# Patient Record
Sex: FEMALE | Race: WHITE | NOT HISPANIC OR LATINO | Employment: OTHER | URBAN - METROPOLITAN AREA
[De-identification: names, ages, dates, MRNs, and addresses within clinical notes are randomized per-mention and may not be internally consistent; named-entity substitution may affect disease eponyms.]

---

## 2017-01-07 ENCOUNTER — HOSPITAL ENCOUNTER (OUTPATIENT)
Dept: MRI IMAGING | Facility: HOSPITAL | Age: 72
Discharge: HOME | End: 2017-01-07
Payer: MEDICARE

## 2017-01-07 DIAGNOSIS — G50.8: ICD-10-CM

## 2017-01-07 PROCEDURE — A9585 GADOBUTROL INJECTION: HCPCS | Performed by: FAMILY MEDICINE

## 2017-01-07 PROCEDURE — 70553 MRI BRAIN STEM W/O & W/DYE: CPT

## 2017-01-07 RX ADMIN — GADOBUTROL 6 ML: 604.72 INJECTION INTRAVENOUS at 10:38

## 2017-02-15 ENCOUNTER — APPOINTMENT (EMERGENCY)
Dept: RADIOLOGY | Facility: HOSPITAL | Age: 72
End: 2017-02-15
Payer: MEDICARE

## 2017-02-15 ENCOUNTER — APPOINTMENT (EMERGENCY)
Dept: CT IMAGING | Facility: HOSPITAL | Age: 72
End: 2017-02-15
Payer: MEDICARE

## 2017-02-15 ENCOUNTER — HOSPITAL ENCOUNTER (EMERGENCY)
Facility: HOSPITAL | Age: 72
Discharge: HOME | End: 2017-02-15
Attending: EMERGENCY MEDICINE | Admitting: EMERGENCY MEDICINE
Payer: MEDICARE

## 2017-02-15 VITALS
TEMPERATURE: 97.5 F | RESPIRATION RATE: 18 BRPM | SYSTOLIC BLOOD PRESSURE: 131 MMHG | DIASTOLIC BLOOD PRESSURE: 57 MMHG | HEART RATE: 66 BPM | OXYGEN SATURATION: 96 %

## 2017-02-15 DIAGNOSIS — K59.00 CONSTIPATION: ICD-10-CM

## 2017-02-15 DIAGNOSIS — R10.84 GENERALIZED ABDOMINAL PAIN: Primary | ICD-10-CM

## 2017-02-15 LAB
ALBUMIN SERPL BCP-MCNC: 3.7 G/DL (ref 3.5–5)
ALP SERPL-CCNC: 66 U/L (ref 46–116)
ALT SERPL W P-5'-P-CCNC: 22 U/L (ref 12–78)
ANION GAP SERPL CALCULATED.3IONS-SCNC: 11 MMOL/L (ref 4–13)
AST SERPL W P-5'-P-CCNC: 16 U/L (ref 5–45)
ATRIAL RATE: 65 BPM
BASOPHILS # BLD AUTO: 0.01 THOUSANDS/ΜL (ref 0–0.1)
BASOPHILS NFR BLD AUTO: 0 % (ref 0–1)
BILIRUB SERPL-MCNC: 0.18 MG/DL (ref 0.2–1)
BUN SERPL-MCNC: 23 MG/DL (ref 5–25)
CALCIUM SERPL-MCNC: 9.1 MG/DL (ref 8.3–10.1)
CHLORIDE SERPL-SCNC: 103 MMOL/L (ref 100–108)
CO2 SERPL-SCNC: 27 MMOL/L (ref 21–32)
CREAT SERPL-MCNC: 1.1 MG/DL (ref 0.6–1.3)
EOSINOPHIL # BLD AUTO: 0.1 THOUSAND/ΜL (ref 0–0.61)
EOSINOPHIL NFR BLD AUTO: 1 % (ref 0–6)
ERYTHROCYTE [DISTWIDTH] IN BLOOD BY AUTOMATED COUNT: 14.4 % (ref 11.6–15.1)
GFR SERPL CREATININE-BSD FRML MDRD: 48.7 ML/MIN/1.73SQ M
GLUCOSE SERPL-MCNC: 110 MG/DL (ref 65–140)
HCT VFR BLD AUTO: 33.9 % (ref 34.8–46.1)
HGB BLD-MCNC: 11.7 G/DL (ref 11.5–15.4)
LIPASE SERPL-CCNC: 116 U/L (ref 73–393)
LYMPHOCYTES # BLD AUTO: 2.23 THOUSANDS/ΜL (ref 0.6–4.47)
LYMPHOCYTES NFR BLD AUTO: 26 % (ref 14–44)
MCH RBC QN AUTO: 36.8 PG (ref 26.8–34.3)
MCHC RBC AUTO-ENTMCNC: 34.5 G/DL (ref 31.4–37.4)
MCV RBC AUTO: 107 FL (ref 82–98)
MONOCYTES # BLD AUTO: 0.87 THOUSAND/ΜL (ref 0.17–1.22)
MONOCYTES NFR BLD AUTO: 10 % (ref 4–12)
NEUTROPHILS # BLD AUTO: 5.34 THOUSANDS/ΜL (ref 1.85–7.62)
NEUTS SEG NFR BLD AUTO: 63 % (ref 43–75)
NRBC BLD AUTO-RTO: 0 /100 WBCS
P AXIS: 47 DEGREES
PLATELET # BLD AUTO: 217 THOUSANDS/UL (ref 149–390)
PMV BLD AUTO: 9.7 FL (ref 8.9–12.7)
POTASSIUM SERPL-SCNC: 4 MMOL/L (ref 3.5–5.3)
PR INTERVAL: 154 MS
PROT SERPL-MCNC: 7.2 G/DL (ref 6.4–8.2)
QRS AXIS: 49 DEGREES
QRSD INTERVAL: 84 MS
QT INTERVAL: 402 MS
QTC INTERVAL: 418 MS
RBC # BLD AUTO: 3.18 MILLION/UL (ref 3.81–5.12)
SODIUM SERPL-SCNC: 141 MMOL/L (ref 136–145)
SPECIMEN SOURCE: NORMAL
T WAVE AXIS: 39 DEGREES
TROPONIN I BLD-MCNC: 0.01 NG/ML (ref 0–0.08)
VENTRICULAR RATE: 65 BPM
WBC # BLD AUTO: 8.55 THOUSAND/UL (ref 4.31–10.16)

## 2017-02-15 PROCEDURE — 99284 EMERGENCY DEPT VISIT MOD MDM: CPT

## 2017-02-15 PROCEDURE — 93005 ELECTROCARDIOGRAM TRACING: CPT | Performed by: EMERGENCY MEDICINE

## 2017-02-15 PROCEDURE — 83690 ASSAY OF LIPASE: CPT | Performed by: EMERGENCY MEDICINE

## 2017-02-15 PROCEDURE — 71275 CT ANGIOGRAPHY CHEST: CPT

## 2017-02-15 PROCEDURE — 85025 COMPLETE CBC W/AUTO DIFF WBC: CPT | Performed by: EMERGENCY MEDICINE

## 2017-02-15 PROCEDURE — 80053 COMPREHEN METABOLIC PANEL: CPT

## 2017-02-15 PROCEDURE — 36415 COLL VENOUS BLD VENIPUNCTURE: CPT

## 2017-02-15 PROCEDURE — 84484 ASSAY OF TROPONIN QUANT: CPT

## 2017-02-15 PROCEDURE — 71020 HB CHEST X-RAY 2VW FRONTAL&LATL: CPT

## 2017-02-15 PROCEDURE — 74174 CTA ABD&PLVS W/CONTRAST: CPT

## 2017-02-15 RX ADMIN — IODIXANOL 100 ML: 320 INJECTION, SOLUTION INTRAVASCULAR at 03:18

## 2017-03-21 ENCOUNTER — ALLSCRIPTS OFFICE VISIT (OUTPATIENT)
Dept: OTHER | Facility: OTHER | Age: 72
End: 2017-03-21

## 2017-03-21 ENCOUNTER — LAB REQUISITION (OUTPATIENT)
Dept: LAB | Facility: HOSPITAL | Age: 72
End: 2017-03-21
Payer: MEDICARE

## 2017-03-21 DIAGNOSIS — E78.2 MIXED HYPERLIPIDEMIA: ICD-10-CM

## 2017-03-21 LAB
ALBUMIN SERPL BCP-MCNC: 3.7 G/DL (ref 3.5–5)
ALP SERPL-CCNC: 59 U/L (ref 46–116)
ALT SERPL W P-5'-P-CCNC: 20 U/L (ref 12–78)
ANION GAP SERPL CALCULATED.3IONS-SCNC: 7 MMOL/L (ref 4–13)
AST SERPL W P-5'-P-CCNC: 11 U/L (ref 5–45)
BASOPHILS # BLD AUTO: 0.02 THOUSANDS/ΜL (ref 0–0.1)
BASOPHILS NFR BLD AUTO: 0 % (ref 0–1)
BILIRUB SERPL-MCNC: 0.34 MG/DL (ref 0.2–1)
BUN SERPL-MCNC: 22 MG/DL (ref 5–25)
CALCIUM SERPL-MCNC: 9.1 MG/DL (ref 8.3–10.1)
CHLORIDE SERPL-SCNC: 106 MMOL/L (ref 100–108)
CHOLEST SERPL-MCNC: 174 MG/DL (ref 50–200)
CO2 SERPL-SCNC: 29 MMOL/L (ref 21–32)
CREAT SERPL-MCNC: 0.95 MG/DL (ref 0.6–1.3)
EOSINOPHIL # BLD AUTO: 0.08 THOUSAND/ΜL (ref 0–0.61)
EOSINOPHIL NFR BLD AUTO: 1 % (ref 0–6)
ERYTHROCYTE [DISTWIDTH] IN BLOOD BY AUTOMATED COUNT: 14.2 % (ref 11.6–15.1)
GFR SERPL CREATININE-BSD FRML MDRD: 57.7 ML/MIN/1.73SQ M
GLUCOSE P FAST SERPL-MCNC: 86 MG/DL (ref 65–99)
HCT VFR BLD AUTO: 33 % (ref 34.8–46.1)
HDLC SERPL-MCNC: 99 MG/DL (ref 40–60)
HGB BLD-MCNC: 10.9 G/DL (ref 11.5–15.4)
LDLC SERPL CALC-MCNC: 62 MG/DL (ref 0–100)
LYMPHOCYTES # BLD AUTO: 1.7 THOUSANDS/ΜL (ref 0.6–4.47)
LYMPHOCYTES NFR BLD AUTO: 29 % (ref 14–44)
MCH RBC QN AUTO: 35.9 PG (ref 26.8–34.3)
MCHC RBC AUTO-ENTMCNC: 33 G/DL (ref 31.4–37.4)
MCV RBC AUTO: 109 FL (ref 82–98)
MONOCYTES # BLD AUTO: 0.67 THOUSAND/ΜL (ref 0.17–1.22)
MONOCYTES NFR BLD AUTO: 11 % (ref 4–12)
NEUTROPHILS # BLD AUTO: 3.43 THOUSANDS/ΜL (ref 1.85–7.62)
NEUTS SEG NFR BLD AUTO: 59 % (ref 43–75)
NRBC BLD AUTO-RTO: 0 /100 WBCS
PLATELET # BLD AUTO: 211 THOUSANDS/UL (ref 149–390)
PMV BLD AUTO: 10.2 FL (ref 8.9–12.7)
POTASSIUM SERPL-SCNC: 4.6 MMOL/L (ref 3.5–5.3)
PROT SERPL-MCNC: 6.7 G/DL (ref 6.4–8.2)
RBC # BLD AUTO: 3.04 MILLION/UL (ref 3.81–5.12)
SODIUM SERPL-SCNC: 142 MMOL/L (ref 136–145)
TRIGL SERPL-MCNC: 64 MG/DL
WBC # BLD AUTO: 5.93 THOUSAND/UL (ref 4.31–10.16)

## 2017-03-21 PROCEDURE — 80061 LIPID PANEL: CPT | Performed by: FAMILY MEDICINE

## 2017-03-21 PROCEDURE — 85025 COMPLETE CBC W/AUTO DIFF WBC: CPT | Performed by: FAMILY MEDICINE

## 2017-03-21 PROCEDURE — 80053 COMPREHEN METABOLIC PANEL: CPT | Performed by: FAMILY MEDICINE

## 2017-04-10 ENCOUNTER — ALLSCRIPTS OFFICE VISIT (OUTPATIENT)
Dept: OTHER | Facility: OTHER | Age: 72
End: 2017-04-10

## 2017-04-11 ENCOUNTER — GENERIC CONVERSION - ENCOUNTER (OUTPATIENT)
Dept: OTHER | Facility: OTHER | Age: 72
End: 2017-04-11

## 2017-05-22 ENCOUNTER — GENERIC CONVERSION - ENCOUNTER (OUTPATIENT)
Dept: OTHER | Facility: OTHER | Age: 72
End: 2017-05-22

## 2017-06-29 ENCOUNTER — ALLSCRIPTS OFFICE VISIT (OUTPATIENT)
Dept: OTHER | Facility: OTHER | Age: 72
End: 2017-06-29

## 2017-07-11 ENCOUNTER — ALLSCRIPTS OFFICE VISIT (OUTPATIENT)
Dept: OTHER | Facility: OTHER | Age: 72
End: 2017-07-11

## 2017-07-11 ENCOUNTER — LAB REQUISITION (OUTPATIENT)
Dept: LAB | Facility: HOSPITAL | Age: 72
End: 2017-07-11
Payer: MEDICARE

## 2017-07-11 DIAGNOSIS — Z78.0 ASYMPTOMATIC MENOPAUSAL STATE: ICD-10-CM

## 2017-07-11 DIAGNOSIS — D64.9 ANEMIA: ICD-10-CM

## 2017-07-11 DIAGNOSIS — I10 ESSENTIAL (PRIMARY) HYPERTENSION: ICD-10-CM

## 2017-07-11 DIAGNOSIS — I25.10 ATHEROSCLEROTIC HEART DISEASE OF NATIVE CORONARY ARTERY WITHOUT ANGINA PECTORIS: ICD-10-CM

## 2017-07-11 LAB
ALBUMIN SERPL BCP-MCNC: 3.8 G/DL (ref 3.5–5)
ALP SERPL-CCNC: 44 U/L (ref 46–116)
ALT SERPL W P-5'-P-CCNC: 18 U/L (ref 12–78)
ANION GAP SERPL CALCULATED.3IONS-SCNC: 7 MMOL/L (ref 4–13)
AST SERPL W P-5'-P-CCNC: 14 U/L (ref 5–45)
BASOPHILS # BLD AUTO: 0.02 THOUSANDS/ΜL (ref 0–0.1)
BASOPHILS NFR BLD AUTO: 0 % (ref 0–1)
BILIRUB SERPL-MCNC: 0.37 MG/DL (ref 0.2–1)
BUN SERPL-MCNC: 17 MG/DL (ref 5–25)
CALCIUM SERPL-MCNC: 9.5 MG/DL (ref 8.3–10.1)
CHLORIDE SERPL-SCNC: 105 MMOL/L (ref 100–108)
CHOLEST SERPL-MCNC: 172 MG/DL (ref 50–200)
CO2 SERPL-SCNC: 27 MMOL/L (ref 21–32)
CREAT SERPL-MCNC: 0.97 MG/DL (ref 0.6–1.3)
EOSINOPHIL # BLD AUTO: 0.06 THOUSAND/ΜL (ref 0–0.61)
EOSINOPHIL NFR BLD AUTO: 1 % (ref 0–6)
ERYTHROCYTE [DISTWIDTH] IN BLOOD BY AUTOMATED COUNT: 15 % (ref 11.6–15.1)
GFR SERPL CREATININE-BSD FRML MDRD: 56.3 ML/MIN/1.73SQ M
GLUCOSE P FAST SERPL-MCNC: 97 MG/DL (ref 65–99)
HCT VFR BLD AUTO: 35.9 % (ref 34.8–46.1)
HDLC SERPL-MCNC: 84 MG/DL (ref 40–60)
HGB BLD-MCNC: 11.9 G/DL (ref 11.5–15.4)
LDLC SERPL CALC-MCNC: 62 MG/DL (ref 0–100)
LYMPHOCYTES # BLD AUTO: 1.81 THOUSANDS/ΜL (ref 0.6–4.47)
LYMPHOCYTES NFR BLD AUTO: 22 % (ref 14–44)
MCH RBC QN AUTO: 36.5 PG (ref 26.8–34.3)
MCHC RBC AUTO-ENTMCNC: 33.1 G/DL (ref 31.4–37.4)
MCV RBC AUTO: 110 FL (ref 82–98)
MONOCYTES # BLD AUTO: 1.11 THOUSAND/ΜL (ref 0.17–1.22)
MONOCYTES NFR BLD AUTO: 14 % (ref 4–12)
NEUTROPHILS # BLD AUTO: 5 THOUSANDS/ΜL (ref 1.85–7.62)
NEUTS SEG NFR BLD AUTO: 63 % (ref 43–75)
NRBC BLD AUTO-RTO: 0 /100 WBCS
PLATELET # BLD AUTO: 247 THOUSANDS/UL (ref 149–390)
PMV BLD AUTO: 10.5 FL (ref 8.9–12.7)
POTASSIUM SERPL-SCNC: 4.7 MMOL/L (ref 3.5–5.3)
PROT SERPL-MCNC: 6.6 G/DL (ref 6.4–8.2)
RBC # BLD AUTO: 3.26 MILLION/UL (ref 3.81–5.12)
SODIUM SERPL-SCNC: 139 MMOL/L (ref 136–145)
TRIGL SERPL-MCNC: 128 MG/DL
TSH SERPL DL<=0.05 MIU/L-ACNC: 0.9 UIU/ML (ref 0.36–3.74)
WBC # BLD AUTO: 8.08 THOUSAND/UL (ref 4.31–10.16)

## 2017-07-11 PROCEDURE — 80061 LIPID PANEL: CPT | Performed by: FAMILY MEDICINE

## 2017-07-11 PROCEDURE — 80053 COMPREHEN METABOLIC PANEL: CPT | Performed by: FAMILY MEDICINE

## 2017-07-11 PROCEDURE — 85025 COMPLETE CBC W/AUTO DIFF WBC: CPT | Performed by: FAMILY MEDICINE

## 2017-07-11 PROCEDURE — 84443 ASSAY THYROID STIM HORMONE: CPT | Performed by: FAMILY MEDICINE

## 2017-07-19 ENCOUNTER — HOSPITAL ENCOUNTER (OUTPATIENT)
Dept: BONE DENSITY | Facility: MEDICAL CENTER | Age: 72
Discharge: HOME | End: 2017-07-19
Payer: MEDICARE

## 2017-07-19 DIAGNOSIS — Z78.0 ASYMPTOMATIC MENOPAUSAL STATE: ICD-10-CM

## 2017-07-19 PROCEDURE — 77080 DXA BONE DENSITY AXIAL: CPT

## 2017-07-25 ENCOUNTER — GENERIC CONVERSION - ENCOUNTER (OUTPATIENT)
Dept: OTHER | Facility: OTHER | Age: 72
End: 2017-07-25

## 2017-08-13 ENCOUNTER — ALLSCRIPTS OFFICE VISIT (OUTPATIENT)
Dept: OTHER | Facility: OTHER | Age: 72
End: 2017-08-13

## 2017-08-22 ENCOUNTER — APPOINTMENT (OUTPATIENT)
Dept: LAB | Facility: HOSPITAL | Age: 72
End: 2017-08-22
Payer: MEDICARE

## 2017-08-22 ENCOUNTER — ALLSCRIPTS OFFICE VISIT (OUTPATIENT)
Dept: OTHER | Facility: OTHER | Age: 72
End: 2017-08-22

## 2017-08-22 DIAGNOSIS — R23.3 SPONTANEOUS ECCHYMOSES: ICD-10-CM

## 2017-08-22 DIAGNOSIS — G50.8: ICD-10-CM

## 2017-08-22 LAB
BASOPHILS # BLD AUTO: 0.02 THOUSANDS/ΜL (ref 0–0.1)
BASOPHILS NFR BLD AUTO: 0 % (ref 0–1)
EOSINOPHIL # BLD AUTO: 0.1 THOUSAND/ΜL (ref 0–0.61)
EOSINOPHIL NFR BLD AUTO: 1 % (ref 0–6)
ERYTHROCYTE [DISTWIDTH] IN BLOOD BY AUTOMATED COUNT: 14.3 % (ref 11.6–15.1)
HCT VFR BLD AUTO: 33.1 % (ref 34.8–46.1)
HGB BLD-MCNC: 10.9 G/DL (ref 11.5–15.4)
LYMPHOCYTES # BLD AUTO: 1.45 THOUSANDS/ΜL (ref 0.6–4.47)
LYMPHOCYTES NFR BLD AUTO: 21 % (ref 14–44)
MCH RBC QN AUTO: 36.2 PG (ref 26.8–34.3)
MCHC RBC AUTO-ENTMCNC: 32.9 G/DL (ref 31.4–37.4)
MCV RBC AUTO: 110 FL (ref 82–98)
MONOCYTES # BLD AUTO: 0.89 THOUSAND/ΜL (ref 0.17–1.22)
MONOCYTES NFR BLD AUTO: 13 % (ref 4–12)
NEUTROPHILS # BLD AUTO: 4.46 THOUSANDS/ΜL (ref 1.85–7.62)
NEUTS SEG NFR BLD AUTO: 65 % (ref 43–75)
NRBC BLD AUTO-RTO: 0 /100 WBCS
PLATELET # BLD AUTO: 253 THOUSANDS/UL (ref 149–390)
PMV BLD AUTO: 9.8 FL (ref 8.9–12.7)
RBC # BLD AUTO: 3.01 MILLION/UL (ref 3.81–5.12)
WBC # BLD AUTO: 6.96 THOUSAND/UL (ref 4.31–10.16)

## 2017-08-22 PROCEDURE — 36415 COLL VENOUS BLD VENIPUNCTURE: CPT

## 2017-08-22 PROCEDURE — 85025 COMPLETE CBC W/AUTO DIFF WBC: CPT

## 2017-09-12 ENCOUNTER — ALLSCRIPTS OFFICE VISIT (OUTPATIENT)
Dept: OTHER | Facility: OTHER | Age: 72
End: 2017-09-12

## 2017-09-12 ENCOUNTER — TRANSCRIBE ORDERS (OUTPATIENT)
Dept: ADMINISTRATIVE | Facility: HOSPITAL | Age: 72
End: 2017-09-12

## 2017-09-12 DIAGNOSIS — G50.8: Primary | ICD-10-CM

## 2017-09-20 ENCOUNTER — LAB CONVERSION - ENCOUNTER (OUTPATIENT)
Dept: OTHER | Facility: OTHER | Age: 72
End: 2017-09-20

## 2017-09-20 LAB
A/G RATIO (HISTORICAL): 1.7 (CALC) (ref 1–2.5)
ALBUMIN SERPL BCP-MCNC: 3.9 G/DL (ref 3.6–5.1)
ALP SERPL-CCNC: 49 U/L (ref 33–130)
ALT SERPL W P-5'-P-CCNC: 13 U/L (ref 6–29)
AST SERPL W P-5'-P-CCNC: 16 U/L (ref 10–35)
BILIRUB SERPL-MCNC: 0.5 MG/DL (ref 0.2–1.2)
BUN SERPL-MCNC: 12 MG/DL (ref 7–25)
BUN/CREA RATIO (HISTORICAL): NORMAL (CALC) (ref 6–22)
CALCIUM SERPL-MCNC: 9.3 MG/DL (ref 8.6–10.4)
CARBAMAZEPINE LEVEL (HISTORICAL): <2 MG/L (ref 4–12)
CHLORIDE SERPL-SCNC: 104 MMOL/L (ref 98–110)
CO2 SERPL-SCNC: 29 MMOL/L (ref 20–31)
CREAT SERPL-MCNC: 0.84 MG/DL (ref 0.6–0.93)
EGFR AFRICAN AMERICAN (HISTORICAL): 80 ML/MIN/1.73M2
EGFR-AMERICAN CALC (HISTORICAL): 69 ML/MIN/1.73M2
GAMMA GLOBULIN (HISTORICAL): 2.3 G/DL (CALC) (ref 1.9–3.7)
GLUCOSE (HISTORICAL): 87 MG/DL (ref 65–99)
POTASSIUM SERPL-SCNC: 4.2 MMOL/L (ref 3.5–5.3)
SODIUM SERPL-SCNC: 139 MMOL/L (ref 135–146)
TOTAL PROTEIN (HISTORICAL): 6.2 G/DL (ref 6.1–8.1)

## 2017-09-27 ENCOUNTER — GENERIC CONVERSION - ENCOUNTER (OUTPATIENT)
Dept: OTHER | Facility: OTHER | Age: 72
End: 2017-09-27

## 2017-09-30 ENCOUNTER — HOSPITAL ENCOUNTER (OUTPATIENT)
Dept: MRI IMAGING | Facility: HOSPITAL | Age: 72
Discharge: HOME | End: 2017-09-30
Attending: PSYCHIATRY & NEUROLOGY
Payer: MEDICARE

## 2017-09-30 DIAGNOSIS — G50.8: ICD-10-CM

## 2017-09-30 PROCEDURE — A9585 GADOBUTROL INJECTION: HCPCS | Performed by: PSYCHIATRY & NEUROLOGY

## 2017-09-30 PROCEDURE — 70553 MRI BRAIN STEM W/O & W/DYE: CPT

## 2017-09-30 RX ADMIN — GADOBUTROL 6 ML: 604.72 INJECTION INTRAVENOUS at 08:41

## 2017-10-05 ENCOUNTER — GENERIC CONVERSION - ENCOUNTER (OUTPATIENT)
Dept: OTHER | Facility: OTHER | Age: 72
End: 2017-10-05

## 2017-10-26 NOTE — PROGRESS NOTES
Assessment  1  Transient cerebral ischemia (435 9) (G45 9)   2  Trigeminal neuritis (350 1) (G50 8)    Plan  Trigeminal neuritis    · CarBAMazepine  MG Oral Capsule Extended Release 12 Hour; 1 tab PO  daily   Rx By: Joy Lindsay; Dispense: 30 Days ; #:30 Capsule Extended Release 12 Hour; Refill: 3;For: Trigeminal neuritis; SERGIO = N; Print Rx   · (1) BUN; Status:Active; Requested for:12Sep2017;    Perform:[facility]; Due:12Sep2018; Ordered; For:Trigeminal neuritis; Ordered By:Ildefonso Moore;   · (1) COMPREHENSIVE METABOLIC PANEL; Status:Active; Requested for:12Sep2017;    Perform:[facility]; Due:12Sep2018; Ordered; For:Trigeminal neuritis; Ordered By:Ildefonso Moore;   · (1) TEGRETOL(CARBAMAZEPINE); Status:Active; Requested for:12Sep2017;    Perform:[facility]; Due:12Sep2018; Ordered; For:Trigeminal neuritis; Ordered By:Ildefonso Moore;   · * MRI BRAIN W WO CONTRAST; Status:Need Information - Financial Authorization; Requested for:12Sep2017;    Perform:[facility]; Order Comments:TOPETE and FIESTA needed with attn to the right trigeminal nerve; Due:12Sep2018; Last Updated By: Camilla Simon; 9/12/2017 9:41:00 AM;Ordered; For:Trigeminal neuritis;  Ordered By:Ildefonso Moore;    Discussion/Summary  Discussion Summary:   Pt here for trigeminal neuralgia follow upstill with constant right v2 and v3 sxsnotes sxs aggrevated by chewing and hot and cold foodsnotes sxs all on the right sidehad last mri head in jan 2017repeat mri head attn trigeminal areapt was previously on aggrenox after tia in 2011was initially on asa at that time and had a recurrent episode from our review in allrxsprior [facility], pt notified her pcp that the cost of the aggrenox was too muchduscussion and review of the data we could find, rec trial with plavixwas then placed on generic plavix as well as her asa by her pcpwas then noted to have increased ecchymosis in the lower ext and asa discontinuedecchymosis currently on the lower extto cont with plavix at this timedenies any new tia or cva like ssonia tried gabapentin for her TN in the past and no successtrial a low dose of tegretol to see if help hay with eatingto call in one week if no better and of course call if any issues with tolerating the medicationgive generic tegretol 100mg extended release once dailycan increase the tegretol level, will only do one daily and see how pt toleratesto call for any dizziness or gi related issues3 days after updated imaging  Counseling Documentation With Imm: The patient was counseled regarding diagnostic results,-- instructions for management,-- risk factor reductions,-- prognosis,-- patient and family education,-- risks and benefits of treatment options  total time of encounter was 25 minutes-- and-- greater than 50% minutes was spent counseling  Goals and Barriers: The patient has the current Goals: Pt to trial tegretolincrease if pt derrick med and still symptomaticfor any issues with new med  Patient's Capacity to Self-Care: Patient is able to Self-Care  Medication SE Review and Pt Understands Tx: Possible side effects of new medications were reviewed with the patient/guardian today  Patient Guardian understands agrees: The treatment plan was reviewed with the patient/guardian  The patient/guardian understands and agrees with the treatment plan      Chief Complaint  Chief Complaint Free Text Note Form: Patient presents today for a neurological follow up for trigeminal neuralgia  History of Present Illness  HPI: Patient is a 66year old female with PMH of RA on methotrexate for about 10 years, hypercholesterolemia, TIA in 2011 on Aggrenox, HTN, who presents today for neurological follow up  Patient initially seen on 5/20/16 for evaluation of right facial numbness  She was referred by Dr Fred Arriaga  Symptoms started acutely at the end of March 2016   Patient reports she was sick and treated with antibiotics for a sinus infection, then developed the numbness and had cold sores in her mouth  She was treated with valacyclovir and oral steroids  Symptoms predominantly numbness, no pain, facial weakness  No change in speech or swallowing  No vision changes, bowel or bladder changes, trips or falls  No vertigo  Patient had MRI of the brain 4/3/16 which showed 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  Possibilities include nerve sheath tumor vs neurosarcoid vs Lyme or Lymphoma, or demyelinating disease  Patient was seen by neurosurgery and Dr Adal Wells did not feel there was a discrete lesion, less likely tumor  Neurosurgery and neurorads considering LP  Dr Adal Wells planned to repeat MRI brain in July 2016 to see if enhancement has resolved, which would go against a neoplastic process  Prior labs with neg Lyme, ACE, Sjogren?s  Sed rate normal at 11  Of note, sed rate was elevated just before original MRI in March at 29  B12 383  Normal TSH  re rev with pt prior LP done on June 14th  WBCs 1 in the CSF, neg ACE, VZV, HSV, VDRL, Cryto, JCV, NMO  MS panel with elevated MBP at 2 2, normal IgG synthesis rate, 2 OCBs  She had repeat MRI of the brain on July 2nd 2016, ordered by Dr Adal Wells  This demonstrated persistent, but improving 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, with less involvement of the nerve root entry zone and improvement of previously seen enlargement of the nerve  This is unlikely to represent nerve sheath tumor and more likely to represent improving infectious/inflammatory process  Patient then saw Dr Adal Wells for follow up on July 8th 2016 and he told her again, not likely to be a nerve sheath tumor that would require surgery, and likely a viral process that caused the enhancement  He was pleased that this was resolving and did not feel any further neurosurgical follow up was needed at this time  here for follow up   last seen in april 2017  She continues with numbness of the right side of the face, mainly V2, V3 divisions, perioral  She denies any weakness, trouble with speech or swallowing  She does need to chew on the left side and her taste is not as great as in the past  pt feels sxs are exacerbated by hot and cold foods  pt notes sxs all on the right side  pt had last mri head in jan 2017  rec repeat mri head attn trigeminal area due to persistence of sxs  also pt was previously on aggrenox after [place] in 2011  pt was initially on asa at that time and had a recurrent episode from our review in allrxs  during appt re rev all prior tasks from july  over the summer, pt notified her pcp that the cost of the aggrenox was too much  pcp tasked with me at that time  after discission and review of the data we could find, rec trial with plavix if cheaper for pt than the aggrenox  was then placed on generic plavix as well as her asa by her pcp  pt was then noted to have increased ecchymosis in the lower ext and asa was recently discontinued  no ecchymosis currently on the lower ext on exam today  rev with pt to cont with plavix at this time  pt denies any new tia or cva like sxs  no falls or trips  no change in bowel or bladder  no change in vision  no loss of vision  no diplopia  pt has tried gabapentin for her TN in the past and no success  will trial a low dose of tegretol to see if help hay with eating  informed pt of side effects of the tegretol  pt to call in one week if no better and of course call if any issues with tolerating the medication  we will try generic tegretol 100mg extended release once daily  rev with pt that the diltiazem can increase the tegretol level and we will only do one daily and see how pt tolerates  pt to call for any dizziness or gi related issues  She had lost some weight due to the food was not tasting as good to her, but now eating better  Denies any new symptoms   I had tried her on low-dose gabapentin to see if that might help some of the more ?annoying? symptoms she was having such as some tingling, but it did not help her and she has stopped the medication  She continues to follow with rheumatology for RA and all has been stable  re rev last MRI of the brain 1/7/17 compared to 7/2/16 and 4/3/16 No acute infarction, intracranial hemorrhage or mass effect  Normal-appearing trigeminal nerves currently  Previously present tubular thickening and abnormal enhancement associated with the cisternal segment of the right 5th nerve on the 4/3/2016 MRI has resolved, indicative of prior trigeminal neuritis  will update imaging to make sure continued resolution  of note, pt is up 2 lbs since dec visit  Review of Systems  ros rev with pt at appt   Neurological ROS:   Constitutional: no fever, no chills, no recent weight gain, no recent weight loss, no complaints of feeling tired, no changes in appetite  HEENT:  no sinus problems, not feeling congested, no blurred vision, no dryness of the eyes, no eye pain, no hearing loss, no tinnitus, no mouth sores, no sore throat, no hoarseness, no dysphagia, no masses, no bleeding  Cardiovascular:  no chest pain or pressure, no palpitations present, the heart rate was not rapid or irregular, no swelling in the arms or legs, no poor circulation  Respiratory:  no unusual or persistant cough, no shortness of breath with or without exertion  Gastrointestinal:  no nausea, no vomiting, no diarrhea, no abdominal pain, no changes in bowel habits, no melena, no loss of bowel control  Genitourinary:  no incontinence, no feelings of urinary urgency, no increase in frequency, no urinary hesitancy, no dysuria, no hematuria  Musculoskeletal:  no arthralgias, no myalgias, no immobility or loss of function, no head/neck/back pain, no pain while walking  Integumentary  no masses, no rash, no skin lesions, no livedo reticularis     Psychiatric:  no anxiety, no depression, no mood swings, no psychiatric hospitalizations, no sleep problems  Endocrine  no unusual weight loss or gain, no excessive urination, no excessive thirst, no hair loss or gain, no hot or cold intolerance, no menstrual period change or irregularity, no loss of sexual ability or drive, no erection difficulty, no nipple discharge  Hematologic/Lymphatic:  no unusual bleeding, no tendency for easy bruising, no clotting skin or lumps  Neurological General:  no headache, no nausea or vomiting, no lightheadedness, no convulsions, no blackouts, no syncope, no trauma, no photopsia, no increased sleepiness, no trouble falling asleep, no snoring, no awakening at night  Neurological Mental Status:  no confusion, no mood swings, no alteration or loss of consciousness, no difficulty expressing/understanding speech, no memory problems  Neurological Cranial Nerves: facial numbness or weakness-- and-- taste or smell loss/changes  Neurological Motor findings include:  no tremor, no twitching, no cramping(pre/post exercise), no atrophy  Neurological Coordination:  no unsteadiness, no vertigo or dizziness, no clumsiness, no problems reaching for objects  Neurological Sensory:  no numbness, no pain, no tingling, does not fall when eyes closed or taking a shower  Neurological Gait:  no difficulty walking, not falling to one side, no sensation of being pushed, has not had falls  Active Problems  1  Anemia, unspecified type (285 9) (D64 9)   2  Arteriosclerotic heart disease (414 00) (I25 10)   3  Benign essential hypertension (401 1) (I10)   4  Ecchymoses, spontaneous (782 7) (R23 3)   5  Fatty liver (571 8) (K76 0)   6  Mixed hyperlipidemia (272 2) (E78 2)   7  MRI of brain abnormal (793 0) (R90 89)   8  Need for influenza vaccination (V04 81) (Z23)   9  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)   10  Post-menopause (V49 81) (Z78 0)   11  Rheumatoid arthritis (714 0) (M06 9)   12  Screening for colon cancer (V76 51) (Z12 11)   13   Seasonal allergies (477 9) (J30 2)   14  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)   15  Transient cerebral ischemia (435 9) (G45 9)   16  Trigeminal neuritis (350 1) (G50 8)    Past Medical History  1  History of Abnormal Liver Function Test (790 6)   2  History of Accelerated essential hypertension (401 0) (I10)   3  History of Acute sinusitis, recurrence not specified, unspecified location (461 9) (J01 90)   4  History of Bilateral impacted cerumen (380 4) (H61 23)   5  History of Chronic constipation (564 00) (K59 09)   6  History of Coated tongue (529 3) (K14 3)   7  History of Cough (786 2) (R05)   8  History of Cough (786 2) (R05)   9  History of Depression screening (V79 0) (Z13 89)   10  History of Dysfunction of right eustachian tube (381 81) (H69 81)   11  History of Encounter for mammogram to establish baseline mammogram (V76 12)    (Z12 31)   12  History of Encounter for screening for malignant neoplasm of breast (V76 10) (Z12 39)   13  History of Encounter for screening mammogram for breast cancer (V76 12) (Z12 31)   14  History of Encounter for screening mammogram for malignant neoplasm of breast    (V76 12) (Z12 31)   15  History of Encounter for wellness examination (V70 0) (Z00 00)   16  History of Facial numbness (782 0) (R20 0)   17  History of abdominal pain (V13 89) (Z87 898)   18  History of acute bacterial sinusitis (V12 69) (Z87 09)   19  History of acute pharyngitis (V12 69) (Z87 09)   20  History of acute sinusitis (V12 69) (Z87 09)   21  History of candidiasis of mouth (V12 09) (Z86 19)   22  History of constipation (V12 79) (Z87 19)   23  History of fever (V13 89) (Z87 898)   24  History of herpes zoster (V12 09) (Z86 19)   25  History of influenza (V12 09) (Z87 09)   26  History of psoriatic arthritis (V13 4) (Z87 2)   27  History of Medicare annual wellness visit, subsequent (V70 0) (Z00 00)   28  History of Need for pneumococcal vaccine (V03 82) (Z23)   29   History of Need for prophylactic vaccination and inoculation against influenza (V04 81)    (Z23)   30  History of Recent weight loss (783 21) (R63 4)   31  History of Screening for colon cancer (V76 51) (Z12 11)   32  History of Screening for colon cancer (V76 51) (Z12 11)   33  History of Screening for depression (V79 0) (Z13 89)   34  History of Screening for osteoporosis (V82 81) (Z13 820)   35  History of Screening for other and unspecified genitourinary condition (V81 6) (Z13 89)   36  History of Special screening for other neurological conditions (V80 09) (Z13 89)   37  History of Special screening for other neurological conditions (V80 09) (Z13 89)  Active Problems And Past Medical History Reviewed: The active problems and past medical history were reviewed and updated today  Surgical History  1  History of Appendectomy   2  History of Foot Surgery   3  History of Kidney Surgery  Surgical History Reviewed: The surgical history was reviewed and updated today  Family History  Father    1  No pertinent family history  Family History Reviewed: The family history was reviewed and updated today  Social History   · Denied: History of Drug use   · High school graduate   ·    · Never A Smoker   · No alcohol use   · Retired  Social History Reviewed: The social history was reviewed and updated today  Current Meds   1  Clopidogrel Bisulfate 75 MG Oral Tablet; take 1 tablet by mouth once daily; Therapy: 63VYP4139 to (Evaluate:26Nov2017); Last Rx:06Nfs7524 Ordered   2  DilTIAZem HCl ER Beads 240 MG Oral Capsule Extended Release 24 Hour; TAKE 1   CAPSULE DAILY IN THE MORNING; Therapy: 11VYC9131 to (Evaluate:44Fvc7070)  Requested for: 21Mar2017; Last   Rx:21Mar2017 Ordered   3  Fluticasone Propionate 50 MCG/ACT Nasal Suspension; USE 2 SPRAYS IN EACH   NOSTRIL ONCE DAILY; Therapy: 57OGY3423 to (Last Brewsterwisam Rapp)  Requested for: 39TTP0425; Status: ACTIVE   - Transmit to Pharmacy - Awaiting Verification Ordered   4   Folic Acid 1 MG Oral Tablet; TABS PO X 30; Therapy: 84TIQ3620 to (Last Rx:44Jpn2778)  Requested for: 97BZH7675 Ordered   5  Methotrexate 2 5 MG Oral Tablet; TAKE 6 TABLETS WEEKLY; Therapy: 37Ltd2640 to (Evaluate:42Jfp7654)  Requested for: 85Gpp6791 Recorded   6  PredniSONE 5 MG Oral Tablet; Take five tabs x 1 d, 4 tabs x1d, 3 tabs x 1d, 2 tabs   x 1 d , 1 tab x 1 d; Therapy: 53XWP3278 to (Last Rx:60Yma5791)  Requested for: 13IPM5668 Ordered   7  Simvastatin 40 MG Oral Tablet; TAKE 1 TABLET DAILY AS DIRECTED; Therapy: 88QKZ4995 to (Evaluate:90Dms0624)  Requested for: 07VJS8023; Last   Rx:61Uwy6333 Ordered   8  Valsartan 320 MG Oral Tablet; take 1 tablet once daily; Therapy: 82HKH5591 to (Evaluate:16Mar2018)  Requested for: 21Mar2017; Last   Rx:21Mar2017 Ordered   9  Xalatan 0 005 % Ophthalmic Solution; INSTILL 1 DROP IN BOTH EYES AT BEDTIME; Therapy: 44CLZ4606 to Recorded  Medication List Reviewed: The medication list was reviewed and updated today  Allergies  1  Amoxicillin CAPS   2  Penicillins    Vitals  Signs   Recorded: 12Sep2017 08:53AM   Heart Rate: 65, L Radial  Respiration: 16  Systolic: 418, LUE, Sitting  Diastolic: 62, LUE, Sitting  Height: 5 ft 4 in  Weight: 137 lb 8 oz  BMI Calculated: 23 6  BSA Calculated: 1 67    Physical Exam    Constitutional   General appearance: No acute distress, well appearing and well nourished  -- pos peripheral pulses lillie  Eyes   Ophthalmoscopic examination: Vision is grossly normal  Gross visual field testing by confrontation shows no abnormalities  EOMI in both eyes  Conjunctivae clear  Eyelids normal palpebral fissures equal  Orbits exhibit normal position  No discharge from the eyes  PERRL  -- disc flat  Musculoskeletal   Gait and station: Normal gait, stance and balance  Muscle strength: Normal strength throughout  Muscle tone: No atrophy, abnormal movements, flaccidity, cogwheeling or spasticity  Involuntary movements: None observed      Neurologic Orientation to person, place, and time: Normal     Recent and remote memory: Demonstrates normal memory  Attention span and concentration: Normal thought process and attention span  Language: Names objects, able to repeat phrases and speaks spontaneously  Fund of knowledge: Normal vocabulary with appropriate knowledge of current events and past history  2nd cranial nerve: Normal     3rd, 4th, and 6th cranial nerves: Normal     5th cranial nerve: Abnormal  -- decreased sensation maximum right v2 and v3 greater than v1    7th cranial nerve: Normal     8th cranial nerve: Normal     9th cranial nerve: Normal     11th cranial nerve: Normal     12th cranial nerve: Normal     Sensation: Normal     Reflexes: Normal     Coordination: Normal     Cortical function: Normal     Mood and affect: Normal        Results/Data  Diagnostic Studies Reviewed: I personally reviewed the films/images/results in the office today  My interpretation follows  Diagnostic Review see hpi        Future Appointments    Date/Time Provider Specialty Site   12/19/2017 08:00 AM Karmen Martini   03/12/2018 09:00 AM Shawna Dempsey, [facility] [place] [address]     Signatures   Electronically signed by : DARYL Chowdary ; Sep 12 2017 10:05AM EST                       (Author)

## 2017-10-31 DIAGNOSIS — Z12.31 ENCOUNTER FOR SCREENING MAMMOGRAM FOR MALIGNANT NEOPLASM OF BREAST: ICD-10-CM

## 2017-12-07 ENCOUNTER — ALLSCRIPTS OFFICE VISIT (OUTPATIENT)
Dept: OTHER | Facility: OTHER | Age: 72
End: 2017-12-07

## 2017-12-11 ENCOUNTER — HOSPITAL ENCOUNTER (OUTPATIENT)
Dept: MAMMOGRAPHY | Facility: MEDICAL CENTER | Age: 72
Discharge: HOME | End: 2017-12-11
Payer: MEDICARE

## 2017-12-11 DIAGNOSIS — Z12.31 ENCOUNTER FOR SCREENING MAMMOGRAM FOR MALIGNANT NEOPLASM OF BREAST: ICD-10-CM

## 2017-12-11 PROCEDURE — G0202 SCR MAMMO BI INCL CAD: HCPCS

## 2017-12-19 ENCOUNTER — ALLSCRIPTS OFFICE VISIT (OUTPATIENT)
Dept: OTHER | Facility: OTHER | Age: 72
End: 2017-12-19

## 2017-12-19 NOTE — PROGRESS NOTES
Assessment  1  Acute sinusitis, recurrence not specified, unspecified location (461 9) (J01 90)    Plan  Acute sinusitis, recurrence not specified, unspecified location    · Promethazine-Codeine 6 25-10 MG/5ML Oral Syrup; TAKE 1 TSP Every 6 hoursPRN    Discussion/Summary    Discussed OTC cold meds  Fever Care, ER instructions given  F/U 5-7 days if not resolved  Possible side effects of new medications were reviewed with the patient/guardian today  The treatment plan was reviewed with the patient/guardian  The patient/guardian understands and agrees with the treatment plan      Chief Complaint  Pt c/o cough, congestion, and sneezing x 2 weeks  History of Present Illness  HPI: Pt  presents with 1-2 week history of dry cough with intermittent productivity, PND, sneezing, scratchy throat, nasal congestion, chills  Denies fever, N/V/D  She has taken Coricedin HBP and Flonase  Denies hx of asthma/allergies  Does not smoke  Review of Systems   Constitutional: as noted in HPI   ENT: as noted in HPI  Cardiovascular: no complaints of slow or fast heart rate, no chest pain, no palpitations, no leg claudication or lower extremity edema  Respiratory: as noted in HPI  Gastrointestinal: no complaints of abdominal pain, no constipation, no nausea or diarrhea, no vomiting, no bloody stools  Genitourinary: no complaints of dysuria, no incontinence, no pelvic pain, no dysmenorrhea, no vaginal discharge or abnormal vaginal bleeding  Active Problems  1  Anemia, unspecified type (285 9) (D64 9)   2  Arteriosclerotic heart disease (414 00) (I25 10)   3  Benign essential hypertension (401 1) (I10)   4  Ecchymoses, spontaneous (782 7) (R23 3)   5  Encounter for screening mammogram for breast cancer (V76 12) (Z12 31)   6  Fatty liver (571 8) (K76 0)   7  Mixed hyperlipidemia (272 2) (E78 2)   8  MRI of brain abnormal (793 0) (R90 89)   9  Need for influenza vaccination (V04 81) (Z23)   10   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)   11  Post-menopause (V49 81) (Z78 0)   12  Rheumatoid arthritis (714 0) (M06 9)   13  Screening for colon cancer (V76 51) (Z12 11)   14  Seasonal allergies (477 9) (J30 2)   15  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)   16  Transient cerebral ischemia (435 9) (G45 9)   17  Trigeminal neuritis (350 1) (G50 8)    Past Medical History  1  History of Abnormal Liver Function Test (790 6)   2  History of Accelerated essential hypertension (401 0) (I10)   3  History of Acute sinusitis, recurrence not specified, unspecified location (461 9) (J01 90)   4  History of Bilateral impacted cerumen (380 4) (H61 23)   5  History of Chronic constipation (564 00) (K59 09)   6  History of Coated tongue (529 3) (K14 3)   7  History of Cough (786 2) (R05)   8  History of Cough (786 2) (R05)   9  History of Depression screening (V79 0) (Z13 89)   10  History of Dysfunction of right eustachian tube (381 81) (H69 81)   11  History of Encounter for mammogram to establish baseline mammogram (V76 12)  (Z12 31)   12  History of Encounter for screening for malignant neoplasm of breast (V76 10) (Z12 39)   13  History of Encounter for screening mammogram for breast cancer (V76 12) (Z12 31)   14  History of Encounter for screening mammogram for malignant neoplasm of breast  (V76 12) (Z12 31)   15  History of Encounter for wellness examination (V70 0) (Z00 00)   16  History of Facial numbness (782 0) (R20 0)   17  History of abdominal pain (V13 89) (Z87 898)   18  History of acute bacterial sinusitis (V12 69) (Z87 09)   19  History of acute pharyngitis (V12 69) (Z87 09)   20  History of acute sinusitis (V12 69) (Z87 09)   21  History of candidiasis of mouth (V12 09) (Z86 19)   22  History of constipation (V12 79) (Z87 19)   23  History of fever (V13 89) (Z87 898)   24  History of herpes zoster (V12 09) (Z86 19)   25  History of influenza (V12 09) (Z87 09)   26  History of psoriatic arthritis (V13 4) (Z87 2)   27   History of Medicare annual wellness visit, subsequent (V70 0) (Z00 00)   28  History of Need for pneumococcal vaccine (V03 82) (Z23)   29  History of Need for prophylactic vaccination and inoculation against influenza (V04 81)  (Z23)   30  History of Recent weight loss (783 21) (R63 4)   31  History of Screening for colon cancer (V76 51) (Z12 11)   32  History of Screening for colon cancer (V76 51) (Z12 11)   33  History of Screening for depression (V79 0) (Z13 89)   34  History of Screening for osteoporosis (V82 81) (Z13 820)   35  History of Screening for other and unspecified genitourinary condition (V81 6) (Z13 89)   36  History of Special screening for other neurological conditions (V80 09) (Z13 89)   37  History of Special screening for other neurological conditions (V80 09) (Z13 89)    Family History  Father    1  No pertinent family history    Social History   · Denied: History of Drug use   · High school graduate   ·    · Never A Smoker   · No alcohol use   · Retired  The social history was reviewed and is unchanged  Surgical History  1  History of Appendectomy   2  History of Foot Surgery   3  History of Kidney Surgery    Current Meds   1  Clopidogrel Bisulfate 75 MG Oral Tablet; Take 1 tablet daily; Therapy: 10MBN4499 to (Pattie Melgar)  Requested for: 33LMC2072; Last Rx:28Nov2017 Ordered   2  DilTIAZem HCl ER Beads 240 MG Oral Capsule Extended Release 24 Hour; TAKE 1 CAPSULE DAILY IN THE MORNING; Therapy: 75QNH1844 to (Evaluate:17Mar2018)  Requested for: 69Nwb3587; Last Rx:52Vhk2377 Ordered   3  Fluticasone Propionate 50 MCG/ACT Nasal Suspension; USE 2 SPRAYS IN EACH NOSTRIL ONCE DAILY; Therapy: 73QQP9115 to (Last Dewayne Schillings)  Requested for: 17BAU5554; Status: ACTIVE - Transmit to Pharmacy - Awaiting Verification Ordered   4  Folic Acid 1 MG Oral Tablet; TABS PO X 30; Therapy: 74BRM8400 to (Last Rx:53Fku6053)  Requested for: 49EEB3892 Ordered   5  Methotrexate 2 5 MG Oral Tablet; TAKE 6 TABLETS WEEKLY;  Therapy: 23QHY0025 to (Evaluate:82Zje6147)  Requested for: 14Apr2015 Recorded   6  Simvastatin 40 MG Oral Tablet; TAKE 1 TABLET DAILY AS DIRECTED; Therapy: 28ION2652 to (Evaluate:31Mar2018)  Requested for: 14SBF2421; Last Rx:03Oct2017 Ordered   7  Valsartan 320 MG Oral Tablet; take 1 tablet once daily; Therapy: 25POX6129 to (Evaluate:16Mar2018)  Requested for: 21Mar2017; Last Rx:21Mar2017 Ordered   8  Xalatan 0 005 % Ophthalmic Solution; INSTILL 1 DROP IN BOTH EYES AT BEDTIME; Therapy: 45KEW7183 to Recorded    The medication list was reviewed and updated today  Allergies  1  Amoxicillin CAPS   2  Penicillins    Vitals   Recorded: 76RUS5601 05:07PM   Temperature 97 8 F, Tympanic   Heart Rate 74   Respiration Quality Normal   Respiration 17   Systolic 510, LUE, Sitting   Diastolic 66, LUE, Sitting   Weight 134 lb 8 oz   BMI Calculated 23 09   BSA Calculated 1 65   O2 Saturation 99, RA   Pain Scale 0     Physical Exam   Constitutional  General appearance: No acute distress, well appearing and well nourished  Ears, Nose, Mouth, and Throat  External inspection of ears and nose: Normal    Otoscopic examination: Tympanic membranes translucent with normal light reflex  Canals patent without erythema  Nasal mucosa, septum, and turbinates: Abnormal  -- B/L boggy erythematous turbinates  Oropharynx: Abnormal  -- PND; no exudates  Pulmonary  Respiratory effort: No increased work of breathing or signs of respiratory distress  Auscultation of lungs: Clear to auscultation  Cardiovascular  Auscultation of heart: Normal rate and rhythm, normal S1 and S2, without murmurs  Lymphatic  Palpation of lymph nodes in neck: No lymphadenopathy  Psychiatric  Orientation to person, place, and time: Normal    Mood and affect: Normal    Additional Exam:  Vital signs were reviewed; neck supple          Future Appointments    Date/Time Provider Specialty Site   12/19/2017 08:00 AM Karmen Hutchison [facility] 09[facility] [address]     Signatures   Electronically signed by :  Umair Majano [facility]; Dec  8 2017  5:23PM EST                       (Author)    Electronically signed by : José Joshi DO; Dec 19 2017  4:17AM EST                       (Co-author)

## 2017-12-20 ENCOUNTER — LAB CONVERSION - ENCOUNTER (OUTPATIENT)
Dept: OTHER | Facility: OTHER | Age: 72
End: 2017-12-20

## 2017-12-20 LAB
A/G RATIO (HISTORICAL): 1.5 (CALC) (ref 1–2.5)
ALBUMIN SERPL BCP-MCNC: 4.1 G/DL (ref 3.6–5.1)
ALP SERPL-CCNC: 49 U/L (ref 33–130)
ALT SERPL W P-5'-P-CCNC: 10 U/L (ref 6–29)
AST SERPL W P-5'-P-CCNC: 17 U/L (ref 10–35)
BILIRUB SERPL-MCNC: 0.6 MG/DL (ref 0.2–1.2)
BUN SERPL-MCNC: 25 MG/DL (ref 7–25)
BUN/CREA RATIO (HISTORICAL): 23 (CALC) (ref 6–22)
CALCIUM SERPL-MCNC: 9.6 MG/DL (ref 8.6–10.4)
CHLORIDE SERPL-SCNC: 103 MMOL/L (ref 98–110)
CHOLEST SERPL-MCNC: 168 MG/DL
CHOLEST/HDLC SERPL: 2 (CALC)
CO2 SERPL-SCNC: 25 MMOL/L (ref 20–31)
CREAT SERPL-MCNC: 1.11 MG/DL (ref 0.6–0.93)
EGFR AFRICAN AMERICAN (HISTORICAL): 57 ML/MIN/1.73M2
EGFR-AMERICAN CALC (HISTORICAL): 49 ML/MIN/1.73M2
GAMMA GLOBULIN (HISTORICAL): 2.8 G/DL (CALC) (ref 1.9–3.7)
GLUCOSE (HISTORICAL): 102 MG/DL (ref 65–99)
HDLC SERPL-MCNC: 86 MG/DL
LDL CHOLESTEROL (HISTORICAL): 62 MG/DL (CALC)
NON-HDL-CHOL (CHOL-HDL) (HISTORICAL): 82 MG/DL (CALC)
POTASSIUM SERPL-SCNC: 4.4 MMOL/L (ref 3.5–5.3)
SODIUM SERPL-SCNC: 140 MMOL/L (ref 135–146)
TOTAL PROTEIN (HISTORICAL): 6.9 G/DL (ref 6.1–8.1)
TRIGL SERPL-MCNC: 118 MG/DL
TSH SERPL DL<=0.05 MIU/L-ACNC: 1.15 MIU/L (ref 0.4–4.5)

## 2017-12-20 NOTE — PROGRESS NOTES
Assessment  1  Medicare annual wellness visit, subsequent (V70 0) (Z00 00)   2  Screening for colon cancer (V76 51) (Z12 11)   3  Benign essential hypertension (401 1) (I10)   4  Mixed hyperlipidemia (272 2) (E78 2)   5  Tinea corporis (110 5) (B35 4)   6  Screening for neurological condition (V80 09) (Z13 89)   7  Screening for genitourinary condition (V81 6) (Z13 89)   8  Depression screening (V79 0) (Z13 89)    Plan  Benign essential hypertension, Fatty liver, Mixed hyperlipidemia    · Routine Venipuncture - POC; Status:Complete;   Done: 86MSE6404  Benign essential hypertension, Mixed hyperlipidemia    · (1) COMPREHENSIVE METABOLIC PANEL; Status:Active; Requested for:29Wpa6127;    · (1) LIPID PANEL FASTING W DIRECT LDL REFLEX; Status:Active; Requestedfor:14Fmt7488;    · (1) TSH WITH FT4 REFLEX; Status:Active; Requested for:54Iel8278;   Depression screening    · *VB-Depression Screening; Status:Complete - Retrospective By Protocol Authorization;  Done: 51VSB2272 10:10AM  Medicare annual wellness visit, subsequent, Screening for colon cancer    · (1) OCCULT BLOOD, FECAL IMMUNOCHEMICAL TEST; Status:Active; Requestedfor:04Bmf2525;   Screening for genitourinary condition    · *VB - Urinary Incontinence Screen (Dx Z13 89 Screen for UI); Status:Complete -Retrospective By Protocol Authorization;   Done: 27HIY7318 10:10AM  Screening for neurological condition    · *VB - Fall Risk Assessment  (Dx Z13 89 Screen for Neurologic Disorder);Status:Complete - Retrospective By Protocol Authorization;   Done: 25QMF5555 10:09AM  Tinea corporis    · Clotrimazole 1 % External Cream; APPLY SPARINGLY TO AFFECTED AREA(S)TWICE DAILY    Discussion/Summary    Patient is a 67-year-old female with hypertension  Her blood pressure is well controlled today  We are drawing blood work to check her cholesterol   She has a rash under her breasts which I believe is tinea and so I prescribed clotrimazole cream  Her rheumatologist has decreased her visits to once a year  She still follows with Dr Celine Chaudhry for her trigeminal neuritis and they had prescribe carbamazepine but patient was hesitant to take it  I will see her back in 4 months  Possible side effects of new medications were reviewed with the patient/guardian today  The treatment plan was reviewed with the patient/guardian  The patient/guardian understands and agrees with the treatment plan      Chief Complaint  Pt presents for a follow up to her BP  Patient is here today for follow up of chronic conditions described in HPI  History of Present Illness  The patient is here today for a follow-up visit  Her hyperlipidemia has been stable  the patient is adherent with her medication regimen  Her hypertension is primary, but-- stable  the patient is adherent with her medication regimen  She has no significant interval events  Symptoms: The patient denies any symptoms currently  Lifestyle and Disease Management:    Patient is here for follow up of hypertension  She recently finished Doxycycline and it upset her stomach  Still stuffy  She also has a rash under her breast       Review of Systems   Constitutional: No fever, no chills, feels well, no tiredness, no recent weight gain or weight loss  ENT: as noted in HPI  Cardiovascular: No complaints of slow heart rate, no fast heart rate, no chest pain, no palpitations, no leg claudication, no lower extremity edema  Respiratory: as noted in HPI  Integumentary: as noted in HPI  Neurological: Abnormal sensation across right cheek  Active Problems  1  Acute sinusitis, recurrence not specified, unspecified location (461 9) (J01 90)   2  Anemia, unspecified type (285 9) (D64 9)   3  Arteriosclerotic heart disease (414 00) (I25 10)   4  Benign essential hypertension (401 1) (I10)   5  Ecchymoses, spontaneous (782 7) (R23 3)   6  Encounter for screening mammogram for breast cancer (V76 12) (Z12 31)   7  Fatty liver (571 8) (K76 0)   8   Mixed hyperlipidemia (272 2) (E78 2)   9  MRI of brain abnormal (793 0) (R90 89)   10  Need for influenza vaccination (V04 81) (Z23)   11  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)   12  Post-menopause (V49 81) (Z78 0)   13  Rheumatoid arthritis (714 0) (M06 9)   14  Screening for colon cancer (V76 51) (Z12 11)   15  Seasonal allergies (477 9) (J30 2)   16  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)   17  Transient cerebral ischemia (435 9) (G45 9)   18  Trigeminal neuritis (350 1) (G50 8)    Past Medical History  1  History of Abnormal Liver Function Test (790 6)   2  History of Accelerated essential hypertension (401 0) (I10)   3  History of Acute sinusitis, recurrence not specified, unspecified location (461 9) (J01 90)   4  History of Bilateral impacted cerumen (380 4) (H61 23)   5  History of Chronic constipation (564 00) (K59 09)   6  History of Coated tongue (529 3) (K14 3)   7  History of Cough (786 2) (R05)   8  History of Cough (786 2) (R05)   9  History of Depression screening (V79 0) (Z13 89)   10  History of Dysfunction of right eustachian tube (381 81) (H69 81)   11  History of Encounter for mammogram to establish baseline mammogram (V76 12)  (Z12 31)   12  History of Encounter for screening for malignant neoplasm of breast (V76 10) (Z12 39)   13  History of Encounter for screening mammogram for breast cancer (V76 12) (Z12 31)   14  History of Encounter for screening mammogram for malignant neoplasm of breast  (V76 12) (Z12 31)   15  History of Encounter for wellness examination (V70 0) (Z00 00)   16  History of Facial numbness (782 0) (R20 0)   17  History of abdominal pain (V13 89) (Z87 898)   18  History of acute bacterial sinusitis (V12 69) (Z87 09)   19  History of acute pharyngitis (V12 69) (Z87 09)   20  History of acute sinusitis (V12 69) (Z87 09)   21  History of candidiasis of mouth (V12 09) (Z86 19)   22  History of constipation (V12 79) (Z87 19)   23  History of fever (V13 89) (Z87 898)   24   History of herpes zoster (V12 09) (Z86 19)   25  History of influenza (V12 09) (Z87 09)   26  History of psoriatic arthritis (V13 4) (Z87 2)   27  History of Need for pneumococcal vaccine (V03 82) (Z23)   28  History of Need for prophylactic vaccination and inoculation against influenza (V04 81)  (Z23)   29  History of Recent weight loss (783 21) (R63 4)   30  History of Screening for colon cancer (V76 51) (Z12 11)   31  History of Screening for colon cancer (V76 51) (Z12 11)   32  History of Screening for depression (V79 0) (Z13 89)   33  History of Screening for osteoporosis (V82 81) (Z13 820)   34  History of Screening for other and unspecified genitourinary condition (V81 6) (Z13 89)   35  History of Special screening for other neurological conditions (V80 09) (Z13 89)   36  History of Special screening for other neurological conditions (V80 09) (Z13 89)    The active problems and past medical history were reviewed and updated today  Surgical History  1  History of Appendectomy   2  History of Foot Surgery   3  History of Kidney Surgery    Family History  Father    1  No pertinent family history    Social History   · Denied: History of Drug use   · High school graduate   ·    · Never A Smoker   · No alcohol use   · Retired  The social history was reviewed and updated today  The social history was reviewed and is unchanged  Current Meds   1  Clopidogrel Bisulfate 75 MG Oral Tablet; Take 1 tablet daily; Therapy: 56NIK5455 to (Flora Medin)  Requested for: 40GSG9011; Last Rx:28Nov2017 Ordered   2  DilTIAZem HCl ER Beads 240 MG Oral Capsule Extended Release 24 Hour; TAKE 1 CAPSULE DAILY IN THE MORNING; Therapy: 12TBN7403 to (Evaluate:17Mar2018)  Requested for: 76Tpz2390; Last Rx:88Umf2429 Ordered   3  Fluticasone Propionate 50 MCG/ACT Nasal Suspension; USE 2 SPRAYS IN EACH NOSTRIL ONCE DAILY;  Therapy: 02LAT9065 to (Last Channing Lisa)  Requested for: 41CMJ8675; Status: [address] marjorie Andres Verification Ordered   4  Folic Acid 1 MG Oral Tablet; TABS PO X 30; Therapy: 95PFM6657 to (Last Rx:48Lkt1223)  Requested for: 04GET2131 Ordered   5  Methotrexate 2 5 MG Oral Tablet; TAKE 6 TABLETS WEEKLY; Therapy: 36Sga0712 to (Evaluate:52Ixv2022)  Requested for: 62Wxj5456 Recorded   6  Simvastatin 40 MG Oral Tablet; TAKE 1 TABLET DAILY AS DIRECTED; Therapy: 07DWZ3404 to (Evaluate:31Mar2018)  Requested for: 57OLK5112; Last Rx:03Oct2017 Ordered   7  Valsartan 320 MG Oral Tablet; take 1 tablet once daily; Therapy: 04CHQ6940 to (Evaluate:16Mar2018)  Requested for: 21Mar2017; Last Rx:21Mar2017 Ordered   8  Xalatan 0 005 % Ophthalmic Solution; INSTILL 1 DROP IN BOTH EYES AT BEDTIME; Therapy: 39WGZ3309 to Recorded    The medication list was reviewed and updated today  Allergies  1  Amoxicillin CAPS   2  Penicillins    Vitals  Vital Signs    Recorded: 11UVX7126 08:05AM   Temperature 97 6 F, Tympanic   Heart Rate 76   Respiration Quality Normal   Respiration 16   Systolic 565, LUE, Sitting   Diastolic 60, LUE, Sitting   Height 5 ft 4 5 in   Weight 131 lb 7 oz   BMI Calculated 22 21   BSA Calculated 1 65   O2 Saturation 99, RA   Pain Scale 0     Physical Exam   Constitutional  General appearance: No acute distress, well appearing and well nourished  Ears, Nose, Mouth, and Throat  External inspection of ears and nose: Normal    Otoscopic examination: Tympanic membranes translucent with normal light reflex  Canals patent without erythema  Oropharynx: Normal with no erythema, edema, exudate or lesions  Pulmonary  Respiratory effort: No increased work of breathing or signs of respiratory distress  Auscultation of lungs: Clear to auscultation  Cardiovascular  Auscultation of heart: Normal rate and rhythm, normal S1 and S2, without murmurs  Examination of extremities for edema and/or varicosities: Normal    Carotid pulses: Normal    Abdomen  Abdomen: Non-tender, no masses     Lymphatic  Palpation of lymph nodes in neck: No lymphadenopathy     Musculoskeletal  Gait and station: Normal    Psychiatric  Orientation to person, place, and time: Normal    Mood and affect: Normal          Results/Data  *VB - Urinary Incontinence Screen (Dx Z13 89 Screen for UI) 84GYE1908 10:10AM [place]     Test Name Result Flag Reference   Urinary Incontinence Assessment 76WVR6081       *VB-Depression Screening 64EFR2887 10:10AM [place] [facility]     Test Name Result Flag Reference   Depression Scale Result      Depression Screen - Negative For Symptoms     *VB - Fall Risk Assessment  (Dx Z13 89 Screen for Neurologic Disorder) 48XHR4599 10:09AM [place] [facility]     Test Name Result Flag Reference   Falls Risk      No falls in the past year       Future Appointments    Date/Time Provider Specialty Site   03/12/2018 09[facility]00 [facility] [address]     Signatures   Electronically signed by : Destini Ramos DO; Dec 19 2017 11:58PM EST                       (Author)

## 2018-01-09 NOTE — RESULT NOTES
Verified Results  (1) COMPREHENSIVE METABOLIC PANEL 11XJC2302 70:39TP Priscilla Gutierrezri   TW Order Number: [id number]      National Kidney Disease Education Program recommendations are as follows:  GFR calculation is accurate only with a steady state creatinine  Chronic Kidney disease less than 60 ml/min/1 73 sq  meters  Kidney failure less than 15 ml/min/1 73 sq  meters  Test Name Result Flag Reference   GLUCOSE,RANDM 94 mg/dL     If the patient is fasting, the ADA then defines impaired fasting glucose as > 100 mg/dL and diabetes as > or equal to 123 mg/dL  SODIUM 139 mmol/L  136-145   POTASSIUM 4 6 mmol/L  3 5-5 3   CHLORIDE 106 mmol/L  100-108   CARBON DIOXIDE 28 mmol/L  21-32   ANION GAP (CALC) 5 mmol/L  4-13   BLOOD UREA NITROGEN 15 mg/dL  5-25   CREATININE 0 92 mg/dL  0 60-1 30   Standardized to IDMS reference method   CALCIUM 8 8 mg/dL  8 3-10 1   BILI, TOTAL 0 47 mg/dL  0 20-1 00   ALK PHOSPHATAS 53 U/L     ALT (SGPT) 17 U/L  12-78   AST(SGOT) 11 U/L  5-45   ALBUMIN 3 7 g/dL  3 5-5 0   TOTAL PROTEIN 6 6 g/dL  6 4-8 2   eGFR Non-African American 60 0 ml/min/1 73sq m       (1) CBC/PLT/DIFF 78QAS8200 09:55AM Priscilla Boles     Test Name Result Flag Reference   WBC COUNT 6 56 Thousand/uL  4 31-10 16   RBC COUNT 3 19 Million/uL L 3 81-5 12   HEMOGLOBIN 11 3 g/dL L 11 5-15 4   HEMATOCRIT 34 4 % L 34 8-46  1    fL H 82-98   MCH 35 4 pg H 26 8-34 3   MCHC 32 8 g/dL  31 4-37 4   RDW 15 5 % H 11 6-15 1   MPV 10 3 fL  8 9-12 7   PLATELET COUNT 173 Thousands/uL  149-390   nRBC AUTOMATED 0 /100 WBCs     NEUTROPHILS RELATIVE PERCENT 62 %  43-75   LYMPHOCYTES RELATIVE PERCENT 21 %  14-44   MONOCYTES RELATIVE PERCENT 15 % H 4-12   EOSINOPHILS RELATIVE PERCENT 2 %  0-6   BASOPHILS RELATIVE PERCENT 0 %  0-1   NEUTROPHILS ABSOLUTE COUNT 3 99 Thousands/µL  1 85-7 62   LYMPHOCYTES ABSOLUTE COUNT 1 40 Thousands/µL  0 60-4 47   MONOCYTES ABSOLUTE COUNT 0 96 Thousand/µL  0 17-1 22   EOSINOPHILS ABSOLUTE COUNT 0 14 Thousand/µL  0 00-0 61   BASOPHILS ABSOLUTE COUNT 0 02 Thousands/µL  0 00-0 10     (1) LIPID PANEL, FASTING 81MFV2302 09:55AM Sonia Cid   Triglyceride:         Normal              <150 mg/dl       Borderline High    150-199 mg/dl       High               200-499 mg/dl       Very High          >499 mg/dl  Cholesterol:         Desirable        <200 mg/dl      Borderline High  200-239 mg/dl      High             >239 mg/dl  HDL Cholesterol:        High    >59 mg/dL      Low     <41 mg/dL     Test Name Result Flag Reference   CHOLESTEROL 149 mg/dL     HDL,DIRECT 75 mg/dL H 40-60   LDL CHOLESTEROL CALCULATED 51 mg/dL  0-100   TRIGLYCERIDES 114 mg/dL  <=150     (1) TSH WITH FT4 REFLEX 91TQS9926 09:55AM Sonia Cid   Patients undergoing fluorescein dye angiography may retain small amounts of fluorescein in the body for 48-72 hours post procedure  Samples containing fluorescein can produce falsely depressed TSH values  If the patient had this procedure,a specimen should be resubmitted post fluorescein clearance          The recommended reference ranges for TSH during pregnancy are as follows:  First trimester 0 1 to 2 5 uIU/mL  Second trimester  0 2 to 3 0 uIU/mL  Third trimester 0 3 to 3 0 uIU/m     Test Name Result Flag Reference   TSH 1 270 uIU/mL  0 358-3 740     (1) SED RATE 75XIC0284 09:55AM Sonia Cid     Test Name Result Flag Reference   SED RATE 34 mm/hour H 0-20

## 2018-01-09 NOTE — RESULT NOTES
Verified Results  (1) CSF CULTURE 11APF0646 02:21PM Gus Odor     Test Name Result Flag Reference   CLINICAL REPORT (Report)     Test:        CSF culture  Specimen Source:  Lumbar Puncture  Specimen Type:   Cerebrospinal Fluid  Specimen Date:   6/14/2016 2:21 PM  Result Date:    6/17/2016 9:15 AM  Result Status:   Final result  Resulting Lab:   [address] [address]            Tel: [phone]      CULTURE                                       ------------------                                   No growth     (1) CHRISTIANO VIRUS PCR, CSF [id number] 02:21PM Gus Odor     Test Name Result Flag Reference   JCV   Negative   No JCV DNA detected  This test was developed and its performance characteristics determined  by [employer]  It has not been cleared or approved by the Food and Drug  Administration  The FDA has determined that such clearance or  approval is not necessary  Performed at:  [address]  [id number]  : Roland Mendoza MD, Phone:  [phone]     (1) VZV (VARICELLA ZOSTER) DNA PCR, CSF 99VSK5567 02:21PM Gus Odor     Test Name Result Flag Reference   VZV Real Time PCR (Report)  Negative   No Varicella Zoster Virus DNA detected  This test was developed and its performance characteristics determined  by [facility]  It has not been cleared or approved by the Food and Drug  Administration  The FDA has determined that such clearance or  approval is not necessary     Performed at:  [address]  [id number]  : Roland Mendoza MD, Phone:  [phone]

## 2018-01-10 NOTE — RESULT NOTES
Verified Results  (1) CBC/PLT/DIFF 22Aug2017 08:29PM Lisandra Harmon Order Number: [id number]     Test Name Result Flag Reference   WBC COUNT 6 96 Thousand/uL  4 31-10 16   RBC COUNT 3 01 Million/uL L 3 81-5 12   HEMOGLOBIN 10 9 g/dL L 11 5-15 4   HEMATOCRIT 33 1 % L 34 8-46  1    fL H 82-98   MCH 36 2 pg H 26 8-34 3   MCHC 32 9 g/dL  31 4-37 4   RDW 14 3 %  11 6-15 1   MPV 9 8 fL  8 9-12 7   PLATELET COUNT 993 Thousands/uL  149-390   nRBC AUTOMATED 0 /100 WBCs     NEUTROPHILS RELATIVE PERCENT 65 %  43-75   LYMPHOCYTES RELATIVE PERCENT 21 %  14-44   MONOCYTES RELATIVE PERCENT 13 % H 4-12   EOSINOPHILS RELATIVE PERCENT 1 %  0-6   BASOPHILS RELATIVE PERCENT 0 %  0-1   NEUTROPHILS ABSOLUTE COUNT 4 46 Thousands/? ??L  1 85-7 62   LYMPHOCYTES ABSOLUTE COUNT 1 45 Thousands/? ??L  0 60-4 47   MONOCYTES ABSOLUTE COUNT 0 89 Thousand/? ??L  0 17-1 22   EOSINOPHILS ABSOLUTE COUNT 0 10 Thousand/? ??L  0 00-0 61   BASOPHILS ABSOLUTE COUNT 0 02 Thousands/? ??L  0 00-0 10       Plan  Ecchymoses, spontaneous    · (1) CBC/PLT/DIFF; Status:Complete;   Done: 22Aug2017 08:29PM   · Routine Venipuncture - POC; Status:Complete;   Done: 22Aug2017 03:06PM  Need for influenza vaccination    · Fluzone High-Dose 0 5 ML Intramuscular Suspension Prefilled Syringe  PMH: Accelerated essential hypertension    · DilTIAZem HCl ER Beads 240 MG Oral Capsule Extended Release 24 Hour;  TAKE 1 CAPSULE DAILY IN THE MORNING

## 2018-01-11 NOTE — RESULT NOTES
Message   please obtain full report     Verified Results  (1) LEUKEMIA PROFILE, CHRONIC [id number] 02:21PM Gemini Burns     Test Name Result Flag Reference   SCAN RESULT      SEE WRITTEN REPORT FROM [facility]

## 2018-01-12 NOTE — RESULT NOTES
Message   let pt know hb is 10 6, lower than march  any bleeding in stool?  pt needs to follow up with pcp   please send copy of labs to pcp today     Verified Results  (1) CBC/PLT/DIFF [id number] 07:15AM Boby Jonathan   REPORT COMMENT:  FOR REQ #S [id number]  FASTING:YES     Test Name Result Flag Reference   WHITE BLOOD CELL COUNT 7 1 Thousand/uL  3 8-10 8   RED BLOOD CELL COUNT 3 00 Million/uL L 3 80-5 10   HEMOGLOBIN 10 6 g/dL L 11 7-15 5   HEMATOCRIT 33 0 % L 35 0-45 0    1 fL H 80 0-100 0   MCH 35 4 pg H 27 0-33 0   MCHC 32 1 g/dL  32 0-36 0   RDW 17 0 % H 11 0-15 0   PLATELET COUNT 467 Thousand/uL  140-400   MPV 8 1 fL  7 5-11 5   ABSOLUTE NEUTROPHILS 4793 cells/uL  0604-6372   ABSOLUTE LYMPHOCYTES 1342 cells/uL  850-3900   ABSOLUTE MONOCYTES 746 cells/uL  200-950   ABSOLUTE EOSINOPHILS 199 cells/uL     ABSOLUTE BASOPHILS 21 cells/uL  0-200   NEUTROPHILS 67 5 %     LYMPHOCYTES 18 9 %     MONOCYTES 10 5 %     EOSINOPHILS 2 8 %     BASOPHILS 0 3 %

## 2018-01-13 VITALS
RESPIRATION RATE: 15 BRPM | OXYGEN SATURATION: 98 % | HEIGHT: 64 IN | HEART RATE: 55 BPM | BODY MASS INDEX: 22.88 KG/M2 | SYSTOLIC BLOOD PRESSURE: 122 MMHG | TEMPERATURE: 97.1 F | WEIGHT: 134 LBS | DIASTOLIC BLOOD PRESSURE: 86 MMHG

## 2018-01-13 VITALS
SYSTOLIC BLOOD PRESSURE: 110 MMHG | BODY MASS INDEX: 23.1 KG/M2 | DIASTOLIC BLOOD PRESSURE: 70 MMHG | TEMPERATURE: 96.6 F | OXYGEN SATURATION: 99 % | HEART RATE: 46 BPM | WEIGHT: 135.31 LBS | RESPIRATION RATE: 16 BRPM | HEIGHT: 64 IN

## 2018-01-13 VITALS
HEART RATE: 74 BPM | OXYGEN SATURATION: 97 % | BODY MASS INDEX: 22.29 KG/M2 | DIASTOLIC BLOOD PRESSURE: 68 MMHG | TEMPERATURE: 95.7 F | SYSTOLIC BLOOD PRESSURE: 110 MMHG | HEIGHT: 64 IN | RESPIRATION RATE: 16 BRPM | WEIGHT: 130.56 LBS

## 2018-01-13 VITALS
HEIGHT: 64 IN | WEIGHT: 134.31 LBS | BODY MASS INDEX: 22.93 KG/M2 | OXYGEN SATURATION: 97 % | DIASTOLIC BLOOD PRESSURE: 70 MMHG | SYSTOLIC BLOOD PRESSURE: 140 MMHG | TEMPERATURE: 98.1 F | RESPIRATION RATE: 16 BRPM

## 2018-01-14 VITALS
RESPIRATION RATE: 16 BRPM | HEART RATE: 65 BPM | SYSTOLIC BLOOD PRESSURE: 137 MMHG | WEIGHT: 137.5 LBS | BODY MASS INDEX: 23.47 KG/M2 | HEIGHT: 64 IN | DIASTOLIC BLOOD PRESSURE: 62 MMHG

## 2018-01-14 VITALS
DIASTOLIC BLOOD PRESSURE: 50 MMHG | BODY MASS INDEX: 22.76 KG/M2 | SYSTOLIC BLOOD PRESSURE: 118 MMHG | OXYGEN SATURATION: 99 % | HEIGHT: 64 IN | HEART RATE: 68 BPM | WEIGHT: 133.31 LBS | TEMPERATURE: 98 F | RESPIRATION RATE: 16 BRPM

## 2018-01-15 NOTE — RESULT NOTES
Message   LET PT KNOW CBC WITH SOME INCREASED ANEMIA WITH HB AT 10 6  NEEDS TO FOLLOW UP [facility] PCP FOR ETIOLOGY    SEND COPY OF LABS TO PCP OFFICE     Verified Results  (1) CBC/PLT/DIFF [id number] 07:15AM Gemini Burns     Test Name Result Flag Reference   WHITE BLOOD CELL COUNT 7 1 Thousand/uL  3 8-10 8   RED BLOOD CELL COUNT 3 00 Million/uL L 3 80-5 10   HEMOGLOBIN 10 6 g/dL L 11 7-15 5   HEMATOCRIT 33 0 % L 35 0-45 0    1 fL H 80 0-100 0   MCH 35 4 pg H 27 0-33 0   MCHC 32 1 g/dL  32 0-36 0   RDW 17 0 % H 11 0-15 0   PLATELET COUNT 308 Thousand/uL  140-400   MPV 8 1 fL  7 5-11 5   ABSOLUTE NEUTROPHILS 4793 cells/uL  5028-3039   ABSOLUTE LYMPHOCYTES 1342 cells/uL  850-3900   ABSOLUTE MONOCYTES 746 cells/uL  200-950   ABSOLUTE EOSINOPHILS 199 cells/uL     ABSOLUTE BASOPHILS 21 cells/uL  0-200   NEUTROPHILS 67 5 %     LYMPHOCYTES 18 9 %     MONOCYTES 10 5 %     EOSINOPHILS 2 8 %     BASOPHILS 0 3 %

## 2018-01-15 NOTE — RESULT NOTES
Message   please obtain     Verified Results  (1) SPECIAL TEST [id number] 02:21PM Sarikaareli Cohengenaro     Test Name Result Flag Reference   TEST RESULT      SEE WRITTEN REPORT FROM [facility]

## 2018-01-17 NOTE — RESULT NOTES
Verified Results  (1) OCCULT BLOOD, FECAL IMMUNOCHEMICAL TEST [id number] 02:08PM Ju Valentine    Order Number: [id number]     Test Name Result Flag Reference   OCCULT BLD, FECAL IMMUNOLOGICAL Negative  Negative   Performed by Fecal Immunochemical Test

## 2018-01-17 NOTE — RESULT NOTES
Verified Results  * DXA BONE DENSITY SPINE HIP AND PELVIS [id number] 08:35AM Rajan Medrano Order Number: [id number]    - Patient Instructions: To schedule this appointment, please contact Central Scheduling at [phone]  Test Name Result Flag Reference   DXA BONE DENSITY SPINE HIP AND PELVIS (Report)     CENTRAL DXA SCAN     CLINICAL HISTORY:  66year old post-menopausal  female risk factors include estrogen deficiency  Rheumatoid arthritis  Bone losing medication  TECHNIQUE: Bone densitometry was performed using a [facility]'s W bone densitometer  Regions of interest appear properly placed  There are no obvious fractures or other confounding variables which could limit the study  Degenerative or    osteoarthritic changes may be noted on the spine image suggesting a lateral thoracic lumbar spine x-ray for further evaluation  COMPARISON: Follow-up     RESULTS:    LUMBAR SPINE: L1-L4:   BMD 1 177 gm/cm2   T-score above normal, +1 2 and unchanged from 2014  Z-score +3 5     LEFT TOTAL HIP:   BMD 0 996 gm/cm2   T-score normal, 0 4   Z-score +2 1     LEFT FEMORAL NECK:   BMD 0 967 gm/cm2   T-score above normal, +1 1 and unchanged from 2014  Z-score +3 1     The forearm BMD is 0 596 and the T score is below normal, -1 6 and 3% less than 2014  The Z score is 0 8  IMPRESSION:   1  Based on the [facility] classification, this study identifies a diagnosis of osteopenia, only at the forearm area and the patient is considered at low risk for fracture  2  A daily intake of calcium of at least 1200 mg and vitamin D, 800-1000 IU, as well as weight bearing and muscle strengthening exercise, fall prevention and avoidance of tobacco and excessive alcohol intake as basic preventive measures are recommended  3  Repeat DXA scan on the same equipment in 18-24 months as clinically indicated         The 10 year risk of hip fracture is 0 3%, with the 10 year risk of major osteoporotic fracture being 7 0%, as calculated by the [facility] fracture risk assessment tool (FRAX)  The current NOF guidelines recommend treating patients with FRAX 10 year risk score   of >3% for hip fracture and >20% for major osteoporotic fracture  WHO CLASSIFICATION:   Normal (a T-score of -1 0 or higher)   Low bone mineral density (a T-score of less than -1 0 but higher than -2 5)   Osteoporosis (a T-score of -2 5 or less)   Severe osteoporosis (a T-score of -2 5 or less with a fragility fracture)      Thank you for allowing us the opportunity to participate in your patient care  The expanded DEXA report will no longer be arriving in your mail  If you desire to view the full report please contact [address] [place] [address] or access the PACS system  Workstation performed: [id number]     Signed by:   Serenity Prater MD   7/19/17       Plan  PMH: History of transient cerebral ischemia    · Aggrenox  MG Oral Capsule Extended Release 12 Hour  (Aspirin-Dipyridamole ER);  Take 1 capsule twice daily

## 2018-01-18 NOTE — RESULT NOTES
Verified Results  * MAMMO SCREENING BILATERAL W CAD [id number] 08:03AM Melissa Lara   TW Order Number: [id number]    - Patient Instructions: To schedule this appointment, please contact Central Scheduling at [phone]  Do not wear any perfume, powder, lotion or deodorant on breast or underarm area  Please bring your doctors order, referral (if needed) and insurance information with you on the day of the test  Failure to bring this information may result in this test being rescheduled  Arrive 15 minutes prior to your appointment time to register  On the day of your test, please bring any prior mammogram or breast studies with you that were not performed at a [facility]  Failure to bring prior exams may result in your test needing to be rescheduled   Order Number: [id number]    - Patient Instructions: To schedule this appointment, please contact Central Scheduling at [phone]  Do not wear any perfume, powder, lotion or deodorant on breast or underarm area  Please bring your doctors order, referral (if needed) and insurance information with you on the day of the test  Failure to bring this information may result in this test being rescheduled  Arrive 15 minutes prior to your appointment time to register  On the day of your test, please bring any prior mammogram or breast studies with you that were not performed at a [facility]  Failure to bring prior exams may result in your test needing to be rescheduled  Test Name Result Flag Reference   MAMMO SCREENING BILATERAL W CAD (Report)     Patient History:   Patient is postmenopausal and is nulliparous  No known family history of cancer  Took estrogen for 10 years  Took progesterone for 10 years  Patient has never smoked  Patient's BMI is 23 4  Reason for exam: screening (asymptomatic)       Mammo Screening Bilateral W CAD: November 12, 2016 - Check In #:    [de-identified]   Bilateral CC and MLO view(s) were taken  Technologist: KOREY Forbes (R)(M)   Prior study comparison: October 20, 2015, bilateral digital    screening mammogram performed at [address]  October 14, 2014, bilateral digital screening    mammogram performed at [address]  October 2, 2013, bilateral digital screening mammogram    performed at [address]  September 18, 2012, bilateral digital screening mammogram    performed at [address]  July 5, 2011, bilateral digital screening mammogram performed at [address]  There are scattered fibroglandular densities  No dominant soft tissue mass, architectural distortion or    suspicious calcifications are noted in either breast  The skin    and nipple contours are within normal limits  No mammographic evidence of malignancy  No significant changes when compared with prior studies  ASSESSMENT: BiRad:1 - Negative     Recommendation:   Routine screening mammogram of both breasts in 1 year  A    reminder letter will be scheduled  Analyzed by CAD     8-10% of cancers will be missed on mammography  Management of a    palpable abnormality must be based on clinical grounds  Patients   will be notified of their results via letter from our facility  Accredited by [employer] and FDA       Transcription Location: [facility] C[facility]all[facility] 98: [id number]     Risk Value(s):   Tyrer-Cuzick 10 Year: 5 107%, Tyrer-Cuzick Lifetime: 6 243%,    Myriad Table: 1 5%, GM 5 Year: 2 2%, NCI Lifetime: 5 3%   Signed by:   Godwin Arenas MD   11/14/16

## 2018-01-18 NOTE — RESULT NOTES
Verified Results  * MRI BRAIN W WO CONTRAST 03Apr2016 08:05AM Haile Aayush Order Number: [id number]   Performing Comments: Numbness on right side of face over a few weeks not improving  Biting cheek when eating  Sometimes feeling increased sensation or tightness over cheek, upper lipand around eye    - Patient Instructions: To schedule this appointment,    please contact Central Scheduling at [phone]  Test Name Result Flag Reference   MRI BRAIN W WO CONTRAST (Report)     MRI BRAIN WITH AND WITHOUT CONTRAST     INDICATION: Numbness in the right side of the face for the past few weeks  COMPARISON: Multiple prior examinations including most recent MRI performed January 4, 2011     TECHNIQUE:   Sagittal T1, axial T2, axial FLAIR, axial T1, axial Gradient, axial diffusion  Sagittal, axial and coronal T1 postcontrast  Axial BRAVO post contrast     6 mL of Gadavist was injected intravenously without immediate consequence  IMAGE QUALITY:  Diagnostic  FINDINGS:     BRAIN PARENCHYMA: There is 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  There is no other cranial nerve enhancement  No significant mass effect  Few scattered foci of T2/FLAIR signal abnormality without associated mass effect are stable from January 2011 consistent with minimal microangiopathic change  Otherwise no acute intracranial abnormality  No intracranial hemorrhage  No intracranial    ischemia  No other pathologic postcontrast enhancement  VENTRICLES: Normal      SELLA AND PITUITARY GLAND: Normal      ORBITS: Normal      PARANASAL SINUSES: Normal      VASCULATURE: Evaluation of the major intracranial vasculature demonstrates appropriate flow voids  CALVARIUM AND SKULL BASE: Normal      EXTRACRANIAL SOFT TISSUES: Normal        IMPRESSION:     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   Further evaluation with laboratory evaluation and lumbar puncture is recommended to exclude less likely diagnostic possibilities    including neurosarcoid, lymphoma, or demyelinating process such as Lyme disease  If negative, continued interval contrast-enhanced MR follow-up is recommended  ##sigslh##sigslh       Workstation performed: [id number]     Signed by:   Kunal Figueroa MD   4/4/16       Plan  MRI of brain abnormal    · *1 - [facility] Physician Referral  Consult - Abormal MRI - enhancement of  right trigeminal nerve  Right sided facial numbness    Status: Active  Requested for:  05Apr2016  Care Summary provided  : Yes

## 2018-01-18 NOTE — RESULT NOTES
Verified Results  (1) FUNGUS - YEAST CULTURE [id number] 11:24AM Marcelo ALCANTARA Order Number: [id number]     Test Name Result Flag Reference   CLINICAL REPORT (Report)     Test:        Fungal culture  Specimen Type: Other  Specimen Date:   4/6/2016 11:24 AM  Result Date:    4/13/2016 1:03 PM  Result Status:   Final result  Resulting Lab:   [place] [address]            Tel: [phone]                 CULTURE                                       ------------------                                   4+ Growth of Candida albicans     *** This organism has been edited   The previous result was Yeast on 4/11/2016      at 1238 EDT  ***       Plan  Oral thrush    · Clotrimazole 10 MG Mouth/Throat Aryan; ALLOW 1 TO DISSOLVE SLOWLY IN  MOUTH 5 TIMES DAILY AS DIRECTED

## 2018-01-22 VITALS
RESPIRATION RATE: 17 BRPM | BODY MASS INDEX: 23.09 KG/M2 | OXYGEN SATURATION: 99 % | WEIGHT: 134.5 LBS | DIASTOLIC BLOOD PRESSURE: 66 MMHG | HEART RATE: 74 BPM | TEMPERATURE: 97.8 F | SYSTOLIC BLOOD PRESSURE: 128 MMHG

## 2018-01-23 VITALS
OXYGEN SATURATION: 99 % | DIASTOLIC BLOOD PRESSURE: 60 MMHG | HEART RATE: 76 BPM | BODY MASS INDEX: 21.9 KG/M2 | HEIGHT: 65 IN | RESPIRATION RATE: 16 BRPM | SYSTOLIC BLOOD PRESSURE: 128 MMHG | WEIGHT: 131.44 LBS | TEMPERATURE: 97.6 F

## 2018-01-23 NOTE — RESULT NOTES
Verified Results  * MAMMO SCREENING BILATERAL W CAD [id number] 07:45AM Juan Fiore Order Number: [id number]    - Patient Instructions: To schedule this appointment, please contact Central Scheduling at [phone]  Do not wear any perfume, powder, lotion or deodorant on breast or underarm area  Please bring your doctors order, referral (if needed) and insurance information with you on the day of the test  Failure to bring this information may result in this test being rescheduled  Arrive 15 minutes prior to your appointment time to register  On the day of your test, please bring any prior mammogram or breast studies with you that were not performed at a [facility]  Failure to bring prior exams may result in your test needing to be rescheduled  Test Name Result Flag Reference   MAMMO SCREENING BILATERAL W CAD (Report)     Patient History:   Patient is postmenopausal and is nulliparous  No known family history of cancer  Took estrogen for 10 years  Took progesterone for 10 years  Patient has never smoked  Patient's BMI is 23 4  Reason for exam: screening, asymptomatic  Mammo Screening Bilateral W CAD: December 11, 2017 - Check In #:    [de-identified]   Bilateral CC and MLO view(s) were taken  Technologist: KOREY Keller (R)(M)   Prior study comparison: November 12, 2016, mammo screening    bilateral W CAD performed at [address]  October 20, 2015, bilateral digital screening mammogram    performed at [address]  October 14, 2014, bilateral digital screening mammogram performed at [address]  There are scattered fibroglandular densities  The parenchymal pattern appears stable  No dominant soft tissue    mass or suspicious calcifications are noted  The skin and nipple   contours are within normal limits  No mammographic evidence of malignancy   No    significant changes when compared with prior studies  ACR BI-RADSï¾® Assessments: BiRad:1 - Negative     Recommendation:   Routine screening mammogram in 1 year  Analyzed by CAD     The patient is scheduled in a reminder system for screening    mammography  8-10% of cancers will be missed on mammography  Management of a    palpable abnormality must be based on clinical grounds  Patients   will be notified of their results via letter from our facility  Accredited by [employer] and FDA       Transcription Location: [facility] 98: [id number]     Risk Value(s):   Tyrer-Cuzick 10 Year: 5 200%, Tyrer-Cuzick Lifetime: 5 800%,    Myriad Table: 1 5%, GM 5 Year: 2 2%, NCI Lifetime: 5 0%   Signed by:   Madhavi Aldrich MD   12/11/17       Plan  Acute sinusitis, recurrence not specified, unspecified location    · Promethazine-Codeine 6 25-10 MG/5ML Oral Syrup; TAKE 1 TSP Every 6 hours  PRN

## 2018-01-23 NOTE — PROGRESS NOTES
Assessment    1  Medicare annual wellness visit, subsequent (V70 0) (Z00 00)   2  Screening for colon cancer (V76 51) (Z12 11)   3  Benign essential hypertension (401 1) (I10)   4  Mixed hyperlipidemia (272 2) (E78 2)   5  Tinea corporis (110 5) (B35 4)   6  Screening for neurological condition (V80 09) (Z13 89)   7  Screening for genitourinary condition (V81 6) (Z13 89)   8  Depression screening (V79 0) (Z13 89)    Plan  Benign essential hypertension, Fatty liver, Mixed hyperlipidemia    · Routine Venipuncture - POC; Status:Complete;   Done: 76FJA1507  Benign essential hypertension, Mixed hyperlipidemia    · (1) COMPREHENSIVE METABOLIC PANEL; Status:Active; Requested for:40Vyz9435;    · (1) LIPID PANEL FASTING W DIRECT LDL REFLEX; Status:Active; Requested  for:62Zza7818;    · (1) TSH WITH FT4 REFLEX; Status:Active; Requested for:12Ugn9662;   Depression screening    · *VB-Depression Screening; Status:Complete - Retrospective By Protocol Authorization;    Done: 52VRY8330 10:10AM  Medicare annual wellness visit, subsequent, Screening for colon cancer    · (1) OCCULT BLOOD, FECAL IMMUNOCHEMICAL TEST; Status:Active; Requested  for:10Pja2425;   Screening for genitourinary condition    · *VB - Urinary Incontinence Screen (Dx Z13 89 Screen for UI); Status:Complete -  Retrospective By Protocol Authorization;   Done: 38ZVV0230 10:10AM  Screening for neurological condition    · *VB - Fall Risk Assessment  (Dx Z13 89 Screen for Neurologic Disorder);  Status:Complete - Retrospective By Protocol Authorization;   Done: 76DCC3772 10:09AM  Tinea corporis    · Clotrimazole 1 % External Cream; APPLY SPARINGLY TO AFFECTED AREA(S)  TWICE DAILY    Discussion/Summary    Patient is 38-year-old female here for Medicare wellness visit  She is up-to-date on immunizations she is having fasting blood work today  She is given a fit test to take home with her  She recently had her mammogram  She had a DEXA scan earlier this year   She has a living will  She was born prior to [address] [facility], so I did not do a hepatitis C test    Impression: Subsequent Annual Wellness Visit, with preventive exam as well as age and risk appropriate counseling completed  Cardiovascular screening and counseling: the risks and benefits of screening were discussed and due for a lipid panel  Diabetes screening and counseling: the risks and benefits of screening were discussed and due for blood glucose  Colorectal cancer screening and counseling: the risks and benefits of screening were discussed and due for fecal occult blood testing  Breast cancer screening and counseling: the risks and benefits of screening were discussed and screening is current  Cervical cancer screening and counseling: screening not indicated  Osteoporosis screening and counseling: the risks and benefits of screening were discussed and screening is current  Glaucoma screening and counseling: screening is current  HIV screening and counseling: screening not indicated  Immunizations: influenza vaccine is up to date this year, the lifetime pneumococcal vaccine has been completed and hepatitis B vaccination series is not indicated at this time due to the patient's low risk of nandini the disease  Advance Directive Planning: complete and up to date  Patient Discussion: plan discussed with the patient, follow-up visit needed in one year  Chief Complaint  Pt presents for her subsequent AWV  History of Present Illness  Welcome to Medicare and Wellness Visits: The patient is being seen for the subsequent annual wellness visit  Medicare Screening and Risk Factors   Hospitalizations: no previous hospitalizations and she has been hospitalized 0 times  Medicare Screening Tests Risk Questions   Abdominal aortic aneurysm risk assessment: none indicated  Osteoporosis risk assessment: , female gender and over 48years of age  HIV risk assessment: none indicated     Drug and Alcohol Use: The patient has never smoked cigarettes and has never used smokeless tobacco  The patient reports never drinking alcohol  Alcohol concern:   The patient has no concerns about alcohol abuse  She has never used illicit drugs  Diet and Physical Activity: Current diet includes well balanced meals, 2 servings of fruit per day, 2 servings of vegetables per day, 1 servings of meat per day, 1 servings of whole grains per day, 1 servings of dairy products per day and 1 cups of coffee per day  She exercises infrequently  Exercise: walking 15 minutes per day  Mood Disorder and Cognitive Impairment Screening: PHQ-9 Depression Scale   Over the past 2 weeks, how often have you been bothered by the following problems? 1 ) Little interest or pleasure in doing things? Not at all    2 ) Feeling down, depressed or hopeless? Not at all    3 ) Trouble falling asleep or sleeping too much? Not at all    4 ) Feeling tired or having little energy? Not at all    5 ) Poor appetite or overeating? Not at all    6 ) Feeling bad about yourself, or that you are a failure, or have let yourself or your family down? Not at all    7 ) Trouble concentrating on things, such as reading a newspaper or watching television? Not at all    8 ) Moving or speaking so slowly that other people could have noticed, or the opposite, moving or speaking faster than usual? Not at all  TOTAL SCORE: 0    How difficult have these problems made it for you to do your work, take care of things at home, or get along with people? Not at all  Depression screening score was 0     negative for symptoms  She denies feeling down, depressed, or hopeless over the past two weeks  She denies feeling little interest or pleasure in doing things over the past two weeks     Cognitive impairment screening: denies difficulty learning/retaining new information, denies difficulty handling complex tasks, denies difficulty with reasoning, denies difficulty with spatial ability and orientation, denies difficulty with language and denies difficulty with behavior  Functional Ability/Level of Safety: Hearing is normal bilaterally, normal in the right ear and normal in the left ear  She denies hearing difficulties  She does not use a hearing aid  The patient is currently able to do activities of daily living without limitations, able to do instrumental activities of daily living without limitations, able to participate in social activities without limitations and able to drive without limitations  Activities of daily living details: does not need help using the phone, no transportation help needed, does not need help shopping, no meal preparation help needed, does not need help doing housework, does not need help doing laundry, does not need help managing medications and does not need help managing money  Fall risk factors: The patient fell 0 times in the past 12 months  Injury History: polypharmacy, no alcohol use, no mobility impairment, no antidepressant use, no deconditioning, no postural hypotension, no sedative use, no visual impairment, no urinary incontinence, antihypertensive use, no cognitive impairment, up and go test was normal and no previous fall  Home safety risk factors:  no unfamiliar surroundings, no loose rugs, no poor household lighting, no uneven floors, no household clutter, grab bars in the bathroom and handrails on the stairs  Advance Directives: Advance directives: living will, durable power of  for health care directives and advance directives  end of life decisions were reviewed with the patient and I agree with the patient's decisions  Co-Managers and Medical Equipment/Suppliers: See Patient Care Team   Reviewed Updated [address] [place] [address] [address]:   Last Medicare Wellness Visit Information was reviewed, patient interviewed and updates made to the record today        Patient Care Team    Care Team Member Role Specialty Office Number   [address] Rheumatology [phone]   Kaiden Bhakta MD PhD Specialist Neurosurgery [phone], [address] Referring 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)   12  Post-menopause (V49 81) (Z78 0)   13  Rheumatoid arthritis (714 0) (M06 9)   14  Screening for colon cancer (V76 51) (Z12 11)   15  Seasonal allergies (477 9) (J30 2)   16  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)   17  Transient cerebral ischemia (435 9) (G45 9)   18   Trigeminal neuritis (350 1) (G50 8)    Past Medical History    · History of Abnormal Liver Function Test (790 6)   · History of Accelerated essential hypertension (401 0) (I10)   · History of Acute sinusitis, recurrence not specified, unspecified location (461 9) (J01 90)   · History of Bilateral impacted cerumen (380 4) (H61 23)   · History of Chronic constipation (564 00) (K59 09)   · History of Coated tongue (529 3) (K14 3)   · History of Cough (786 2) (R05)   · History of Cough (786 2) (R05)   · History of Depression screening (V79 0) (Z13 89)   · History of Dysfunction of right eustachian tube (381 81) (H69 81)   · History of Encounter for mammogram to establish baseline mammogram (V76 12)  (Z12 31)   · History of Encounter for screening for malignant neoplasm of breast (V76 10) (Z12 39)   · History of Encounter for screening mammogram for breast cancer (V76 12) (Z12 31)   · History of Encounter for screening mammogram for malignant neoplasm of breast  (V76 12) (Z12 31)   · History of Encounter for wellness examination (V70 0) (Z00 00)   · History of Facial numbness (782 0) (R20 0)   · History of abdominal pain (V13 89) (K01 014)   · History of acute bacterial sinusitis (V12 69) (Z87 09)   · History of acute pharyngitis (V12 69) (Z87 09)   · History of acute sinusitis (V12 69) (Z87 09)   · History of candidiasis of mouth (V12 09) (Z86 19)   · History of constipation (V12 79) (Z87 19)   · History of fever (V13 89) (L50 856)   · History of herpes zoster (V12 09) (Z86 19)   · History of influenza (V12 09) (Z87 09)   · History of psoriatic arthritis (V13 4) (Z87 2)   · History of Need for pneumococcal vaccine (V03 82) (Z23)   · History of Need for prophylactic vaccination and inoculation against influenza (V04 81)  (Z23)   · History of Recent weight loss (783 21) (R63 4)   · History of Screening for colon cancer (V76 51) (Z12 11)   · History of Screening for colon cancer (V76 51) (Z12 11)   · History of Screening for depression (V79 0) (Z13 89)   · History of Screening for osteoporosis (V82 81) (Z13 820)   · History of Screening for other and unspecified genitourinary condition (V81 6) (Z13 89)   · History of Special screening for other neurological conditions (V80 09) (Z13 89)   · History of Special screening for other neurological conditions (V80 09) (Z13 89)    Surgical History    · History of Appendectomy   · History of Foot Surgery   · History of Kidney Surgery    Family History  Father    · No pertinent family history    Social History    · Denied: History of Drug use   · High school graduate   ·    · Never A Smoker   · No alcohol use   · Retired    Current Meds   1  Clopidogrel Bisulfate 75 MG Oral Tablet; Take 1 tablet daily; Therapy: 54EXR9086 to (Marzena Brenner)  Requested for: 10KBY8480; Last   Rx:28Nov2017 Ordered   2   DilTIAZem HCl ER Beads 240 MG Oral Capsule Extended Release 24 Hour; TAKE 1   CAPSULE DAILY IN THE MORNING; Therapy: 88CQA3723 to (Evaluate:17Mar2018)  Requested for: 07Gxh0770; Last   Rx:79Rfg5400 Ordered   3  Fluticasone Propionate 50 MCG/ACT Nasal Suspension; USE 2 SPRAYS IN EACH   NOSTRIL ONCE DAILY; Therapy: 44IRY3006 to (Last [place])  Requested for: 67GUZ7750; Status:   ACTIVE - Transmit to Pharmacy - Awaiting Verification Ordered   4  Folic Acid 1 MG Oral Tablet; TABS PO X 30; Therapy: 05QIX6344 to (Last Rx:99Weq7591)  Requested for: 62OSE8200 Ordered   5  Methotrexate 2 5 MG Oral Tablet; TAKE 6 TABLETS WEEKLY; Therapy: 65Acw8136 to (Evaluate:65Ksm5825)  Requested for: 03Whh3443 Recorded   6  Simvastatin 40 MG Oral Tablet; TAKE 1 TABLET DAILY AS DIRECTED; Therapy: 97NXT0838 to (Evaluate:31Mar2018)  Requested for: 11VTL0708; Last   Rx:01Yjn2339 Ordered   7  Valsartan 320 MG Oral Tablet; take 1 tablet once daily; Therapy: 66DMK4724 to (Evaluate:16Mar2018)  Requested for: 21Mar2017; Last   Rx:21Mar2017 Ordered   8  Xalatan 0 005 % Ophthalmic Solution; INSTILL 1 DROP IN BOTH EYES AT BEDTIME; Therapy: 80Akc9699 to Recorded    Allergies    1  Amoxicillin CAPS   2  Penicillins    Immunizations   ** Printed in Appendix #1 below       Vitals  Signs    Temperature: 97 6 F, Tympanic  Heart Rate: 76  Respiration Quality: Normal  Respiration: 16  Systolic: 246, LUE, Sitting  Diastolic: 60, LUE, Sitting  Height: 5 ft 4 5 in  Weight: 131 lb 7 oz  BMI Calculated: 22 21  BSA Calculated: 1 65  O2 Saturation: 99, RA  Pain Scale: 0    Results/Data  *VB - Urinary Incontinence Screen (Dx Z13 89 Screen for UI) 49PJZ2137 10:10AM [place]     Test Name Result Flag Reference   Urinary Incontinence Assessment 89PAN4626       *VB-Depression Screening 26UZZ7458 10:10AM [facility]     Test Name Result Flag Reference   Depression Scale Result      Depression Screen - Negative For Symptoms     *VB - Fall Risk Assessment  (Dx Z13 89 Screen for Neurologic Disorder) 72IOC1938 10:09AM [facility]     Test Name Result Flag Reference   Falls Risk      No falls in the past year       Future Appointments    Date/Time Provider Specialty Site   [facility] 09[facility] [address]     Signatures   Electronically signed by : Darin Long DO; Dec 19 2017 11:55PM EST                       (Author)    Appendix #1     Patient: Carina Dill ; : 1945; MRN: [id number]      1 2 3 4 5    Influenza  12-Oct-2013 09-Sep-2014 15-Oct-2015 01-Oct-2016 22-Aug-2017    PCV  2015        PPSV  2008        Zoster  2009

## 2018-01-27 DIAGNOSIS — Z12.11 SCREENING FOR COLON CANCER: Primary | ICD-10-CM

## 2018-01-30 ENCOUNTER — APPOINTMENT (OUTPATIENT)
Dept: LAB | Facility: HOSPITAL | Age: 73
End: 2018-01-30
Payer: MEDICARE

## 2018-01-30 ENCOUNTER — TELEPHONE (OUTPATIENT)
Dept: FAMILY MEDICINE CLINIC | Facility: CLINIC | Age: 73
End: 2018-01-30

## 2018-01-30 LAB — HEMOCCULT STL QL IA: NEGATIVE

## 2018-01-30 PROCEDURE — G0328 FECAL BLOOD SCRN IMMUNOASSAY: HCPCS

## 2018-01-31 NOTE — TELEPHONE ENCOUNTER
I spoke to patient  She can hold her Plavix for 2 or 3 days prior to her having her tooth removed  We also discussed her blood work  Her cholesterol was very good  But she was questioning if her stool test was back yet  When I checked under in counters, it looked like the stool test still needed to be collected  I know that patient dropped off on Saturday or Sunday because Lonne Angers was asking me about it  Was it sent out  ?
Pt called into the office and stated that she will be having dental surgery on Monday and is currently taking Clopidogrel 75mg and stated should she stop taking this med  Please advise  Thank you!
Yes it was sent, I was here when ben bagged it up 
patient

## 2018-03-02 ENCOUNTER — HOSPITAL ENCOUNTER (INPATIENT)
Facility: HOSPITAL | Age: 73
LOS: 2 days | Discharge: HOME | DRG: 989 | End: 2018-03-04
Attending: SURGERY | Admitting: SURGERY
Payer: MEDICARE

## 2018-03-02 ENCOUNTER — APPOINTMENT (EMERGENCY)
Dept: CT IMAGING | Facility: HOSPITAL | Age: 73
DRG: 989 | End: 2018-03-02
Payer: MEDICARE

## 2018-03-02 ENCOUNTER — APPOINTMENT (INPATIENT)
Dept: RADIOLOGY | Facility: HOSPITAL | Age: 73
DRG: 989 | End: 2018-03-02
Payer: MEDICARE

## 2018-03-02 ENCOUNTER — APPOINTMENT (EMERGENCY)
Dept: RADIOLOGY | Facility: HOSPITAL | Age: 73
DRG: 989 | End: 2018-03-02
Payer: MEDICARE

## 2018-03-02 ENCOUNTER — HOSPITAL ENCOUNTER (EMERGENCY)
Facility: HOSPITAL | Age: 73
DRG: 989 | End: 2018-03-02
Attending: EMERGENCY MEDICINE | Admitting: EMERGENCY MEDICINE
Payer: MEDICARE

## 2018-03-02 VITALS
WEIGHT: 131 LBS | SYSTOLIC BLOOD PRESSURE: 145 MMHG | OXYGEN SATURATION: 100 % | DIASTOLIC BLOOD PRESSURE: 68 MMHG | TEMPERATURE: 98 F | BODY MASS INDEX: 22.14 KG/M2 | HEART RATE: 59 BPM | RESPIRATION RATE: 18 BRPM

## 2018-03-02 DIAGNOSIS — S62.309A FRACTURE OF METACARPAL BONE OF RIGHT HAND: ICD-10-CM

## 2018-03-02 DIAGNOSIS — S01.511A COMPLICATED LACERATION OF LIP, INITIAL ENCOUNTER: ICD-10-CM

## 2018-03-02 DIAGNOSIS — S09.90XA CLOSED HEAD INJURY, INITIAL ENCOUNTER: ICD-10-CM

## 2018-03-02 DIAGNOSIS — S12.000A C1 CERVICAL FRACTURE (HCC): Primary | ICD-10-CM

## 2018-03-02 DIAGNOSIS — S02.5XXA TOOTH FRACTURE: ICD-10-CM

## 2018-03-02 LAB
ABO GROUP BLD: NORMAL
ALBUMIN SERPL BCP-MCNC: 3.9 G/DL (ref 3.5–5)
ALP SERPL-CCNC: 65 U/L (ref 46–116)
ALT SERPL W P-5'-P-CCNC: 27 U/L (ref 12–78)
ANION GAP SERPL CALCULATED.3IONS-SCNC: 10 MMOL/L (ref 4–13)
AST SERPL W P-5'-P-CCNC: 19 U/L (ref 5–45)
BASOPHILS # BLD AUTO: 0.02 THOUSANDS/ΜL (ref 0–0.1)
BASOPHILS NFR BLD AUTO: 0 % (ref 0–1)
BILIRUB SERPL-MCNC: 0.52 MG/DL (ref 0.2–1)
BLD GP AB SCN SERPL QL: NEGATIVE
BUN SERPL-MCNC: 27 MG/DL (ref 5–25)
CALCIUM SERPL-MCNC: 9.3 MG/DL (ref 8.3–10.1)
CHLORIDE SERPL-SCNC: 98 MMOL/L (ref 100–108)
CO2 SERPL-SCNC: 28 MMOL/L (ref 21–32)
CREAT SERPL-MCNC: 1.2 MG/DL (ref 0.6–1.3)
EOSINOPHIL # BLD AUTO: 0.01 THOUSAND/ΜL (ref 0–0.61)
EOSINOPHIL NFR BLD AUTO: 0 % (ref 0–6)
ERYTHROCYTE [DISTWIDTH] IN BLOOD BY AUTOMATED COUNT: 14.6 % (ref 11.6–15.1)
GFR SERPL CREATININE-BSD FRML MDRD: 45 ML/MIN/1.73SQ M
GLUCOSE SERPL-MCNC: 108 MG/DL (ref 65–140)
HCT VFR BLD AUTO: 36.5 % (ref 34.8–46.1)
HGB BLD-MCNC: 12.4 G/DL (ref 11.5–15.4)
LYMPHOCYTES # BLD AUTO: 1.63 THOUSANDS/ΜL (ref 0.6–4.47)
LYMPHOCYTES NFR BLD AUTO: 11 % (ref 14–44)
MCH RBC QN AUTO: 34.1 PG (ref 26.8–34.3)
MCHC RBC AUTO-ENTMCNC: 34 G/DL (ref 31.4–37.4)
MCV RBC AUTO: 100 FL (ref 82–98)
MONOCYTES # BLD AUTO: 1.25 THOUSAND/ΜL (ref 0.17–1.22)
MONOCYTES NFR BLD AUTO: 8 % (ref 4–12)
NEUTROPHILS # BLD AUTO: 11.96 THOUSANDS/ΜL (ref 1.85–7.62)
NEUTS SEG NFR BLD AUTO: 81 % (ref 43–75)
NRBC BLD AUTO-RTO: 0 /100 WBCS
PLATELET # BLD AUTO: 195 THOUSANDS/UL (ref 149–390)
PMV BLD AUTO: 10 FL (ref 8.9–12.7)
POTASSIUM SERPL-SCNC: 4.3 MMOL/L (ref 3.5–5.3)
PROT SERPL-MCNC: 7.3 G/DL (ref 6.4–8.2)
RBC # BLD AUTO: 3.64 MILLION/UL (ref 3.81–5.12)
RH BLD: POSITIVE
SODIUM SERPL-SCNC: 136 MMOL/L (ref 136–145)
SPECIMEN EXPIRATION DATE: NORMAL
WBC # BLD AUTO: 14.87 THOUSAND/UL (ref 4.31–10.16)

## 2018-03-02 PROCEDURE — 72125 CT NECK SPINE W/O DYE: CPT

## 2018-03-02 PROCEDURE — 70450 CT HEAD/BRAIN W/O DYE: CPT

## 2018-03-02 PROCEDURE — 99285 EMERGENCY DEPT VISIT HI MDM: CPT

## 2018-03-02 PROCEDURE — 96360 HYDRATION IV INFUSION INIT: CPT

## 2018-03-02 PROCEDURE — 70486 CT MAXILLOFACIAL W/O DYE: CPT

## 2018-03-02 PROCEDURE — 13152 CMPLX RPR E/N/E/L 2.6-7.5 CM: CPT | Performed by: SURGERY

## 2018-03-02 PROCEDURE — 99222 1ST HOSP IP/OBS MODERATE 55: CPT | Performed by: SURGERY

## 2018-03-02 PROCEDURE — 73130 X-RAY EXAM OF HAND: CPT

## 2018-03-02 PROCEDURE — 90471 IMMUNIZATION ADMIN: CPT

## 2018-03-02 PROCEDURE — 90715 TDAP VACCINE 7 YRS/> IM: CPT | Performed by: EMERGENCY MEDICINE

## 2018-03-02 PROCEDURE — 36415 COLL VENOUS BLD VENIPUNCTURE: CPT | Performed by: EMERGENCY MEDICINE

## 2018-03-02 PROCEDURE — 26600 TREAT METACARPAL FRACTURE: CPT | Performed by: SURGERY

## 2018-03-02 PROCEDURE — 86900 BLOOD TYPING SEROLOGIC ABO: CPT | Performed by: EMERGENCY MEDICINE

## 2018-03-02 PROCEDURE — 85025 COMPLETE CBC W/AUTO DIFF WBC: CPT | Performed by: EMERGENCY MEDICINE

## 2018-03-02 PROCEDURE — 86850 RBC ANTIBODY SCREEN: CPT | Performed by: EMERGENCY MEDICINE

## 2018-03-02 PROCEDURE — 2W3EX1Z IMMOBILIZATION OF RIGHT HAND USING SPLINT: ICD-10-PCS | Performed by: SURGERY

## 2018-03-02 PROCEDURE — 0CQ00ZZ REPAIR UPPER LIP, OPEN APPROACH: ICD-10-PCS | Performed by: SURGERY

## 2018-03-02 PROCEDURE — 86901 BLOOD TYPING SEROLOGIC RH(D): CPT | Performed by: EMERGENCY MEDICINE

## 2018-03-02 PROCEDURE — 96374 THER/PROPH/DIAG INJ IV PUSH: CPT

## 2018-03-02 PROCEDURE — 73560 X-RAY EXAM OF KNEE 1 OR 2: CPT

## 2018-03-02 PROCEDURE — 80053 COMPREHEN METABOLIC PANEL: CPT | Performed by: EMERGENCY MEDICINE

## 2018-03-02 RX ORDER — BACITRACIN, NEOMYCIN, POLYMYXIN B 400; 3.5; 5 [USP'U]/G; MG/G; [USP'U]/G
1 OINTMENT TOPICAL ONCE
Status: DISCONTINUED | OUTPATIENT
Start: 2018-03-02 | End: 2018-03-02

## 2018-03-02 RX ORDER — ACETAMINOPHEN 325 MG/1
650 TABLET ORAL ONCE
Status: COMPLETED | OUTPATIENT
Start: 2018-03-02 | End: 2018-03-02

## 2018-03-02 RX ORDER — ONDANSETRON 2 MG/ML
4 INJECTION INTRAMUSCULAR; INTRAVENOUS ONCE
Status: COMPLETED | OUTPATIENT
Start: 2018-03-02 | End: 2018-03-02

## 2018-03-02 RX ORDER — FOLIC ACID 1 MG/1
1 TABLET ORAL
COMMUNITY
End: 2018-03-12 | Stop reason: SDUPTHER

## 2018-03-02 RX ORDER — ONDANSETRON 2 MG/ML
INJECTION INTRAMUSCULAR; INTRAVENOUS
Status: COMPLETED
Start: 2018-03-02 | End: 2018-03-02

## 2018-03-02 RX ORDER — GINSENG 100 MG
1 CAPSULE ORAL 3 TIMES DAILY
Status: DISCONTINUED | OUTPATIENT
Start: 2018-03-02 | End: 2018-03-04 | Stop reason: HOSPADM

## 2018-03-02 RX ORDER — SODIUM CHLORIDE 9 MG/ML
75 INJECTION, SOLUTION INTRAVENOUS CONTINUOUS
Status: DISCONTINUED | OUTPATIENT
Start: 2018-03-03 | End: 2018-03-03

## 2018-03-02 RX ORDER — CLOPIDOGREL BISULFATE 75 MG/1
75 TABLET ORAL DAILY
COMMUNITY
End: 2018-03-12 | Stop reason: SDUPTHER

## 2018-03-02 RX ORDER — ACETAMINOPHEN 325 MG/1
650 TABLET ORAL EVERY 6 HOURS PRN
Status: DISCONTINUED | OUTPATIENT
Start: 2018-03-02 | End: 2018-03-04 | Stop reason: HOSPADM

## 2018-03-02 RX ORDER — GABAPENTIN 100 MG/1
100 CAPSULE ORAL 3 TIMES DAILY
Status: DISCONTINUED | OUTPATIENT
Start: 2018-03-03 | End: 2018-03-04 | Stop reason: HOSPADM

## 2018-03-02 RX ORDER — LIDOCAINE HYDROCHLORIDE AND EPINEPHRINE 10; 10 MG/ML; UG/ML
10 INJECTION, SOLUTION INFILTRATION; PERINEURAL ONCE
Status: COMPLETED | OUTPATIENT
Start: 2018-03-02 | End: 2018-03-02

## 2018-03-02 RX ORDER — ACETAMINOPHEN 325 MG/1
975 TABLET ORAL ONCE
Status: DISCONTINUED | OUTPATIENT
Start: 2018-03-02 | End: 2018-03-02

## 2018-03-02 RX ADMIN — ACETAMINOPHEN 650 MG: 325 TABLET, FILM COATED ORAL at 23:49

## 2018-03-02 RX ADMIN — LIDOCAINE HYDROCHLORIDE,EPINEPHRINE BITARTRATE 10 ML: 10; .01 INJECTION, SOLUTION INFILTRATION; PERINEURAL at 15:46

## 2018-03-02 RX ADMIN — BACITRACIN ZINC 1 LARGE APPLICATION: 500 OINTMENT TOPICAL at 21:30

## 2018-03-02 RX ADMIN — SODIUM CHLORIDE 75 ML/HR: 0.9 INJECTION, SOLUTION INTRAVENOUS at 23:51

## 2018-03-02 RX ADMIN — ACETAMINOPHEN 650 MG: 325 TABLET, FILM COATED ORAL at 15:46

## 2018-03-02 RX ADMIN — SODIUM CHLORIDE 500 ML: 0.9 INJECTION, SOLUTION INTRAVENOUS at 17:32

## 2018-03-02 RX ADMIN — TETANUS TOXOID, REDUCED DIPHTHERIA TOXOID AND ACELLULAR PERTUSSIS VACCINE, ADSORBED 0.5 ML: 5; 2.5; 8; 8; 2.5 SUSPENSION INTRAMUSCULAR at 15:38

## 2018-03-02 RX ADMIN — ONDANSETRON 4 MG: 2 INJECTION INTRAMUSCULAR; INTRAVENOUS at 21:24

## 2018-03-02 NOTE — ED NOTES
Wound cleansed and irrigated with NSS and temporary dressing applied        Pretty Kelley RN  03/02/18 [id number]

## 2018-03-02 NOTE — ED NOTES
Pt  lingering in doorway, approached nurses station requesting additional ice pack d/t pt bleeding  Pt provided sterile gauze with saline and instructed to apply pressure to upper lip  Pt also provided ice pack for R hand  Pt and  informed that a doctor was signed up to see pt and would be at bedside for evaluation as soon as she is able, acknowledged understanding        Aysha Saravia RN  03/02/18 2037
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
## 2018-03-02 NOTE — EMTALA/ACUTE CARE TRANSFER
[facility]  [address] [address]  Dept: [phone]      EMTALA TRANSFER CONSENT    NAME Sera Patrick                                         1945                              MRN [id number]    I have been informed of my rights regarding examination, treatment, and transfer   by Dr Cortney Luevaon: Specialized equipment and/or services available at the receiving facility (Include comment)________________________    Risks: Potential for delay in receiving treatment      Consent for Transfer:  I acknowledge that my medical condition has been evaluated and explained to me by the emergency department physician or other qualified medical person and/or my attending physician, who has recommended that I be transferred to the service of  Accepting Physician:  (Dr Checo Mills) at [address] Name, [address] :  ([facility])  The above potential benefits of such transfer, the potential risks associated with such transfer, and the probable risks of not being transferred have been explained to me, and I fully understand them  The doctor has explained that, in my case, the benefits of transfer outweigh the risks  I agree to be transferred  I authorize the performance of emergency medical procedures and treatments upon me in both transit and upon arrival at the receiving facility  Additionally, I authorize the release of any and all medical records to the receiving facility and request they be transported with me, if possible  I understand that the safest mode of transportation during a medical emergency is an ambulance and that the Hospital advocates the use of this mode of transport  Risks of traveling to the receiving facility by car, including absence of medical control, life sustaining equipment, such as oxygen, and medical personnel has been explained to me and I fully understand them      [facility]  [  ] I consent to the stated transfer and to be transported by ambulance/helicopter  [  ]  I consent to the stated transfer, but refuse transportation by ambulance and accept full responsibility for my transportation by car  I understand the risks of non-ambulance transfers and I exonerate the Hospital and its staff from any deterioration in my condition that results from this refusal     X___________________________________________    DATE  18  TIME________  Signature of patient or legally responsible individual signing on patient behalf           RELATIONSHIP TO PATIENT_________________________          Provider Certification    NAME Roberto Jain                                         1945                              MRN [id number]    A medical screening exam was performed on the above named patient  Based on the examination:    Condition Necessitating Transfer The primary encounter diagnosis was C1 cervical fracture ([facility] [address] )  Diagnoses of Closed nondisplaced fracture of fourth metatarsal bone of right foot, initial encounter, Complicated laceration of lip, initial encounter, Closed head injury, initial encounter, and Tooth fracture were also pertinent to this visit      Patient Condition: The patient has been stabilized such that within reasonable medical probability, no material deterioration of the patient condition or the condition of the unborn child(violet) is likely to result from the transfer    Reason for Transfer: Level of Care needed not available at this facility    Transfer Requirements: Facility  ([address] [place] [address])   · Space available and qualified personnel available for treatment as acknowledged by    · Agreed to accept transfer and to provide appropriate medical treatment as acknowledged by        (Dr Zuleima Weeks)  · Appropriate medical records of the examination and treatment of the patient are provided at the time of transfer   [address] _______  · Transfer will be performed by qualified personnel from    and appropriate transfer equipment as required, including the use of necessary and appropriate life support measures  Provider Certification: I have examined the patient and explained the following risks and benefits of being transferred/refusing transfer to the patient/family:  General risk, such as traffic hazards, adverse weather conditions, rough terrain or turbulence, possible failure of equipment (including vehicle or aircraft), or consequences of actions of persons outside the control of the transport personnel      Based on these reasonable risks and benefits to the patient and/or the unborn child(violet), and based upon the information available at the time of the patients examination, I certify that the medical benefits reasonably to be expected from the provision of appropriate medical treatments at another medical facility outweigh the increasing risks, if any, to the individuals medical condition, and in the case of labor to the unborn child, from effecting the transfer      X____________________________________________ DATE 03/02/18        TIME_______      ORIGINAL - SEND TO MEDICAL RECORDS   COPY - SEND WITH PATIENT DURING TRANSFER

## 2018-03-03 ENCOUNTER — APPOINTMENT (INPATIENT)
Dept: RADIOLOGY | Facility: HOSPITAL | Age: 73
DRG: 989 | End: 2018-03-03
Payer: MEDICARE

## 2018-03-03 PROBLEM — S12.000A C1 CERVICAL FRACTURE (HCC): Status: ACTIVE | Noted: 2018-03-03

## 2018-03-03 PROBLEM — S01.511A LACERATION OF LIP: Status: ACTIVE | Noted: 2018-03-03

## 2018-03-03 PROBLEM — S62.308A CLOSED FRACTURE OF 4TH METACARPAL: Status: ACTIVE | Noted: 2018-03-03

## 2018-03-03 LAB
ANION GAP SERPL CALCULATED.3IONS-SCNC: 8 MMOL/L (ref 4–13)
BASOPHILS # BLD AUTO: 0.01 THOUSANDS/ΜL (ref 0–0.1)
BASOPHILS NFR BLD AUTO: 0 % (ref 0–1)
BUN SERPL-MCNC: 19 MG/DL (ref 5–25)
CALCIUM SERPL-MCNC: 7.7 MG/DL (ref 8.3–10.1)
CHLORIDE SERPL-SCNC: 108 MMOL/L (ref 100–108)
CO2 SERPL-SCNC: 27 MMOL/L (ref 21–32)
CREAT SERPL-MCNC: 0.95 MG/DL (ref 0.6–1.3)
EOSINOPHIL # BLD AUTO: 0.02 THOUSAND/ΜL (ref 0–0.61)
EOSINOPHIL NFR BLD AUTO: 0 % (ref 0–6)
ERYTHROCYTE [DISTWIDTH] IN BLOOD BY AUTOMATED COUNT: 14.5 % (ref 11.6–15.1)
GFR SERPL CREATININE-BSD FRML MDRD: 59 ML/MIN/1.73SQ M
GLUCOSE SERPL-MCNC: 93 MG/DL (ref 65–140)
HCT VFR BLD AUTO: 33.1 % (ref 34.8–46.1)
HGB BLD-MCNC: 11.1 G/DL (ref 11.5–15.4)
LYMPHOCYTES # BLD AUTO: 1.53 THOUSANDS/ΜL (ref 0.6–4.47)
LYMPHOCYTES NFR BLD AUTO: 13 % (ref 14–44)
MCH RBC QN AUTO: 33.6 PG (ref 26.8–34.3)
MCHC RBC AUTO-ENTMCNC: 33.5 G/DL (ref 31.4–37.4)
MCV RBC AUTO: 100 FL (ref 82–98)
MONOCYTES # BLD AUTO: 1.15 THOUSAND/ΜL (ref 0.17–1.22)
MONOCYTES NFR BLD AUTO: 10 % (ref 4–12)
NEUTROPHILS # BLD AUTO: 8.7 THOUSANDS/ΜL (ref 1.85–7.62)
NEUTS SEG NFR BLD AUTO: 77 % (ref 43–75)
NRBC BLD AUTO-RTO: 0 /100 WBCS
PLATELET # BLD AUTO: 194 THOUSANDS/UL (ref 149–390)
PMV BLD AUTO: 9.7 FL (ref 8.9–12.7)
POTASSIUM SERPL-SCNC: 4.3 MMOL/L (ref 3.5–5.3)
RBC # BLD AUTO: 3.3 MILLION/UL (ref 3.81–5.12)
SODIUM SERPL-SCNC: 143 MMOL/L (ref 136–145)
WBC # BLD AUTO: 11.45 THOUSAND/UL (ref 4.31–10.16)

## 2018-03-03 PROCEDURE — 99285 EMERGENCY DEPT VISIT HI MDM: CPT

## 2018-03-03 PROCEDURE — 99232 SBSQ HOSP IP/OBS MODERATE 35: CPT | Performed by: SURGERY

## 2018-03-03 PROCEDURE — 85025 COMPLETE CBC W/AUTO DIFF WBC: CPT | Performed by: EMERGENCY MEDICINE

## 2018-03-03 PROCEDURE — 72040 X-RAY EXAM NECK SPINE 2-3 VW: CPT

## 2018-03-03 PROCEDURE — 99222 1ST HOSP IP/OBS MODERATE 55: CPT | Performed by: NEUROLOGICAL SURGERY

## 2018-03-03 PROCEDURE — 80048 BASIC METABOLIC PNL TOTAL CA: CPT | Performed by: EMERGENCY MEDICINE

## 2018-03-03 RX ORDER — VALSARTAN 160 MG/1
320 TABLET ORAL EVERY EVENING
Status: DISCONTINUED | OUTPATIENT
Start: 2018-03-03 | End: 2018-03-04 | Stop reason: HOSPADM

## 2018-03-03 RX ORDER — SENNOSIDES 8.6 MG
1 TABLET ORAL
Status: DISCONTINUED | OUTPATIENT
Start: 2018-03-03 | End: 2018-03-04 | Stop reason: HOSPADM

## 2018-03-03 RX ORDER — HEPARIN SODIUM 5000 [USP'U]/ML
5000 INJECTION, SOLUTION INTRAVENOUS; SUBCUTANEOUS EVERY 8 HOURS SCHEDULED
Status: DISCONTINUED | OUTPATIENT
Start: 2018-03-03 | End: 2018-03-03 | Stop reason: SDUPTHER

## 2018-03-03 RX ORDER — HEPARIN SODIUM 5000 [USP'U]/ML
5000 INJECTION, SOLUTION INTRAVENOUS; SUBCUTANEOUS EVERY 8 HOURS SCHEDULED
Status: DISCONTINUED | OUTPATIENT
Start: 2018-03-03 | End: 2018-03-04 | Stop reason: HOSPADM

## 2018-03-03 RX ORDER — FOLIC ACID 1 MG/1
1 TABLET ORAL
Status: DISCONTINUED | OUTPATIENT
Start: 2018-03-03 | End: 2018-03-04 | Stop reason: HOSPADM

## 2018-03-03 RX ORDER — DILTIAZEM HYDROCHLORIDE 120 MG/1
120 CAPSULE, EXTENDED RELEASE ORAL EVERY 12 HOURS SCHEDULED
Status: DISCONTINUED | OUTPATIENT
Start: 2018-03-03 | End: 2018-03-04 | Stop reason: HOSPADM

## 2018-03-03 RX ORDER — PRAVASTATIN SODIUM 40 MG
40 TABLET ORAL
Status: DISCONTINUED | OUTPATIENT
Start: 2018-03-03 | End: 2018-03-04 | Stop reason: HOSPADM

## 2018-03-03 RX ORDER — LATANOPROST 50 UG/ML
1 SOLUTION/ DROPS OPHTHALMIC
Status: DISCONTINUED | OUTPATIENT
Start: 2018-03-03 | End: 2018-03-04 | Stop reason: HOSPADM

## 2018-03-03 RX ORDER — FOLIC ACID 1 MG/1
2 TABLET ORAL EVERY MORNING
Status: DISCONTINUED | OUTPATIENT
Start: 2018-03-03 | End: 2018-03-04 | Stop reason: HOSPADM

## 2018-03-03 RX ORDER — SODIUM CHLORIDE 9 MG/ML
75 INJECTION, SOLUTION INTRAVENOUS CONTINUOUS
Status: DISCONTINUED | OUTPATIENT
Start: 2018-03-03 | End: 2018-03-03

## 2018-03-03 RX ORDER — CLOPIDOGREL BISULFATE 75 MG/1
75 TABLET ORAL DAILY
Status: DISCONTINUED | OUTPATIENT
Start: 2018-03-03 | End: 2018-03-04 | Stop reason: HOSPADM

## 2018-03-03 RX ORDER — ONDANSETRON 2 MG/ML
4 INJECTION INTRAMUSCULAR; INTRAVENOUS EVERY 6 HOURS PRN
Status: DISCONTINUED | OUTPATIENT
Start: 2018-03-03 | End: 2018-03-04 | Stop reason: HOSPADM

## 2018-03-03 RX ADMIN — GABAPENTIN 100 MG: 100 CAPSULE ORAL at 15:28

## 2018-03-03 RX ADMIN — BACITRACIN ZINC 1 LARGE APPLICATION: 500 OINTMENT TOPICAL at 15:28

## 2018-03-03 RX ADMIN — PRAVASTATIN SODIUM 40 MG: 40 TABLET ORAL at 15:28

## 2018-03-03 RX ADMIN — BACITRACIN ZINC 1 LARGE APPLICATION: 500 OINTMENT TOPICAL at 21:56

## 2018-03-03 RX ADMIN — FOLIC ACID 2 MG: 1 TABLET ORAL at 08:28

## 2018-03-03 RX ADMIN — ACETAMINOPHEN 650 MG: 325 TABLET, FILM COATED ORAL at 06:21

## 2018-03-03 RX ADMIN — DILTIAZEM HYDROCHLORIDE 120 MG: 120 CAPSULE, EXTENDED RELEASE ORAL at 08:27

## 2018-03-03 RX ADMIN — FOLIC ACID 1 MG: 1 TABLET ORAL at 21:48

## 2018-03-03 RX ADMIN — GABAPENTIN 100 MG: 100 CAPSULE ORAL at 21:48

## 2018-03-03 RX ADMIN — GABAPENTIN 100 MG: 100 CAPSULE ORAL at 00:13

## 2018-03-03 RX ADMIN — CLOPIDOGREL BISULFATE 75 MG: 75 TABLET ORAL at 08:27

## 2018-03-03 RX ADMIN — DILTIAZEM HYDROCHLORIDE 120 MG: 120 CAPSULE, EXTENDED RELEASE ORAL at 21:52

## 2018-03-03 RX ADMIN — GABAPENTIN 100 MG: 100 CAPSULE ORAL at 08:28

## 2018-03-03 NOTE — PLAN OF CARE
Problem: Potential for Falls  Goal: Patient will remain free of falls  INTERVENTIONS:  - Assess patient frequently for physical needs  -  Identify cognitive and physical deficits and behaviors that affect risk of falls    -  [place] fall precautions as indicated by assessment   - Educate patient/family on patient safety including physical limitations  - Instruct patient to call for assistance with activity based on assessment  - Modify environment to reduce risk of injury  - Consider OT/PT consult to assist with strengthening/mobility   Outcome: Progressing      Problem: Prexisting or High Potential for Compromised Skin Integrity  Goal: Skin integrity is maintained or improved  INTERVENTIONS:  - Identify patients at risk for skin breakdown  - Assess and monitor skin integrity  - Assess and monitor nutrition and hydration status  - Monitor labs (i e  albumin)  - Assess for incontinence   - Turn and reposition patient  - Assist with mobility/ambulation  - Relieve pressure over bony prominences  - Avoid friction and shearing  - Provide appropriate hygiene as needed including keeping skin clean and dry  - Evaluate need for skin moisturizer/barrier cream  - Collaborate with interdisciplinary team (i e  Nutrition, Rehabilitation, etc )   - Patient/family teaching   Outcome: Progressing      Problem: PAIN - ADULT  Goal: Verbalizes/displays adequate comfort level or baseline comfort level  Interventions:  - Encourage patient to monitor pain and request assistance  - Assess pain using appropriate pain scale  - Administer analgesics based on type and severity of pain and evaluate response  - Implement non-pharmacological measures as appropriate and evaluate response  - Consider cultural and social influences on pain and pain management  - Notify physician/advanced practitioner if interventions unsuccessful or patient reports new pain   Outcome: Progressing      Problem: INFECTION - ADULT  Goal: Absence or prevention of progression during hospitalization  INTERVENTIONS:  - Assess and monitor for signs and symptoms of infection  - Monitor lab/diagnostic results  - Monitor all insertion sites, i e  indwelling lines, tubes, and drains  - Monitor endotracheal (as able) and nasal secretions for changes in amount and color  - [place] appropriate cooling/warming therapies per order  - Administer medications as ordered  - Instruct and encourage patient and family to use good hand hygiene technique  - Identify and instruct in appropriate isolation precautions for identified infection/condition   Outcome: Progressing      Problem: SAFETY ADULT  Goal: Maintain or return to baseline ADL function  INTERVENTIONS:  -  Assess patient's ability to carry out ADLs; assess patient's baseline for ADL function and identify physical deficits which impact ability to perform ADLs (bathing, care of mouth/teeth, toileting, grooming, dressing, etc )  - Assess/evaluate cause of self-care deficits   - Assess range of motion  - Assess patient's mobility; develop plan if impaired  - Assess patient's need for assistive devices and provide as appropriate  - Encourage maximum independence but intervene and supervise when necessary  ¯ Involve family in performance of ADLs  ¯ Assess for home care needs following discharge   ¯ Request OT consult to assist with ADL evaluation and planning for discharge  ¯ Provide patient education as appropriate   Outcome: Progressing    Goal: Maintain or return mobility status to optimal level  INTERVENTIONS:  - Assess patient's baseline mobility status (ambulation, transfers, stairs, etc )    - Identify cognitive and physical deficits and behaviors that affect mobility  - Identify mobility aids required to assist with transfers and/or ambulation (gait belt, sit-to-stand, lift, walker, cane, etc )  - [place] fall precautions as indicated by assessment  - Record patient progress and toleration of activity level on Mobility SBAR; progress patient to next Phase/Stage  - Instruct patient to call for assistance with activity based on assessment  - Request Rehabilitation consult to assist with strengthening/weightbearing, etc    Outcome: Progressing      Problem: DISCHARGE PLANNING  Goal: Discharge to home or other facility with appropriate resources  INTERVENTIONS:  - Identify barriers to discharge w/patient and caregiver  - Arrange for needed discharge resources and transportation as appropriate  - Identify discharge learning needs (meds, wound care, etc )  - Arrange for interpretive services to assist at discharge as needed  - Refer to Case Management Department for coordinating discharge planning if the patient needs post-hospital services based on physician/advanced practitioner order or complex needs related to functional status, cognitive ability, or social support system   Outcome: Progressing      Problem: Knowledge Deficit  Goal: Patient/family/caregiver demonstrates understanding of disease process, treatment plan, medications, and discharge instructions  Complete learning assessment and assess knowledge base    Interventions:  - Provide teaching at level of understanding  - Provide teaching via preferred learning methods   Outcome: Progressing      Problem: NEUROSENSORY - ADULT  Goal: Achieves maximal functionality and self care  INTERVENTIONS  - Monitor swallowing and airway patency with patient fatigue and changes in neurological status  - Encourage and assist patient to increase activity and self care with guidance from rehab services  - Encourage visually impaired, hearing impaired and aphasic patients to use assistive/communication devices   Outcome: Progressing      Problem: CARDIOVASCULAR - ADULT  Goal: Maintains optimal cardiac output and hemodynamic stability  INTERVENTIONS:  - Monitor I/O, vital signs and rhythm  - Monitor for S/S and trends of decreased cardiac output i e  bleeding, hypotension  - Administer and titrate ordered vasoactive medications to optimize hemodynamic stability  - Assess quality of pulses, skin color and temperature  - Assess for signs of decreased coronary artery perfusion - ex   Angina  - Instruct patient to report change in severity of symptoms   Outcome: Progressing    Goal: Absence of cardiac dysrhythmias or at baseline rhythm  INTERVENTIONS:  - Continuous cardiac monitoring, monitor vital signs, obtain 12 lead EKG if indicated  - Administer antiarrhythmic and heart rate control medications as ordered  - Monitor electrolytes and administer replacement therapy as ordered   Outcome: Progressing      Problem: SKIN/TISSUE INTEGRITY - ADULT  Goal: Skin integrity remains intact  INTERVENTIONS  - Identify patients at risk for skin breakdown  - Assess and monitor skin integrity  - Assess and monitor nutrition and hydration status  - Monitor labs (i e  albumin)  - Assess for incontinence   - Turn and reposition patient  - Assist with mobility/ambulation  - Relieve pressure over bony prominences  - Avoid friction and shearing  - Provide appropriate hygiene as needed including keeping skin clean and dry  - Evaluate need for skin moisturizer/barrier cream  - Collaborate with interdisciplinary team (i e  Nutrition, Rehabilitation, etc )   - Patient/family teaching   Outcome: Progressing    Goal: Incision(s), wounds(s) or drain site(s) healing without S/S of infection  INTERVENTIONS  - Assess and document risk factors for skin impairment   - Assess and document dressing, incision, wound bed, drain sites and surrounding tissue  - Initiate Nutrition services consult and/or wound management as needed   Outcome: Progressing    Goal: Oral mucous membranes remain intact  INTERVENTIONS  - Assess oral mucosa and hygiene practices  - Implement preventative oral hygiene regimen  - Implement oral medicated treatments as ordered  - Initiate Nutrition services referral as needed   Outcome: Progressing      Problem: HEMATOLOGIC - ADULT  Goal: Maintains hematologic stability  INTERVENTIONS  - Assess for signs and symptoms of bleeding or hemorrhage  - Monitor labs  - Administer supportive blood products/factors as ordered and appropriate   Outcome: Progressing      Problem: MUSCULOSKELETAL - ADULT  Goal: Maintain or return mobility to safest level of function  INTERVENTIONS:  - Assess patient's ability to carry out ADLs; assess patient's baseline for ADL function and identify physical deficits which impact ability to perform ADLs (bathing, care of mouth/teeth, toileting, grooming, dressing, etc )  - Assess/evaluate cause of self-care deficits   - Assess range of motion  - Assess patient's mobility; develop plan if impaired  - Assess patient's need for assistive devices and provide as appropriate  - Encourage maximum independence but intervene and supervise when necessary  - Involve family in performance of ADLs  - Assess for home care needs following discharge   - Request OT consult to assist with ADL evaluation and planning for discharge  - Provide patient education as appropriate   Outcome: Progressing    Goal: Maintain proper alignment of affected body part  INTERVENTIONS:  - Support, maintain and protect limb and body alignment  - Provide pt/fam with appropriate education   Outcome: Progressing

## 2018-03-03 NOTE — TRAUMA DOCUMENTATION
Family requesting pain meds and update on pt's status  Trauma resident paged [phone] at this time with no response

## 2018-03-03 NOTE — PROGRESS NOTES
Progress Note - Critical Care   Dennise Found 74 y o  female MRN: [id number]  Unit/Bed#: ICU 03 Encounter: [id number]    Assessment: 74year-old female with nondisplaced C1 fracture, R 4th metacarpal fx, and lip laceration 2/2 mechanical fall on 3/2  Plan:   Neuro:               - Neurosurgery saw this a m : "1  Continue to monitor neurological examination  2  Fit with [place] collar  Once collar is in placed, can undergo upright plain films of the cervical spine, including odontoid views  3  Neurosurgery will review results of plain film and likely follow up on an outpatient basis "    -No analgesia needed overnight                   CV: HD stable, no acute issues                  Pulm: On RA with no acute issues, maintain O2 sat > 92%                   GI:    - No indication for stress ulcer ppx   - Start diet                  : Cr 0 95 <- 1 2 (baseline ~0 9)                 F/E/N: MF NS 75 mL/hr, electrolytes unremarkable                 ID: WBCs 11/45 <- 14 87, afebrile  Continue to monitor vitals                   Heme: DVT ppx SQ heparin, SCDs  Hgb stable                   Endo: BS normal range                   Msk/Skin:               - Nonoperative management R 4th metatarsal fx with splint in place  Plastics to f/u as outpatient  - Lip laceration sutured  Bacitracin ointment  Plastic surgery to f/u as outpatient  - Pressure ulcer ppx, frequent offloading                 Disposition: To floor    Chief Complaint: multi-trauma 2/2 fall    HPI/24hr events: No acute events since admission       Physical Exam:     Vitals:    18 0400 18 0500 18 0600 18 0615   BP: (!) 116/41 (!) 119/47 (!) 98/45 (!) 101/44   BP Location:       Pulse: 60 58 (!) 54    Resp: (!) 26 15 15    Temp: 98 4 °F (36 9 °C)      TempSrc: Oral      SpO2: 99% 98% 98%    Weight:       Height:                 Temperature:   Temp (24hrs), Av 1 °F (36 7 °C), Min:97 8 °F (36 6 °C), Max:98 4 °F (36 9 °C)    Current: Temperature: 98 4 °F (36 9 °C)    Weights:   IBW: 57 kg    Body mass index is 21 68 kg/m²  Weight (last 2 days)     Date/Time   Weight    03/03/18 0120  59 1 (130 29)    03/02/18 1859  59 4 (131)            Constitutional: She appears well-developed and well-nourished  She is cooperative  She does not appear ill  No distress  Cervical collar in place  HENT:   Head: Normocephalic  Head is with laceration  Right Ear: External ear normal    Left Ear: External ear normal    Nose: Nose normal  No nasal septal hematoma  Mouth/Throat: Uvula is midline and oropharynx is clear and moist        Eyes: EOM are normal  Pupils are equal, round, and reactive to light  Right conjunctiva is not injected  Left conjunctiva is not injected  Neck: Phonation normal    Cardiovascular: Regular rhythm, normal heart sounds and normal pulses  Bradycardia present  Pulmonary/Chest: Effort normal and breath sounds normal  She exhibits no tenderness  Abdominal: Soft  Normal appearance and bowel sounds are normal  There is no tenderness  Musculoskeletal:        Left knee: She exhibits ecchymosis  Splint applied right hand/wrist  Fingers warm, well-perfused, with normal light-touch sensation  Neurological: She is alert  She has normal strength  No cranial nerve deficit or sensory deficit     Skin:           Non-Invasive/Invasive Ventilation Settings:  Respiratory    Lab Data (Last 4 hours)    None         O2/Vent Data (Last 4 hours)    None              No results found for: PHART, OYD6CEN, PO2ART, FIO4JAJ, J9NOIDML, BEART, SOURCE  SpO2: SpO2: 98 %    Intake and Outputs:  I/O       03/01 0701 - 03/02 0700 03/02 0701 - 03/03 0700 03/03 0701 - 03/04 0700    I V  (mL/kg)  461 3 (7 8)     Total Intake(mL/kg)  461 3 (7 8)     Net   +461 3             Unmeasured Urine Occurrence  1 x         Nutrition:        Diet Orders            Start     Ordered    03/03/18 0130  Diet NPO  Diet effective now Question Answer Comment   Diet Type NPO    RD to adjust diet per protocol? Yes        03/03/18 0129        Labs:     Results from last 7 days  Lab Units 03/03/18  0448 03/02/18  1731   WBC Thousand/uL 11 45* 14 87*   HEMOGLOBIN g/dL 11 1* 12 4   HEMATOCRIT % 33 1* 36 5   PLATELETS Thousands/uL 194 195   NEUTROS PCT % 77* 81*   MONOS PCT % 10 8        Results from last 7 days  Lab Units 03/03/18  0448 03/02/18  1731   SODIUM mmol/L 143 136   POTASSIUM mmol/L 4 3 4 3   CHLORIDE mmol/L 108 98*   CO2 mmol/L 27 28   BUN mg/dL 19 27*   CREATININE mg/dL 0 95 1 20   CALCIUM mg/dL 7 7* 9 3   TOTAL PROTEIN g/dL  --  7 3   BILIRUBIN TOTAL mg/dL  --  0 52   ALK PHOS U/L  --  65   ALT U/L  --  27   AST U/L  --  19   GLUCOSE RANDOM mg/dL 93 108                      No results found for: TROPONINI    Imaging:   XR knee 1 or 2 vw left   Final Result by Johnny Frost MD (03/03 0150)      No acute fracture or dislocation  Workstation performed: [id number]          I have personally reviewed pertinent reports  Allergies:    Allergies   Allergen Reactions    Amoxicillin     Penicillins        Medications:   Scheduled Meds:    Current Facility-Administered Medications:  acetaminophen 650 mg Oral Q6H PRN Ирина Izaguirre MD   bacitracin 1 large application Topical TID Radha Major MD   clopidogrel 75 mg Oral Daily Ирина Izaguirre MD   diltiazem 120 mg Oral Q12H Connie Medina MD   folic acid 1 mg Oral HS Ирина Izaguirre MD   folic acid 2 mg Oral QAM Ирина Izaguirre MD   gabapentin 100 mg Oral TID Ирина Izaguirre MD   heparin (porcine) 5,000 Units Subcutaneous Q8H [facility] Ирина Izaguirre MD   ondansetron 4 mg Intravenous Q6H PRN Ирина Izaguirre MD   pravastatin 40 mg Oral Daily With Rina Mcmullen MD   senna 1 tablet Oral HS PRN Ирина Izaguirre MD   sodium chloride 75 mL/hr Intravenous Continuous Ирина Izaguirre MD   valsartan 320 mg Oral QPM Ирина Izaguirre MD     Continuous Infusions:    sodium chloride 75 mL/hr     PRN Meds:    acetaminophen 650 mg Q6H PRN   ondansetron 4 mg Q6H PRN   senna 1 tablet HS PRN       VTE Pharmacologic Prophylaxis: Heparin  VTE Mechanical Prophylaxis: sequential compression device    Invasive lines and devices: Invasive Devices     Peripheral Intravenous Line            Peripheral IV 03/02/18 Right Antecubital less than 1 day                  Code Status: Level 1 - Full Code     Portions of the record may have been created with voice recognition software  Occasional wrong word or "sound a like" substitutions may have occurred due to the inherent limitations of voice recognition software  Read the chart carefully and recognize, using context, where substitutions have occurred       Daniel Myers MD
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
## 2018-03-03 NOTE — ED NOTES
Pt and family member aware that we're awaiting trauma to come see pt and put in orders        Alexa Muller  03/02/18 4705

## 2018-03-03 NOTE — ED NOTES
Pt's  at nursing station and states "when the doctor was doing the stitches in her face the other doctor came in to check her neck and he said that he would come back and he still hasn't come back and I was just wondering if they were going to come check her neck at some point since the other hospital said it was pretty bad "      Argelia Jones  03/02/18 [id number]
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
## 2018-03-03 NOTE — PLAN OF CARE
CARDIOVASCULAR - ADULT     Maintains optimal cardiac output and hemodynamic stability Progressing     Absence of cardiac dysrhythmias or at baseline rhythm Progressing        DISCHARGE PLANNING     Discharge to home or other facility with appropriate resources Progressing        HEMATOLOGIC - ADULT     Maintains hematologic stability Progressing        INFECTION - ADULT     Absence or prevention of progression during hospitalization Progressing        Knowledge Deficit     Patient/family/caregiver demonstrates understanding of disease process, treatment plan, medications, and discharge instructions Progressing        MUSCULOSKELETAL - ADULT     Maintain or return mobility to safest level of function Progressing     Maintain proper alignment of affected body part Progressing        NEUROSENSORY - ADULT     Achieves maximal functionality and self care Progressing        PAIN - ADULT     Verbalizes/displays adequate comfort level or baseline comfort level Progressing        Potential for Falls     Patient will remain free of falls Progressing        Prexisting or High Potential for Compromised Skin Integrity     Skin integrity is maintained or improved Progressing        SAFETY ADULT     Maintain or return to baseline ADL function Progressing     Maintain or return mobility status to optimal level Progressing        SKIN/TISSUE INTEGRITY - ADULT     Skin integrity remains intact Progressing     Incision(s), wounds(s) or drain site(s) healing without S/S of infection Progressing     Oral mucous membranes remain intact Progressing
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
## 2018-03-03 NOTE — DISCHARGE INSTRUCTIONS
Neurosurgical discharge instructions  VISTA collar at all times except for showering change to joana collar  No heavy lifting  No strenuous activities  NO DRIVING  **Please notify MD immediately if you have increased neck or arm pain  New numbness and/or weakness in your arm  Difficulty swallowing or breathing especially while lying down  Numbness or weakness in arms or legs  **     Lip Laceration Instructions  -Apply bacitracin ointment to the upper lip 3 x daily and as needed  Keep area moist with ointment as much as possible    -Avoid washing or getting it wet for 3 days    -No brushing teeth  May rinse with mouthwash twice daily    -Soft foods and liquids only     -f/u in 1 week for nylon suture removal      -Hand Surgery Discharge Instructions  -Keep splint clean dry and intact until follow-up appointment     -Hold right hand above level of heart at all times to decrease swelling and therefore pain     -Follow-up in 2 weeks for splint removal/replacement    -Call my office at [phone] if you experience increased pain, swelling, redness, drainage or fever greater than 100 5°F

## 2018-03-03 NOTE — CONSULTS
Consultation - Plastic Hand Surgery   Dennise Found 74 y o  female MRN: [id number]  Unit/Bed#: ED 21 Encounter: [id number]      Assessment/Plan      Assessment:  Right 4th metacarpal fracture  Plan:  -Ulnar gutter splint for 4 weeks  -fracture nondisplaced   -keep splint clean, dry and intact until followup appointment    History of Present Illness   Physician Requesting Consult: No att  providers found  Reason for Consult / Principal Problem: right 4th metacarpal fracture  Hx and PE limited by:   HPI: Dennise Found is a 74y o  year old female who presents with a nondisplaced oblique right 4th metacarpal fracture after falling from standing onto her face and also sustaining a complex upper lip laceration and C-spine fracture  Consults    Review of Systems   Constitutional: Negative  HENT: Negative  Eyes: Negative  Respiratory: Negative  Cardiovascular: Negative  Gastrointestinal: Negative  Endocrine: Negative  Genitourinary: Negative  Musculoskeletal:        As noted in HPI   Skin:        As noted in HPI   Allergic/Immunologic: Negative  Neurological: Negative  Hematological: Negative  Psychiatric/Behavioral: Negative          Historical Information   Past Medical History:   Diagnosis Date    Anemia     Hyperlipidemia     Hypertension     [facility] [address] [facility]     Stroke [facility] [address] [facility]     Trigeminal neuritis      Past Surgical History:   Procedure Laterality Date    APPENDECTOMY      FOOT SURGERY      KIDNEY SURGERY       Social History   History   Alcohol Use No     History   Drug Use No     History   Smoking Status    Never Smoker   Smokeless Tobacco    Never Used     Family History: non-contributory    Meds/Allergies   all current active meds have been reviewed    Allergies   Allergen Reactions    Amoxicillin     Penicillins        Objective   No intake or output data in the 24 hours ending 03/02/18 2765    Invasive Devices     Peripheral Intravenous Line Peripheral IV 03/02/18 Right Antecubital less than 1 day                Physical Exam   Constitutional: She is oriented to person, place, and time  She appears well-developed and well-nourished  HENT:   Head: Normocephalic and atraumatic  Eyes: Conjunctivae are normal  Pupils are equal, round, and reactive to light  Neck: Normal range of motion  Cardiovascular: Normal rate and regular rhythm  Pulmonary/Chest: Effort normal and breath sounds normal    Abdominal: Soft  Bowel sounds are normal    Musculoskeletal: Normal range of motion  Tenderness over right 4th metacarpal with swelling and bruising  Range of motion limited by pain  Sensation intact through thumb, index, middle, ring and small fingers  Neurological: She is alert and oriented to person, place, and time  Skin: Skin is warm and dry  Psychiatric: She has a normal mood and affect  Her behavior is normal        Lab Results:   Lab Results   Component Value Date    WBC 14 87 (H) 03/02/2018    HGB 12 4 03/02/2018    HCT 36 5 03/02/2018     (H) 03/02/2018     03/02/2018      No results found for: TISSUECULT, WOUNDCULT  Imaging Studies:  Reviewed  EKG, Pathology, and Other Studies:   Lab Results   Component Value Date/Time    [facility]  06/14/2016 02:21 PM     A  Lumbar Puncture, CSF:  Negative for malignancy  Few monocytes  Red blood cells  Satisfactory for evaluation  Interpretation performed at [facility] [employer]       Counseling / Coordination of Care  Total floor / unit time spent today thirty minutes  Greater than 50% of total time was spent with the patient and / or family counseling and / or coordination of care   A description of the counseling / coordination of care:  Splint care and follow-up
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
## 2018-03-03 NOTE — ORTHOTIC NOTE
Orthotic Note            Date: 3/3/2018      Patient Name: Shayy Escobar            Reason for Consult:  Patient Active Problem List   Diagnosis    C1 cervical fracture (HCC)    Closed fracture of 4th metacarpal    Laceration of lip         (20 minutes)    Ortho Tech adjusted/molded/fit/and donned ASPEN [place] to pt while pt was sitting up in bed  Chin plate set to a level 3 for optimal fit/comfort  Instructions and adjustments were reviewed during this time x3  Educated pt on changing/washing replacement padding and use of Lorena Collar x3  Will continue to follow up  [facility]/and business card left at bedside  RN aware  Recommendations:  Please call ext  [phone] with questions regarding bracing instruction and or adjustment(s)      Joseline CLAIRE RS

## 2018-03-04 ENCOUNTER — APPOINTMENT (INPATIENT)
Dept: RADIOLOGY | Facility: HOSPITAL | Age: 73
DRG: 989 | End: 2018-03-04
Payer: MEDICARE

## 2018-03-04 VITALS
SYSTOLIC BLOOD PRESSURE: 108 MMHG | BODY MASS INDEX: 21.83 KG/M2 | OXYGEN SATURATION: 95 % | DIASTOLIC BLOOD PRESSURE: 53 MMHG | RESPIRATION RATE: 16 BRPM | HEIGHT: 65 IN | WEIGHT: 131 LBS | TEMPERATURE: 98.2 F | HEART RATE: 74 BPM

## 2018-03-04 LAB
ERYTHROCYTE [DISTWIDTH] IN BLOOD BY AUTOMATED COUNT: 14.8 % (ref 11.6–15.1)
HCT VFR BLD AUTO: 32.6 % (ref 34.8–46.1)
HGB BLD-MCNC: 11.1 G/DL (ref 11.5–15.4)
MCH RBC QN AUTO: 34.3 PG (ref 26.8–34.3)
MCHC RBC AUTO-ENTMCNC: 34 G/DL (ref 31.4–37.4)
MCV RBC AUTO: 101 FL (ref 82–98)
PLATELET # BLD AUTO: 190 THOUSANDS/UL (ref 149–390)
PMV BLD AUTO: 10 FL (ref 8.9–12.7)
RBC # BLD AUTO: 3.24 MILLION/UL (ref 3.81–5.12)
WBC # BLD AUTO: 9.02 THOUSAND/UL (ref 4.31–10.16)

## 2018-03-04 PROCEDURE — G8988 SELF CARE GOAL STATUS: HCPCS

## 2018-03-04 PROCEDURE — 99232 SBSQ HOSP IP/OBS MODERATE 35: CPT | Performed by: PHYSICIAN ASSISTANT

## 2018-03-04 PROCEDURE — G8987 SELF CARE CURRENT STATUS: HCPCS

## 2018-03-04 PROCEDURE — 99238 HOSP IP/OBS DSCHRG MGMT 30/<: CPT | Performed by: SURGERY

## 2018-03-04 PROCEDURE — 97166 OT EVAL MOD COMPLEX 45 MIN: CPT

## 2018-03-04 PROCEDURE — 70498 CT ANGIOGRAPHY NECK: CPT

## 2018-03-04 PROCEDURE — 97163 PT EVAL HIGH COMPLEX 45 MIN: CPT

## 2018-03-04 PROCEDURE — G8989 SELF CARE D/C STATUS: HCPCS

## 2018-03-04 PROCEDURE — G8978 MOBILITY CURRENT STATUS: HCPCS

## 2018-03-04 PROCEDURE — G8979 MOBILITY GOAL STATUS: HCPCS

## 2018-03-04 PROCEDURE — 70496 CT ANGIOGRAPHY HEAD: CPT

## 2018-03-04 PROCEDURE — 85027 COMPLETE CBC AUTOMATED: CPT | Performed by: NURSE PRACTITIONER

## 2018-03-04 RX ORDER — ACETAMINOPHEN 325 MG/1
TABLET ORAL
Qty: 30 TABLET | Refills: 0 | Status: SHIPPED | OUTPATIENT
Start: 2018-03-04 | End: 2018-09-17 | Stop reason: ALTCHOICE

## 2018-03-04 RX ORDER — GABAPENTIN 100 MG/1
100 CAPSULE ORAL 3 TIMES DAILY
Qty: 30 CAPSULE | Refills: 0 | Status: SHIPPED | OUTPATIENT
Start: 2018-03-04 | End: 2018-03-12 | Stop reason: ALTCHOICE

## 2018-03-04 RX ADMIN — DILTIAZEM HYDROCHLORIDE 120 MG: 120 CAPSULE, EXTENDED RELEASE ORAL at 08:34

## 2018-03-04 RX ADMIN — BACITRACIN ZINC 1 LARGE APPLICATION: 500 OINTMENT TOPICAL at 08:34

## 2018-03-04 RX ADMIN — IOHEXOL 85 ML: 350 INJECTION, SOLUTION INTRAVENOUS at 11:21

## 2018-03-04 RX ADMIN — FOLIC ACID 2 MG: 1 TABLET ORAL at 08:33

## 2018-03-04 RX ADMIN — CLOPIDOGREL BISULFATE 75 MG: 75 TABLET ORAL at 08:33

## 2018-03-04 RX ADMIN — LATANOPROST 1 DROP: 50 SOLUTION OPHTHALMIC at 01:47

## 2018-03-04 RX ADMIN — GABAPENTIN 100 MG: 100 CAPSULE ORAL at 08:33
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
## 2018-03-04 NOTE — OCCUPATIONAL THERAPY NOTE
[address]  Evaluation     Patient Name: Kamilah Donahue  LQPVO'[id number] Date: 3/4/2018  Problem List  Patient Active Problem List   Diagnosis    [facility]1 [facility] fracture ([facility] [address] )    Closed fracture of 4th metacarpal    Laceration of lip     Past Medical History  Past Medical History:   Diagnosis Date    Anemia     Hyperlipidemia     Hypertension     Rheumatoid arthritis ([facility] [address] )     Stroke ([facility] )     Trigeminal neuritis      Past Surgical History  Past Surgical History:   Procedure Laterality Date    APPENDECTOMY      FOOT SURGERY      KIDNEY SURGERY             03/04/18 1103   Note Type   Note type Eval only   Restrictions/Precautions   Weight Bearing Precautions Per Order Yes   RUE Weight Bearing Per Order NWB  (VERBALLY NWB PER TRAUMA RESIDENT)   LUE Weight Bearing Per Order WBAT   Braces or Orthoses C/S Collar  ([facility])   Other Precautions Fall Risk;Spinal precautions   Pain Assessment   Pain Assessment No/denies pain   Pain Score No Pain   Hospital Pain Intervention(s) Repositioned   Response to Interventions TOLERATED   Home Living   Type of [address] Multi-level;Bed/bath upstairs;Stairs to enter with rails  (1 BISI)   Bathroom Shower/Tub Walk-in shower   Bathroom Toilet Standard   Bathroom Equipment Built-in shower seat   [facility]   Prior Function   Level of [place] Independent with ADLs and functional mobility   Lives With Spouse   Receives Help From Family   ADL Assistance Independent   IADLs Independent   Falls in the last 6 months 1 to 4  (NO ADD'L H/O FALLS)   Vocational Retired   Lifestyle   Autonomy PT REPORTS BASELINE INDEPENDENCE IN ADLS/IADLS/DRIVING  NO DME USE AT BASELINE AND NO RECENT H/O FALLS  Reciprocal Relationships PT RESIDES WITH SPOUSE WHO CAN ASSIST AT TIME OF D/C  Service to Others PT IS RETIRED  PREVIOUSLY WORKED AS A   Intrinsic Gratification PT ENJOYS DOING PUZZLES     Psychosocial Psychosocial (WDL) WDL   Subjective   Subjective "MY  WILL BE ABLE TO HELP ME"   ADL   Where Assessed Chair   Eating Assistance 5  Supervision/Setup   Grooming Assistance 5  Supervision/Setup   UB Bathing Assistance 4  Minimal Assistance   LB Bathing Assistance 4  Minimal Assistance   UB Dressing Assistance 5  Supervision/Setup   LB Dressing Assistance 5  [address]  5  Supervision/Setup   Bed Mobility   Supine to Sit Unable to assess   Sit to Supine Unable to assess   Additional Comments PT OOB IN CHAIR UPON ARRIVAL  PT LEFT IN ROOM CHAIR POST EVAL W/ CHAIR ALARM ACTIVATED  Transfers   Sit to Stand 5  Supervision   Additional items Assist x 1   Stand to Sit 5  Supervision   Additional items Assist x 1   Functional Mobility   Functional Mobility 5  Supervision   Additional Comments PT AMBULATING W/IN ROOM W/ SBA FROM THERAPIST  SPOKE TO PHYSICAL THERAPIST WHO REPORTS PLAN FOR SPC AT D/C  Balance   Static Sitting Good   Dynamic Sitting Fair +   Static Standing Fair   Dynamic Standing Fair   Ambulatory Fair -   Activity Tolerance   Activity Tolerance Patient tolerated treatment well   Medical Staff Made Aware F/U W/ TRAUMA PA-C COR[place]   Nurse Made Aware OKAY TO SEE PRE HEMAL Mata   RUE Assessment   RUE Assessment X  (RUE NWB IN SPLINT)   LUE Assessment   LUE Assessment WFL   Hand Function   Gross Motor Coordination Functional   Fine Motor Coordination Impaired  (MILD RUE DEFICITS 2* SPLINT)   Vision-Basic Assessment   Current Vision Wears glasses all the time   Cognition   Overall Cognitive Status WFL   Arousal/Participation Alert   Attention Within functional limits   Orientation Level Oriented X4   Memory Within functional limits   Following Commands Follows all commands and directions without difficulty   Comments PT ENGAGES IN APPROPRIATE CONVERSATION W/ THERAPIST THIS AM    Assessment   Prognosis Good   Assessment PT IS A 75 YO F ADMIT S/P FALL   (-) LOC REPORTED DESPITE FACIAL IMPACT  PT W/ C/O NECK, WRIST AND KNEE PAIN  PT CURRENTLY UNDERGOING W/U FOR NONDISPLACED C1 FX, R PROXIMAL 4TH METACARPAL FX, COMPLEX LIP LAC AND TOOTH FX  PT IS FIT WITH [facility] AND CLEARED FOR FUNCTIONAL ACTIVITY  PT IS ADD'L NWB RUE IN SPLINT (VERBAL ORDER PER TRAUMA RESIDENT)  PT W/ PMH SIGNIFICANT FOR ANEMIA, HTN, HLD, RA, CVA, TRIGENMINAL NEURITIS, APPY, FOOT SX AND KIDNEY SX  PT IS FROM HOME W/ SPOUSE AND W/ BASELINE INDEPENDENCE IN ADLS/IADLS/DRIVING  NO DME USE AT BASELINE AND NO ADD'L H/O FALLS  CURRENTLY PT REQUIRING MIN A BATHING, SBA DRESSING, SBA FUNCTIONAL TRANSFERS/SHORT DISTANCE MOBILITY  PT AND SPOUSE EDUCATED RE: NWB STATUS OF RUE AS WELL AS SPINAL PRECAUTIONS, COMPENSATORY STRATEGIES FOR ADL COMPLETION AND BRACE MANAGEMENT (SHOWER COLLAR)  PT DOES REMAIN LIMITED 2* IMPAIRED BALANCE, ACTIVITY TOLERANCE, NWB RUE, SPINAL PRECAUTIONS AND ADL IMPAIRMENTS  PT REPORTS NO CONCERNS RE: D/C HOME WITH FAMILY SUPPORT  NO ADDITIONAL INPT OT NEEDS IDENTIFIED AT THIS TIME  D/C OT  Goals   Patient Goals PT WOULD LIKE TO GO HOME      Plan   OT Frequency Eval only   Recommendation   OT Discharge Recommendation Home with family support   OT - OK to Discharge Yes   Barthel Index   Feeding 5   Bathing 0   Grooming Score 5   Dressing Score 5   Bladder Score 10   Bowels Score 10   Toilet Use Score 10   Transfers (Bed/Chair) Score 10   Mobility (Level Surface) Score 0   Stairs Score 5   Barthel Index Score 60   Modified Kinza Scale   Modified Kinza Scale 4     DOCUMENTATION COMPLETED BY ORIN PETERSON MS, OTR/L

## 2018-03-04 NOTE — PROGRESS NOTES
Progress Note - Adalid Rosa 1945, 74 y o  female MRN: [id number]    Unit/Bed#: [facility] 924-01 Encounter: [id number]    Primary Care Provider: Milly Perkins DO   Date and time admitted to hospital: 3/2/2018  6:49 PM        Laceration of lip   Assessment & Plan    PPD # 2 s/p sutures placed by plastics  Continue local wound care  Per plastics, d/c sutures 1 week (3/9)  Analgesia PRN        Closed fracture of 4th metacarpal   Assessment & Plan    S/p placement of ulnar gutter splint  Per hand (plastics), continue splint 4 weeks, follow up outpatient  Analgesia PRN        * C1 cervical fracture [facility])   Assessment & Plan    Per neurosurgery, non-op  Continue Vista collar (likely 2-3 months)  Analgesia PRN  Outpatient follow up  Subjective/Objective   Chief Complaint: No complaints  Subjective: Wants to get up and walk  States  able to help at home after discharge  Objective: No acute events overnight  Patient doing well from trauma standpoint  Pending PT/OT prior to discharge  Meds/Allergies   Prescriptions Prior to Admission   Medication Sig Dispense Refill Last Dose    clopidogrel (PLAVIX) 75 mg tablet Take 75 mg by mouth daily   3/2/2018 at Unknown time    diltiazem (DILACOR XR) 240 MG 24 hr capsule Take 240 mg by mouth daily  3/2/2018 at Unknown time    folic acid (FOLVITE) 1 mg tablet Take 2 mg by mouth every morning     3/2/2018 at Unknown time    folic acid (FOLVITE) 1 mg tablet Take 1 mg by mouth daily at bedtime   3/1/2018 at Unknown time    latanoprost (XALATAN) 0 005 % ophthalmic solution Administer 1 drop to both eyes daily at bedtime  3/1/2018 at Unknown time    methotrexate 2 5 mg tablet Take 12 5 mg by mouth once a week     3/2/2018 at Unknown time    Multiple Vitamin (MULTIVITAMIN) tablet Take 1 tablet by mouth daily  3/2/2018 at Unknown time    simvastatin (ZOCOR) 40 mg tablet Take 40 mg by mouth daily at bedtime     3/1/2018 at Unknown time    valsartan (DIOVAN) 320 MG tablet Take 320 mg by mouth every evening  3/1/2018 at Unknown time    VITAMIN D, CHOLECALCIFEROL, PO Take 1 capsule by mouth daily  Past Week at Unknown time       Vitals: Blood pressure 118/71, pulse 70, temperature 98 2 °F (36 8 °C), temperature source Oral, resp  rate 18, height 5' 5" (1 651 m), weight 59 4 kg (131 lb), SpO2 98 %  Body mass index is 21 8 kg/m²  SpO2: SpO2: 98 %    ABG: No results found for: PHART, NRH2JSA, PO2ART, EFU1UPT, I7ZQUNHR, BEART, SOURCE      Intake/Output Summary (Last 24 hours) at 03/04/18 5175  Last data filed at 03/04/18 0500   Gross per 24 hour   Intake             1470 ml   Output              580 ml   Net              890 ml       Invasive Devices     Peripheral Intravenous Line            Peripheral IV 03/02/18 Right Antecubital 1 day                Nutrition/GI Proph/Bowel Reg: regular diet    Physical Exam:   GENERAL APPEARANCE: comfortable in bed  HEENT: Vista collar in place, upper lip healing well  CV: RRR  LUNGS: CTAB  ABD: Soft, nontender  EXT: RUE in splint, NVI  NEURO: AAOx3  SKIN: warm, dry     Lab Results:   Results: I have personally reviewed pertinent reports   , BMP/CMP: No results found for: NA, K, CL, CO2, ANIONGAP, BUN, CREATININE, GLUCOSE, CALCIUM, AST, ALT, ALKPHOS, PROT, ALBUMIN, BILITOT, EGFR and CBC:   Lab Results   Component Value Date    WBC 9 02 03/04/2018    HGB 11 1 (L) 03/04/2018    HCT 32 6 (L) 03/04/2018     (H) 03/04/2018     03/04/2018    MCH 34 3 03/04/2018    MCHC 34 0 03/04/2018    RDW 14 8 03/04/2018    MPV 10 0 03/04/2018     Imaging/EKG Studies: Results: I have personally reviewed pertinent reports  and I have personally reviewed pertinent films in PACS  Other Studies: Xr Spine Cervical 2 Or 3 Vw Injury    Result Date: 3/4/2018  Impression: Known C1 fracture poorly visualized  Alignment is anatomic   Workstation performed: [id number]     Xr Hand 3+ Views Right    Result Date: 3/2/2018  Impression: Oblique fracture proximal aspect of the 4th metacarpal  Dislocation interphalangeal joint of the thumb  This may be chronic  Correlate for point tenderness  Workstation performed: [id number]     Xr Knee 1 Or 2 Vw Left    Result Date: 3/3/2018  Impression: No acute fracture or dislocation  Workstation performed: [id number]     Ct Head Without Contrast    Result Date: 3/2/2018  Impression: 1  Nondisplaced, oblique fracture of the anterior ring of C1 acentrically on the right  2   No acute intracranial hemorrhage  I personally discussed this study with KILO Wesley on 3/2/2018 at 4:32 PM  Workstation performed: [id number]     Ct Facial Bones Without Contrast    Result Date: 3/2/2018  Impression: 1  Nondisplaced anterior ring fracture of C1 to the right of midline noted  2   No facial bone fracture  Workstation performed: [id number]     Ct Cervical Spine Without Contrast    Result Date: 3/2/2018  Impression: Nondisplaced C1 fracture anteriorly on the right    I personally discussed this study with KILO Wesley on 3/2/2018 at 4:27 PM   Workstation performed: [id number]      VTE Prophylaxis: SQH

## 2018-03-04 NOTE — PLAN OF CARE
Problem: PHYSICAL THERAPY ADULT  Goal: Performs mobility at highest level of function for planned discharge setting  See evaluation for individualized goals  Treatment/Interventions: Functional transfer training, LE strengthening/ROM, Elevations, Therapeutic exercise, Endurance training, Cognitive reorientation, Patient/family training, Equipment eval/education, Bed mobility, Gait training, Compensatory technique education, Spoke to case management, Spoke to nursing, Continued evaluation  Equipment Recommended: Cane (issued by PT)       See flowsheet documentation for full assessment, interventions and recommendations    Prognosis: Good  Problem List: Decreased endurance, Decreased strength, Decreased range of motion, Impaired balance, Decreased mobility, Decreased coordination, Decreased safety awareness, Decreased skin integrity  Assessment: Pt is a 74 y o  female admitted to [facility] on 3/2/2018 w/ C1 cervical fracture ([address] ); currently non-op in vista collar w Cspine precautions; a;so sustained RUE 4th MCP fx in ulnar gutter splint WBAT per TR  Pt exhibits significant impairments with weakness, decreased ROM, impaired balance, decreased endurance, impaired coordination, gait deviations, decreased activity tolerance, decreased functional mobility tolerance, decreased safety awareness, impaired judgement, fall risk, orthopedic restrictions and decreased skin integrity; these impact independence with mobility, ADLs, and IADLs; requires min assist w hand support amb 70 ft - gait unsteady warrants use of cane; objective measures on the Barthel Index also reveal limitations;  therapy prognosis is impacted by relevant co morbidities as noted in evaluation; clinical presentation is currently unstable/unpredictable - pt is on cont pulse oximetry, presents w abnormal labs, vitals, complex hx and phys impairments as noted- a regression from baseline; PTA, pt was Independent with mobility lives in [facility] w stair glide access, 1 BISI, spouse available to assist skilled PT is indicated to to optimize functional independence and discharge planning; pending functional progress, PT recommendation at discharge is OP PT and home with family support cane; OP PT warranted 2* gait and bal dysfunction         Recommendation: Outpatient PT, Home with family support          See flowsheet documentation for full assessment

## 2018-03-04 NOTE — ASSESSMENT & PLAN NOTE
PPD # 2 s/p sutures placed by plastics  Continue local wound care  Per plastics, d/c sutures 1 week (3/9)  Analgesia PRN

## 2018-03-04 NOTE — ASSESSMENT & PLAN NOTE
Per neurosurgery, non-op  Continue Vista collar (likely 2-3 months)  Analgesia PRN  Outpatient follow up
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
## 2018-03-04 NOTE — PLAN OF CARE
CARDIOVASCULAR - ADULT     Maintains optimal cardiac output and hemodynamic stability Adequate for Discharge     Absence of cardiac dysrhythmias or at baseline rhythm Adequate for Discharge        DISCHARGE PLANNING     Discharge to home or other facility with appropriate resources Adequate for Discharge        HEMATOLOGIC - ADULT     Maintains hematologic stability Adequate for Discharge        INFECTION - ADULT     Absence or prevention of progression during hospitalization Adequate for Discharge        Knowledge Deficit     Patient/family/caregiver demonstrates understanding of disease process, treatment plan, medications, and discharge instructions Adequate for Discharge        MUSCULOSKELETAL - ADULT     Maintain or return mobility to safest level of function Adequate for Discharge     Maintain proper alignment of affected body part Adequate for Discharge        NEUROSENSORY - ADULT     Achieves maximal functionality and self care Adequate for Discharge        PAIN - ADULT     Verbalizes/displays adequate comfort level or baseline comfort level Adequate for Discharge        Potential for Falls     Patient will remain free of falls Adequate for Discharge        Prexisting or High Potential for Compromised Skin Integrity     Skin integrity is maintained or improved Adequate for Discharge        SAFETY ADULT     Maintain or return to baseline ADL function Adequate for Discharge     Maintain or return mobility status to optimal level Adequate for Discharge        SKIN/TISSUE INTEGRITY - ADULT     Skin integrity remains intact Adequate for Discharge     Incision(s), wounds(s) or drain site(s) healing without S/S of infection Adequate for Discharge     Oral mucous membranes remain intact Adequate for Discharge

## 2018-03-04 NOTE — ASSESSMENT & PLAN NOTE
S/p placement of ulnar gutter splint  Per hand (plastics), continue splint 4 weeks, follow up outpatient  Analgesia PRN

## 2018-03-04 NOTE — PHYSICAL THERAPY NOTE
Physical Therapy Evaluation    Patient's Name:Jany Boyle    Today's Date:03/04/18    Patient Active Problem List   Diagnosis    C1 cervical fracture ([facility] )    Closed fracture of 4th metacarpal    Laceration of lip       Past Medical History:   Diagnosis Date    Anemia     Hyperlipidemia     Hypertension     [facility]     Stroke ([facility] )     Trigeminal neuritis        Past Surgical History:   Procedure Laterality Date    APPENDECTOMY      FOOT SURGERY      KIDNEY SURGERY          03/04/18 1040   Pain Assessment   Pain Assessment 0-10   Pain Score No Pain   Hospital Pain Intervention(s) Ambulation/increased activity   Response to Interventions unchanged   Home Living   Type of Home House   Home Layout Multi-level  (stair glide access 1 BISI)   Prior Function   Level of [place] Independent with ADLs and functional mobility   Lives With Spouse   Receives Help From Family   Restrictions/Precautions   Weight Bearing Precautions Per Order Yes   RUE Weight Bearing Per Order WBAT   LUE Weight Bearing Per Order WBAT   Braces or Orthoses C/S Collar   Other Precautions Fall Risk;Spinal precautions   General   Family/Caregiver Present Yes   Cognition   Arousal/Participation Alert   Orientation Level Oriented X4   Following Commands Follows all commands and directions without difficulty   RUE Assessment   RUE Assessment (forearm in ulnar gutter splint)   LUE Assessment   LUE Assessment WFL   RLE Assessment   RLE Assessment X   Strength RLE   RLE Overall Strength 4-/5   LLE Assessment   LLE Assessment X   Strength LLE   LLE Overall Strength 4-/5   Coordination   Movements are Fluid and Coordinated 0   Coordination and Movement Description LE instability   Bed Mobility   Supine to Sit 4  Minimal assistance   Additional items Increased time required;Verbal cues;LE management  (hand support to pull on)   Transfers   Sit to Stand 4  Minimal assistance   Additional items Increased time required;Verbal cues   Stand to Sit 4  Minimal assistance   Additional items Increased time required;Verbal cues   Stand pivot 4  Minimal assistance   Additional items Increased time required;Verbal cues   Ambulation/Elevation   Gait pattern Improper Weight shift; Inconsistent jannie; Short stride; Excessively slow   Gait Assistance 4  Minimal assist   Additional items Verbal cues; Tactile cues   Assistive Device None  (needs AD)   Distance 70 ft   Balance   Dynamic Sitting Fair +  (forward reach)   Static Standing Fair   Dynamic Standing Poor +   Endurance Deficit   Endurance Deficit Yes   Endurance Deficit Description LE instability   Activity Tolerance   Activity Tolerance (LE instability)   Medical Staff Made Aware TR   Nurse Made Aware yes   Assessment   Prognosis Good   Problem List Decreased endurance;Decreased strength;Decreased range of motion; Impaired balance;Decreased mobility; Decreased coordination;Decreased safety awareness;Decreased skin integrity   Assessment Pt is a 74 y o  female admitted to [facility] on 3/2/2018 w/ C1 cervical fracture ([address] ); currently non-op in [place] collar w Cspine precautions; a;so sustained RUE 4th MCP fx in ulnar gutter splint WBAT per TR  Pt exhibits significant impairments with weakness, decreased ROM, impaired balance, decreased endurance, impaired coordination, gait deviations, decreased activity tolerance, decreased functional mobility tolerance, decreased safety awareness, impaired judgement, fall risk, orthopedic restrictions and decreased skin integrity; these impact independence with mobility, ADLs, and IADLs; requires min assist w hand support amb 70 ft - gait unsteady warrants use of cane; objective measures on the Barthel Index also reveal limitations;  therapy prognosis is impacted by relevant co morbidities as noted in evaluation; clinical presentation is currently unstable/unpredictable - pt is on cont pulse oximetry, presents w abnormal labs, vitals, complex hx and phys impairments as noted- a regression from baseline; PTA, pt was Independent with mobility lives in multilevel w stair glide access, 1 BISI, spouse available to assist skilled PT is indicated to to optimize functional independence and discharge planning; pending functional progress, PT recommendation at discharge is OP PT and home with family support cane; OP PT warranted 2* gait and bal dysfunction    Goals   Patient Goals go home   STG Expiration Date 03/18/18   Short Term Goal #1 1  Modified Independent with Bed Mobility Rolling Right and Left     2  Modified Independent with Bed Mobility Supine-Sit     3  Modified Independent with Transfer Bed-Chair     4  Increase Dynamic Sitting Balance at least 1 Grade for improved stability with functional reach activities     5  Increase Dynamic Standing Balance at least 1 Grade for improved ease with Activities of Daily Living     6  Increase Lower Extremity Strength at least 1 Grade for improved ease mobility tasks     7  Modified Independent with  Ambulation 150 feet using cane to facilitate home and community mobility 8  Modified Independent with Ascending/Descending 1 step to facilitate home and community accessibility  Plan   Treatment/Interventions Functional transfer training;LE strengthening/ROM; Elevations; Therapeutic exercise; Endurance training;Cognitive reorientation;Patient/family training;Equipment eval/education; Bed mobility;Gait training; Compensatory technique education;Spoke to case management;Spoke to nursing;Continued evaluation   PT Frequency (6x/wk)   Recommendation   Recommendation Outpatient PT; Home with family support   Equipment Recommended Cane  (issued by PT)   Barthel Index   Feeding 10   Bathing 0   Grooming Score 0   Dressing Score 5   Bladder Score 10   Bowels Score 10   Toilet Use Score 5   Transfers (Bed/Chair) Score 10   Mobility (Level Surface) Score 0   Stairs Score 5   Barthel Index Score 55
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
## 2018-03-04 NOTE — SOCIAL WORK
Cm attempted to met with patient, but patient was at CAT scan  Patient's  at bedside, Alayna Pino  He reported that patient is generally alert and oriented at their 2 story home   reports that there is 1 step to enter the home and a stair glide to 2nd floor bedroom   reports that patient can generally climb the steps herself, but the stair glide is for comfort   denied patient having any inpatient PT/OT, inpatient MH, substance abuse treatment or [facility] services  Patient's reported pharmacy of choice is [facility] [address]  Patient's PCP is through [facility]  Patient is anticipated for d/c this afternoon  CM reviewed d/c planning process including the following: identifying help at home, patient preference for d/c planning needs, Discharge Lounge, [facility] Meds to Bed program, availability of treatment team to discuss questions or concerns patient and/or family may have regarding understanding medications and recognizing signs and symptoms once discharged  CM also encouraged patient to follow up with all recommended appointments after discharge  Patient advised of importance for patient and family to participate in managing patients medical well being

## 2018-03-05 ENCOUNTER — DOCUMENTATION (OUTPATIENT)
Dept: NEUROSURGERY | Facility: CLINIC | Age: 73
End: 2018-03-05

## 2018-03-09 ENCOUNTER — OFFICE VISIT (OUTPATIENT)
Dept: PLASTIC SURGERY | Facility: CLINIC | Age: 73
End: 2018-03-09
Payer: MEDICARE

## 2018-03-09 VITALS — HEIGHT: 65 IN | WEIGHT: 131 LBS | BODY MASS INDEX: 21.83 KG/M2

## 2018-03-09 DIAGNOSIS — S01.511D LIP LACERATION, SUBSEQUENT ENCOUNTER: Primary | ICD-10-CM

## 2018-03-09 PROCEDURE — 99214 OFFICE O/P EST MOD 30 MIN: CPT | Performed by: PHYSICIAN ASSISTANT

## 2018-03-09 NOTE — PROGRESS NOTES
74 y o  female who is a right-hand-dominant status post fall         Initial imaging showed a nondisplaced oblique C1 fracture  as well as a right ring finger nondisplaced oblique fracture of the metacarpal  Lip lacerations were closed bedside by Dr Romero Haywood of Plastic surgery  She presents today for followup  She has an approximately 1 5 cm defect in her upper lip,  which crosses the vermillion border  There is no sign of infection  The sutures were removed  The wound was dressed with Dermagran and gauze  She will return in one week

## 2018-03-12 ENCOUNTER — OFFICE VISIT (OUTPATIENT)
Dept: NEUROLOGY | Facility: CLINIC | Age: 73
End: 2018-03-12
Payer: MEDICARE

## 2018-03-12 VITALS
WEIGHT: 131 LBS | SYSTOLIC BLOOD PRESSURE: 125 MMHG | DIASTOLIC BLOOD PRESSURE: 58 MMHG | HEART RATE: 64 BPM | HEIGHT: 65 IN | BODY MASS INDEX: 21.83 KG/M2

## 2018-03-12 DIAGNOSIS — S12.091D OTHER CLOSED NONDISPLACED FRACTURE OF FIRST CERVICAL VERTEBRA WITH ROUTINE HEALING, SUBSEQUENT ENCOUNTER: ICD-10-CM

## 2018-03-12 DIAGNOSIS — Z86.73 HISTORY OF TIA (TRANSIENT ISCHEMIC ATTACK): ICD-10-CM

## 2018-03-12 DIAGNOSIS — G50.0 TRIGEMINAL NEURALGIA: Primary | ICD-10-CM

## 2018-03-12 PROBLEM — M06.9 RHEUMATOID ARTHRITIS (HCC): Status: ACTIVE | Noted: 2018-03-12

## 2018-03-12 PROCEDURE — 99214 OFFICE O/P EST MOD 30 MIN: CPT | Performed by: PHYSICIAN ASSISTANT

## 2018-03-12 RX ORDER — CARBAMAZEPINE 100 MG/1
1 CAPSULE, EXTENDED RELEASE ORAL DAILY
COMMUNITY
Start: 2017-09-12 | End: 2018-03-12 | Stop reason: SDDI

## 2018-03-12 RX ORDER — VALSARTAN 320 MG/1
1 TABLET ORAL DAILY
COMMUNITY
Start: 2012-01-31 | End: 2018-04-19 | Stop reason: SDUPTHER

## 2018-03-12 RX ORDER — LATANOPROST 50 UG/ML
1 SOLUTION/ DROPS OPHTHALMIC
COMMUNITY
Start: 2012-09-05

## 2018-03-12 RX ORDER — CODEINE PHOSPHATE AND GUAIFENESIN 10; 100 MG/5ML; MG/5ML
SOLUTION ORAL
COMMUNITY
Start: 2014-11-14 | End: 2018-03-20

## 2018-03-12 RX ORDER — SIMVASTATIN 40 MG
1 TABLET ORAL DAILY
COMMUNITY
Start: 2012-04-06 | End: 2018-03-29 | Stop reason: SDUPTHER

## 2018-03-12 RX ORDER — DOXYCYCLINE 100 MG/1
100 CAPSULE ORAL 2 TIMES DAILY
Refills: 0 | COMMUNITY
Start: 2017-12-07 | End: 2018-05-24 | Stop reason: ALTCHOICE

## 2018-03-12 RX ORDER — BETAMETHASONE DIPROPIONATE 0.5 MG/ML
LOTION, AUGMENTED TOPICAL
Refills: 5 | COMMUNITY
Start: 2018-02-15 | End: 2018-05-24 | Stop reason: ALTCHOICE

## 2018-03-12 RX ORDER — GABAPENTIN 100 MG/1
100 CAPSULE ORAL 3 TIMES DAILY
Qty: 90 CAPSULE | Refills: 2 | Status: SHIPPED | OUTPATIENT
Start: 2018-03-12 | End: 2018-05-24 | Stop reason: ALTCHOICE

## 2018-03-12 RX ORDER — PROMETHAZINE HYDROCHLORIDE AND CODEINE PHOSPHATE 6.25; 1 MG/5ML; MG/5ML
SYRUP ORAL
Refills: 0 | COMMUNITY
Start: 2017-12-07 | End: 2018-03-20

## 2018-03-12 RX ORDER — CLOTRIMAZOLE 1 %
CREAM (GRAM) TOPICAL 2 TIMES DAILY
COMMUNITY
Start: 2017-12-19 | End: 2018-05-24 | Stop reason: ALTCHOICE

## 2018-03-12 RX ORDER — FLUTICASONE PROPIONATE 50 MCG
2 SPRAY, SUSPENSION (ML) NASAL DAILY
COMMUNITY
Start: 2017-06-29 | End: 2018-05-24 | Stop reason: ALTCHOICE

## 2018-03-12 RX ORDER — CLOPIDOGREL BISULFATE 75 MG/1
1 TABLET ORAL DAILY
COMMUNITY
Start: 2017-07-29 | End: 2018-04-01 | Stop reason: SDUPTHER

## 2018-03-12 RX ORDER — FOLIC ACID 1 MG/1
1 TABLET ORAL 3 TIMES DAILY
COMMUNITY
Start: 2012-09-05

## 2018-03-12 RX ORDER — DILTIAZEM HYDROCHLORIDE 240 MG/1
1 CAPSULE, EXTENDED RELEASE ORAL DAILY
COMMUNITY
Start: 2012-02-09 | End: 2018-03-15 | Stop reason: SDUPTHER

## 2018-03-12 RX ORDER — ASPIRIN AND DIPYRIDAMOLE 25; 200 MG/1; MG/1
CAPSULE, EXTENDED RELEASE ORAL
COMMUNITY
Start: 2012-09-05 | End: 2018-03-12 | Stop reason: ALTCHOICE

## 2018-03-12 NOTE — ASSESSMENT & PLAN NOTE
This has been stable  She had another MRI in Sept 2017 which showed complete resolution of enhancement  She is currently back on gabapentin 100mg TID from a recent C1 fracture  We can see if that will help her TN as well  She had tried it in the past and did not think it was very helpful  Symptoms are mainly numbness in the right face  She was not comfortable with trying Tegretol, which was prescribed at last visit
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
## 2018-03-12 NOTE — PATIENT INSTRUCTIONS
Will cont gabapentin 100mg three times a day  If any changes, let us know  Follow up in 6 months or sooner if needed

## 2018-03-14 ENCOUNTER — HOSPITAL ENCOUNTER (OUTPATIENT)
Dept: RADIOLOGY | Facility: HOSPITAL | Age: 73
Discharge: HOME | End: 2018-03-14
Payer: MEDICARE

## 2018-03-14 ENCOUNTER — TELEPHONE (OUTPATIENT)
Dept: NEUROLOGY | Facility: CLINIC | Age: 73
End: 2018-03-14

## 2018-03-14 ENCOUNTER — OFFICE VISIT (OUTPATIENT)
Dept: PLASTIC SURGERY | Facility: CLINIC | Age: 73
End: 2018-03-14
Payer: MEDICARE

## 2018-03-14 VITALS — HEIGHT: 65 IN | WEIGHT: 131 LBS | BODY MASS INDEX: 21.83 KG/M2

## 2018-03-14 DIAGNOSIS — I10 BENIGN ESSENTIAL HYPERTENSION: Primary | ICD-10-CM

## 2018-03-14 DIAGNOSIS — S12.000A C1 CERVICAL FRACTURE (HCC): ICD-10-CM

## 2018-03-14 DIAGNOSIS — S01.511D LIP LACERATION, SUBSEQUENT ENCOUNTER: ICD-10-CM

## 2018-03-14 DIAGNOSIS — S01.81XD FACIAL LACERATION, SUBSEQUENT ENCOUNTER: Primary | ICD-10-CM

## 2018-03-14 PROCEDURE — 99024 POSTOP FOLLOW-UP VISIT: CPT | Performed by: PHYSICIAN ASSISTANT

## 2018-03-14 PROCEDURE — 72040 X-RAY EXAM NECK SPINE 2-3 VW: CPT

## 2018-03-14 NOTE — PROGRESS NOTES
72 y o  female  status post fall, resulting in a lip lac         She has an approximately 1 5 cm defect in her upper lip,  which crosses the vermillion border  There is no sign of infection      The wound was dressed with Dermagran and gauze      She will return in two weeks as long as the wound is healing appropriately, without signs of infection

## 2018-03-14 NOTE — TELEPHONE ENCOUNTER
Gabapentin rx from Monday's visit called in to [facility] ([email or web address] @[phone] per pt request, she states it was never called in  Eliseo rocha/[place]

## 2018-03-15 RX ORDER — DILTIAZEM HYDROCHLORIDE 240 MG/1
240 CAPSULE, EXTENDED RELEASE ORAL DAILY
Qty: 90 CAPSULE | Refills: 1 | Status: SHIPPED | OUTPATIENT
Start: 2018-03-15 | End: 2018-09-26 | Stop reason: SDUPTHER

## 2018-03-20 ENCOUNTER — OFFICE VISIT (OUTPATIENT)
Dept: OBGYN CLINIC | Facility: HOSPITAL | Age: 73
End: 2018-03-20
Payer: MEDICARE

## 2018-03-20 ENCOUNTER — HOSPITAL ENCOUNTER (OUTPATIENT)
Dept: RADIOLOGY | Facility: HOSPITAL | Age: 73
Discharge: HOME | End: 2018-03-20
Attending: ORTHOPAEDIC SURGERY
Payer: MEDICARE

## 2018-03-20 ENCOUNTER — OFFICE VISIT (OUTPATIENT)
Dept: NEUROSURGERY | Facility: CLINIC | Age: 73
End: 2018-03-20
Payer: MEDICARE

## 2018-03-20 VITALS
WEIGHT: 130.95 LBS | DIASTOLIC BLOOD PRESSURE: 101 MMHG | SYSTOLIC BLOOD PRESSURE: 134 MMHG | HEART RATE: 65 BPM | HEIGHT: 65 IN | BODY MASS INDEX: 21.82 KG/M2

## 2018-03-20 VITALS
HEIGHT: 65 IN | TEMPERATURE: 97.2 F | BODY MASS INDEX: 21.83 KG/M2 | DIASTOLIC BLOOD PRESSURE: 51 MMHG | WEIGHT: 131 LBS | SYSTOLIC BLOOD PRESSURE: 120 MMHG | HEART RATE: 72 BPM

## 2018-03-20 DIAGNOSIS — S12.091D OTHER CLOSED NONDISPLACED FRACTURE OF FIRST CERVICAL VERTEBRA WITH ROUTINE HEALING, SUBSEQUENT ENCOUNTER: Primary | ICD-10-CM

## 2018-03-20 DIAGNOSIS — S62.91XA CLOSED FRACTURE OF RIGHT HAND, INITIAL ENCOUNTER: ICD-10-CM

## 2018-03-20 PROCEDURE — 73130 X-RAY EXAM OF HAND: CPT

## 2018-03-20 PROCEDURE — 99203 OFFICE O/P NEW LOW 30 MIN: CPT | Performed by: ORTHOPAEDIC SURGERY

## 2018-03-20 PROCEDURE — 99213 OFFICE O/P EST LOW 20 MIN: CPT | Performed by: PHYSICIAN ASSISTANT

## 2018-03-20 NOTE — LETTER
March 20, 2018     DO Joel Arviz[address]  [address] [facility]    Patient: Alexandra Ontiveros   YOB: 1945   Date of Visit: 3/20/2018       Dear Dr Luisa Ricketts: Thank you for referring Zina Turk to me for evaluation  Below are my notes for this consultation  If you have questions, please do not hesitate to call me  I look forward to following your patient along with you  Sincerely,        Mirella Cunningham PA-C        CC: No Recipients  Mirella Cunningham PA-C  3/20/2018  9:39 AM  Sign at close encounter  Assessment/Plan:  Very pleasant 53-year-old female, accompanied by her , returns to review updated cervical spine study 3/14/18, the study was carefully reviewed in detail by Dr Frank Kilgore and compared with prior studies including CT 3/2/18 the C1 fracture on the right, anterior is not clearly visible on this study however overall alignment of the cervical spine appears to be appropriate/stable  Clinically she reports she has some occasional discomfort but no paris pain in the cervical spine/neck  There are no focal neurologic deficits appreciated on examination  She also has a fracture of the 4th metacarpal on the right and has not seen orthopedics for follow-up consultation with them has been arranged  Further follow-up with Neurosurgery is planned in approximately 4 weeks with plain films of the cervical spine prior to visit  Activities were reviewed she has to lift no greater than 10 lb, she is to continue to wear her Vista type cervical collar at all times other than when she uses her [place] collar for bath time  She may ambulate as tolerated, she should avoid frequent bending  These findings, impressions and recommendations are reviewed in great detail with the patient, she expressed understanding and agreement, her questions were answered completely and to her satisfaction  Follow up has been scheduled           Diagnoses and all orders for this visit:    Other closed nondisplaced fracture of first cervical vertebra with routine healing, subsequent encounter  -     XR spine cervical 2 or 3 vw injury; Future    Closed fracture of right hand, initial encounter  -     Ambulatory referral to Orthopedic Surgery; Future          Subjective:      Patient ID: Perla Lucas is a 74 y o  female  Very pleasant 64-year-old female, accompanied by her , returns for hospital follow-up, approximately 2 weeks post injury  She has had updated cervical spine study as requested 3/14/18  She arrives today with a Vista type cervical collar in place appropriately applied and reports she wears it all times as directed  She has a [place] collar which she uses for bath time  She has a history of fall 3/2/18, she reports conditions were "windy", she slipped hit a curb, there was no loss of consciousness  She was seen at [facility] Emergency Department a C1 fracture was diagnosed, in addition she had a 4th metacarpal fracture of the right hand, and fractured tooth as well as a laceration to her lip  Ultimately she was transferred to [facility] for neurosurgical consultation  She had a brief inpatient hospital stay from 3/2/18 through 3/4/18 and was discharged to home  She denies gait or balance disturbance, motor or sensory difficulties in the upper or lower extremities, or difficulty with memory or mentation  She has a history of a CVA in the remote past for which she continues on Plavix  She continues to follow with Neurology for a history of right trigeminal neuralgia, and continues on gabapentin with good effect                 The following portions of the patient's history were reviewed and updated as appropriate: allergies, current medications, past family history, past medical history, past social history and past surgical history  Review of Systems   Constitutional: Negative      HENT: Negative  Eyes: Negative  Respiratory: Negative  Cardiovascular: Negative  Gastrointestinal: Negative  Endocrine: Negative  Genitourinary: Negative  Musculoskeletal: Negative for arthralgias, back pain, gait problem, joint swelling, myalgias, neck pain and neck stiffness  Skin: Negative  Allergic/Immunologic: Negative  Neurological: Negative  Hematological: Negative  Psychiatric/Behavioral: Negative  Objective:    Physical Exam   Constitutional: She is oriented to person, place, and time  She appears well-developed and well-nourished  HENT:   Head: Normocephalic  Eyes: Pupils are equal, round, and reactive to light  Neck:   [place] type cervical collar in place at time of examination no areas of tenderness to palpation or appreciated  Cardiovascular: Normal rate, regular rhythm and normal heart sounds  Pulmonary/Chest: Effort normal and breath sounds normal    Neurological: She is alert and oriented to person, place, and time  Gait normal    Reflex Scores:       Tricep reflexes are 2+ on the right side and 2+ on the left side  Bicep reflexes are 2+ on the right side and 2+ on the left side  Neurologic Exam     Mental Status   Oriented to person, place, and time  Level of consciousness: alert    Cranial Nerves     CN III, IV, VI   Pupils are equal, round, and reactive to light  Motor Exam   Muscle bulk: normal    Strength   Right biceps: 5/5  Left biceps: 5/5  Right triceps: 5/5  Left triceps: 5/5    Sensory Exam   Light touch normal      Gait, Coordination, and Reflexes     Gait  Gait: normal    Reflexes   Right biceps: 2+  Left biceps: 2+  Right triceps: 2+  Left triceps: 2+     CERVICAL SPINE 3/14/18     INDICATION: " pt fell 2 weeks ago and fractured C1, follow up xray today  no surg hx"     COMPARISON: CT cervical spine 3/2/2018    Cervical spine radiographs 3/3/2018      VIEWS:  XR SPINE CERVICAL 2 OR 3 VW INJURY        FINDINGS:     The known C1 fracture is not apparent on the current or prior radiographs  No new malalignment      Normal alignment without subluxation      The intervertebral disc spaces are preserved      The prevertebral soft tissues are within normal limits        The lung apices are clear      IMPRESSION:     The known C1 fracture is not apparent on the current or prior radiographs  No new malalignment

## 2018-03-20 NOTE — PROGRESS NOTES
Assessment/Plan:  Very pleasant 68-year-old female, accompanied by her , returns to review updated cervical spine study 3/14/18, the study was carefully reviewed in detail by Dr Anastacio Cast and compared with prior studies including CT 3/2/18 the C1 fracture on the right, anterior is not clearly visible on this study however overall alignment of the cervical spine appears to be appropriate/stable  Clinically she reports she has some occasional discomfort but no paris pain in the cervical spine/neck  There are no focal neurologic deficits appreciated on examination  She also has a fracture of the 4th metacarpal on the right and has not seen orthopedics for follow-up consultation with them has been arranged  Further follow-up with Neurosurgery is planned in approximately 4 weeks with plain films of the cervical spine prior to visit  Activities were reviewed she has to lift no greater than 10 lb, she is to continue to wear her Vista type cervical collar at all times other than when she uses her [place] collar for bath time  She may ambulate as tolerated, she should avoid frequent bending  These findings, impressions and recommendations are reviewed in great detail with the patient, she expressed understanding and agreement, her questions were answered completely and to her satisfaction  Follow up has been scheduled  Diagnoses and all orders for this visit:    Other closed nondisplaced fracture of first cervical vertebra with routine healing, subsequent encounter  -     XR spine cervical 2 or 3 vw injury; Future    Closed fracture of right hand, initial encounter  -     Ambulatory referral to Orthopedic Surgery; Future          Subjective:      Patient ID: Lena Mckenzie is a 74 y o  female  Very pleasant 68-year-old female, accompanied by her , returns for hospital follow-up, approximately 2 weeks post injury      She has had updated cervical spine study as requested 3/14/18  She arrives today with a Vista type cervical collar in place appropriately applied and reports she wears it all times as directed  She has a [place] collar which she uses for bath time  She has a history of fall 3/2/18, she reports conditions were "windy", she slipped hit a curb, there was no loss of consciousness  She was seen at [address] [address] Emergency Department a C1 fracture was diagnosed, in addition she had a 4th metacarpal fracture of the right hand, and fractured tooth as well as a laceration to her lip  Ultimately she was transferred to [facility] for neurosurgical consultation  She had a brief inpatient hospital stay from 3/2/18 through 3/4/18 and was discharged to home  She denies gait or balance disturbance, motor or sensory difficulties in the upper or lower extremities, or difficulty with memory or mentation  She has a history of a CVA in the remote past for which she continues on Plavix  She continues to follow with Neurology for a history of right trigeminal neuralgia, and continues on gabapentin with good effect                 The following portions of the patient's history were reviewed and updated as appropriate: allergies, current medications, past family history, past medical history, past social history and past surgical history  Review of Systems   Constitutional: Negative  HENT: Negative  Eyes: Negative  Respiratory: Negative  Cardiovascular: Negative  Gastrointestinal: Negative  Endocrine: Negative  Genitourinary: Negative  Musculoskeletal: Negative for arthralgias, back pain, gait problem, joint swelling, myalgias, neck pain and neck stiffness  Skin: Negative  Allergic/Immunologic: Negative  Neurological: Negative  Hematological: Negative  Psychiatric/Behavioral: Negative  Objective:    Physical Exam   Constitutional: She is oriented to person, place, and time   She appears well-developed and well-nourished  HENT:   Head: Normocephalic  Eyes: Pupils are equal, round, and reactive to light  Neck:   [place] type cervical collar in place at time of examination no areas of tenderness to palpation or appreciated  Cardiovascular: Normal rate, regular rhythm and normal heart sounds  Pulmonary/Chest: Effort normal and breath sounds normal    Neurological: She is alert and oriented to person, place, and time  Gait normal    Reflex Scores:       Tricep reflexes are 2+ on the right side and 2+ on the left side  Bicep reflexes are 2+ on the right side and 2+ on the left side  Neurologic Exam     Mental Status   Oriented to person, place, and time  Level of consciousness: alert    Cranial Nerves     CN III, IV, VI   Pupils are equal, round, and reactive to light  Motor Exam   Muscle bulk: normal    Strength   Right biceps: 5/5  Left biceps: 5/5  Right triceps: 5/5  Left triceps: 5/5    Sensory Exam   Light touch normal      Gait, Coordination, and Reflexes     Gait  Gait: normal    Reflexes   Right biceps: 2+  Left biceps: 2+  Right triceps: 2+  Left triceps: 2+     CERVICAL SPINE 3/14/18     INDICATION: " pt fell 2 weeks ago and fractured C1, follow up xray today  no surg hx"     COMPARISON: CT cervical spine 3/2/2018  Cervical spine radiographs 3/3/2018      VIEWS:  XR SPINE CERVICAL 2 OR 3 VW INJURY        FINDINGS:     The known C1 fracture is not apparent on the current or prior radiographs  No new malalignment      Normal alignment without subluxation      The intervertebral disc spaces are preserved      The prevertebral soft tissues are within normal limits        The lung apices are clear      IMPRESSION:     The known C1 fracture is not apparent on the current or prior radiographs  No new malalignment

## 2018-03-20 NOTE — PROGRESS NOTES
Orthopedics   Conrad Barthel 74 y o  female MRN: [id number]  Unit/Bed#: @JOB@      Chief Complaint:   right ring finger pain    HPI:  74 y  o female complaining of right ring finger pain  She was seen previously as a trauma 2 weeks prior after her injury with a fall onto her right hand and was placed in a ulnar gutter splint for nondisplaced ring finger metacarpal fracture on the right  She is being treated for her lip lacerations by Plastic surgery and her cervical spine injury by neurosurgery  Occupation: retired    Review Of Systems:   · Skin: Normal   · Neuro: See HPI  · Musculoskeletal: See HPI  · 14 point review of systems negative except as stated above     Past Medical History:   Past Medical History:   Diagnosis Date    Anemia     Hyperlipidemia     Hypertension     [facility] [address] [facility]     Stroke ([facility]     Trigeminal neuritis        Past Surgical History:   Past Surgical History:   Procedure Laterality Date    APPENDECTOMY      FOOT SURGERY      KIDNEY SURGERY         Family History:  Family history reviewed and non-contributory  History reviewed  No pertinent family history  Social History:  Social History     Social History    Marital status: /Civil Union     Spouse name: N/A    Number of children: N/A    Years of education: N/A     Social History Main Topics    Smoking status: Never Smoker    Smokeless tobacco: Never Used    Alcohol use No    Drug use: No    Sexual activity: Not Asked     Other Topics Concern    None     Social History Narrative    None       Allergies:    Allergies   Allergen Reactions    Amoxicillin Edema    Penicillins Edema            Labs:    0  Lab Value Date/Time   HCT 32 6 (L) 03/04/2018 0512   HCT 33 1 (L) 03/03/2018 0448   HCT 36 5 03/02/2018 1731   HCT 33 0 (L) 05/24/2016 0715   HCT 33 0 (L) 05/24/2016 0715   HCT 33 0 (L) 05/24/2016 0715   HGB 11 1 (L) 03/04/2018 0512   HGB 11 1 (L) 03/03/2018 0448   HGB 12 4 03/02/2018 1731 HGB 10 6 (L) 05/24/2016 0715   HGB 10 6 (L) 05/24/2016 0715   HGB 10 6 (L) 05/24/2016 0715   INR 0 91 06/14/2016 1154   INR 1 0 05/24/2016 0715   INR 1 0 05/24/2016 0715   WBC 9 02 03/04/2018 0512   WBC 11 45 (H) 03/03/2018 0448   WBC 14 87 (H) 03/02/2018 1731   WBC 7 1 05/24/2016 0715   WBC 7 1 05/24/2016 0715   WBC 7 1 05/24/2016 0715   ESR 11 05/24/2016 0715   ESR 11 05/24/2016 0715       Meds:    Current Outpatient Prescriptions:     acetaminophen (TYLENOL) 325 mg tablet, Take 2 tabs per day Q6 hours for fever or pain, Disp: 30 tablet, Rfl: 0    betamethasone, augmented, (DIPROLENE) 0 05 % lotion, APPLY TWICE A DAY TO SCALP, Disp: , Rfl: 5    clopidogrel (PLAVIX) 75 mg tablet, Take 1 tablet by mouth daily, Disp: , Rfl:     clotrimazole (LOTRIMIN) 1 % cream, Apply topically 2 (two) times a day, Disp: , Rfl:     cyanocobalamin (CVS VITAMIN B-12) 1000 MCG tablet, Take 1,000 mcg by mouth, Disp: , Rfl:     diltiazem (TIAZAC) 240 MG 24 hr capsule, Take 1 capsule (240 mg total) by mouth daily, Disp: 90 capsule, Rfl: 1    doxycycline monohydrate (MONODOX) 100 mg capsule, Take 100 mg by mouth 2 (two) times a day, Disp: , Rfl: 0    fluticasone (FLONASE) 50 mcg/act nasal spray, 2 sprays into each nostril daily, Disp: , Rfl:     folic acid (FOLVITE) 1 mg tablet, one 2 x daily, Disp: , Rfl:     gabapentin (NEURONTIN) 100 mg capsule, Take 1 capsule (100 mg total) by mouth 3 (three) times a day, Disp: 90 capsule, Rfl: 2    latanoprost (XALATAN) 0 005 % ophthalmic solution, 2 5ml 1 drop each eye HS, Disp: , Rfl:     LYCOPENE PO, one daily, Disp: , Rfl:     methotrexate 2 5 mg tablet, Take 6 tablets by mouth once a week, Disp: , Rfl:     Multiple Vitamin (MULTIVITAMIN) tablet, Take 1 tablet by mouth daily  , Disp: , Rfl:     OMEGA-3 FATTY ACIDS-VITAMIN E PO, one 2x daily, Disp: , Rfl:     simvastatin (ZOCOR) 40 mg tablet, Take 1 tablet by mouth daily, Disp: , Rfl:     valsartan (DIOVAN) 320 MG tablet, Take 1 tablet by mouth daily, Disp: , Rfl:     VITAMIN D, CHOLECALCIFEROL, PO, Take 1 capsule by mouth daily  , Disp: , Rfl:     Blood Culture:   No results found for: BLOODCX    Wound Culture:   No results found for: WOUNDCULT    Ins and Outs:  [unfilled]          Physical Exam:   BP (!) 134/101   Pulse 65   Ht 5' 5" (1 651 m)   Wt 59 4 kg (130 lb 15 3 oz)   BMI 21 79 kg/m²   Gen: Alert and oriented to person, place, time  HEENT:  C collar in place for cervical injuries  Respiratory: Bilateral chest rise  No audible wheezing found  Cardiovascular: Regular Rate and Rhythm  Abdomen: soft nontender/nondistended  Musculoskeletal: left upper extremity  · Skin intact   · TTP over ring finger metacarpal   · Able to perform after adequate passive range of motion at all digits  · Able to form a composite fist without any rotational deformities      · SILT m/r/u    · Motor intact ain/pin/m/r/u,   · 2+ rad pulse    Radiology:   X-ray of right hand showed adequate position of nondisplaced ring finger metacarpal for under, slight shortening, adequate surface area contact to stimulate bony healing  _*_*_*_*_*_*_*_*_*_*_*_*_*_*_*_*_*_*_*_*_*_*_*_*_*_*_*_*_*_*_*_*_*_*_*_*_*_*_*_*_*    Assessment:  74 y o  female status post mechanical fall 2 weeks prior with nondisplaced right ring finger metacarpal fracture, being treated nonoperatively    Plan:   · NWB right hand, splint removed, ok to perform active and passive ROM   · Pt is to f/u in 3 weeks for recheck and repeat XR    Tyrone Muro MD

## 2018-03-20 NOTE — PATIENT INSTRUCTIONS
Continue with cervical collar at all times  Activities; lifting no greater than 10 lb, ambulation as tolerated  Follow-up with orthopedist was arranged for today with Dr Holden Diallo    Further follow-up with Neurosurgery in approximately 4 weeks, Dr Elfego Weiss, [facility])    Upright cervical spine study prior to visit

## 2018-03-24 NOTE — PROCEDURES
22-year-old female who sustained a complex lip laceration from a fall head 1st in to a concrete pavement  There appears to be soft tissue loss  I discussed with her the possibility that this will result in lip asymmetry and an obvious lip deformity  There is approximately 2-1/2 cm of tissue loss involving the vermilion, the vermilion border and the right upper lip  She understands this and wishes to proceed  I personally obtained her informed consent  Bilateral infraorbital nerve blocks were performed with 1% lidocaine with epinephrine  The vermilion borders were marked bilaterally with a marking pen  The wound was then gently debrided of devitalized tissue  The laceration was triangular in nature and there was extensive nonviable tissue  Every effort was made to preserve viable tissue to minimize the aesthetic outcome  The wound was copiously irrigated with normal saline solution  Muscle was reapproximated with buried interrupted 4-0 Vicryl suture  The vermilion was reapproximated with interrupted 4-0 chromic sutures  The superficial skin was closed with interrupted 5 0 nylon sutures  The final length of closure measured 4 2 cm  Patient tolerated the procedure well and without complication  The wound was dressed with bacitracin ointment followed by Xeroform

## 2018-03-29 ENCOUNTER — OFFICE VISIT (OUTPATIENT)
Dept: PLASTIC SURGERY | Facility: CLINIC | Age: 73
End: 2018-03-29

## 2018-03-29 VITALS — BODY MASS INDEX: 21.43 KG/M2 | WEIGHT: 128.6 LBS | HEIGHT: 65 IN

## 2018-03-29 DIAGNOSIS — E78.2 MIXED HYPERLIPIDEMIA: Primary | ICD-10-CM

## 2018-03-29 DIAGNOSIS — S01.511D LIP LACERATION, SUBSEQUENT ENCOUNTER: ICD-10-CM

## 2018-03-29 DIAGNOSIS — S01.81XD FACIAL LACERATION, SUBSEQUENT ENCOUNTER: Primary | ICD-10-CM

## 2018-03-29 PROCEDURE — 99024 POSTOP FOLLOW-UP VISIT: CPT | Performed by: PHYSICIAN ASSISTANT

## 2018-03-29 RX ORDER — SIMVASTATIN 40 MG
40 TABLET ORAL DAILY
Qty: 90 TABLET | Refills: 1 | Status: SHIPPED | OUTPATIENT
Start: 2018-03-29 | End: 2018-03-30 | Stop reason: SDUPTHER

## 2018-03-29 NOTE — PROGRESS NOTES
72 y o  female  status post fall, resulting in a lip lac  The laceration is completely closed today  T here are 2 approx 1 mm superficial openings  She will apply bacitracin daily for a week, then Aquaphor  She was given silicone scar gel information, which she can start using in 1 month  She was advised to use sunscreen of at least SPF 30  She states today that she is happy with the result, but   she will return, if desired, in 3 months to discuss scar revision options

## 2018-03-30 DIAGNOSIS — E78.2 MIXED HYPERLIPIDEMIA: ICD-10-CM

## 2018-03-31 RX ORDER — SIMVASTATIN 40 MG
TABLET ORAL
Qty: 90 TABLET | Refills: 1 | Status: SHIPPED | OUTPATIENT
Start: 2018-03-31 | End: 2019-03-20 | Stop reason: SDUPTHER

## 2018-04-01 DIAGNOSIS — Z86.73 HISTORY OF TIA (TRANSIENT ISCHEMIC ATTACK): Primary | ICD-10-CM

## 2018-04-01 RX ORDER — CLOPIDOGREL BISULFATE 75 MG/1
TABLET ORAL
Qty: 30 TABLET | Refills: 3 | Status: SHIPPED | OUTPATIENT
Start: 2018-04-01 | End: 2018-07-23 | Stop reason: SDUPTHER

## 2018-04-12 ENCOUNTER — OFFICE VISIT (OUTPATIENT)
Dept: OBGYN CLINIC | Facility: HOSPITAL | Age: 73
End: 2018-04-12

## 2018-04-12 ENCOUNTER — HOSPITAL ENCOUNTER (OUTPATIENT)
Dept: RADIOLOGY | Facility: HOSPITAL | Age: 73
Discharge: HOME | End: 2018-04-12
Attending: ORTHOPAEDIC SURGERY
Payer: MEDICARE

## 2018-04-12 VITALS
HEIGHT: 65 IN | DIASTOLIC BLOOD PRESSURE: 63 MMHG | SYSTOLIC BLOOD PRESSURE: 102 MMHG | HEART RATE: 70 BPM | BODY MASS INDEX: 21.41 KG/M2 | WEIGHT: 128.53 LBS

## 2018-04-12 DIAGNOSIS — Z09 FRACTURE FOLLOW-UP: ICD-10-CM

## 2018-04-12 DIAGNOSIS — Z09 FRACTURE FOLLOW-UP: Primary | ICD-10-CM

## 2018-04-12 DIAGNOSIS — S62.304D CLOSED FRACTURE OF FOURTH METACARPAL BONE OF RIGHT HAND WITH ROUTINE HEALING, UNSPECIFIED FRACTURE MORPHOLOGY, SUBSEQUENT ENCOUNTER: ICD-10-CM

## 2018-04-12 PROCEDURE — 99024 POSTOP FOLLOW-UP VISIT: CPT | Performed by: ORTHOPAEDIC SURGERY

## 2018-04-12 PROCEDURE — 73130 X-RAY EXAM OF HAND: CPT

## 2018-04-12 NOTE — PROGRESS NOTES
74 y o female presents for 4 week follow-up examination status post traumatic injury to the right ring finger metacarpal fracture  Patient has been doing well since previous examination  She has no complaints of decreased range of motion or pain ove the ring metacarpal     Review of Systems  Review of systems negative unless otherwise specified in HPI    Past Medical History  Past Medical History:   Diagnosis Date    Anemia     Hyperlipidemia     Hypertension     [facility] [address] [facility]     [facility]     Trigeminal neuritis        Past Surgical History  Past Surgical History:   Procedure Laterality Date    APPENDECTOMY      FOOT SURGERY      KIDNEY SURGERY         Current Medications  Current Outpatient Prescriptions on File Prior to Visit   Medication Sig Dispense Refill    acetaminophen (TYLENOL) 325 mg tablet Take 2 tabs per day Q6 hours for fever or pain 30 tablet 0    betamethasone, augmented, (DIPROLENE) 0 05 % lotion APPLY TWICE A DAY TO SCALP  5    clopidogrel (PLAVIX) 75 mg tablet TAKE 1 TABLET DAILY 30 tablet 3    clotrimazole (LOTRIMIN) 1 % cream Apply topically 2 (two) times a day      cyanocobalamin (CVS VITAMIN B-12) 1000 MCG tablet Take 1,000 mcg by mouth      diltiazem (TIAZAC) 240 MG 24 hr capsule Take 1 capsule (240 mg total) by mouth daily 90 capsule 1    doxycycline monohydrate (MONODOX) 100 mg capsule Take 100 mg by mouth 2 (two) times a day  0    fluticasone (FLONASE) 50 mcg/act nasal spray 2 sprays into each nostril daily      folic acid (FOLVITE) 1 mg tablet one 2 x daily      gabapentin (NEURONTIN) 100 mg capsule Take 1 capsule (100 mg total) by mouth 3 (three) times a day 90 capsule 2    latanoprost (XALATAN) 0 005 % ophthalmic solution 2 5ml 1 drop each eye HS      LYCOPENE PO one daily      methotrexate 2 5 mg tablet Take 6 tablets by mouth once a week      Multiple Vitamin (MULTIVITAMIN) tablet Take 1 tablet by mouth daily        OMEGA-3 FATTY ACIDS-VITAMIN E PO one 2x daily      simvastatin (ZOCOR) 40 mg tablet TAKE ONE TABLET BY MOUTH DAILY as directed 90 tablet 1    valsartan (DIOVAN) 320 MG tablet Take 1 tablet by mouth daily      VITAMIN D, CHOLECALCIFEROL, PO Take 1 capsule by mouth daily  No current facility-administered medications on file prior to visit  Recent Labs [facility])    0  Lab Value Date/Time   HCT 32 6 (L) 03/04/2018 0512   HCT 33 0 (L) 05/24/2016 0715   HCT 33 0 (L) 05/24/2016 0715   HCT 33 0 (L) 05/24/2016 0715   HGB 11 1 (L) 03/04/2018 0512   HGB 10 6 (L) 05/24/2016 0715   HGB 10 6 (L) 05/24/2016 0715   HGB 10 6 (L) 05/24/2016 0715   WBC 9 02 03/04/2018 0512   WBC 7 1 05/24/2016 0715   WBC 7 1 05/24/2016 0715   WBC 7 1 05/24/2016 0715   INR 0 91 06/14/2016 1154   INR 1 0 05/24/2016 0715   INR 1 0 05/24/2016 0715   ESR 11 05/24/2016 0715   ESR 11 05/24/2016 0715   GLUCOSE 93 03/03/2018 0448   GLUCOSE 95 04/14/2015 1613   HGBA1C 6 2 (H) 07/30/2014 1545         Physical exam  · General: Awake, Alert, Oriented  · Eyes: Pupils equal, round and reactive to light  · Heart: regular rate and rhythm  · Lungs: No audible wheezing  · Abdomen: soft  right hand  · No tenderness to palpation  · Full flexion extension at MCP joint moved  · Sensation intact the entirety of hand    Procedure  None    Imaging  X-ray    Assessment:   74 y  o female right hand shows well-healing spiral fracture of ring metacarpal    Plan  · Weightbearing:  As tolerated  · Activity:  As tolerated  · Follow-up: P r n

## 2018-04-15 ENCOUNTER — HOSPITAL ENCOUNTER (OUTPATIENT)
Dept: RADIOLOGY | Facility: HOSPITAL | Age: 73
Discharge: HOME | End: 2018-04-15
Payer: MEDICARE

## 2018-04-15 DIAGNOSIS — S12.091D OTHER CLOSED NONDISPLACED FRACTURE OF FIRST CERVICAL VERTEBRA WITH ROUTINE HEALING, SUBSEQUENT ENCOUNTER: ICD-10-CM

## 2018-04-15 PROCEDURE — 72040 X-RAY EXAM NECK SPINE 2-3 VW: CPT

## 2018-04-19 ENCOUNTER — TRANSCRIBE ORDERS (OUTPATIENT)
Dept: RADIOLOGY | Facility: HOSPITAL | Age: 73
End: 2018-04-19

## 2018-04-19 ENCOUNTER — OFFICE VISIT (OUTPATIENT)
Dept: NEUROSURGERY | Facility: CLINIC | Age: 73
End: 2018-04-19
Payer: MEDICARE

## 2018-04-19 ENCOUNTER — HOSPITAL ENCOUNTER (OUTPATIENT)
Dept: RADIOLOGY | Facility: HOSPITAL | Age: 73
Discharge: HOME | End: 2018-04-19
Payer: MEDICARE

## 2018-04-19 VITALS
HEIGHT: 65 IN | TEMPERATURE: 97.8 F | RESPIRATION RATE: 20 BRPM | DIASTOLIC BLOOD PRESSURE: 55 MMHG | SYSTOLIC BLOOD PRESSURE: 115 MMHG | HEART RATE: 68 BPM | BODY MASS INDEX: 21.66 KG/M2 | WEIGHT: 130 LBS

## 2018-04-19 DIAGNOSIS — S12.001D: Primary | ICD-10-CM

## 2018-04-19 DIAGNOSIS — I10 ESSENTIAL HYPERTENSION: Primary | ICD-10-CM

## 2018-04-19 DIAGNOSIS — S12.001D: ICD-10-CM

## 2018-04-19 PROCEDURE — 99214 OFFICE O/P EST MOD 30 MIN: CPT | Performed by: NEUROLOGICAL SURGERY

## 2018-04-19 PROCEDURE — 72040 X-RAY EXAM NECK SPINE 2-3 VW: CPT
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
## 2018-04-19 NOTE — PATIENT INSTRUCTIONS
Proceed with weaning from the cervical collar as discussed, collar is not needed bedtime  Start today without the collar, as you have a sensation that your head is heavy return to the collar for 2-3 hours and continue to alternate in this fashion over the next 7-10 days  Start a course of physical therapy approximately 2 weeks after completion of weaning from the collar  Physical therapy consultation has been placed, as [address] [place] [address]    Further follow-up with Neurosurgery will be on an as needed basis
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
## 2018-04-20 RX ORDER — VALSARTAN 320 MG/1
320 TABLET ORAL DAILY
Qty: 90 TABLET | Refills: 1 | Status: SHIPPED | OUTPATIENT
Start: 2018-04-20 | End: 2018-10-16 | Stop reason: SDUPTHER

## 2018-05-08 ENCOUNTER — EVALUATION (OUTPATIENT)
Dept: PHYSICAL THERAPY | Facility: REHABILITATION | Age: 73
End: 2018-05-08
Payer: MEDICARE

## 2018-05-08 DIAGNOSIS — S12.001D: Primary | ICD-10-CM

## 2018-05-08 PROCEDURE — 97140 MANUAL THERAPY 1/> REGIONS: CPT | Performed by: PHYSICAL THERAPIST

## 2018-05-08 PROCEDURE — G8984 CARRY CURRENT STATUS: HCPCS | Performed by: PHYSICAL THERAPIST

## 2018-05-08 PROCEDURE — 97161 PT EVAL LOW COMPLEX 20 MIN: CPT | Performed by: PHYSICAL THERAPIST

## 2018-05-08 PROCEDURE — G8985 CARRY GOAL STATUS: HCPCS | Performed by: PHYSICAL THERAPIST
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
## 2018-05-11 ENCOUNTER — OFFICE VISIT (OUTPATIENT)
Dept: PHYSICAL THERAPY | Facility: REHABILITATION | Age: 73
End: 2018-05-11
Payer: MEDICARE

## 2018-05-11 DIAGNOSIS — S12.001D: Primary | ICD-10-CM

## 2018-05-11 PROCEDURE — 97140 MANUAL THERAPY 1/> REGIONS: CPT | Performed by: PHYSICAL THERAPIST

## 2018-05-11 NOTE — PROGRESS NOTES
Daily Note     Today's date: 2018  Patient name: Hung Cash  : 1945  MRN: [id number]  Referring provider: Vishal Juarez  Dx:   Encounter Diagnosis     ICD-10-CM    1  Open nondisplaced fracture of first cervical vertebra with routine healing, unspecified fracture morphology, subsequent encounter S12 001D                   Subjective: Pt  Was able to drive  She is a little stiff but no pain  She has been doing her exercises      Objective: See treatment diary below  Manual  -11           STM/MFR 15' x                                                                   Exercise Diary                           DNF 10x 5s x                                     Scap ret 10x 10s x                                                                                                                                                                                                                  Modalities              MH 10'                                            Assessment: Left sided upper cervical spine is still tight  Even after manual work it was still about 25% tighter than the right  Anticipate that she will be a little sore later today and tomorrow  I think she will only need 1-2 more visits to clear out any tightness  Plan: Work out left sided upper cervcial tightness along with increasing left cervical rotation

## 2018-05-14 ENCOUNTER — OFFICE VISIT (OUTPATIENT)
Dept: PHYSICAL THERAPY | Facility: REHABILITATION | Age: 73
End: 2018-05-14
Payer: MEDICARE

## 2018-05-14 DIAGNOSIS — S12.001D: Primary | ICD-10-CM

## 2018-05-14 PROCEDURE — 97140 MANUAL THERAPY 1/> REGIONS: CPT

## 2018-05-14 PROCEDURE — 97110 THERAPEUTIC EXERCISES: CPT

## 2018-05-14 NOTE — PROGRESS NOTES
Daily Note     Today's date: 2018  Patient name: Ana Rosa Guy  : 1945  MRN: [id number]  Referring provider: Josh Mauricio  Dx:   Encounter Diagnosis     ICD-10-CM    1  Open nondisplaced fracture of first cervical vertebra with routine healing, unspecified fracture morphology, subsequent encounter S12 001D                   Subjective: Pt denies cervical pain at arrival  States her neck feels a little sore but not too bad, R > L today  Objective: See treatment diary below    Objective: See treatment diary below  Manual  14                 STM/MFR 15' x  x                                                                                                                       Exercise Diary                                                DNF 10x 5s x  x                                                                 Scap ret 10x 10s x  yellow t-band  3'' 2 x 10                  upper trap stretch      20''x3                  levator scap stretch      20''x3                                                                                                                                                                                                                                                                                                                                               Modalities                        MH 8'  10'  10'                                                                          Assessment: Tolerated treatment well  Patient would benefit from continued PT  Pt needed visual and verbal cueing for technique with cervical stretches and length of time holding stretch  Pt has arthritis in B hands but was willing to try t-band retractions today, was able to tolerate sets but had slightly increased B hand pain afterwards  Plan: Continue per plan of care

## 2018-05-18 ENCOUNTER — OFFICE VISIT (OUTPATIENT)
Dept: PHYSICAL THERAPY | Facility: REHABILITATION | Age: 73
End: 2018-05-18
Payer: MEDICARE

## 2018-05-18 DIAGNOSIS — S12.001D: Primary | ICD-10-CM

## 2018-05-18 PROCEDURE — G8982 BODY POS GOAL STATUS: HCPCS | Performed by: PHYSICAL THERAPIST

## 2018-05-18 PROCEDURE — 97140 MANUAL THERAPY 1/> REGIONS: CPT | Performed by: PHYSICAL THERAPIST

## 2018-05-18 PROCEDURE — 97110 THERAPEUTIC EXERCISES: CPT | Performed by: PHYSICAL THERAPIST

## 2018-05-18 PROCEDURE — G8981 BODY POS CURRENT STATUS: HCPCS | Performed by: PHYSICAL THERAPIST

## 2018-05-18 NOTE — PROGRESS NOTES
Daily Note     Today's date: 2018  Patient name: Erasto Macedo  : 1945  MRN: [id number]  Referring provider: Sravani Peres  Dx:   Encounter Diagnosis     ICD-10-CM    1  Open nondisplaced fracture of first cervical vertebra with routine healing, unspecified fracture morphology, subsequent encounter S12 001D             Subjective: Pt notes that she had "swelling and throbbing in her arthritic hands" following yellow tband scap retraction LV but denies neck pain upon arrival and since LV  She would like to be discharged to [facility] today  Objective: See treatment diary below  Issued and instructed in HEP  Manual  5/8 5-10  5-14 5/18               STM/MFR 15' x  x [facility]                                          Exercise Diary  5/8  5/10  5/14   5/18               DNF 10x 5s x  x 5"x10                                                                Scap ret 10x 10s x YTB  3'' 2 x 10 5"x20                upper trap stretch      20''x3  20"x3 ea                levator scap stretch      20''x3 20"x3 ea                                                                   Modalities   5/8 5/10 5/14  5/18               MH 10'  10'  10'  10' pre                  Assessment: Tolerated treatment well  Patient exhibited good technique with therapeutic exercises but req correction for form for UT stretch  Plan: Pt discharged to [facility] per MW, PT  FOTO score remained the same      Juan Lim PTA

## 2018-05-24 ENCOUNTER — OFFICE VISIT (OUTPATIENT)
Dept: FAMILY MEDICINE CLINIC | Facility: CLINIC | Age: 73
End: 2018-05-24
Payer: MEDICARE

## 2018-05-24 VITALS
WEIGHT: 131.2 LBS | HEIGHT: 64 IN | HEART RATE: 87 BPM | OXYGEN SATURATION: 99 % | DIASTOLIC BLOOD PRESSURE: 66 MMHG | RESPIRATION RATE: 18 BRPM | TEMPERATURE: 98.2 F | SYSTOLIC BLOOD PRESSURE: 130 MMHG | BODY MASS INDEX: 22.4 KG/M2

## 2018-05-24 DIAGNOSIS — Z86.73 HISTORY OF TIA (TRANSIENT ISCHEMIC ATTACK): ICD-10-CM

## 2018-05-24 DIAGNOSIS — Z23 NEED FOR ZOSTER VACCINE: ICD-10-CM

## 2018-05-24 DIAGNOSIS — E78.2 MIXED HYPERLIPIDEMIA: ICD-10-CM

## 2018-05-24 DIAGNOSIS — I25.10 ARTERIOSCLEROTIC HEART DISEASE: ICD-10-CM

## 2018-05-24 DIAGNOSIS — M06.9 RHEUMATOID ARTHRITIS, INVOLVING UNSPECIFIED SITE, UNSPECIFIED RHEUMATOID FACTOR PRESENCE: ICD-10-CM

## 2018-05-24 DIAGNOSIS — I10 BENIGN ESSENTIAL HYPERTENSION: Primary | ICD-10-CM

## 2018-05-24 PROCEDURE — 99214 OFFICE O/P EST MOD 30 MIN: CPT | Performed by: FAMILY MEDICINE
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
## 2018-05-25 NOTE — ASSESSMENT & PLAN NOTE
She has not had blood work since December so she will have blood work prior to her next visit 4 months

## 2018-07-23 DIAGNOSIS — Z86.73 HISTORY OF TIA (TRANSIENT ISCHEMIC ATTACK): ICD-10-CM

## 2018-07-23 RX ORDER — CLOPIDOGREL BISULFATE 75 MG/1
TABLET ORAL
Qty: 30 TABLET | Refills: 3 | Status: SHIPPED | OUTPATIENT
Start: 2018-07-23 | End: 2018-11-30 | Stop reason: SDUPTHER

## 2018-09-17 ENCOUNTER — OFFICE VISIT (OUTPATIENT)
Dept: NEUROLOGY | Facility: CLINIC | Age: 73
End: 2018-09-17
Payer: MEDICARE

## 2018-09-17 VITALS
SYSTOLIC BLOOD PRESSURE: 144 MMHG | WEIGHT: 128.2 LBS | HEART RATE: 64 BPM | RESPIRATION RATE: 15 BRPM | BODY MASS INDEX: 22.16 KG/M2 | DIASTOLIC BLOOD PRESSURE: 50 MMHG

## 2018-09-17 DIAGNOSIS — S12.001D: ICD-10-CM

## 2018-09-17 DIAGNOSIS — Z86.73 HISTORY OF TIA (TRANSIENT ISCHEMIC ATTACK): ICD-10-CM

## 2018-09-17 DIAGNOSIS — G50.0 TRIGEMINAL NEURALGIA: Primary | ICD-10-CM

## 2018-09-17 PROCEDURE — 99214 OFFICE O/P EST MOD 30 MIN: CPT | Performed by: PSYCHIATRY & NEUROLOGY

## 2018-09-17 NOTE — PROGRESS NOTES
Patient ID: Eda Pereyra is a 74 y o  female  Assessment/Plan:    Trigeminal neuralgia  Pt here for neuro follow up  Last seen in march 2017  Overall doing well  No pain  Pt with annoying paresthesias of the right side of face  No new sxs  No diff with eating or chewing  Pt had updated imaging in sept 2017 and enh in trigeminal area resolved at that time  Pt has not had any success with neuropathic pain meds and prefers to be off meds as no pain      History of TIA (transient ischemic attack)  No new neuro sxs  No sxs of cva or tia  Pt remains on plavix   bp is followed by pcp    Open nondisplaced fracture of first cervical vertebra with routine healing  Pt is now off collar, wore for 10 weeks  Pt notes she has been cleared by neurosurgery  No follow up with ns and now released from them as well       Diagnoses and all orders for this visit:    Trigeminal neuralgia    Open nondisplaced fracture of first cervical vertebra with routine healing, unspecified fracture morphology, subsequent encounter    History of TIA (transient ischemic attack)           Subjective:    HPI    Patient is a 74year old female with PMH of RA on methotrexate, hypercholesterolemia, TIA in 2011, HTN, who presents today for neurological follow up  Pt last seen by hector holloway on 3/12/18  Per hector last note from March " Patient initially seen on 5/20/16 for evaluation of right facial numbness  She was referred by Dr Colette Kang  Symptoms started acutely at the end of March 2016  Patient reports she was sick and treated with antibiotics for a sinus infection, then developed the numbness and had cold sores in her mouth  She was treated with valacyclovir and oral steroids  Patient had MRI of the brain 4/3/16 which showed 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  Possibilities include nerve sheath tumor vs neurosarcoid vs Lyme or Lymphoma, or demyelinating disease   Patient was seen by neurosurgery and   [place] did not feel there was a discrete lesion, less likely tumor  Neurosurgery and neurorads considering LP  Dr Hossein Ayon planned to repeat MRI brain in July 2016 to see if enhancement has resolved, which would go against a neoplastic process  Patient had labs done with neg Lyme, ACE, Sjogrens  Sed rate normal at 11  Of note, sed rate was elevated just before original MRI in March at 29  B12 383  Normal TSH  Patient then had LP done in June 2016  WBCs 1 in the CSF, neg ACE, VZV, HSV, VDRL, Cryto, JCV, NMO  MS panel with elevated MBP at 2 2, normal IgG synthesis rate, 2 OCBs  She had repeat MRI of the brain on July 2nd 2016, ordered by Dr Hossein Ayon  This demonstrated persistent, but improving 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, with less involvement of the nerve root entry zone and improvement of previously seen enlargement of the nerve  This is unlikely to represent nerve sheath tumor and more likely to represent improving infectious/inflammatory process  Patient then saw Dr Hossein Ayon for follow up on July 8th and he told her again, not likely to be a nerve sheath tumor that would require surgery, and likely a viral process that caused the enhancement  He was pleased that this was resolving and did not feel any further neurosurgical follow up was needed at this time  "  Re rev above with pt at this appt  Kept above paragraph due to complexities of her case  Overall doing well  No right facial pain  Pt with annoying paresthesias of the right side of face  No new sxs  No change in vision  No fever or chills  No recent infections  No further falls  No change in speech or swallowing  no loss of vision  No diplopia  No ha or n or v   No diff with eating or chewing  Pt had updated imaging in sept 2017 and enh in trigeminal area resolved at that time  Pt has not had any success with neuropathic pain meds and prefers to be off meds as no pain    From tia standpoint,no new neuro sxs  No sxs of cva or tia  Pt remains on plavix   bp is followed by pcp  Pt is s/p non displaced fx of C1 in march 2018  After prior fall  Pt is now off collar, wore for 10 weeks  Pt notes she has been cleared by neurosurgery  No follow up with ns and now released from them as well  Pt currently off gabapentin as she feels it did not help and facial sxs minimal at this time  Total time spent today 25  minutes  Greater than 50% of total time was spent with the patient and / or family counseling and / or coordination of care                          The following portions of the patient's history were reviewed and updated as appropriate: allergies, current medications, past family history, past medical history, past social history, past surgical history and problem list          Objective:    Blood pressure 144/50, pulse 64, resp  rate 15, weight 58 2 kg (128 lb 3 2 oz), not currently breastfeeding  Physical Exam   Constitutional: She appears well-developed and well-nourished  Eyes: EOM are normal  Pupils are equal, round, and reactive to light  Cardiovascular: Intact distal pulses  Neurological: She has normal strength and normal reflexes  Psychiatric: Her speech is normal        Neurological Exam    Mental Status  The patient is alert and oriented to person, place, time, and situation  Her recent and remote memory are normal  Her speech is normal  Her language is fluent with no aphasia  She has normal attention span and concentration  She follows multi-step commands  She has a normal fund of knowledge      Cranial Nerves    CN II: The patient's visual acuity and visual fields are normal   CN III, IV, VI: The patient's pupils are equally round and reactive to light and ocular movements are normal   CN V: The patient has normal facial sensation  CN VII:  The patient has symmetric facial movement  CN VIII:  The patient's hearing is normal   CN IX, X: The patient has symmetric palate movement and normal gag reflex  CN XI: The patient's shoulder shrug strength is normal   CN XII: The patient's tongue is midline without atrophy or fasciculations  Motor  The patient has normal muscle bulk throughout  Her overall muscle tone is normal throughout  Her strength is 5/5 throughout all four extremities  Sensory  The patient's sensation is normal in all four extremities  Reflexes  Deep tendon reflexes are 2+ and symmetric in all four extremities with downgoing toes bilaterally  Gait and Coordination   She has a wide stance  She has normal right finger to nose and normal left finger to nose coordination  Neg babinski lillie  No pronator drift or fix         ROS:    Review of Systems   Constitutional: Negative  Negative for appetite change and fever  HENT: Negative  Negative for hearing loss, tinnitus, trouble swallowing and voice change  Eyes: Negative  Negative for photophobia and pain  Respiratory: Negative  Negative for shortness of breath  Cardiovascular: Negative  Negative for palpitations  Gastrointestinal: Negative  Negative for nausea and vomiting  Endocrine: Negative  Negative for cold intolerance and heat intolerance  Genitourinary: Negative  Negative for dysuria, frequency and urgency  Musculoskeletal: Negative  Negative for myalgias and neck pain  Skin: Negative  Negative for rash  Neurological: Negative  Negative for dizziness, tremors, seizures, syncope, facial asymmetry, speech difficulty, weakness, light-headedness, numbness (tingling) and headaches  Hematological: Negative  Does not bruise/bleed easily  Psychiatric/Behavioral: Negative  Negative for confusion, hallucinations and sleep disturbance

## 2018-09-17 NOTE — PATIENT INSTRUCTIONS
Trigeminal neuralgia  Pt here for neuro follow up  Last seen in march 2017  Overall doing well  No pain  Pt with annoying paresthesias of the right side of face  No new sxs  No diff with eating or chewing  Pt had updated imaging in sept 2017 and enh in trigeminal area resolved at that time  Pt has not had any success with neuropathic pain meds and prefers to be off meds as no pain      History of TIA (transient ischemic attack)  No new neuro sxs  No sxs of cva or tia  Pt remains on plavix   bp is followed by pcp    Open nondisplaced fracture of first cervical vertebra with routine healing  Pt is now off collar, wore for 10 weeks  Pt notes she has been cleared by neurosurgery  No follow up with ns and now released from them as well

## 2018-09-17 NOTE — ASSESSMENT & PLAN NOTE
Pt is now off collar, wore for 10 weeks  Pt notes she has been cleared by neurosurgery  No follow up with ns and now released from them as well

## 2018-09-17 NOTE — ASSESSMENT & PLAN NOTE
Pt here for neuro follow up  Last seen in march 2017  Overall doing well  No pain  Pt with annoying paresthesias of the right side of face  No new sxs  No diff with eating or chewing  Pt had updated imaging in sept 2017 and enh in trigeminal area resolved at that time  Pt has not had any success with neuropathic pain meds and prefers to be off meds as no pain

## 2018-09-21 DIAGNOSIS — E78.2 MIXED HYPERLIPIDEMIA: ICD-10-CM

## 2018-09-21 RX ORDER — SIMVASTATIN 40 MG
TABLET ORAL
Qty: 90 TABLET | Refills: 1 | Status: SHIPPED | OUTPATIENT
Start: 2018-09-21 | End: 2019-01-29

## 2018-09-26 ENCOUNTER — OFFICE VISIT (OUTPATIENT)
Dept: FAMILY MEDICINE CLINIC | Facility: CLINIC | Age: 73
End: 2018-09-26
Payer: MEDICARE

## 2018-09-26 VITALS
OXYGEN SATURATION: 96 % | HEIGHT: 65 IN | TEMPERATURE: 98.7 F | DIASTOLIC BLOOD PRESSURE: 68 MMHG | SYSTOLIC BLOOD PRESSURE: 120 MMHG | BODY MASS INDEX: 21.13 KG/M2 | HEART RATE: 70 BPM | RESPIRATION RATE: 16 BRPM | WEIGHT: 126.8 LBS

## 2018-09-26 DIAGNOSIS — Z12.11 COLON CANCER SCREENING: ICD-10-CM

## 2018-09-26 DIAGNOSIS — I10 BENIGN ESSENTIAL HYPERTENSION: Primary | ICD-10-CM

## 2018-09-26 DIAGNOSIS — G50.0 TRIGEMINAL NEURALGIA: ICD-10-CM

## 2018-09-26 DIAGNOSIS — Z23 NEED FOR INFLUENZA VACCINATION: ICD-10-CM

## 2018-09-26 DIAGNOSIS — E78.2 MIXED HYPERLIPIDEMIA: ICD-10-CM

## 2018-09-26 DIAGNOSIS — M06.9 RHEUMATOID ARTHRITIS, INVOLVING UNSPECIFIED SITE, UNSPECIFIED RHEUMATOID FACTOR PRESENCE: ICD-10-CM

## 2018-09-26 DIAGNOSIS — Z12.39 BREAST CANCER SCREENING: ICD-10-CM

## 2018-09-26 LAB
ALBUMIN SERPL BCP-MCNC: 3.8 G/DL (ref 3.5–5)
ALP SERPL-CCNC: 63 U/L (ref 46–116)
ALT SERPL W P-5'-P-CCNC: 16 U/L (ref 12–78)
ANION GAP SERPL CALCULATED.3IONS-SCNC: 7 MMOL/L (ref 4–13)
AST SERPL W P-5'-P-CCNC: 13 U/L (ref 5–45)
BASOPHILS # BLD AUTO: 0.02 THOUSANDS/ΜL (ref 0–0.1)
BASOPHILS NFR BLD AUTO: 0 % (ref 0–1)
BILIRUB SERPL-MCNC: 0.63 MG/DL (ref 0.2–1)
BUN SERPL-MCNC: 17 MG/DL (ref 5–25)
CALCIUM SERPL-MCNC: 9 MG/DL (ref 8.3–10.1)
CHLORIDE SERPL-SCNC: 102 MMOL/L (ref 100–108)
CHOLEST SERPL-MCNC: 179 MG/DL (ref 50–200)
CO2 SERPL-SCNC: 29 MMOL/L (ref 21–32)
CREAT SERPL-MCNC: 0.94 MG/DL (ref 0.6–1.3)
EOSINOPHIL # BLD AUTO: 0.05 THOUSAND/ΜL (ref 0–0.61)
EOSINOPHIL NFR BLD AUTO: 1 % (ref 0–6)
ERYTHROCYTE [DISTWIDTH] IN BLOOD BY AUTOMATED COUNT: 14.7 % (ref 11.6–15.1)
GFR SERPL CREATININE-BSD FRML MDRD: 60 ML/MIN/1.73SQ M
GLUCOSE P FAST SERPL-MCNC: 101 MG/DL (ref 65–99)
HCT VFR BLD AUTO: 38.7 % (ref 34.8–46.1)
HDLC SERPL-MCNC: 83 MG/DL (ref 40–60)
HGB BLD-MCNC: 12.6 G/DL (ref 11.5–15.4)
IMM GRANULOCYTES # BLD AUTO: 0.02 THOUSAND/UL (ref 0–0.2)
IMM GRANULOCYTES NFR BLD AUTO: 0 % (ref 0–2)
LDLC SERPL CALC-MCNC: 70 MG/DL (ref 0–100)
LYMPHOCYTES # BLD AUTO: 1.55 THOUSANDS/ΜL (ref 0.6–4.47)
LYMPHOCYTES NFR BLD AUTO: 26 % (ref 14–44)
MCH RBC QN AUTO: 33.6 PG (ref 26.8–34.3)
MCHC RBC AUTO-ENTMCNC: 32.6 G/DL (ref 31.4–37.4)
MCV RBC AUTO: 103 FL (ref 82–98)
MONOCYTES # BLD AUTO: 0.53 THOUSAND/ΜL (ref 0.17–1.22)
MONOCYTES NFR BLD AUTO: 9 % (ref 4–12)
NEUTROPHILS # BLD AUTO: 3.73 THOUSANDS/ΜL (ref 1.85–7.62)
NEUTS SEG NFR BLD AUTO: 64 % (ref 43–75)
NRBC BLD AUTO-RTO: 0 /100 WBCS
PLATELET # BLD AUTO: 217 THOUSANDS/UL (ref 149–390)
PMV BLD AUTO: 10.2 FL (ref 8.9–12.7)
POTASSIUM SERPL-SCNC: 4.3 MMOL/L (ref 3.5–5.3)
PROT SERPL-MCNC: 7 G/DL (ref 6.4–8.2)
RBC # BLD AUTO: 3.75 MILLION/UL (ref 3.81–5.12)
SODIUM SERPL-SCNC: 138 MMOL/L (ref 136–145)
TRIGL SERPL-MCNC: 128 MG/DL
TSH SERPL DL<=0.05 MIU/L-ACNC: 1.29 UIU/ML (ref 0.36–3.74)
WBC # BLD AUTO: 5.9 THOUSAND/UL (ref 4.31–10.16)

## 2018-09-26 PROCEDURE — 90662 IIV NO PRSV INCREASED AG IM: CPT | Performed by: FAMILY MEDICINE

## 2018-09-26 PROCEDURE — 36415 COLL VENOUS BLD VENIPUNCTURE: CPT | Performed by: FAMILY MEDICINE

## 2018-09-26 PROCEDURE — 80061 LIPID PANEL: CPT | Performed by: FAMILY MEDICINE

## 2018-09-26 PROCEDURE — 80053 COMPREHEN METABOLIC PANEL: CPT | Performed by: FAMILY MEDICINE

## 2018-09-26 PROCEDURE — G0008 ADMIN INFLUENZA VIRUS VAC: HCPCS | Performed by: FAMILY MEDICINE

## 2018-09-26 PROCEDURE — 84443 ASSAY THYROID STIM HORMONE: CPT | Performed by: FAMILY MEDICINE

## 2018-09-26 PROCEDURE — 99214 OFFICE O/P EST MOD 30 MIN: CPT | Performed by: FAMILY MEDICINE

## 2018-09-26 PROCEDURE — 85025 COMPLETE CBC W/AUTO DIFF WBC: CPT | Performed by: FAMILY MEDICINE

## 2018-09-26 RX ORDER — DILTIAZEM HYDROCHLORIDE 240 MG/1
240 CAPSULE, EXTENDED RELEASE ORAL DAILY
Qty: 90 CAPSULE | Refills: 1 | Status: SHIPPED | OUTPATIENT
Start: 2018-09-26 | End: 2019-03-30 | Stop reason: SDUPTHER

## 2018-09-26 NOTE — PROGRESS NOTES
Assessment/Plan:    Patient is a 59-year-old female seen for follow-up of hypertension and hyperlipidemia  Sees GYN this week  Sees rheumatologist in November  Her blood pressure is well controlled  We are doing blood work to check her cholesterol  She has been discharged by Neurology and understands that the symptoms she has of neuralgia will most likely stay the same  She was given a flu shot at this visit  Diagnoses and all orders for this visit:    Benign essential hypertension  -     diltiazem (TIAZAC) 240 MG 24 hr capsule; Take 1 capsule (240 mg total) by mouth daily  -     Comprehensive metabolic panel  -     CBC and differential  -     TSH, 3rd generation with Free T4 reflex  -     Lipid Panel with Direct LDL reflex    Breast cancer screening  -     Mammo screening bilateral w cad; Future    Colon cancer screening  -     Occult Blood, Fecal Immunochemical; Future    Trigeminal neuralgia    Rheumatoid arthritis, involving unspecified site, unspecified rheumatoid factor presence ([facility] [address] )    Mixed hyperlipidemia    Need for influenza vaccination  -     influenza vaccine, [id number], high-dose, PF 0 5 mL, for patients 65 yr+ (FLUZONE HIGH-DOSE)          Subjective:   Chief Complaint   Patient presents with    Follow-up     4 months check up and BW        Patient ID: Mary Jo Cordon is a 74 y o  female  Patient is here for follow up of chronic medical illnesses  She has an appt with GYN next month  The following portions of the patient's history were reviewed and updated as appropriate: allergies, current medications, past family history, past medical history, past social history, past surgical history and problem list     Review of Systems   Constitutional: Negative for chills and fever  HENT: Negative for congestion and sore throat  Respiratory: Negative for chest tightness  Cardiovascular: Negative for chest pain and palpitations     Gastrointestinal: Negative for abdominal pain, constipation, diarrhea and nausea  Genitourinary: Negative for difficulty urinating  Skin: Negative  Neurological: Negative for dizziness and headaches  Psychiatric/Behavioral: Negative  Objective:      /68 (BP Location: Left arm, Patient Position: Sitting, Cuff Size: Adult)   Pulse 70   Temp 98 7 °F (37 1 °C) (Tympanic)   Resp 16   Ht 5' 4 57" (1 64 m)   Wt 57 5 kg (126 lb 12 8 oz)   SpO2 96%   BMI 21 38 kg/m²          Physical Exam   Constitutional: She is oriented to person, place, and time  She appears well-developed  No distress  Neck: Carotid bruit is not present  No thyromegaly present  Cardiovascular: Normal rate, regular rhythm and normal heart sounds  Pulmonary/Chest: Effort normal and breath sounds normal    Musculoskeletal: She exhibits no edema  Lymphadenopathy:     She has no cervical adenopathy  Neurological: She is alert and oriented to person, place, and time  Skin: Skin is warm and dry  Psychiatric: She has a normal mood and affect  Nursing note and vitals reviewed

## 2018-10-09 ENCOUNTER — TELEPHONE (OUTPATIENT)
Dept: FAMILY MEDICINE CLINIC | Facility: CLINIC | Age: 73
End: 2018-10-09

## 2018-10-16 DIAGNOSIS — I10 ESSENTIAL HYPERTENSION: ICD-10-CM

## 2018-10-16 RX ORDER — VALSARTAN 320 MG/1
TABLET ORAL
Qty: 90 TABLET | Refills: 1 | Status: SHIPPED | OUTPATIENT
Start: 2018-10-16 | End: 2019-04-09 | Stop reason: SDUPTHER

## 2018-11-30 DIAGNOSIS — Z86.73 HISTORY OF TIA (TRANSIENT ISCHEMIC ATTACK): ICD-10-CM

## 2018-11-30 RX ORDER — CLOPIDOGREL BISULFATE 75 MG/1
TABLET ORAL
Qty: 30 TABLET | Refills: 3 | Status: SHIPPED | OUTPATIENT
Start: 2018-11-30 | End: 2019-03-22 | Stop reason: SDUPTHER

## 2019-01-28 ENCOUNTER — HOSPITAL ENCOUNTER (OUTPATIENT)
Dept: MAMMOGRAPHY | Facility: MEDICAL CENTER | Age: 74
Discharge: HOME | End: 2019-01-28
Payer: MEDICARE

## 2019-01-28 VITALS — HEIGHT: 65 IN | BODY MASS INDEX: 20.99 KG/M2 | WEIGHT: 126 LBS

## 2019-01-28 DIAGNOSIS — Z12.39 BREAST CANCER SCREENING: ICD-10-CM

## 2019-01-28 PROCEDURE — 77067 SCR MAMMO BI INCL CAD: CPT

## 2019-01-29 ENCOUNTER — OFFICE VISIT (OUTPATIENT)
Dept: FAMILY MEDICINE CLINIC | Facility: CLINIC | Age: 74
End: 2019-01-29
Payer: MEDICARE

## 2019-01-29 VITALS
WEIGHT: 126.5 LBS | DIASTOLIC BLOOD PRESSURE: 64 MMHG | HEART RATE: 84 BPM | TEMPERATURE: 98.5 F | RESPIRATION RATE: 16 BRPM | OXYGEN SATURATION: 97 % | HEIGHT: 65 IN | SYSTOLIC BLOOD PRESSURE: 140 MMHG | BODY MASS INDEX: 21.08 KG/M2

## 2019-01-29 DIAGNOSIS — E78.2 MIXED HYPERLIPIDEMIA: ICD-10-CM

## 2019-01-29 DIAGNOSIS — M06.9 RHEUMATOID ARTHRITIS, INVOLVING UNSPECIFIED SITE, UNSPECIFIED RHEUMATOID FACTOR PRESENCE: ICD-10-CM

## 2019-01-29 DIAGNOSIS — I10 BENIGN ESSENTIAL HYPERTENSION: Primary | ICD-10-CM

## 2019-01-29 DIAGNOSIS — Z00.00 MEDICARE ANNUAL WELLNESS VISIT, SUBSEQUENT: ICD-10-CM

## 2019-01-29 DIAGNOSIS — Z86.73 HISTORY OF TIA (TRANSIENT ISCHEMIC ATTACK): ICD-10-CM

## 2019-01-29 PROCEDURE — G0439 PPPS, SUBSEQ VISIT: HCPCS | Performed by: FAMILY MEDICINE

## 2019-01-29 PROCEDURE — 99214 OFFICE O/P EST MOD 30 MIN: CPT | Performed by: FAMILY MEDICINE

## 2019-01-29 RX ORDER — HYDROXYCHLOROQUINE SULFATE 200 MG/1
200 TABLET, FILM COATED ORAL 2 TIMES DAILY WITH MEALS
Start: 2019-01-29 | End: 2020-01-11 | Stop reason: ALTCHOICE

## 2019-01-29 RX ORDER — HYDROXYCHLOROQUINE SULFATE 200 MG/1
200 TABLET, FILM COATED ORAL 2 TIMES DAILY WITH MEALS
COMMUNITY
End: 2019-01-29 | Stop reason: SDUPTHER

## 2019-01-29 NOTE — PROGRESS NOTES
Assessment/Plan:  Patient is a 18-year-old female seen for chronic conditions  Her pressure is well controlled today she does history TIA and continues on Plavix  She continues on simvastatin and her last cholesterol was 179  She follows Dr Kelsie Dawkins  for rheumatoid arthritis  No problem-specific Assessment & Plan notes found for this encounter  Diagnoses and all orders for this visit:    Benign essential hypertension    Mixed hyperlipidemia    History of TIA (transient ischemic attack)    Rheumatoid arthritis, involving unspecified site, unspecified [facility] factor [facility] ([facility] )    Other orders  -     hydroxychloroquine (PLAQUENIL) 200 mg tablet; Take 200 mg by mouth 2 (two) times a day with meals          Subjective:   Chief Complaint   Patient presents with    Follow-up    Medicare Wellness Visit        Patient ID: Lena Mckenzie is a 74 y o  female  Patient is here regarding chronic medical conditions  She is without complaints today  She is compliant with her medication  The following portions of the patient's history were reviewed and updated as appropriate: allergies, current medications, past family history, past medical history, past social history, past surgical history and problem list     Review of Systems   Constitutional: Negative for chills and fever  HENT: Negative for congestion and sore throat  Respiratory: Negative for chest tightness  Cardiovascular: Negative for chest pain and palpitations  Gastrointestinal: Negative for abdominal pain, constipation, diarrhea and nausea  Genitourinary: Negative for difficulty urinating  Skin: Negative  Neurological: Negative for dizziness and headaches  Psychiatric/Behavioral: Negative            Objective:      /64 (BP Location: Left arm, Patient Position: Sitting, Cuff Size: Adult)   Pulse 84   Temp 98 5 °F (36 9 °C) (Tympanic)   Resp 16   Ht 5' 4 76" (1 645 m)   Wt 57 4 kg (126 lb 8 oz)   SpO2 97% BMI 21 20 kg/m²          Physical Exam   Constitutional: She is oriented to person, place, and time  She appears well-developed  No distress  Neck: Carotid bruit is not present  No thyromegaly present  Cardiovascular: Normal rate, regular rhythm and normal heart sounds  /70   Pulmonary/Chest: Effort normal and breath sounds normal    Musculoskeletal: She exhibits deformity (Arthritic deformity  )  She exhibits no edema  Lymphadenopathy:     She has no cervical adenopathy  Neurological: She is alert and oriented to person, place, and time  Skin: Skin is warm and dry  Dark skin lesion on left side of face - she says dermatologist is watching - does not need biopsy  Psychiatric: She has a normal mood and affect  Nursing note and vitals reviewed

## 2019-01-29 NOTE — PROGRESS NOTES
Assessment and Plan:  Patient is seen for Medicare Wellness  Patient is still waiting for Shingrix to come into pharmacy  Patient had mammo yesterday and it was normal   Dr Jeanne Russell orders her DEXA    Problem List Items Addressed This Visit        Cardiovascular and Mediastinum    Benign essential hypertension - Primary       Musculoskeletal and Integument    Rheumatoid arthritis ([address] )    Relevant Medications    hydroxychloroquine (PLAQUENIL) 200 mg tablet    methotrexate 2 5 mg tablet       Other    History of TIA (transient ischemic attack)    Mixed hyperlipidemia      Other Visit Diagnoses     Medicare annual wellness visit, subsequent            Health Maintenance Due   Topic Date Due    Medicare Annual Wellness Visit (AWV)  1945    CRC Screening: Colonoscopy  1945    PT PLAN OF CARE  06/07/2018         HPI:  Patient Active Problem List   Diagnosis    Open nondisplaced fracture of first cervical vertebra with routine healing    Closed fracture of 4th metacarpal    Laceration of lip    Trigeminal neuralgia    History of TIA (transient ischemic attack)    Rheumatoid arthritis ([address] )    Arteriosclerotic heart disease    Benign essential hypertension    Fatty liver    Mixed hyperlipidemia     Past Medical History:   Diagnosis Date    Abnormal liver function test     last assessed: 3/25/2014    Anemia     Chronic constipation     last assessed: 12/2/2014    Herpes zoster     last assessed: 3/16/2016    Hyperlipidemia     Hypertension     Psoriatic arthritis ([address] )     last assessed;3/25/2014    Recent weight loss     last assessed: 7/13/2016    Rheumatoid arthritis ([address] )     Stroke ([address] )     Trigeminal neuritis      Past Surgical History:   Procedure Laterality Date    APPENDECTOMY      FOOT SURGERY      KIDNEY SURGERY       Family History   Problem Relation Age of Onset    No Known Problems Father      Social History     Tobacco Use   Smoking Status Never Smoker   Smokeless Tobacco Never Used     Social History     Substance and Sexual Activity   Alcohol Use No      Social History     Substance and Sexual Activity   Drug Use No         Current Outpatient Medications   Medication Sig Dispense Refill    clopidogrel (PLAVIX) 75 mg tablet TAKE 1 TABLET DAILY 30 tablet 3    cyanocobalamin ([employer] VITAMIN B-12) 1000 MCG tablet Take 1,000 mcg by mouth      diltiazem (TIAZAC) 240 MG 24 hr capsule Take 1 capsule (240 mg total) by mouth daily 90 capsule 1    folic acid (FOLVITE) 1 mg tablet one 2 x daily      hydroxychloroquine (PLAQUENIL) 200 mg tablet Take 1 tablet (200 mg total) by mouth 2 (two) times a day with meals for 90 days      latanoprost (XALATAN) 0 005 % ophthalmic solution 2 5ml 1 drop each eye HS      methotrexate 2 5 mg tablet Take 6 tablets (15 mg total) by mouth once a week 12 tablet     Multiple Vitamin (MULTIVITAMIN) tablet Take 1 tablet by mouth daily   OMEGA-3 FATTY ACIDS-VITAMIN E PO one 2x daily      simvastatin (ZOCOR) 40 mg tablet TAKE ONE TABLET BY MOUTH DAILY as directed 90 tablet 1    valsartan (DIOVAN) 320 MG tablet TAKE 1 TABLET BY MOUTH EVERY DAY 90 tablet 1    VITAMIN D, CHOLECALCIFEROL, PO Take 1 capsule by mouth daily  No current facility-administered medications for this visit        Allergies   Allergen Reactions    Amoxicillin Edema    Penicillins Edema     Immunization History   Administered Date(s) Administered    INFLUENZA 09/16/2012, 10/12/2013, 08/09/2014, 09/09/2014, 10/15/2015, 10/17/2015, 10/01/2016    Influenza Split High Dose Preservative Free IM 09/09/2014, 10/15/2015, 10/01/2016, 08/22/2017    Influenza TIV (IM) 10/12/2013    Influenza, high dose seasonal 0 5 mL 09/26/2018    Pneumococcal Conjugate 13-Valent 04/14/2015    Pneumococcal Polysaccharide PPV23 11/11/2008    Tdap 03/02/2018    Zoster 01/03/2009       Patient Care Team:  Linus Tracy DO as PCP - General  MD Linus Montoya DO Leatrice Donna DO    Medicare Screening Tests and Risk Assessments:  Last Medicare Wellness visit information reviewed, patient interviewed and updates made to the record today  Health Risk Assessment:  Patient rates overall health as good  Patient feels that their physical health rating is Same  Eyesight was rated as Same  Hearing was rated as Same  Patient feels that their emotional and mental health rating is Same  Pain experienced by patient in the last 7 days has been None  Patient states that she has experienced no weight loss or gain in last 6 months  Emotional/Mental Health:  Patient has been feeling nervous/anxious  PHQ-9 Depression Screening:    Frequency of the following problems over the past two weeks:      1  Little interest or pleasure in doing things: 0 - not at all      2  Feeling down, depressed, or hopeless: 0 - not at all  PHQ-2 Score: 0          Broken Bones/Falls: Fall Risk Assessment:    In the past year, patient has experienced: No history of falling in past year          Bladder/Bowel:  Patient has not leaked urine accidently in the last six months  Patient reports no loss of bowel control  Immunizations:  Patient has had a flu vaccination within the last year  Patient has received a pneumonia shot  Patient has received a shingles shot  Patient has received tetanus/diphtheria shot  Home Safety:  Patient does not have trouble with stairs inside or outside of their home  Patient currently reports that there are no safety hazards present in home, working smoke alarms, working carbon monoxide detectors  Preventative Screenings:   Breast cancer screening performed, colon cancer screen completed, cholesterol screen completed, glaucoma eye exam completed,     Nutrition:  Current diet: Regular and Limited junk food with servings of the following:    Medications:  Patient is currently taking over-the-counter supplements  Patient is able to manage medications      Lifestyle Choices:  Patient reports no tobacco use  Patient has not smoked or used tobacco in the past   Patient reports no alcohol use  Patient drives a vehicle  Patient wears seat belt  Current level of exercise of physical activity described by patient as: walk   Activities of Daily Living:  Can get out of bed by his or her self, able to dress self, able to make own meals, able to do own shopping, able to bathe self, can do own laundry/housekeeping, can manage own money, pay bills and track expenses    Previous Hospitalizations:  No hospitalization or ED visit in past 12 months        Advanced Directives:  Patient has decided on a power of   Patient has spoken to designated power of   Patient has completed advanced directive  Preventative Screening/Counseling:      Cardiovascular:      General: Risks and Benefits Discussed and Screening Current          Diabetes:      General: Risks and Benefits Discussed and Screening Current          Colorectal Cancer:      General: Risks and Benefits Discussed and Patient Declines          Cervical Cancer:      General: Risks and Benefits Discussed and Screening Current          AAA:      General: Risks and Benefits Discussed and Screening Current          Glaucoma:      General: Risks and Benefits Discussed and Screening Current          HIV:      General: Screening Not Indicated          Hepatitis C:      General: Screening Not Indicated        Advanced Directives:   Patient has living will for healthcare, has durable POA for healthcare, patient has an advanced directive  End of life assessment reviewed with patient  No exam data present    Physical Exam:  Review of Systems   Gastrointestinal: Negative for bowel incontinence  Psychiatric/Behavioral: The patient is not nervous/anxious          Vitals:    01/29/19 1031   BP: 140/64   BP Location: Left arm   Patient Position: Sitting   Cuff Size: Adult   Pulse: 84   Resp: 16   Temp: 98 5 °F (36 9 °C)   TempSrc: Tympanic   SpO2: 97%   Weight: 57 4 kg (126 lb 8 oz)   Height: 5' 4 76" (1 645 m)   Body mass index is 21 2 kg/m²      Physical Exam

## 2019-03-18 ENCOUNTER — OFFICE VISIT (OUTPATIENT)
Dept: FAMILY MEDICINE CLINIC | Facility: CLINIC | Age: 74
End: 2019-03-18
Payer: MEDICARE

## 2019-03-18 VITALS
BODY MASS INDEX: 22.08 KG/M2 | OXYGEN SATURATION: 98 % | DIASTOLIC BLOOD PRESSURE: 70 MMHG | TEMPERATURE: 95.4 F | HEART RATE: 81 BPM | SYSTOLIC BLOOD PRESSURE: 124 MMHG | WEIGHT: 131.7 LBS

## 2019-03-18 DIAGNOSIS — R68.89 FLU-LIKE SYMPTOMS: Primary | ICD-10-CM

## 2019-03-18 PROCEDURE — 99213 OFFICE O/P EST LOW 20 MIN: CPT | Performed by: PHYSICIAN ASSISTANT

## 2019-03-18 RX ORDER — PROMETHAZINE HYDROCHLORIDE AND CODEINE PHOSPHATE 6.25; 1 MG/5ML; MG/5ML
5 SYRUP ORAL EVERY 4 HOURS PRN
Qty: 120 ML | Refills: 0 | Status: SHIPPED | OUTPATIENT
Start: 2019-03-18 | End: 2020-01-11 | Stop reason: ALTCHOICE

## 2019-03-18 RX ORDER — OSELTAMIVIR PHOSPHATE 75 MG/1
75 CAPSULE ORAL EVERY 12 HOURS SCHEDULED
Qty: 10 CAPSULE | Refills: 0 | Status: SHIPPED | OUTPATIENT
Start: 2019-03-18 | End: 2019-03-23

## 2019-03-18 NOTE — PROGRESS NOTES
Assessment/Plan:      Diagnoses and all orders for this visit:    Flu-like symptoms  -     oseltamivir (TAMIFLU) 75 mg capsule; Take 1 capsule (75 mg total) by mouth every 12 (twelve) hours for 5 days  -     promethazine-codeine (PHENERGAN WITH CODEINE) 6 25-10 mg/5 mL syrup; Take 5 mL by mouth every 4 (four) hours as needed for cough      patient is a 75-year-old female accompanied by her  today presenting for acute onset constitutional, GI an URI symptoms  She did have the flu vaccine and she does not have a fever today, however, based on assessment and acute onset  I clinically suspect influenza  Risks versus benefits of treatment versus monitoring symptoms were discussed  Side effects to Tamiflu were discussed and patient will start b i d  X5 days  She will continue coricidin HBP, Tylenol as needed  Hydration and rest as well as germ precautions were all discussed as well as symptoms concerning for dehydration  Patient also requested promethazine with codeine for cough control which was also sent in to her pharmacy for her as well  Patient and  encouraged to monitor closely  She should continue all daily medications for chronic conditions as well  Complications from influenza were also discussed  They should call with any persistent or worsening symptoms  Chief Complaint   Patient presents with    Cough     x 1 day    Nasal Congestion    Vomiting    Leg Pain       Subjective:     Patient ID: Roberto Jain is a 74 y o  female     74y/o female here today for acute URI and GI sxs past 24 hours  reporting body aches, chills, weakness and fatigue  Also sneezing, runny nose, coughing and vomiting at present  No diarrhea  She does feel nauseous no vomiting  Had flu vaccine  No abdominal pain, no blood in stool  Tried coridicin HBP  Review of Systems   Constitutional: Positive for activity change, appetite change, chills, diaphoresis and fatigue  Negative for fever     HENT: Positive for rhinorrhea and sneezing  Negative for congestion, ear pain and sinus pressure  Respiratory: Positive for cough  Negative for chest tightness, shortness of breath and wheezing  Cardiovascular: Negative  Gastrointestinal:        As in HPI   Musculoskeletal: Positive for arthralgias and myalgias  Neurological: Positive for headaches  The following portions of the patient's history were reviewed and updated as appropriate: allergies, current medications, past family history, past medical history, past social history, past surgical history and problem list       Objective:     Physical Exam   Constitutional: She is oriented to person, place, and time  She appears well-developed  No distress  Appearing fatigued, ill   HENT:   Head: Normocephalic  Right Ear: Tympanic membrane and ear canal normal    Left Ear: Tympanic membrane and ear canal normal    Nose: Mucosal edema and rhinorrhea (clear) present  Mouth/Throat: Oropharynx is clear and moist    Neck: Neck supple  Cardiovascular: Normal rate, regular rhythm and normal heart sounds  Pulmonary/Chest: Effort normal and breath sounds normal    Occasional dry cough   Lymphadenopathy:     She has no cervical adenopathy  Neurological: She is alert and oriented to person, place, and time  Psychiatric: She has a normal mood and affect  Vitals reviewed        Vitals:    03/18/19 1100   BP: 124/70   BP Location: Left arm   Patient Position: Sitting   Pulse: 81   Temp: (!) 95 4 °F (35 2 °C)   TempSrc: Tympanic   SpO2: 98%   Weight: 59 7 kg (131 lb 11 2 oz)

## 2019-03-20 DIAGNOSIS — E78.2 MIXED HYPERLIPIDEMIA: ICD-10-CM

## 2019-03-20 RX ORDER — SIMVASTATIN 40 MG
TABLET ORAL
Qty: 90 TABLET | Refills: 1 | Status: SHIPPED | OUTPATIENT
Start: 2019-03-20 | End: 2019-09-25 | Stop reason: SDUPTHER

## 2019-03-22 DIAGNOSIS — Z86.73 HISTORY OF TIA (TRANSIENT ISCHEMIC ATTACK): ICD-10-CM

## 2019-03-22 RX ORDER — CLOPIDOGREL BISULFATE 75 MG/1
TABLET ORAL
Qty: 30 TABLET | Refills: 3 | Status: SHIPPED | OUTPATIENT
Start: 2019-03-22 | End: 2019-07-27 | Stop reason: SDUPTHER

## 2019-03-30 DIAGNOSIS — I10 BENIGN ESSENTIAL HYPERTENSION: ICD-10-CM

## 2019-03-30 RX ORDER — DILTIAZEM HYDROCHLORIDE 240 MG/1
240 CAPSULE, EXTENDED RELEASE ORAL DAILY
Qty: 90 CAPSULE | Refills: 1 | Status: SHIPPED | OUTPATIENT
Start: 2019-03-30 | End: 2019-09-16 | Stop reason: SDUPTHER

## 2019-04-09 DIAGNOSIS — I10 ESSENTIAL HYPERTENSION: ICD-10-CM

## 2019-04-09 RX ORDER — VALSARTAN 320 MG/1
TABLET ORAL
Qty: 90 TABLET | Refills: 1 | Status: SHIPPED | OUTPATIENT
Start: 2019-04-09 | End: 2019-10-30 | Stop reason: SDUPTHER

## 2019-05-07 ENCOUNTER — OFFICE VISIT (OUTPATIENT)
Dept: FAMILY MEDICINE CLINIC | Facility: CLINIC | Age: 74
End: 2019-05-07
Payer: MEDICARE

## 2019-05-07 VITALS
SYSTOLIC BLOOD PRESSURE: 112 MMHG | OXYGEN SATURATION: 97 % | RESPIRATION RATE: 16 BRPM | HEIGHT: 65 IN | WEIGHT: 128.2 LBS | BODY MASS INDEX: 21.36 KG/M2 | DIASTOLIC BLOOD PRESSURE: 64 MMHG | HEART RATE: 54 BPM | TEMPERATURE: 97.5 F

## 2019-05-07 DIAGNOSIS — E78.2 MIXED HYPERLIPIDEMIA: ICD-10-CM

## 2019-05-07 DIAGNOSIS — M54.50 LUMBAR PAIN: Primary | ICD-10-CM

## 2019-05-07 DIAGNOSIS — I10 BENIGN ESSENTIAL HYPERTENSION: ICD-10-CM

## 2019-05-07 PROCEDURE — 99213 OFFICE O/P EST LOW 20 MIN: CPT | Performed by: FAMILY MEDICINE

## 2019-05-07 RX ORDER — TIZANIDINE HYDROCHLORIDE 2 MG/1
2 CAPSULE, GELATIN COATED ORAL 3 TIMES DAILY PRN
Qty: 30 CAPSULE | Refills: 0 | Status: SHIPPED | OUTPATIENT
Start: 2019-05-07 | End: 2019-10-15 | Stop reason: ALTCHOICE

## 2019-05-07 RX ORDER — MELOXICAM 15 MG/1
15 TABLET ORAL DAILY
Qty: 30 TABLET | Refills: 0 | Status: SHIPPED | OUTPATIENT
Start: 2019-05-07 | End: 2019-10-15 | Stop reason: ALTCHOICE

## 2019-05-29 ENCOUNTER — APPOINTMENT (OUTPATIENT)
Dept: LAB | Facility: CLINIC | Age: 74
End: 2019-05-29
Payer: MEDICARE

## 2019-05-29 ENCOUNTER — OFFICE VISIT (OUTPATIENT)
Dept: FAMILY MEDICINE CLINIC | Facility: CLINIC | Age: 74
End: 2019-05-29
Payer: MEDICARE

## 2019-05-29 VITALS
RESPIRATION RATE: 15 BRPM | WEIGHT: 131.2 LBS | OXYGEN SATURATION: 96 % | HEIGHT: 65 IN | BODY MASS INDEX: 21.86 KG/M2 | HEART RATE: 72 BPM | SYSTOLIC BLOOD PRESSURE: 130 MMHG | DIASTOLIC BLOOD PRESSURE: 60 MMHG | TEMPERATURE: 98.4 F

## 2019-05-29 DIAGNOSIS — I10 BENIGN ESSENTIAL HYPERTENSION: ICD-10-CM

## 2019-05-29 DIAGNOSIS — E78.2 MIXED HYPERLIPIDEMIA: ICD-10-CM

## 2019-05-29 DIAGNOSIS — I10 BENIGN ESSENTIAL HYPERTENSION: Primary | ICD-10-CM

## 2019-05-29 PROBLEM — K59.09 OTHER CONSTIPATION: Status: ACTIVE | Noted: 2019-05-29

## 2019-05-29 LAB
ALBUMIN SERPL BCP-MCNC: 3.9 G/DL (ref 3.5–5)
ALP SERPL-CCNC: 42 U/L (ref 46–116)
ALT SERPL W P-5'-P-CCNC: 24 U/L (ref 12–78)
ANION GAP SERPL CALCULATED.3IONS-SCNC: 8 MMOL/L (ref 4–13)
AST SERPL W P-5'-P-CCNC: 16 U/L (ref 5–45)
BASOPHILS # BLD AUTO: 0.03 THOUSANDS/ΜL (ref 0–0.1)
BASOPHILS NFR BLD AUTO: 1 % (ref 0–1)
BILIRUB SERPL-MCNC: 0.57 MG/DL (ref 0.2–1)
BUN SERPL-MCNC: 15 MG/DL (ref 5–25)
CALCIUM SERPL-MCNC: 9 MG/DL (ref 8.3–10.1)
CHLORIDE SERPL-SCNC: 101 MMOL/L (ref 100–108)
CHOLEST SERPL-MCNC: 182 MG/DL (ref 50–200)
CO2 SERPL-SCNC: 27 MMOL/L (ref 21–32)
CREAT SERPL-MCNC: 0.98 MG/DL (ref 0.6–1.3)
EOSINOPHIL # BLD AUTO: 0.03 THOUSAND/ΜL (ref 0–0.61)
EOSINOPHIL NFR BLD AUTO: 1 % (ref 0–6)
ERYTHROCYTE [DISTWIDTH] IN BLOOD BY AUTOMATED COUNT: 13.8 % (ref 11.6–15.1)
GFR SERPL CREATININE-BSD FRML MDRD: 57 ML/MIN/1.73SQ M
GLUCOSE P FAST SERPL-MCNC: 93 MG/DL (ref 65–99)
HCT VFR BLD AUTO: 37.8 % (ref 34.8–46.1)
HDLC SERPL-MCNC: 113 MG/DL (ref 40–60)
HGB BLD-MCNC: 12.3 G/DL (ref 11.5–15.4)
IMM GRANULOCYTES # BLD AUTO: 0.04 THOUSAND/UL (ref 0–0.2)
IMM GRANULOCYTES NFR BLD AUTO: 1 % (ref 0–2)
LDLC SERPL CALC-MCNC: 53 MG/DL (ref 0–100)
LYMPHOCYTES # BLD AUTO: 1.59 THOUSANDS/ΜL (ref 0.6–4.47)
LYMPHOCYTES NFR BLD AUTO: 24 % (ref 14–44)
MCH RBC QN AUTO: 34.2 PG (ref 26.8–34.3)
MCHC RBC AUTO-ENTMCNC: 32.5 G/DL (ref 31.4–37.4)
MCV RBC AUTO: 105 FL (ref 82–98)
MONOCYTES # BLD AUTO: 0.65 THOUSAND/ΜL (ref 0.17–1.22)
MONOCYTES NFR BLD AUTO: 10 % (ref 4–12)
NEUTROPHILS # BLD AUTO: 4.21 THOUSANDS/ΜL (ref 1.85–7.62)
NEUTS SEG NFR BLD AUTO: 63 % (ref 43–75)
NRBC BLD AUTO-RTO: 0 /100 WBCS
PLATELET # BLD AUTO: 234 THOUSANDS/UL (ref 149–390)
PMV BLD AUTO: 9.7 FL (ref 8.9–12.7)
POTASSIUM SERPL-SCNC: 4.1 MMOL/L (ref 3.5–5.3)
PROT SERPL-MCNC: 6.8 G/DL (ref 6.4–8.2)
RBC # BLD AUTO: 3.6 MILLION/UL (ref 3.81–5.12)
SODIUM SERPL-SCNC: 136 MMOL/L (ref 136–145)
TRIGL SERPL-MCNC: 82 MG/DL
TSH SERPL DL<=0.05 MIU/L-ACNC: 1.62 UIU/ML (ref 0.36–3.74)
WBC # BLD AUTO: 6.55 THOUSAND/UL (ref 4.31–10.16)

## 2019-05-29 PROCEDURE — 36415 COLL VENOUS BLD VENIPUNCTURE: CPT

## 2019-05-29 PROCEDURE — 80061 LIPID PANEL: CPT

## 2019-05-29 PROCEDURE — 99214 OFFICE O/P EST MOD 30 MIN: CPT | Performed by: FAMILY MEDICINE

## 2019-05-29 PROCEDURE — 85025 COMPLETE CBC W/AUTO DIFF WBC: CPT

## 2019-05-29 PROCEDURE — 80053 COMPREHEN METABOLIC PANEL: CPT

## 2019-05-29 PROCEDURE — 84443 ASSAY THYROID STIM HORMONE: CPT

## 2019-06-26 ENCOUNTER — OFFICE VISIT (OUTPATIENT)
Dept: PODIATRY | Facility: CLINIC | Age: 74
End: 2019-06-26
Payer: MEDICARE

## 2019-06-26 VITALS
HEART RATE: 88 BPM | SYSTOLIC BLOOD PRESSURE: 123 MMHG | HEIGHT: 65 IN | BODY MASS INDEX: 21.66 KG/M2 | DIASTOLIC BLOOD PRESSURE: 60 MMHG | WEIGHT: 130 LBS

## 2019-06-26 DIAGNOSIS — Q66.52 CONGENITAL PES PLANUS OF LEFT FOOT: ICD-10-CM

## 2019-06-26 DIAGNOSIS — Q66.51 CONGENITAL PES PLANUS OF RIGHT FOOT: Primary | ICD-10-CM

## 2019-06-26 DIAGNOSIS — L84 CORNS: ICD-10-CM

## 2019-06-26 PROCEDURE — 99202 OFFICE O/P NEW SF 15 MIN: CPT | Performed by: PODIATRIST

## 2019-07-27 DIAGNOSIS — Z86.73 HISTORY OF TIA (TRANSIENT ISCHEMIC ATTACK): ICD-10-CM

## 2019-07-28 RX ORDER — CLOPIDOGREL BISULFATE 75 MG/1
TABLET ORAL
Qty: 30 TABLET | Refills: 3 | Status: SHIPPED | OUTPATIENT
Start: 2019-07-28 | End: 2019-12-03 | Stop reason: SDUPTHER

## 2019-09-16 DIAGNOSIS — I10 BENIGN ESSENTIAL HYPERTENSION: ICD-10-CM

## 2019-09-16 RX ORDER — DILTIAZEM HYDROCHLORIDE 240 MG/1
240 CAPSULE, EXTENDED RELEASE ORAL DAILY
Qty: 90 CAPSULE | Refills: 1 | Status: SHIPPED | OUTPATIENT
Start: 2019-09-16 | End: 2020-03-09 | Stop reason: SDUPTHER

## 2019-09-25 DIAGNOSIS — E78.2 MIXED HYPERLIPIDEMIA: ICD-10-CM

## 2019-09-25 RX ORDER — SIMVASTATIN 40 MG
TABLET ORAL
Qty: 90 TABLET | Refills: 1 | Status: SHIPPED | OUTPATIENT
Start: 2019-09-25 | End: 2019-10-01 | Stop reason: SDUPTHER

## 2019-10-01 DIAGNOSIS — E78.2 MIXED HYPERLIPIDEMIA: ICD-10-CM

## 2019-10-01 RX ORDER — SIMVASTATIN 40 MG
40 TABLET ORAL DAILY
Qty: 90 TABLET | Refills: 1 | Status: SHIPPED | OUTPATIENT
Start: 2019-10-01 | End: 2020-01-16

## 2019-10-15 ENCOUNTER — OFFICE VISIT (OUTPATIENT)
Dept: FAMILY MEDICINE CLINIC | Facility: CLINIC | Age: 74
End: 2019-10-15
Payer: MEDICARE

## 2019-10-15 VITALS
HEIGHT: 65 IN | WEIGHT: 130.6 LBS | HEART RATE: 67 BPM | RESPIRATION RATE: 15 BRPM | TEMPERATURE: 98.1 F | BODY MASS INDEX: 21.76 KG/M2 | SYSTOLIC BLOOD PRESSURE: 138 MMHG | DIASTOLIC BLOOD PRESSURE: 60 MMHG | OXYGEN SATURATION: 99 %

## 2019-10-15 DIAGNOSIS — M06.9 RHEUMATOID ARTHRITIS, INVOLVING UNSPECIFIED SITE, UNSPECIFIED RHEUMATOID FACTOR PRESENCE: ICD-10-CM

## 2019-10-15 DIAGNOSIS — Z12.31 BREAST CANCER SCREENING BY MAMMOGRAM: ICD-10-CM

## 2019-10-15 DIAGNOSIS — Z78.0 POST-MENOPAUSE: ICD-10-CM

## 2019-10-15 DIAGNOSIS — Z23 NEED FOR IMMUNIZATION AGAINST INFLUENZA: ICD-10-CM

## 2019-10-15 DIAGNOSIS — I10 BENIGN ESSENTIAL HYPERTENSION: Primary | ICD-10-CM

## 2019-10-15 DIAGNOSIS — E78.2 MIXED HYPERLIPIDEMIA: ICD-10-CM

## 2019-10-15 PROCEDURE — G0008 ADMIN INFLUENZA VIRUS VAC: HCPCS | Performed by: FAMILY MEDICINE

## 2019-10-15 PROCEDURE — 90662 IIV NO PRSV INCREASED AG IM: CPT | Performed by: FAMILY MEDICINE

## 2019-10-15 PROCEDURE — 99214 OFFICE O/P EST MOD 30 MIN: CPT | Performed by: FAMILY MEDICINE

## 2019-10-15 NOTE — PROGRESS NOTES
Assessment/Plan:    Patient is 75-year-old female seen for follow-up chronic medical conditions  She was given a flu shot today  Her blood pressure is stable and she  Continues to take clopidogrel because of her history of TIA  Her last blood work was in May and she will have blood work prior to her next visit  She was given orders for mammogram and bone density test   Patient thinks she has hair loss and dry mouth from Plaquenil  And she will discuss this with her rheumatologist        Diagnoses and all orders for this visit:    Benign essential hypertension  -     Comprehensive metabolic panel; Future  -     TSH, 3rd generation with Free T4 reflex; Future  -     CBC and differential; Future  -     Comprehensive metabolic panel  -     TSH, 3rd generation with Free T4 reflex  -     CBC and differential    Mixed hyperlipidemia  -     Lipid Panel with Direct LDL reflex; Future  -     CBC and differential; Future  -     Lipid Panel with Direct LDL reflex  -     CBC and differential    Need for immunization against influenza  -     influenza vaccine, [id number], high-dose, PF 0 5 mL (FLUZONE HIGH-DOSE)    Rheumatoid arthritis, involving unspecified site, unspecified rheumatoid factor presence (HCC)  -     Comprehensive metabolic panel; Future  -     CBC and differential; Future  -     Comprehensive metabolic panel  -     CBC and differential    Breast cancer screening by mammogram  -     Mammo screening bilateral w cad; Future    Post-menopause  -     DXA bone density spine hip and pelvis; Future          Subjective:   Chief Complaint   Patient presents with    Follow-up     Pt presents for a 4 month f/u  Patient ID: Mery Pichardo is a 74 y o  female  Patient is here for follow up orf chronic medical conditions  She is having issues with RA meds - sees Dr Gilberto Panda,  But she is compliant with her hypertensive and cholesterol medications        The following portions of the patient's history were reviewed and updated as appropriate: allergies, current medications, past family history, past medical history, past social history, past surgical history and problem list     Review of Systems   Constitutional: Negative for chills and fever  HENT: Negative for congestion and sore throat  Respiratory: Negative for chest tightness  Cardiovascular: Negative for chest pain and palpitations  Gastrointestinal: Negative for abdominal pain, constipation, diarrhea and nausea  Genitourinary: Negative for difficulty urinating  Skin: Negative  Neurological: Negative for dizziness and headaches  Psychiatric/Behavioral: Negative  Objective:      /56 (BP Location: Right arm, Patient Position: Sitting, Cuff Size: Adult)   Pulse 67   Temp 98 1 °F (36 7 °C) (Tympanic)   Resp 15   Ht 5' 5 35" (1 66 m)   Wt 59 2 kg (130 lb 9 6 oz)   SpO2 99%   BMI 21 50 kg/m²          Physical Exam   Constitutional: She is oriented to person, place, and time  She appears well-developed  No distress  Neck: Carotid bruit is not present  No thyromegaly present  Cardiovascular: Normal rate, regular rhythm and normal heart sounds  Pulmonary/Chest: Effort normal and breath sounds normal    Musculoskeletal: She exhibits no edema  Lymphadenopathy:     She has no cervical adenopathy  Neurological: She is alert and oriented to person, place, and time  Skin: Skin is warm and dry  Psychiatric: She has a normal mood and affect  Nursing note and vitals reviewed

## 2019-10-30 DIAGNOSIS — I10 ESSENTIAL HYPERTENSION: ICD-10-CM

## 2019-10-30 RX ORDER — VALSARTAN 320 MG/1
320 TABLET ORAL DAILY
Qty: 90 TABLET | Refills: 1 | Status: SHIPPED | OUTPATIENT
Start: 2019-10-30 | End: 2020-01-12 | Stop reason: HOSPADM

## 2019-11-06 ENCOUNTER — OFFICE VISIT (OUTPATIENT)
Dept: PODIATRY | Facility: CLINIC | Age: 74
End: 2019-11-06
Payer: MEDICARE

## 2019-11-06 VITALS
WEIGHT: 131 LBS | HEART RATE: 99 BPM | BODY MASS INDEX: 21.83 KG/M2 | SYSTOLIC BLOOD PRESSURE: 111 MMHG | HEIGHT: 65 IN | DIASTOLIC BLOOD PRESSURE: 70 MMHG

## 2019-11-06 DIAGNOSIS — M72.2 PLANTAR FASCIITIS: Primary | ICD-10-CM

## 2019-11-06 DIAGNOSIS — M79.672 LEFT FOOT PAIN: ICD-10-CM

## 2019-11-06 PROCEDURE — 99213 OFFICE O/P EST LOW 20 MIN: CPT | Performed by: PODIATRIST

## 2019-11-06 PROCEDURE — 20550 NJX 1 TENDON SHEATH/LIGAMENT: CPT | Performed by: PODIATRIST

## 2019-11-06 RX ORDER — BUPIVACAINE HYDROCHLORIDE 5 MG/ML
1 INJECTION, SOLUTION EPIDURAL; INTRACAUDAL ONCE
Status: COMPLETED | OUTPATIENT
Start: 2019-11-06 | End: 2019-11-06

## 2019-11-06 RX ORDER — TRIAMCINOLONE ACETONIDE 40 MG/ML
20 INJECTION, SUSPENSION INTRA-ARTICULAR; INTRAMUSCULAR ONCE
Status: COMPLETED | OUTPATIENT
Start: 2019-11-06 | End: 2019-11-06

## 2019-11-06 RX ORDER — LIDOCAINE HYDROCHLORIDE 10 MG/ML
1 INJECTION, SOLUTION INFILTRATION; PERINEURAL ONCE
Status: COMPLETED | OUTPATIENT
Start: 2019-11-06 | End: 2019-11-06

## 2019-11-06 RX ADMIN — TRIAMCINOLONE ACETONIDE 20 MG: 40 INJECTION, SUSPENSION INTRA-ARTICULAR; INTRAMUSCULAR at 17:56

## 2019-11-06 RX ADMIN — BUPIVACAINE HYDROCHLORIDE 1 ML: 5 INJECTION, SOLUTION EPIDURAL; INTRACAUDAL at 17:55

## 2019-11-06 RX ADMIN — LIDOCAINE HYDROCHLORIDE 1 ML: 10 INJECTION, SOLUTION INFILTRATION; PERINEURAL at 17:56

## 2019-11-06 NOTE — PROGRESS NOTES
Assessment/Plan:    Trimmed hyperkeratotic lesions left foot  Current pain suggest either plantar fasciitis or bursitis  Injected left heel with 0 5 cc Kenalog 40 along with 1 cc 1% xylocaine and 1 cc 0 5% Marcaine  Patient is rescheduled in 1 month  No problem-specific Assessment & Plan notes found for this encounter  Diagnoses and all orders for this visit:    Plantar fasciitis  -     bupivacaine (PF) (MARCAINE) 0 5 % injection 1 mL  -     lidocaine (XYLOCAINE) 1 % injection 1 mL  -     triamcinolone acetonide (KENALOG-40) 40 mg/mL injection 20 mg    Left foot pain          Subjective:      Patient ID: Jimena Velazco is a 74 y o  female  HPI     Patient, a 78-year-old female presents with left heel pain  Patient was seen with similar pain approximately 3-4 months ago and a callus was noted on the left heel  Palliative care and trimming of the lesion was not very helpful  Patient also has a callus at the left navicular but  this lesion is not painful  The following portions of the patient's history were reviewed and updated as appropriate: allergies, current medications, past family history, past medical history, past social history, past surgical history and problem list     Review of Systems   Gastrointestinal: Negative  Musculoskeletal: Positive for gait problem  Hematological: Negative  Objective:      /70   Pulse 99   Ht 5' 5" (1 651 m)   Wt 59 4 kg (131 lb)   BMI 21 80 kg/m²          Physical Exam   Cardiovascular: Regular rhythm and intact distal pulses  Musculoskeletal: She exhibits deformity  Pes planus foot type with hyperpronation bilateral   Pain with palpation central aspect left heel at the hyperkeratotic lesion present at the heel and distal to it  Skin:   Hyperkeratotic lesion central aspect left heel and overlying the navicular               Foot injection     Date/Time 11/6/2019 5:58 PM     Performed by  Zo Leal DPM     Authorized by Jairo Lea DPM      [place] Protocol Consent: Verbal consent obtained  Risks and benefits: risks, benefits and alternatives were discussed  Consent given by: patient  Patient understanding: patient states understanding of the procedure being performed        Site preparation: Isopropyl alcohol    Local anesthesia used: yes     Anesthesia   Local anesthesia used: yes  Local Anesthetic: lidocaine 1% without epinephrine and bupivacaine 0 5% without epinephrine     Procedure Details   Procedure Notes: Injected left heel with 0 5 cc Kenalog 40 along with 1 cc 1% xylocaine and 1 cc 0 5% Marcaine

## 2019-12-03 DIAGNOSIS — Z86.73 HISTORY OF TIA (TRANSIENT ISCHEMIC ATTACK): ICD-10-CM

## 2019-12-03 RX ORDER — CLOPIDOGREL BISULFATE 75 MG/1
75 TABLET ORAL DAILY
Qty: 30 TABLET | Refills: 3 | Status: SHIPPED | OUTPATIENT
Start: 2019-12-03 | End: 2019-12-04 | Stop reason: SDUPTHER

## 2019-12-04 DIAGNOSIS — Z86.73 HISTORY OF TIA (TRANSIENT ISCHEMIC ATTACK): ICD-10-CM

## 2019-12-04 RX ORDER — CLOPIDOGREL BISULFATE 75 MG/1
75 TABLET ORAL DAILY
Qty: 30 TABLET | Refills: 3 | Status: SHIPPED | OUTPATIENT
Start: 2019-12-04 | End: 2020-01-16

## 2019-12-11 LAB
ALBUMIN SERPL-MCNC: 4 G/DL (ref 3.6–5.1)
ALBUMIN/GLOB SERPL: 1.6 (CALC) (ref 1–2.5)
ALP SERPL-CCNC: 47 U/L (ref 33–130)
ALT SERPL-CCNC: 11 U/L (ref 6–29)
AST SERPL-CCNC: 15 U/L (ref 10–35)
BASOPHILS # BLD AUTO: 28 CELLS/UL (ref 0–200)
BASOPHILS NFR BLD AUTO: 0.4 %
BILIRUB SERPL-MCNC: 0.4 MG/DL (ref 0.2–1.2)
BUN SERPL-MCNC: 18 MG/DL (ref 7–25)
BUN/CREAT SERPL: 17 (CALC) (ref 6–22)
CALCIUM SERPL-MCNC: 9.5 MG/DL (ref 8.6–10.4)
CHLORIDE SERPL-SCNC: 102 MMOL/L (ref 98–110)
CHOLEST SERPL-MCNC: 178 MG/DL
CHOLEST/HDLC SERPL: 2 (CALC)
CO2 SERPL-SCNC: 28 MMOL/L (ref 20–32)
CREAT SERPL-MCNC: 1.06 MG/DL (ref 0.6–0.93)
EOSINOPHIL # BLD AUTO: 71 CELLS/UL (ref 15–500)
EOSINOPHIL NFR BLD AUTO: 1 %
ERYTHROCYTE [DISTWIDTH] IN BLOOD BY AUTOMATED COUNT: 13.3 % (ref 11–15)
GLOBULIN SER CALC-MCNC: 2.5 G/DL (CALC) (ref 1.9–3.7)
GLUCOSE SERPL-MCNC: 96 MG/DL (ref 65–99)
HCT VFR BLD AUTO: 34.1 % (ref 35–45)
HDLC SERPL-MCNC: 87 MG/DL
HGB BLD-MCNC: 11.7 G/DL (ref 11.7–15.5)
LDLC SERPL CALC-MCNC: 72 MG/DL (CALC)
LYMPHOCYTES # BLD AUTO: 2272 CELLS/UL (ref 850–3900)
LYMPHOCYTES NFR BLD AUTO: 32 %
MCH RBC QN AUTO: 34.3 PG (ref 27–33)
MCHC RBC AUTO-ENTMCNC: 34.3 G/DL (ref 32–36)
MCV RBC AUTO: 100 FL (ref 80–100)
MONOCYTES # BLD AUTO: 518 CELLS/UL (ref 200–950)
MONOCYTES NFR BLD AUTO: 7.3 %
NEUTROPHILS # BLD AUTO: 4210 CELLS/UL (ref 1500–7800)
NEUTROPHILS NFR BLD AUTO: 59.3 %
NONHDLC SERPL-MCNC: 91 MG/DL (CALC)
PLATELET # BLD AUTO: 231 THOUSAND/UL (ref 140–400)
PMV BLD REES-ECKER: 10.1 FL (ref 7.5–12.5)
POTASSIUM SERPL-SCNC: 4.2 MMOL/L (ref 3.5–5.3)
PROT SERPL-MCNC: 6.5 G/DL (ref 6.1–8.1)
RBC # BLD AUTO: 3.41 MILLION/UL (ref 3.8–5.1)
SL AMB EGFR AFRICAN AMERICAN: 59 ML/MIN/1.73M2
SL AMB EGFR NON AFRICAN AMERICAN: 51 ML/MIN/1.73M2
SODIUM SERPL-SCNC: 136 MMOL/L (ref 135–146)
TRIGL SERPL-MCNC: 104 MG/DL
TSH SERPL-ACNC: 2.38 MIU/L (ref 0.4–4.5)
WBC # BLD AUTO: 7.1 THOUSAND/UL (ref 3.8–10.8)

## 2020-01-11 ENCOUNTER — HOSPITAL ENCOUNTER (OUTPATIENT)
Facility: HOSPITAL | Age: 75
Setting detail: OBSERVATION
Discharge: HOME | End: 2020-01-12
Attending: EMERGENCY MEDICINE | Admitting: STUDENT IN AN ORGANIZED HEALTH CARE EDUCATION/TRAINING PROGRAM
Payer: MEDICARE

## 2020-01-11 ENCOUNTER — APPOINTMENT (EMERGENCY)
Dept: CT IMAGING | Facility: HOSPITAL | Age: 75
End: 2020-01-11
Payer: MEDICARE

## 2020-01-11 ENCOUNTER — APPOINTMENT (EMERGENCY)
Dept: RADIOLOGY | Facility: HOSPITAL | Age: 75
End: 2020-01-11
Payer: MEDICARE

## 2020-01-11 DIAGNOSIS — N17.9 AKI (ACUTE KIDNEY INJURY) (HCC): ICD-10-CM

## 2020-01-11 DIAGNOSIS — S09.90XA CLOSED HEAD INJURY, INITIAL ENCOUNTER: ICD-10-CM

## 2020-01-11 DIAGNOSIS — M06.9 RHEUMATOID ARTHRITIS, INVOLVING UNSPECIFIED SITE, UNSPECIFIED RHEUMATOID FACTOR PRESENCE: ICD-10-CM

## 2020-01-11 DIAGNOSIS — R55 SYNCOPE: Primary | ICD-10-CM

## 2020-01-11 LAB
ALBUMIN SERPL BCP-MCNC: 3.5 G/DL (ref 3.5–5)
ALP SERPL-CCNC: 53 U/L (ref 46–116)
ALT SERPL W P-5'-P-CCNC: 17 U/L (ref 12–78)
ANION GAP SERPL CALCULATED.3IONS-SCNC: 10 MMOL/L (ref 4–13)
APTT PPP: 23 SECONDS (ref 23–37)
AST SERPL W P-5'-P-CCNC: 16 U/L (ref 5–45)
BASOPHILS # BLD AUTO: 0.01 THOUSANDS/ΜL (ref 0–0.1)
BASOPHILS NFR BLD AUTO: 0 % (ref 0–1)
BILIRUB SERPL-MCNC: 0.39 MG/DL (ref 0.2–1)
BUN SERPL-MCNC: 20 MG/DL (ref 5–25)
CALCIUM SERPL-MCNC: 9.3 MG/DL (ref 8.3–10.1)
CHLORIDE SERPL-SCNC: 101 MMOL/L (ref 100–108)
CO2 SERPL-SCNC: 27 MMOL/L (ref 21–32)
CREAT SERPL-MCNC: 1.48 MG/DL (ref 0.6–1.3)
EOSINOPHIL # BLD AUTO: 0.03 THOUSAND/ΜL (ref 0–0.61)
EOSINOPHIL NFR BLD AUTO: 1 % (ref 0–6)
ERYTHROCYTE [DISTWIDTH] IN BLOOD BY AUTOMATED COUNT: 13.8 % (ref 11.6–15.1)
ETHANOL SERPL-MCNC: <3 MG/DL (ref 0–3)
GFR SERPL CREATININE-BSD FRML MDRD: 34 ML/MIN/1.73SQ M
GLUCOSE SERPL-MCNC: 122 MG/DL (ref 65–140)
GLUCOSE SERPL-MCNC: 129 MG/DL (ref 65–140)
HCT VFR BLD AUTO: 35.1 % (ref 34.8–46.1)
HGB BLD-MCNC: 11.3 G/DL (ref 11.5–15.4)
IMM GRANULOCYTES # BLD AUTO: 0.06 THOUSAND/UL (ref 0–0.2)
IMM GRANULOCYTES NFR BLD AUTO: 1 % (ref 0–2)
INR PPP: 0.99 (ref 0.84–1.19)
LYMPHOCYTES # BLD AUTO: 1.5 THOUSANDS/ΜL (ref 0.6–4.47)
LYMPHOCYTES NFR BLD AUTO: 23 % (ref 14–44)
MAGNESIUM SERPL-MCNC: 2.3 MG/DL (ref 1.6–2.6)
MCH RBC QN AUTO: 34 PG (ref 26.8–34.3)
MCHC RBC AUTO-ENTMCNC: 32.2 G/DL (ref 31.4–37.4)
MCV RBC AUTO: 106 FL (ref 82–98)
MONOCYTES # BLD AUTO: 0.74 THOUSAND/ΜL (ref 0.17–1.22)
MONOCYTES NFR BLD AUTO: 11 % (ref 4–12)
NEUTROPHILS # BLD AUTO: 4.16 THOUSANDS/ΜL (ref 1.85–7.62)
NEUTS SEG NFR BLD AUTO: 64 % (ref 43–75)
NRBC BLD AUTO-RTO: 0 /100 WBCS
PLATELET # BLD AUTO: 244 THOUSANDS/UL (ref 149–390)
PMV BLD AUTO: 9.4 FL (ref 8.9–12.7)
POTASSIUM SERPL-SCNC: 4.2 MMOL/L (ref 3.5–5.3)
PROT SERPL-MCNC: 7.3 G/DL (ref 6.4–8.2)
PROTHROMBIN TIME: 13.2 SECONDS (ref 11.6–14.5)
RBC # BLD AUTO: 3.32 MILLION/UL (ref 3.81–5.12)
SODIUM SERPL-SCNC: 138 MMOL/L (ref 136–145)
TROPONIN I SERPL-MCNC: 0.02 NG/ML
TROPONIN I SERPL-MCNC: <0.02 NG/ML
WBC # BLD AUTO: 6.5 THOUSAND/UL (ref 4.31–10.16)

## 2020-01-11 PROCEDURE — 85610 PROTHROMBIN TIME: CPT | Performed by: EMERGENCY MEDICINE

## 2020-01-11 PROCEDURE — 99220 PR INITIAL OBSERVATION CARE/DAY 70 MINUTES: CPT | Performed by: STUDENT IN AN ORGANIZED HEALTH CARE EDUCATION/TRAINING PROGRAM

## 2020-01-11 PROCEDURE — 85025 COMPLETE CBC W/AUTO DIFF WBC: CPT | Performed by: EMERGENCY MEDICINE

## 2020-01-11 PROCEDURE — 93005 ELECTROCARDIOGRAM TRACING: CPT

## 2020-01-11 PROCEDURE — 80053 COMPREHEN METABOLIC PANEL: CPT | Performed by: EMERGENCY MEDICINE

## 2020-01-11 PROCEDURE — 83735 ASSAY OF MAGNESIUM: CPT | Performed by: EMERGENCY MEDICINE

## 2020-01-11 PROCEDURE — 99285 EMERGENCY DEPT VISIT HI MDM: CPT

## 2020-01-11 PROCEDURE — 80320 DRUG SCREEN QUANTALCOHOLS: CPT | Performed by: EMERGENCY MEDICINE

## 2020-01-11 PROCEDURE — 36415 COLL VENOUS BLD VENIPUNCTURE: CPT | Performed by: EMERGENCY MEDICINE

## 2020-01-11 PROCEDURE — 72125 CT NECK SPINE W/O DYE: CPT

## 2020-01-11 PROCEDURE — 84484 ASSAY OF TROPONIN QUANT: CPT | Performed by: EMERGENCY MEDICINE

## 2020-01-11 PROCEDURE — 1124F ACP DISCUSS-NO DSCNMKR DOCD: CPT | Performed by: EMERGENCY MEDICINE

## 2020-01-11 PROCEDURE — 71045 X-RAY EXAM CHEST 1 VIEW: CPT

## 2020-01-11 PROCEDURE — 82948 REAGENT STRIP/BLOOD GLUCOSE: CPT

## 2020-01-11 PROCEDURE — 99285 EMERGENCY DEPT VISIT HI MDM: CPT | Performed by: EMERGENCY MEDICINE

## 2020-01-11 PROCEDURE — 85730 THROMBOPLASTIN TIME PARTIAL: CPT | Performed by: EMERGENCY MEDICINE

## 2020-01-11 PROCEDURE — 96360 HYDRATION IV INFUSION INIT: CPT

## 2020-01-11 PROCEDURE — 70450 CT HEAD/BRAIN W/O DYE: CPT

## 2020-01-11 RX ORDER — HEPARIN SODIUM 5000 [USP'U]/ML
5000 INJECTION, SOLUTION INTRAVENOUS; SUBCUTANEOUS EVERY 8 HOURS SCHEDULED
Status: DISCONTINUED | OUTPATIENT
Start: 2020-01-11 | End: 2020-01-12 | Stop reason: HOSPADM

## 2020-01-11 RX ORDER — SODIUM CHLORIDE 9 MG/ML
75 INJECTION, SOLUTION INTRAVENOUS CONTINUOUS
Status: DISCONTINUED | OUTPATIENT
Start: 2020-01-11 | End: 2020-01-12

## 2020-01-11 RX ORDER — FOLIC ACID 1 MG/1
1 TABLET ORAL 3 TIMES DAILY
Status: DISCONTINUED | OUTPATIENT
Start: 2020-01-12 | End: 2020-01-11

## 2020-01-11 RX ORDER — ACETAMINOPHEN 325 MG/1
650 TABLET ORAL EVERY 6 HOURS PRN
Status: DISCONTINUED | OUTPATIENT
Start: 2020-01-11 | End: 2020-01-12 | Stop reason: HOSPADM

## 2020-01-11 RX ORDER — FOLIC ACID 1 MG/1
2 TABLET ORAL EVERY MORNING
Status: DISCONTINUED | OUTPATIENT
Start: 2020-01-12 | End: 2020-01-12 | Stop reason: HOSPADM

## 2020-01-11 RX ORDER — CLOPIDOGREL BISULFATE 75 MG/1
75 TABLET ORAL
Status: DISCONTINUED | OUTPATIENT
Start: 2020-01-11 | End: 2020-01-12 | Stop reason: HOSPADM

## 2020-01-11 RX ORDER — PRAVASTATIN SODIUM 40 MG
80 TABLET ORAL
Status: DISCONTINUED | OUTPATIENT
Start: 2020-01-11 | End: 2020-01-12 | Stop reason: HOSPADM

## 2020-01-11 RX ORDER — FOLIC ACID 1 MG/1
1 TABLET ORAL
Status: DISCONTINUED | OUTPATIENT
Start: 2020-01-11 | End: 2020-01-12 | Stop reason: HOSPADM

## 2020-01-11 RX ORDER — LANOLIN ALCOHOL/MO/W.PET/CERES
400 CREAM (GRAM) TOPICAL DAILY
Status: DISCONTINUED | OUTPATIENT
Start: 2020-01-11 | End: 2020-01-11

## 2020-01-11 RX ORDER — CLOPIDOGREL BISULFATE 75 MG/1
75 TABLET ORAL DAILY
Status: DISCONTINUED | OUTPATIENT
Start: 2020-01-11 | End: 2020-01-11

## 2020-01-11 RX ORDER — LATANOPROST 50 UG/ML
1 SOLUTION/ DROPS OPHTHALMIC
Status: DISCONTINUED | OUTPATIENT
Start: 2020-01-11 | End: 2020-01-12 | Stop reason: HOSPADM

## 2020-01-11 RX ADMIN — SODIUM CHLORIDE 75 ML/HR: 0.9 INJECTION, SOLUTION INTRAVENOUS at 21:51

## 2020-01-11 RX ADMIN — CLOPIDOGREL BISULFATE 75 MG: 75 TABLET ORAL at 21:54

## 2020-01-11 RX ADMIN — PRAVASTATIN SODIUM 80 MG: 40 TABLET ORAL at 18:32

## 2020-01-11 RX ADMIN — SODIUM CHLORIDE 1000 ML: 0.9 INJECTION, SOLUTION INTRAVENOUS at 13:34

## 2020-01-11 RX ADMIN — LATANOPROST 1 DROP: 50 SOLUTION OPHTHALMIC at 21:54

## 2020-01-11 RX ADMIN — FOLIC ACID 1 MG: 1 TABLET ORAL at 21:55

## 2020-01-11 NOTE — ASSESSMENT & PLAN NOTE
· States she has a previous episode of transient ischemic attack years ago  · States she had right eye droop along with numbness and tingling sensations in upper extremities  · Symptoms resolved but the patient went to the emergency room for further evaluation and was admitted but neurologic workup was negative  · Patient was discharged with Plavix and has been taking it ever since  · No further episodes of transient ischemic attack since that prior episodes  · CT head did show hematoma from fall but negative for any acute pathology

## 2020-01-11 NOTE — ASSESSMENT & PLAN NOTE
· Noted to have hypertension, on diltiazem 240 mg and valsartan 320 mg daily  · Moderately hypertensive on exam  · Due to syncopal episode with  believing that patient is being over medicated with hypertensive medications and patient has endorsed having soft blood pressures at home, inclined to hold off on medications for now and continue to monitor

## 2020-01-11 NOTE — ED PROVIDER NOTES
History  Chief Complaint   Patient presents with    Syncope     Pt had witnessed syncopal episode at grocery store; Pt did hit her head and is currently taking plavix; Pt c/o posterior headache    Head Injury w/LOC     65 yo F with PMH of TIA, on plavix, RA, HTN, HLD presents to ED after a witnessed syncopal event in grocery store  She was waiting in line to pay  No prodrome at all  No similar in past  [place] back on her head, did strike, has some posterior head pain, and some neck pain  Was unconscious for a few seconds   was walking to car, so was not witnessed by anyone who is here  No sz activity reported  Pt denies any sx currently other than HA, and some neck pain  No numbness/tingling/weakness  History provided by:  Patient and medical records   used: No    Syncope   Episode history:  Single  Most recent episode: Today  Progression:  Unchanged  Chronicity:  New  Context: standing up    Witnessed: yes    Relieved by:  Nothing  Worsened by:  Nothing  Ineffective treatments:  None tried  Associated symptoms: headaches    Associated symptoms: no chest pain, no confusion, no diaphoresis, no difficulty breathing, no dizziness, no fever, no focal sensory loss, no focal weakness, no malaise/fatigue, no nausea, no palpitations, no recent fall, no recent injury, no seizures, no shortness of breath, no visual change, no vomiting and no weakness    Risk factors: vascular disease    Risk factors: no congenital heart disease, no coronary artery disease and no seizures        Prior to Admission Medications   Prescriptions Last Dose Informant Patient Reported? Taking?    clopidogrel (PLAVIX) 75 mg tablet   No Yes   Sig: Take 1 tablet (75 mg total) by mouth daily   diltiazem (TIAZAC) 240 MG 24 hr capsule   No Yes   Sig: Take 1 capsule (240 mg total) by mouth daily   folic acid (FOLVITE) 1 mg tablet   Yes Yes   Sig: Take 1 mg by mouth daily    latanoprost (XALATAN) 0 005 % ophthalmic solution Yes Yes   Sig: Administer 1 drop to both eyes daily at bedtime    methotrexate 2 5 mg tablet   No Yes   Sig: Take 6 tablets (15 mg total) by mouth once a week   simvastatin (ZOCOR) 40 mg tablet   No Yes   Sig: Take 1 tablet (40 mg total) by mouth daily   valsartan (DIOVAN) 320 MG tablet   No Yes   Sig: Take 1 tablet (320 mg total) by mouth daily      Facility-Administered Medications: None       Past Medical History:   Diagnosis Date    Abnormal liver function test     last assessed: 3/25/2014    Anemia     Chronic constipation     last assessed: 12/2/2014    Herpes zoster     last assessed: 3/16/2016    Hyperlipidemia     Hypertension     Psoriatic arthritis ([facility] )     last assessed;3/25/2014    Recent weight loss     last assessed: 7/13/2016    Rheumatoid arthritis ([facility] )     Stroke ([place] [address] )     Trigeminal neuritis        Past Surgical History:   Procedure Laterality Date    APPENDECTOMY      FOOT SURGERY      KIDNEY SURGERY         Family History   Problem Relation Age of Onset    No Known Problems Father      I have reviewed and agree with the history as documented  Social History     Tobacco Use    Smoking status: Never Smoker    Smokeless tobacco: Never Used   Substance Use Topics    Alcohol use: No    Drug use: No        Review of Systems   Constitutional: Negative for appetite change, chills, diaphoresis, fatigue, fever and malaise/fatigue  HENT: Negative for congestion, ear pain, rhinorrhea, sore throat, trouble swallowing and voice change  Eyes: Negative for pain and visual disturbance  Respiratory: Negative for cough, chest tightness and shortness of breath  Cardiovascular: Positive for syncope  Negative for chest pain, palpitations and leg swelling  Gastrointestinal: Negative for abdominal pain, blood in stool, constipation, diarrhea, nausea and vomiting  Genitourinary: Negative for difficulty urinating, dysuria, flank pain, hematuria and urgency     Musculoskeletal: Positive for neck pain  Negative for back pain and neck stiffness  Skin: Negative for rash  Neurological: Positive for headaches  Negative for dizziness, focal weakness, seizures, syncope, speech difficulty, weakness, light-headedness and numbness  Psychiatric/Behavioral: Negative for confusion and suicidal ideas  Physical Exam  Physical Exam   Constitutional: She is oriented to person, place, and time  She appears well-developed and well-nourished  No distress  HENT:   Head: Normocephalic and atraumatic  Head is without raccoon's eyes and without Manrique's sign  Right Ear: External ear normal    Left Ear: External ear normal    Nose: Nose normal    Mouth/Throat: Oropharynx is clear and moist    Eyes: Pupils are equal, round, and reactive to light  Conjunctivae and EOM are normal  Right eye exhibits no discharge  Left eye exhibits no discharge  No scleral icterus  Neck: Normal range of motion  Neck supple  Muscular tenderness present  No spinous process tenderness present  No neck rigidity  No tracheal deviation present  Cardiovascular: Normal rate, regular rhythm, normal heart sounds and intact distal pulses  Exam reveals no gallop and no friction rub  No murmur heard  Pulmonary/Chest: Effort normal and breath sounds normal  No stridor  No respiratory distress  She exhibits no tenderness  Abdominal: Soft  Bowel sounds are normal  There is no tenderness  There is no rebound and no guarding  Musculoskeletal: Normal range of motion  She exhibits no edema or deformity  Lymphadenopathy:     She has no cervical adenopathy  Neurological: She is alert and oriented to person, place, and time  She has normal strength  No cranial nerve deficit or sensory deficit  Coordination normal  GCS eye subscore is 4  GCS verbal subscore is 5  GCS motor subscore is 6  Skin: Skin is warm and dry  No rash noted  She is not diaphoretic  Psychiatric: She has a normal mood and affect   Her behavior is normal    Nursing note and vitals reviewed        Vital Signs  ED Triage Vitals   Temperature Pulse Respirations Blood Pressure SpO2   01/11/20 1306 01/11/20 1301 01/11/20 1301 01/11/20 1301 01/11/20 1301   98 °F (36 7 °C) 64 16 122/70 98 %      Temp Source Heart Rate Source Patient Position - Orthostatic VS BP Location FiO2 (%)   01/11/20 1306 01/11/20 1301 01/11/20 1444 01/11/20 1444 --   Oral Monitor Lying Left arm       Pain Score       01/11/20 1301       4           Vitals:    01/11/20 1301 01/11/20 1444   BP: 122/70 148/66   Pulse: 64 65   Patient Position - Orthostatic VS:  Lying         Visual Acuity  Visual Acuity      Most Recent Value   L Pupil Size (mm)  3   R Pupil Size (mm)  3          ED Medications  Medications   sodium chloride 0 9 % bolus 1,000 mL (0 mL Intravenous Stopped 1/11/20 1501)       Diagnostic Studies  Results Reviewed     Procedure Component Value Units Date/Time    Ethanol [[id number]]  (Normal) Collected:  01/11/20 1333    Lab Status:  Final result Specimen:  Blood from Arm, Right Updated:  01/11/20 1418     Ethanol Lvl <3 mg/dL     Troponin I [[id number]]     Lab Status:  No result Specimen:  Blood     Protime-INR [[id number]]  (Normal) Collected:  01/11/20 1333    Lab Status:  Final result Specimen:  Blood from Arm, Right Updated:  01/11/20 1413     Protime 13 2 seconds      INR 0 99    APTT [[id number]]  (Normal) Collected:  01/11/20 1333    Lab Status:  Final result Specimen:  Blood from Arm, Right Updated:  01/11/20 1413     PTT 23 seconds     Comprehensive metabolic panel [[id number]]  (Abnormal) Collected:  01/11/20 1333    Lab Status:  Final result Specimen:  Blood from Arm, Right Updated:  01/11/20 1409     Sodium 138 mmol/L      Potassium 4 2 mmol/L      Chloride 101 mmol/L      CO2 27 mmol/L      ANION GAP 10 mmol/L      BUN 20 mg/dL      Creatinine 1 48 mg/dL      Glucose 122 mg/dL      Calcium 9 3 mg/dL      AST 16 U/L      ALT 17 U/L      Alkaline Phosphatase 53 U/L      Total Protein 7 3 g/dL      Albumin 3 5 g/dL      Total Bilirubin 0 39 mg/dL      eGFR 34 ml/min/1 73sq m     Narrative:       National Kidney Disease Foundation guidelines for Chronic Kidney Disease (CKD):     Stage 1 with normal or high GFR (GFR > 90 mL/min/1 73 square meters)    Stage 2 Mild CKD (GFR = 60-89 mL/min/1 73 square meters)    Stage 3A Moderate CKD (GFR = 45-59 mL/min/1 73 square meters)    Stage 3B Moderate CKD (GFR = 30-44 mL/min/1 73 square meters)    Stage 4 Severe CKD (GFR = 15-29 mL/min/1 73 square meters)    Stage 5 End Stage CKD (GFR <15 mL/min/1 73 square meters)  Note: GFR calculation is accurate only with a steady state creatinine    Magnesium [[id number]]  (Normal) Collected:  01/11/20 1333    Lab Status:  Final result Specimen:  Blood from Arm, Right Updated:  01/11/20 1409     Magnesium 2 3 mg/dL     Troponin I [[id number]]  (Normal) Collected:  01/11/20 1333    Lab Status:  Final result Specimen:  Blood from Arm, Right Updated:  01/11/20 1406     Troponin I <0 02 ng/mL     CBC and differential [[id number]]  (Abnormal) Collected:  01/11/20 1333    Lab Status:  Final result Specimen:  Blood from Arm, Right Updated:  01/11/20 1340     WBC 6 50 Thousand/uL      RBC 3 32 Million/uL      Hemoglobin 11 3 g/dL      Hematocrit 35 1 %       fL      MCH 34 0 pg      MCHC 32 2 g/dL      RDW 13 8 %      MPV 9 4 fL      Platelets 210 Thousands/uL      nRBC 0 /100 WBCs      Neutrophils Relative 64 %      Immat GRANS % 1 %      Lymphocytes Relative 23 %      Monocytes Relative 11 %      Eosinophils Relative 1 %      Basophils Relative 0 %      Neutrophils Absolute 4 16 Thousands/µL      Immature Grans Absolute 0 06 Thousand/uL      Lymphocytes Absolute 1 50 Thousands/µL      Monocytes Absolute 0 74 Thousand/µL      Eosinophils Absolute 0 03 Thousand/µL      Basophils Absolute 0 01 Thousands/µL     Fingerstick Glucose (POCT) [[id number]]  (Normal) Collected:  01/11/20 1319    Lab Status:  Final result Updated:  01/11/20 1321     POC Glucose 129 mg/dl                  CT head without contrast   Final Result by Jacky Rojas MD (01/11 2745)      No acute intracranial abnormality  Mild right posterior parietal scalp hematoma without a calvarial skull fracture  Workstation performed: [id number]         CT spine cervical without contrast   Final Result by Jacky Rojas MD (01/11 5145)      No cervical spine fracture or traumatic malalignment  Previously seen nondisplaced right anterior C1 fracture has healed  Workstation performed: [id number]         XR chest 1 view portable   ED Interpretation by Malcolm Colmenares MD (01/11 1417)   No acute findings  Procedures  ECG 12 Lead Documentation Only  Date/Time: 1/11/2020 1:22 PM  Performed by: Malcolm Colmenares MD  Authorized by: Malcolm Colmenares MD     Indications / Diagnosis:  Syncope  ECG reviewed by me, the ED Provider: yes    Patient location:  ED  Previous ECG:     Previous ECG:  Compared to current    Comparison ECG info:  Nonspecific t wave changes avf v3    Similarity:  Changes noted    Comparison to cardiac monitor: Yes    Interpretation:     Interpretation: non-specific    Rate:     ECG rate:  60    ECG rate assessment: normal    Rhythm:     Rhythm: sinus rhythm    Ectopy:     Ectopy: none    QRS:     QRS axis:  Normal    QRS intervals:  Normal  Conduction:     Conduction: normal    ST segments:     ST segments:  Normal  T waves:     T waves: non-specific               ED Course  ED Course as of Jan 11 1517   Sat Jan 11, 2020   1329 Pt with syncope w/out prodrome  Will require admission once workup is complete, she is in agreement  Does not want anything for pain at this time  Collar placed  1403 Reeval'd pt, no change  1513 Workup neg  Will discuss w/ hospitalist for admission for syncope                                     MDM  Number of Diagnoses or Management Options  KARIME (acute kidney injury) [facility]): new and requires workup  Closed head injury, initial encounter: new and requires workup  Syncope: new and requires workup     Amount and/or Complexity of Data Reviewed  Clinical lab tests: ordered and reviewed  Tests in the radiology section of CPT®: ordered and reviewed  Tests in the medicine section of CPT®: ordered and reviewed  Review and summarize past medical records: yes  Discuss the patient with other providers: yes  Independent visualization of images, tracings, or specimens: yes    Risk of Complications, Morbidity, and/or Mortality  Presenting problems: moderate  Diagnostic procedures: low  Management options: low    Patient Progress  Patient progress: improved        Disposition  Final diagnoses:   Syncope   Closed head injury, initial encounter   KARIME (acute kidney injury) ([facility] [address] )     Time reflects when diagnosis was documented in both MDM as applicable and the Disposition within this note     Time User Action Codes Description Comment    1/11/2020  2:16 PM [place] Remer Add [R55] Syncope     1/11/2020  2:16 PM Augustin Smith S Add [S09 [id number]] Closed head injury, initial encounter     1/11/2020  2:17 PM Augustin Smith S Add [N17 9] KARIME (acute kidney injury) [facility])       ED Disposition     ED Disposition Condition Date/Time Comment    Admit Stable Sat Jan 11, 2020  3:16 PM Case was discussed with Dr Renetta Llamas and the patient's admission status was agreed to be Admission Status: inpatient status to the service of Dr Renetta Llamas   Follow-up Information    None         Patient's Medications   Discharge Prescriptions    No medications on file     No discharge procedures on file      ED Provider  Electronically Signed by           Dariela Claros MD  01/11/20 9931

## 2020-01-11 NOTE — ASSESSMENT & PLAN NOTE
· Noted to have elevated creatinine on labs today  · Patient does endorse good p o  Intake and states she is well hydrated  · Unclear if this is pre renal, but patient is denying any episodes of vomiting or diarrhea or other extrarenal losses at this time   · Patient is on 320 mg of valsartan, which could be contributing although she has been on it for an extended period of time already  · Will order renal ultrasound, give IV fluids and check creatinine in a m

## 2020-01-11 NOTE — H&P
H&P- Mery Pichardo 1945, 66 y o  female MRN: [id number]    Unit/Bed#: ED 24 Encounter: [id number]    Primary Care Provider: Rickey Sainz DO   Date and time admitted to hospital: 1/11/2020 12:59 PM        * Syncope  Assessment & Plan  · Still unclear at this time what the etiology is  · Patient does not have any cardiac history nor is she endorsing having any chest pain, chest palpitations, shortness of breath associated with episode  · Patient denied any headaches, seizure-like activity, and CT head was negative for any acute pathology  · No significant electrolyte derangements although creatinine was elevated from baseline which could be attributed to use of valsartan 320 mg daily  · Patient was writing a check at the time of the syncopal episode was not holding any heavy bags or exerting herself  · Could be vasovagal although unclear  · Might be orthostatic in setting of over medication with high doses of hypertensive medications  · Will continue to monitor    KARIME (acute kidney injury) ([facility] [address] )  Assessment & Plan  · Noted to have elevated creatinine on labs today  · Patient does endorse good p o   Intake and states she is well hydrated  · Unclear if this is pre renal, but patient is denying any episodes of vomiting or diarrhea or other extrarenal losses at this time   · Patient is on 320 mg of valsartan, which could be contributing although she has been on it for an extended period of time already  · Will order renal ultrasound, give IV fluids and check creatinine in a m     HLD (hyperlipidemia)  Assessment & Plan  · Resume home dose equivalent statin    Benign essential hypertension  Assessment & Plan  · Noted to have hypertension, on diltiazem 240 mg and valsartan 320 mg daily  · Moderately hypertensive on exam  · Due to syncopal episode with  believing that patient is being over medicated with hypertensive medications and patient has endorsed having soft blood pressures at home, inclined to hold off on medications for now and continue to monitor    Rheumatoid arthritis ([facility] [address] )  Assessment & Plan  · Does not appear in acute flare, on methotrexate 2 5 mg weekly continue    History of TIA (transient ischemic attack)  Assessment & Plan  · States she has a previous episode of transient ischemic attack years ago  · States she had right eye droop along with numbness and tingling sensations in upper extremities  · Symptoms resolved but the patient went to the emergency room for further evaluation and was admitted but neurologic workup was negative  · Patient was discharged with Plavix and has been taking it ever since  · No further episodes of transient ischemic attack since that prior episodes  · CT head did show hematoma from fall but negative for any acute pathology        VTE Prophylaxis: Heparin  / sequential compression device   Code Status: Full Code  POLST: POLST form is not discussed and not completed at this time  Discussion with family:  TIA, hypertension, syncope    Anticipated Length of Stay:  Patient will be admitted on an Observation basis with an anticipated length of stay of 2 midnights  Justification for Hospital Stay:  See above    Total Time for Visit, including Counseling / Coordination of Care: 45 minutes  Greater than 50% of this total time spent on direct patient counseling and coordination of care  Chief Complaint:   Syncope    History of Present Illness: Teodoro Thayer is a 66 y o  female who presents with syncopal episode at a grocery store  Patient states that she was at the grocery store with  and was writing a check to the  when she experienced acute loss of consciousness that lasted for several seconds  Patient awoke on the floor after hitting the back of her head  Patient denied any confusion or postictal state    Patient denied any tonic-clonic jerking movements, bowel or bladder incontinence or associated chest pain, chest palpitations, shortness of breath, headache or other neurologic symptoms that would indicate any cardiogenic or neurogenic etiology of syncope  Patient denies any previous episodes but does endorse a previous transient ischemic attack several years ago  Patient denies any recent infections or symptoms including cough, fevers, chills, night sweats, nausea, vomiting, abdominal pain, dysuria, diarrhea, lower extremity swelling  Patient has no changes in diet or changes in medications although she does take valsartan 320 mg and diltiazem 240 mg daily for blood pressure  Patient's  does endorse that her blood pressure runs on the lower side of normal but has never been overtly hypotensive  Patient was admitted to Medicine for further management  Review of Systems:    See above    Past Medical and Surgical History:     Past Medical History:   Diagnosis Date    Abnormal liver function test     last assessed: 3/25/2014    Anemia     Chronic constipation     last assessed: 12/2/2014    Herpes zoster     last assessed: 3/16/2016    Hyperlipidemia     Hypertension     Psoriatic arthritis ([facility] [address] )     last assessed;3/25/2014    Recent weight loss     last assessed: 7/13/2016    Rheumatoid arthritis ([facility] )     Stroke ([facility] )     Trigeminal neuritis        Past Surgical History:   Procedure Laterality Date    APPENDECTOMY      FOOT SURGERY      KIDNEY SURGERY         Meds/Allergies:    Prior to Admission medications    Medication Sig Start Date End Date Taking?  Authorizing Provider   clopidogrel (PLAVIX) 75 mg tablet Take 1 tablet (75 mg total) by mouth daily 12/4/19  Yes Perla Khan DO   diltiazem (TIAZAC) 240 MG 24 hr capsule Take 1 capsule (240 mg total) by mouth daily 9/16/19  Yes Perla Khan DO   folic acid (FOLVITE) 1 mg tablet Take 1 mg by mouth daily  9/5/12  Yes Historical Provider, MD   latanoprost (XALATAN) 0 005 % ophthalmic solution Administer 1 drop to both eyes daily at bedtime  9/5/12  Yes Historical Provider, MD methotrexate 2 5 mg tablet Take 6 tablets (15 mg total) by mouth once a week 1/29/19  Yes Leslie Newby DO   simvastatin (ZOCOR) 40 mg tablet Take 1 tablet (40 mg total) by mouth daily 10/1/19  Yes Leslie Newby DO   valsartan (DIOVAN) 320 MG tablet Take 1 tablet (320 mg total) by mouth daily 10/30/19  Yes Leslie Newby DO   cyanocobalamin ([employer] VITAMIN B-12) 1000 MCG tablet Take 1,000 mcg by mouth  1/11/20  Historical Provider, MD   hydroxychloroquine (PLAQUENIL) 200 mg tablet Take 1 tablet (200 mg total) by mouth 2 (two) times a day with meals for 90 days 1/29/19 1/11/20  Leslie Newby DO   Multiple Vitamin (MULTIVITAMIN) tablet Take 1 tablet by mouth daily  1/11/20  Historical Provider, MD   OMEGA-3 FATTY ACIDS-VITAMIN E PO one 2x daily 9/5/12 1/11/20  Historical Provider, MD   promethazine-codeine (PHENERGAN WITH CODEINE) 6 25-10 mg/5 mL syrup Take 5 mL by mouth every 4 (four) hours as needed for cough 3/18/19 1/11/20  Vidal Field PA-C   VITAMIN D, CHOLECALCIFEROL, PO Take 1 capsule by mouth daily  1/11/20  Historical Provider, MD     I have reviewed home medications with patient personally  Allergies:    Allergies   Allergen Reactions    Amoxicillin Edema    Penicillins Edema       Social History:     Marital Status: /Civil Union   Occupation: NA  Patient Pre-hospital Living Situation: NA  Patient Pre-hospital Level of Mobility: NA  Patient Pre-hospital Diet Restrictions: NA  Substance Use History:   Social History     Substance and Sexual Activity   Alcohol Use No     Social History     Tobacco Use   Smoking Status Never Smoker   Smokeless Tobacco Never Used     Social History     Substance and Sexual Activity   Drug Use No       Family History:    non-contributory    Physical Exam:     Vitals:   Blood Pressure: 154/88 (01/11/20 1544)  Pulse: 64 (01/11/20 1544)  Temperature: 98 °F (36 7 °C) (01/11/20 1306)  Temp Source: Oral (01/11/20 1306)  Respirations: 18 (01/11/20 1544)  Weight - Scale: 61 2 kg (134 lb 14 7 oz) (01/11/20 1304)  SpO2: 100 % (01/11/20 1544)    Physical Exam   Constitutional: She is oriented to person, place, and time  She appears well-developed and well-nourished  Cardiovascular: Normal rate and regular rhythm  Pulmonary/Chest: Effort normal and breath sounds normal    Abdominal: Soft  Bowel sounds are normal    Neurological: She is alert and oriented to person, place, and time  Skin: Skin is warm and dry  Psychiatric: She has a normal mood and affect  Her behavior is normal            Additional Data:     Lab Results: I have personally reviewed pertinent reports  Results from last 7 days   Lab Units 01/11/20  1333   WBC Thousand/uL 6 50   HEMOGLOBIN g/dL 11 3*   HEMATOCRIT % 35 1   PLATELETS Thousands/uL 244   NEUTROS PCT % 64   LYMPHS PCT % 23   MONOS PCT % 11   EOS PCT % 1     Results from last 7 days   Lab Units 01/11/20  1333   SODIUM mmol/L 138   POTASSIUM mmol/L 4 2   CHLORIDE mmol/L 101   CO2 mmol/L 27   BUN mg/dL 20   CREATININE mg/dL 1 48*   ANION GAP mmol/L 10   CALCIUM mg/dL 9 3   ALBUMIN g/dL 3 5   TOTAL BILIRUBIN mg/dL 0 39   ALK PHOS U/L 53   ALT U/L 17   AST U/L 16   GLUCOSE RANDOM mg/dL 122     Results from last 7 days   Lab Units 01/11/20  1333   INR  0 99     Results from last 7 days   Lab Units 01/11/20  1319   POC GLUCOSE mg/dl 129               Imaging: I have personally reviewed pertinent reports  CT head without contrast   Final Result by Mara Wood MD (01/11 1449)      No acute intracranial abnormality  Mild right posterior parietal scalp hematoma without a calvarial skull fracture  Workstation performed: [id number]         CT spine cervical without contrast   Final Result by Mara Wood MD (01/11 1459)      No cervical spine fracture or traumatic malalignment  Previously seen nondisplaced right anterior C1 fracture has healed               Workstation performed: [id number]         XR chest 1 view portable   ED Interpretation by Maria Del Rosario Damian MD (01/11 1417)   No acute findings  US retroperitoneal complete    (Results Pending)       EKG, Pathology, and Other Studies Reviewed on Admission:   · EKG: Pending completion     Allscripts / Epic Records Reviewed: Yes     ** Please Note: This note has been constructed using a voice recognition system   **

## 2020-01-11 NOTE — PLAN OF CARE
Problem: Potential for Falls  Goal: Patient will remain free of falls  Description  INTERVENTIONS:  - Assess patient frequently for physical needs  -  Identify cognitive and physical deficits and behaviors that affect risk of falls    -  [place] fall precautions as indicated by assessment   - Educate patient/family on patient safety including physical limitations  - Instruct patient to call for assistance with activity based on assessment  - Modify environment to reduce risk of injury  - Consider OT/PT consult to assist with strengthening/mobility  Outcome: Progressing     Problem: CARDIOVASCULAR - ADULT  Goal: Maintains optimal cardiac output and hemodynamic stability  Description  INTERVENTIONS:  - Monitor I/O, vital signs and rhythm  - Monitor for S/S and trends of decreased cardiac output  - Administer and titrate ordered vasoactive medications to optimize hemodynamic stability  - Assess quality of pulses, skin color and temperature  - Assess for signs of decreased coronary artery perfusion  - Instruct patient to report change in severity of symptoms  Outcome: Progressing     Problem: MUSCULOSKELETAL - ADULT  Goal: Maintain or return mobility to safest level of function  Description  INTERVENTIONS:  - Assess patient's ability to carry out ADLs; assess patient's baseline for ADL function and identify physical deficits which impact ability to perform ADLs (bathing, care of mouth/teeth, toileting, grooming, dressing, etc )  - Assess/evaluate cause of self-care deficits   - Assess range of motion  - Assess patient's mobility  - Assess patient's need for assistive devices and provide as appropriate  - Encourage maximum independence but intervene and supervise when necessary  - Involve family in performance of ADLs  - Assess for home care needs following discharge   - Consider OT consult to assist with ADL evaluation and planning for discharge  - Provide patient education as appropriate  Outcome: Progressing Problem: COPING  Goal: Pt/Family able to verbalize concerns and demonstrate effective coping strategies  Description  INTERVENTIONS:  - Assist patient/family to identify coping skills, available support systems and cultural and spiritual values  - Provide emotional support, including active listening and acknowledgement of concerns of patient and caregivers  - Reduce environmental stimuli, as able  - Provide patient education  - Assess for spiritual pain/suffering and initiate spiritual care, including notification of Pastoral Care or [place] based community as needed  - Assess effectiveness of coping strategies  Outcome: Progressing     Problem: PAIN - ADULT  Goal: Verbalizes/displays adequate comfort level or baseline comfort level  Description  Interventions:  - Encourage patient to monitor pain and request assistance  - Assess pain using appropriate pain scale  - Administer analgesics based on type and severity of pain and evaluate response  - Implement non-pharmacological measures as appropriate and evaluate response  - Consider cultural and social influences on pain and pain management  - Notify physician/advanced practitioner if interventions unsuccessful or patient reports new pain  Outcome: Progressing     Problem: SAFETY ADULT  Goal: Maintain or return to baseline ADL function  Description  INTERVENTIONS:  -  Assess patient's ability to carry out ADLs; assess patient's baseline for ADL function and identify physical deficits which impact ability to perform ADLs (bathing, care of mouth/teeth, toileting, grooming, dressing, etc )  - Assess/evaluate cause of self-care deficits   - Assess range of motion  - Assess patient's mobility; develop plan if impaired  - Assess patient's need for assistive devices and provide as appropriate  - Encourage maximum independence but intervene and supervise when necessary  - Involve family in performance of ADLs  - Assess for home care needs following discharge   - Consider OT consult to assist with ADL evaluation and planning for discharge  - Provide patient education as appropriate  Outcome: Progressing  Goal: Maintain or return mobility status to optimal level  Description  INTERVENTIONS:  - Assess patient's baseline mobility status (ambulation, transfers, stairs, etc )    - Identify cognitive and physical deficits and behaviors that affect mobility  - Identify mobility aids required to assist with transfers and/or ambulation (gait belt, sit-to-stand, lift, walker, cane, etc )  - [place] fall precautions as indicated by assessment  - Record patient progress and toleration of activity level on Mobility SBAR; progress patient to next Phase/Stage  - Instruct patient to call for assistance with activity based on assessment  - Consider rehabilitation consult to assist with strengthening/weightbearing, etc   Outcome: Progressing     Problem: DISCHARGE PLANNING  Goal: Discharge to home or other facility with appropriate resources  Description  INTERVENTIONS:  - Identify barriers to discharge w/patient and caregiver  - Arrange for needed discharge resources and transportation as appropriate  - Identify discharge learning needs (meds, wound care, etc )  - Arrange for interpretive services to assist at discharge as needed  - Refer to Case Management Department for coordinating discharge planning if the patient needs post-hospital services based on physician/advanced practitioner order or complex needs related to functional status, cognitive ability, or social support system  Outcome: Progressing     Problem: Knowledge Deficit  Goal: Patient/family/caregiver demonstrates understanding of disease process, treatment plan, medications, and discharge instructions  Description  Complete learning assessment and assess knowledge base    Interventions:  - Provide teaching at level of understanding  - Provide teaching via preferred learning methods  Outcome: Progressing

## 2020-01-11 NOTE — ASSESSMENT & PLAN NOTE
· Still unclear at this time what the etiology is  · Patient does not have any cardiac history nor is she endorsing having any chest pain, chest palpitations, shortness of breath associated with episode  · Patient denied any headaches, seizure-like activity, and CT head was negative for any acute pathology  · No significant electrolyte derangements although creatinine was elevated from baseline which could be attributed to use of valsartan 320 mg daily  · Patient was writing a check at the time of the syncopal episode was not holding any heavy bags or exerting herself  · Could be vasovagal although unclear  · Might be orthostatic in setting of over medication with high doses of hypertensive medications  · Will continue to monitor

## 2020-01-12 ENCOUNTER — APPOINTMENT (OUTPATIENT)
Dept: ULTRASOUND IMAGING | Facility: HOSPITAL | Age: 75
End: 2020-01-12
Payer: MEDICARE

## 2020-01-12 VITALS
BODY MASS INDEX: 22.49 KG/M2 | HEART RATE: 64 BPM | OXYGEN SATURATION: 97 % | RESPIRATION RATE: 18 BRPM | WEIGHT: 135 LBS | HEIGHT: 65 IN | DIASTOLIC BLOOD PRESSURE: 60 MMHG | SYSTOLIC BLOOD PRESSURE: 142 MMHG | TEMPERATURE: 97.8 F

## 2020-01-12 LAB
ALBUMIN SERPL BCP-MCNC: 2.9 G/DL (ref 3.5–5)
ANION GAP SERPL CALCULATED.3IONS-SCNC: 10 MMOL/L (ref 4–13)
BASOPHILS # BLD AUTO: 0.02 THOUSANDS/ΜL (ref 0–0.1)
BASOPHILS NFR BLD AUTO: 0 % (ref 0–1)
BUN SERPL-MCNC: 18 MG/DL (ref 5–25)
CALCIUM SERPL-MCNC: 8.5 MG/DL (ref 8.3–10.1)
CHLORIDE SERPL-SCNC: 106 MMOL/L (ref 100–108)
CO2 SERPL-SCNC: 24 MMOL/L (ref 21–32)
CREAT SERPL-MCNC: 1.11 MG/DL (ref 0.6–1.3)
EOSINOPHIL # BLD AUTO: 0.08 THOUSAND/ΜL (ref 0–0.61)
EOSINOPHIL NFR BLD AUTO: 1 % (ref 0–6)
ERYTHROCYTE [DISTWIDTH] IN BLOOD BY AUTOMATED COUNT: 13.8 % (ref 11.6–15.1)
GFR SERPL CREATININE-BSD FRML MDRD: 48 ML/MIN/1.73SQ M
GLUCOSE P FAST SERPL-MCNC: 91 MG/DL (ref 65–99)
GLUCOSE SERPL-MCNC: 91 MG/DL (ref 65–140)
HCT VFR BLD AUTO: 33.9 % (ref 34.8–46.1)
HGB BLD-MCNC: 11 G/DL (ref 11.5–15.4)
IMM GRANULOCYTES # BLD AUTO: 0.02 THOUSAND/UL (ref 0–0.2)
IMM GRANULOCYTES NFR BLD AUTO: 0 % (ref 0–2)
LYMPHOCYTES # BLD AUTO: 2.06 THOUSANDS/ΜL (ref 0.6–4.47)
LYMPHOCYTES NFR BLD AUTO: 25 % (ref 14–44)
MAGNESIUM SERPL-MCNC: 2.2 MG/DL (ref 1.6–2.6)
MCH RBC QN AUTO: 34.2 PG (ref 26.8–34.3)
MCHC RBC AUTO-ENTMCNC: 32.4 G/DL (ref 31.4–37.4)
MCV RBC AUTO: 105 FL (ref 82–98)
MONOCYTES # BLD AUTO: 0.87 THOUSAND/ΜL (ref 0.17–1.22)
MONOCYTES NFR BLD AUTO: 11 % (ref 4–12)
NEUTROPHILS # BLD AUTO: 5.27 THOUSANDS/ΜL (ref 1.85–7.62)
NEUTS SEG NFR BLD AUTO: 63 % (ref 43–75)
NRBC BLD AUTO-RTO: 0 /100 WBCS
PHOSPHATE SERPL-MCNC: 3.3 MG/DL (ref 2.3–4.1)
PLATELET # BLD AUTO: 229 THOUSANDS/UL (ref 149–390)
PMV BLD AUTO: 9.7 FL (ref 8.9–12.7)
POTASSIUM SERPL-SCNC: 4 MMOL/L (ref 3.5–5.3)
RBC # BLD AUTO: 3.22 MILLION/UL (ref 3.81–5.12)
SODIUM SERPL-SCNC: 140 MMOL/L (ref 136–145)
WBC # BLD AUTO: 8.32 THOUSAND/UL (ref 4.31–10.16)

## 2020-01-12 PROCEDURE — 83735 ASSAY OF MAGNESIUM: CPT | Performed by: STUDENT IN AN ORGANIZED HEALTH CARE EDUCATION/TRAINING PROGRAM

## 2020-01-12 PROCEDURE — 99217 PR OBSERVATION CARE DISCHARGE MANAGEMENT: CPT | Performed by: INTERNAL MEDICINE

## 2020-01-12 PROCEDURE — 80069 RENAL FUNCTION PANEL: CPT | Performed by: STUDENT IN AN ORGANIZED HEALTH CARE EDUCATION/TRAINING PROGRAM

## 2020-01-12 PROCEDURE — 85025 COMPLETE CBC W/AUTO DIFF WBC: CPT | Performed by: STUDENT IN AN ORGANIZED HEALTH CARE EDUCATION/TRAINING PROGRAM

## 2020-01-12 RX ADMIN — SODIUM CHLORIDE 75 ML/HR: 0.9 INJECTION, SOLUTION INTRAVENOUS at 08:13

## 2020-01-12 RX ADMIN — FOLIC ACID 2 MG: 1 TABLET ORAL at 08:12

## 2020-01-12 NOTE — UTILIZATION REVIEW
Initial Clinical Review    Admission: Date/Time/Statement: JABIER [email or web address]  Orders Placed This Encounter   Procedures    Place in Observation (expected length of stay for this patient is less than two midnights)     Standing Status:   Standing     Number of Occurrences:   1     Order Specific Question:   Admitting Physician     Answer:   Anahi Carmichael     Order Specific Question:   Level of Care     Answer:   Med Surg [16]     ED Arrival Information     Expected Arrival Acuity Means of Arrival Escorted By Service Admission Type    - 1/11/2020 12:57 Emergent Ambulance [facility]    Arrival Complaint    Syncope        Chief Complaint   Patient presents with    Syncope     Pt had witnessed syncopal episode at [employer]; Pt did hit her head and is currently taking plavix; Pt c/o posterior headache    Head Injury w/LOC     Assessment/Plan: 67 yo female to ED by ems admitted observation d/t    * Syncope  Assessment & Plan  · Still unclear at this time what the etiology is  · Patient does not have any cardiac history nor is she endorsing having any chest pain, chest palpitations, shortness of breath associated with episode  · Patient denied any headaches, seizure-like activity, and CT head was negative for any acute pathology  · No significant electrolyte derangements although creatinine was elevated from baseline which could be attributed to use of valsartan 320 mg daily  · Patient was writing a check at the time of the syncopal episode was not holding any heavy bags or exerting herself  · Could be vasovagal although unclear  · Might be orthostatic in setting of over medication with high doses of hypertensive medications  · Will continue to monitor     KARIME (acute kidney injury) ([facility] [address] )  Assessment & Plan  · Noted to have elevated creatinine on labs today  · Patient does endorse good p o   Intake and states she is well hydrated  · Unclear if this is pre renal, but patient is denying any episodes of vomiting or diarrhea or other extrarenal losses at this time   · Patient is on 320 mg of valsartan, which could be contributing although she has been on it for an extended period of time already  · Will order renal ultrasound, give IV fluids and check creatinine in a m      HLD (hyperlipidemia)  Assessment & Plan  · Resume home dose equivalent statin     Benign essential hypertension  Assessment & Plan  · Noted to have hypertension, on diltiazem 240 mg and valsartan 320 mg daily  · Moderately hypertensive on exam  · Due to syncopal episode with  believing that patient is being over medicated with hypertensive medications and patient has endorsed having soft blood pressures at home, inclined to hold off on medications for now and continue to monitor     Rheumatoid arthritis ([facility] [address] )  Assessment & Plan  · Does not appear in acute flare, on methotrexate 2 5 mg weekly continue     History of TIA (transient ischemic attack)  Assessment & Plan  · States she has a previous episode of transient ischemic attack years ago  · States she had right eye droop along with numbness and tingling sensations in upper extremities  · Symptoms resolved but the patient went to the emergency room for further evaluation and was admitted but neurologic workup was negative  · Patient was discharged with Plavix and has been taking it ever since  · No further episodes of transient ischemic attack since that prior episodes  · CT head did show hematoma from fall but negative for any acute pathology     Anticipated Length of Stay:  Patient will be admitted on an Observation basis with an anticipated length of stay of 2 midnights     Justification for Hospital Stay:  See above      ED Triage Vitals   Temperature Pulse Respirations Blood Pressure SpO2   01/11/20 1306 01/11/20 1301 01/11/20 1301 01/11/20 1301 01/11/20 1301   98 °F (36 7 °C) 64 16 122/70 98 %      Temp Source Heart Rate Source Patient Position - Orthostatic VS BP Location FiO2 (%)   01/11/20 1306 01/11/20 1301 01/11/20 1444 01/11/20 1444 --   Oral Monitor Lying Left arm       Pain Score       01/11/20 1301       4        Wt Readings from Last 1 Encounters:   01/11/20 61 2 kg (135 lb)     Additional Vital Signs:   01/12/20 0726  97 8 °F (36 6 °C)  64  18  135/58  97 %  None (Room air)  Lying   01/12/20 0300  97 8 °F (36 6 °C)  62  16  135/61  98 %  None (Room air)  Lying   01/11/20 2300  98 1 °F (36 7 °C)  66  18  121/55  98 %  None (Room air)  Lying   01/11/20 1948  98 1 °F (36 7 °C)  78  16  133/57  100 %  None (Room air)  Lying   01/11/20 1712  98 4 °F (36 9 °C)  65  16  135/76  100 %  None (Room air)  Lying   01/11/20 1644    66  18  134/58  98 %  None (Room air)  Lying   01/11/20 1544    64  18  154/88  100 %  None (Room air)  Sitting   01/11/20 1444    65  18  148/66  100 %  None (Room air)  Lying   01/11/20 1306  98 °F (36 7 °C)               01/11/20 1301    64  16  122/70  98 %  None (Room air)         Pertinent Labs/Diagnostic Test Results:   Results from last 7 days   Lab Units 01/12/20  0330 01/11/20  1333   WBC Thousand/uL 8 32 6 50   HEMOGLOBIN g/dL 11 0* 11 3*   HEMATOCRIT % 33 9* 35 1   PLATELETS Thousands/uL 229 244   NEUTROS ABS Thousands/µL 5 27 4 16         Results from last 7 days   Lab Units 01/12/20  0330 01/11/20  1333   SODIUM mmol/L 140 138   POTASSIUM mmol/L 4 0 4 2   CHLORIDE mmol/L 106 101   CO2 mmol/L 24 27   ANION GAP mmol/L 10 10   BUN mg/dL 18 20   CREATININE mg/dL 1 11 1 48*   EGFR ml/min/1 73sq m 48 34   CALCIUM mg/dL 8 5 9 3   MAGNESIUM mg/dL 2 2 2 3   PHOSPHORUS mg/dL 3 3  --      Results from last 7 days   Lab Units 01/12/20  0330 01/11/20  1333   AST U/L  --  16   ALT U/L  --  17   ALK PHOS U/L  --  53   TOTAL PROTEIN g/dL  --  7 3   ALBUMIN g/dL 2 9* 3 5   TOTAL BILIRUBIN mg/dL  --  0 39     Results from last 7 days   Lab Units 01/11/20  1319   POC GLUCOSE mg/dl 129     Results from last 7 days   Lab Units 01/12/20  0330 01/11/20  1333   GLUCOSE RANDOM mg/dL 91 122       Results from last 7 days   Lab Units 01/11/20  1640 01/11/20  1333   TROPONIN I ng/mL 0 02 <0 02         Results from last 7 days   Lab Units 01/11/20  1333   PROTIME seconds 13 2   INR  0 99   PTT seconds 23       Results from last 7 days   Lab Units 01/11/20  1333   ETHANOL LVL mg/dL <3     1/11 cxr:pending    1/11 CT head:No acute intracranial abnormality      Mild right posterior parietal scalp hematoma without a calvarial skull fracture  1/11 CT cervical spine:No cervical spine fracture or traumatic malalignment      Previously seen nondisplaced right anterior C1 fracture has healed      U/s retroperitoneal: pending    1/11 ekg:  ECG 12 Lead Documentation Only  Date/Time: 1/11/2020 1:22 PM  Performed by: Alexa Michaels MD  Authorized by: Alexa Michaels MD      Indications / Diagnosis:  Syncope  ECG reviewed by me, the ED Provider: yes    Patient location:  ED  Previous ECG:     Previous ECG:  Compared to current    Comparison ECG info:  Nonspecific t wave changes avf v3    Similarity:  Changes noted    Comparison to cardiac monitor: Yes    Interpretation:     Interpretation: non-specific    Rate:     ECG rate:  60    ECG rate assessment: normal    Rhythm:     Rhythm: sinus rhythm    Ectopy:     Ectopy: none    QRS:     QRS axis:  Normal    QRS intervals:  Normal  Conduction:     Conduction: normal    ST segments:     ST segments:  Normal  T waves:     T waves: non-specific    ED Treatment:   Medication Administration from 01/11/2020 1257 to 01/11/2020 1657       Date/Time Order Dose Route Action                  01/11/2020 1334 sodium chloride 0 9 % bolus 1,000 mL 1,000 mL Intravenous New Bag          Past Medical History:   Diagnosis Date    Abnormal liver function test     last assessed: 3/25/2014    Anemia     Chronic constipation     last assessed: 12/2/2014    Herpes zoster     last assessed: 3/16/2016    Hyperlipidemia  Hypertension     Psoriatic arthritis ([facility] )     last assessed;3/25/2014    Recent weight loss     last assessed: 7/13/2016    Rheumat[place] arthritis ([place] [address] )     Stroke ([place] [address] )     Trigeminal neuritis      Present on Admission:   Benign essential hypertension   Rheumatoid arthritis ([place] [address] )      Admitting Diagnosis: Syncope [R55]  KARIME (acute kidney injury) ([place] [address] ) [N17 9]  Closed head injury, initial encounter [S09 90XA]  Age/Sex: 66 y o  female  Admission Orders:  Scheduled Medications:    Medications:  clopidogrel 75 mg Oral HS   folic acid 1 mg Oral HS   folic acid 2 mg Oral QAM   heparin (porcine) 5,000 Units Subcutaneous Q8H [address]   latanoprost 1 drop Both Eyes HS   pravastatin 80 mg Oral Daily With Dinner     Continuous IV Infusions:    sodium chloride 75 mL/hr Intravenous Continuous     PRN Meds:    acetaminophen 650 mg Oral Q6H PRN     Reg diet  Fall precautions  Seq comp device  Tele      Network Utilization Review Department  [email or web address]  ATTENTION: Please call with any questions or concerns to [phone] and carefully listen to the prompts so that you are directed to the right person  All voicemails are confidential   Shantel Cheatham all requests for admission clinical reviews, approved or denied determinations and any other requests to dedicated fax number below belonging to the campus where the patient is receiving treatment   List of dedicated fax numbers for the Facilities:  FACILITY NAME UR FAX NUMBER   ADMISSION DENIALS (Administrative/Medical Necessity) [phone]   [address]  (Maternity/NICU/Pediatrics) [phone]   Duc Castro [phone]   Heather Faulkner [phone]   Chris Garza [phone]   [facility] [address] [phone] Chadwick Quiñones [address] [phone]

## 2020-01-12 NOTE — DISCHARGE SUMMARY
Discharge Summary - Zabrina Haider 1945, [id number]        Admission Date: 1/11/2020  Discharge Date:  1/12/2020    Admitting Diagnosis: Syncope [R55]  KARIME (acute kidney injury) ([facility] [address] ) [N17 9]  Closed head injury, initial encounter [S09 90XA]    Discharge Diagnosis:   1  Syncope, likely secondary to hypotension  2  Mild acute kidney injury  3  Hypertension  4  Hyperlipidemia  5  Rheumatoid arthritis   6  History of TIA    Consulting Physicians:  None    Procedures Performed:   None    HPI:  The patient is a 47-year-old woman who was at the grocery store writing a check when she had a sudden syncopal episode  This lasted very briefly  The patient denied any significant permanent Odalis symptoms  She was unconscious for very brief period of time and regained her normal mental status almost immediately  No seizure activity, incontinence, etcetera was noted  She was brought to the emergency room for further evaluation  Hospital Course:  Initial evaluation in the emergency room was unrevealing  She was admitted for observation  She remained hemodynamically stable  Her heart rhythm remained stable  Serial troponins and EKGs were negative  The patient's  does note that her blood pressure has been running on the lower side of late  She monitors this carefully at home  He says at times her pressure has been around 110  In the hospital she received no blood pressure medication  Around the time of discharge her blood pressure was still 110  I believe that this supports the contention that her syncope was related to hypotension  No alternate etiology was discovered  The patient was noted to have mild acute kidney injury  Her creatinine was 1 4 at the time of admission  With adjustment of her antihypertensives in some IV fluid a repeat creatinine was 1 1  I believe that this reflects changes in renal blood flow related to her antihypertensives      The patient's other issues remained stable during her brief hospital stay    On the day of discharge the patient was feeling well  Vital signs were stable  Lungs were clear  Cardiac exam revealed regular rhythm  The abdomen was soft and nontender  There was no edema  Disposition:  The patient was discharged home on January 12th  Diet and activity will be as tolerated  She was asked to stop valsartan for now  She will resume diltiazem tomorrow  She was asked to contact Dr Waldo Lizarraga and arrange follow-up in about 3 days  Discharge instructions/Information to patient and family:   See after visit summary for information provided to patient and family  Provisions for Follow-Up Care:  See after visit summary for information related to follow-up care and any pertinent home health orders  Planned Readmission: No    Discharge Statement   I spent 30 minutes discharging the patient  This time was spent on the day of discharge  I had direct contact with the patient on the day of discharge  Discharge Medications:  See after visit summary for reconciled discharge medications provided to patient and family

## 2020-01-12 NOTE — PLAN OF CARE
Problem: Potential for Falls  Goal: Patient will remain free of falls  Description  INTERVENTIONS:  - Assess patient frequently for physical needs  -  Identify cognitive and physical deficits and behaviors that affect risk of falls    -  T[place]o fall precautions as indicated by assessment   - Educate patient/family on patient safety including physical limitations  - Instruct patient to call for assistance with activity based on assessment  - Modify environment to reduce risk of injury  - Consider OT/PT consult to assist with strengthening/mobility  Outcome: Progressing     Problem: CARDIOVASCULAR - ADULT  Goal: Maintains optimal cardiac output and hemodynamic stability  Description  INTERVENTIONS:  - Monitor I/O, vital signs and rhythm  - Monitor for S/S and trends of decreased cardiac output  - Administer and titrate ordered vasoactive medications to optimize hemodynamic stability  - Assess quality of pulses, skin color and temperature  - Assess for signs of decreased coronary artery perfusion  - Instruct patient to report change in severity of symptoms  Outcome: Progressing     Problem: MUSCULOSKELETAL - ADULT  Goal: Maintain or return mobility to safest level of function  Description  INTERVENTIONS:  - Assess patient's ability to carry out ADLs; assess patient's baseline for ADL function and identify physical deficits which impact ability to perform ADLs (bathing, care of mouth/teeth, toileting, grooming, dressing, etc )  - Assess/evaluate cause of self-care deficits   - Assess range of motion  - Assess patient's mobility  - Assess patient's need for assistive devices and provide as appropriate  - Encourage maximum independence but intervene and supervise when necessary  - Involve family in performance of ADLs  - Assess for home care needs following discharge   - Consider OT consult to assist with ADL evaluation and planning for discharge  - Provide patient education as appropriate  Outcome: Progressing Problem: COPING  Goal: Pt/Family able to verbalize concerns and demonstrate effective coping strategies  Description  INTERVENTIONS:  - Assist patient/family to identify coping skills, available support systems and cultural and spiritual values  - Provide emotional support, including active listening and acknowledgement of concerns of patient and caregivers  - Reduce environmental stimuli, as able  - Provide patient education  - Assess for spiritual pain/suffering and initiate spiritual care, including notification of Pastoral Care or [place] based community as needed  - Assess effectiveness of coping strategies  Outcome: Progressing     Problem: PAIN - ADULT  Goal: Verbalizes/displays adequate comfort level or baseline comfort level  Description  Interventions:  - Encourage patient to monitor pain and request assistance  - Assess pain using appropriate pain scale  - Administer analgesics based on type and severity of pain and evaluate response  - Implement non-pharmacological measures as appropriate and evaluate response  - Consider cultural and social influences on pain and pain management  - Notify physician/advanced practitioner if interventions unsuccessful or patient reports new pain  Outcome: Progressing     Problem: SAFETY ADULT  Goal: Maintain or return to baseline ADL function  Description  INTERVENTIONS:  -  Assess patient's ability to carry out ADLs; assess patient's baseline for ADL function and identify physical deficits which impact ability to perform ADLs (bathing, care of mouth/teeth, toileting, grooming, dressing, etc )  - Assess/evaluate cause of self-care deficits   - Assess range of motion  - Assess patient's mobility; develop plan if impaired  - Assess patient's need for assistive devices and provide as appropriate  - Encourage maximum independence but intervene and supervise when necessary  - Involve family in performance of ADLs  - Assess for home care needs following discharge   - Consider OT consult to assist with ADL evaluation and planning for discharge  - Provide patient education as appropriate  Outcome: Progressing  Goal: Maintain or return mobility status to optimal level  Description  INTERVENTIONS:  - Assess patient's baseline mobility status (ambulation, transfers, stairs, etc )    - Identify cognitive and physical deficits and behaviors that affect mobility  - Identify mobility aids required to assist with transfers and/or ambulation (gait belt, sit-to-stand, lift, walker, cane, etc )  - Crucible fall precautions as indicated by assessment  - Record patient progress and toleration of activity level on Mobility SBAR; progress patient to next Phase/Stage  - Instruct patient to call for assistance with activity based on assessment  - Consider rehabilitation consult to assist with strengthening/weightbearing, etc   Outcome: Progressing     Problem: DISCHARGE PLANNING  Goal: Discharge to home or other facility with appropriate resources  Description  INTERVENTIONS:  - Identify barriers to discharge w/patient and caregiver  - Arrange for needed discharge resources and transportation as appropriate  - Identify discharge learning needs (meds, wound care, etc )  - Arrange for interpretive services to assist at discharge as needed  - Refer to Case Management Department for coordinating discharge planning if the patient needs post-hospital services based on physician/advanced practitioner order or complex needs related to functional status, cognitive ability, or social support system  Outcome: Progressing     Problem: Knowledge Deficit  Goal: Patient/family/caregiver demonstrates understanding of disease process, treatment plan, medications, and discharge instructions  Description  Complete learning assessment and assess knowledge base    Interventions:  - Provide teaching at level of understanding  - Provide teaching via preferred learning methods  Outcome: Progressing

## 2020-01-12 NOTE — NURSING NOTE
AVS reviewed with patient and spouse  No questions at this time  Patient discharged via wheelchair accompanied by PCA and spouse

## 2020-01-13 ENCOUNTER — TELEPHONE (OUTPATIENT)
Dept: FAMILY MEDICINE CLINIC | Facility: CLINIC | Age: 75
End: 2020-01-13

## 2020-01-13 ENCOUNTER — TRANSITIONAL CARE MANAGEMENT (OUTPATIENT)
Dept: FAMILY MEDICINE CLINIC | Facility: CLINIC | Age: 75
End: 2020-01-13

## 2020-01-13 LAB
ATRIAL RATE: 59 BPM
ATRIAL RATE: 60 BPM
P AXIS: 22 DEGREES
P AXIS: 54 DEGREES
PR INTERVAL: 140 MS
PR INTERVAL: 148 MS
QRS AXIS: 15 DEGREES
QRS AXIS: 23 DEGREES
QRSD INTERVAL: 76 MS
QRSD INTERVAL: 82 MS
QT INTERVAL: 414 MS
QT INTERVAL: 418 MS
QTC INTERVAL: 413 MS
QTC INTERVAL: 414 MS
T WAVE AXIS: 14 DEGREES
T WAVE AXIS: 6 DEGREES
VENTRICULAR RATE: 59 BPM
VENTRICULAR RATE: 60 BPM

## 2020-01-13 PROCEDURE — 93010 ELECTROCARDIOGRAM REPORT: CPT | Performed by: INTERNAL MEDICINE

## 2020-01-16 ENCOUNTER — OFFICE VISIT (OUTPATIENT)
Dept: FAMILY MEDICINE CLINIC | Facility: CLINIC | Age: 75
End: 2020-01-16
Payer: MEDICARE

## 2020-01-16 VITALS
OXYGEN SATURATION: 99 % | HEIGHT: 66 IN | TEMPERATURE: 96.8 F | BODY MASS INDEX: 21.28 KG/M2 | SYSTOLIC BLOOD PRESSURE: 140 MMHG | DIASTOLIC BLOOD PRESSURE: 62 MMHG | HEART RATE: 70 BPM | WEIGHT: 132.4 LBS

## 2020-01-16 DIAGNOSIS — I10 ESSENTIAL HYPERTENSION: ICD-10-CM

## 2020-01-16 DIAGNOSIS — Z86.73 HISTORY OF TIA (TRANSIENT ISCHEMIC ATTACK): ICD-10-CM

## 2020-01-16 DIAGNOSIS — E78.2 MIXED HYPERLIPIDEMIA: ICD-10-CM

## 2020-01-16 DIAGNOSIS — R55 SYNCOPE AND COLLAPSE: Primary | ICD-10-CM

## 2020-01-16 PROCEDURE — 99496 TRANSJ CARE MGMT HIGH F2F 7D: CPT | Performed by: FAMILY MEDICINE

## 2020-01-16 RX ORDER — CLOPIDOGREL BISULFATE 75 MG/1
75 TABLET ORAL
Qty: 90 TABLET | Refills: 1
Start: 2020-01-16 | End: 2020-04-29 | Stop reason: SDUPTHER

## 2020-01-16 RX ORDER — SIMVASTATIN 40 MG
40 TABLET ORAL
Qty: 90 TABLET | Refills: 1
Start: 2020-01-16 | End: 2020-03-14 | Stop reason: SDUPTHER

## 2020-01-16 NOTE — PROGRESS NOTES
Assessment/Plan:  Patient is here to follow up on hospitalization  Had syncopal episode  Thought to be secondary to low BP  Track BP at home and drink fluids  Have chemistry done before visit in February  Diagnoses and all orders for this visit:    Essential hypertension  -     Comprehensive metabolic panel; Future  -     Comprehensive metabolic panel    Mixed hyperlipidemia  -     simvastatin (ZOCOR) 40 mg tablet; Take 1 tablet (40 mg total) by mouth daily at bedtime    History of TIA (transient ischemic attack)  -     clopidogrel (PLAVIX) 75 mg tablet; Take 1 tablet (75 mg total) by mouth daily at bedtime          Subjective:   Chief Complaint   Patient presents with    Transition of Care Management     SLA 1/11-1/12 falling over and passing out        Patient ID: Gabriel Eldridge is a 66 y o  female  TCM Call (since 12/16/2019)     Date and time call was made  1/13/2020  2:02 PM    Hospital care reviewed  Records reviewed    Patient was hospitial[facility] at  [facility]    Date of Admission  01/11/20    Date of discharge  01/12/20    Diagnosis  syncope    Disposition  Home    Were the patients medications reviewed and updated  Yes    Current Symptoms  None      TCM Call (since 12/16/2019)     Post hospital issues  None    Should patient be enrolled in anticoag monitoring? No <img src='C:FILES   (X86)style='border:0px;vertical-align:middle; '/> on plavix    Scheduled for follow up?   Yes    Did you obtain your prescribed medications  Yes    Do you need help managing your prescriptions or medications  No    Is transportation to your appointment needed  No    I have advised the patient to call PCP with any new or worsening symptoms    [place] or [facility] other    Are you recieving any outpatient services  No    Are you recieving home care services  No    Are you using any community resources  No    Current waiver services  No    Have you fallen in the last 12 months  No    Interperter language line needed  No      Patient passed out at [employer]  Was admitted to hospital   She does complain of fatigue, but no more syncope or lightheadedness  The following portions of the patient's history were reviewed and updated as appropriate: allergies, current medications, past family history, past medical history, past social history, past surgical history and problem list     Review of Systems   Constitutional: Positive for fatigue  Negative for chills and fever  HENT: Negative for congestion and sore throat  Respiratory: Negative for chest tightness  Cardiovascular: Negative for chest pain and palpitations  Gastrointestinal: Negative for abdominal pain, constipation, diarrhea and nausea  Genitourinary: Negative for difficulty urinating  Skin: Negative  Neurological: Negative for dizziness and headaches  Psychiatric/Behavioral: Negative  Objective:      /62 (BP Location: Left arm, Patient Position: Sitting, Cuff Size: Adult)   Pulse 70   Temp (!) 96 8 °F (36 °C) (Tympanic)   Ht 5' 5 5" (1 664 m)   Wt 60 1 kg (132 lb 6 4 oz)   SpO2 99%   BMI 21 70 kg/m²          Physical Exam   Constitutional: She is oriented to person, place, and time  She appears well-developed  No distress  Neck: Carotid bruit is not present  No thyromegaly present  Cardiovascular: Normal rate, regular rhythm and normal heart sounds  140/62   Pulmonary/Chest: Effort normal and breath sounds normal    Musculoskeletal: She exhibits no edema  Lymphadenopathy:     She has no cervical adenopathy  Neurological: She is alert and oriented to person, place, and time  Skin: Skin is warm and dry  Psychiatric: She has a normal mood and affect  Nursing note and vitals reviewed

## 2020-01-29 ENCOUNTER — HOSPITAL ENCOUNTER (OUTPATIENT)
Dept: MAMMOGRAPHY | Facility: MEDICAL CENTER | Age: 75
Discharge: HOME | End: 2020-01-29
Payer: MEDICARE

## 2020-01-29 ENCOUNTER — HOSPITAL ENCOUNTER (OUTPATIENT)
Dept: BONE DENSITY | Facility: MEDICAL CENTER | Age: 75
Discharge: HOME | End: 2020-01-29
Payer: MEDICARE

## 2020-01-29 VITALS — BODY MASS INDEX: 21.21 KG/M2 | WEIGHT: 132 LBS | HEIGHT: 66 IN

## 2020-01-29 DIAGNOSIS — Z12.31 BREAST CANCER SCREENING BY MAMMOGRAM: ICD-10-CM

## 2020-01-29 DIAGNOSIS — Z78.0 POST-MENOPAUSE: ICD-10-CM

## 2020-01-29 PROCEDURE — 77080 DXA BONE DENSITY AXIAL: CPT

## 2020-01-29 PROCEDURE — 77067 SCR MAMMO BI INCL CAD: CPT

## 2020-02-02 DIAGNOSIS — M89.9 DISORDER OF BONE, UNSPECIFIED: ICD-10-CM

## 2020-02-02 DIAGNOSIS — M85.839 OSTEOPENIA OF FOREARM, UNSPECIFIED LATERALITY: Primary | ICD-10-CM

## 2020-02-20 LAB
ALBUMIN SERPL-MCNC: 3.9 G/DL (ref 3.6–5.1)
ALBUMIN/GLOB SERPL: 1.6 (CALC) (ref 1–2.5)
ALP SERPL-CCNC: 42 U/L (ref 37–153)
ALT SERPL-CCNC: 13 U/L (ref 6–29)
AST SERPL-CCNC: 16 U/L (ref 10–35)
BILIRUB SERPL-MCNC: 0.7 MG/DL (ref 0.2–1.2)
BUN SERPL-MCNC: 11 MG/DL (ref 7–25)
BUN/CREAT SERPL: NORMAL (CALC) (ref 6–22)
CALCIUM SERPL-MCNC: 9.6 MG/DL (ref 8.6–10.4)
CHLORIDE SERPL-SCNC: 103 MMOL/L (ref 98–110)
CO2 SERPL-SCNC: 31 MMOL/L (ref 20–32)
CREAT SERPL-MCNC: 0.84 MG/DL (ref 0.6–0.93)
GLOBULIN SER CALC-MCNC: 2.4 G/DL (CALC) (ref 1.9–3.7)
GLUCOSE SERPL-MCNC: 91 MG/DL (ref 65–99)
POTASSIUM SERPL-SCNC: 3.7 MMOL/L (ref 3.5–5.3)
PROT SERPL-MCNC: 6.3 G/DL (ref 6.1–8.1)
SL AMB EGFR AFRICAN AMERICAN: 78 ML/MIN/1.73M2
SL AMB EGFR NON AFRICAN AMERICAN: 68 ML/MIN/1.73M2
SODIUM SERPL-SCNC: 140 MMOL/L (ref 135–146)

## 2020-02-21 LAB
25(OH)D3 SERPL-MCNC: 31 NG/ML (ref 30–100)
PTH-INTACT SERPL-MCNC: 23 PG/ML (ref 14–64)

## 2020-02-27 ENCOUNTER — OFFICE VISIT (OUTPATIENT)
Dept: FAMILY MEDICINE CLINIC | Facility: CLINIC | Age: 75
End: 2020-02-27
Payer: MEDICARE

## 2020-02-27 VITALS
HEIGHT: 65 IN | WEIGHT: 131.4 LBS | OXYGEN SATURATION: 97 % | TEMPERATURE: 97.2 F | DIASTOLIC BLOOD PRESSURE: 60 MMHG | SYSTOLIC BLOOD PRESSURE: 146 MMHG | BODY MASS INDEX: 21.89 KG/M2 | HEART RATE: 60 BPM

## 2020-02-27 DIAGNOSIS — Z12.11 COLON CANCER SCREENING: ICD-10-CM

## 2020-02-27 DIAGNOSIS — E78.5 HYPERLIPIDEMIA, UNSPECIFIED HYPERLIPIDEMIA TYPE: ICD-10-CM

## 2020-02-27 DIAGNOSIS — L40.9 PSORIASIS: ICD-10-CM

## 2020-02-27 DIAGNOSIS — I10 ESSENTIAL HYPERTENSION: Primary | ICD-10-CM

## 2020-02-27 DIAGNOSIS — Z00.00 MEDICARE ANNUAL WELLNESS VISIT, SUBSEQUENT: ICD-10-CM

## 2020-02-27 PROCEDURE — 1036F TOBACCO NON-USER: CPT | Performed by: FAMILY MEDICINE

## 2020-02-27 PROCEDURE — 1125F AMNT PAIN NOTED PAIN PRSNT: CPT | Performed by: FAMILY MEDICINE

## 2020-02-27 PROCEDURE — 3078F DIAST BP <80 MM HG: CPT | Performed by: FAMILY MEDICINE

## 2020-02-27 PROCEDURE — G0439 PPPS, SUBSEQ VISIT: HCPCS | Performed by: FAMILY MEDICINE

## 2020-02-27 PROCEDURE — 99213 OFFICE O/P EST LOW 20 MIN: CPT | Performed by: FAMILY MEDICINE

## 2020-02-27 PROCEDURE — 3077F SYST BP >= 140 MM HG: CPT | Performed by: FAMILY MEDICINE

## 2020-02-27 PROCEDURE — 4040F PNEUMOC VAC/ADMIN/RCVD: CPT | Performed by: FAMILY MEDICINE

## 2020-02-27 PROCEDURE — 1160F RVW MEDS BY RX/DR IN RCRD: CPT | Performed by: FAMILY MEDICINE

## 2020-02-27 PROCEDURE — 1170F FXNL STATUS ASSESSED: CPT | Performed by: FAMILY MEDICINE

## 2020-02-27 RX ORDER — FLUOCINONIDE 1 MG/G
CREAM TOPICAL
Refills: 0
Start: 2020-02-27 | End: 2020-03-14

## 2020-02-27 RX ORDER — CLOBETASOL PROPIONATE 0.46 MG/ML
SOLUTION TOPICAL
Qty: 50 ML | Refills: 0 | Status: SHIPPED | OUTPATIENT
Start: 2020-02-27 | End: 2020-03-14

## 2020-02-27 NOTE — PROGRESS NOTES
Assessment and Plan:   Patient is seen for subsequent Medicare  Problem List Items Addressed This Visit        Cardiovascular and Mediastinum    Essential hypertension - Primary    Relevant Orders    Comprehensive metabolic panel    CBC and differential    TSH, 3rd generation with Free T4 reflex       Other    HLD (hyperlipidemia)    Relevant Orders    Lipid Panel with Direct LDL reflex      Other Visit Diagnoses     Medicare annual wellness visit, subsequent        Psoriasis        Colon cancer screening        Relevant Orders    Cologuard           Preventive health issues were discussed with patient, and age appropriate screening tests were ordered as noted in patient's After Visit Summary  Personalized health advice and appropriate referrals for health education or preventive services given if needed, as noted in patient's After Visit Summary  History of Present Illness:     Patient presents for Welcome to Medicare visit       Patient Care Team:  Artis Botello DO as PCP - MD Alexia Daugherty DO     Review of Systems:        Problem List:     Patient Active Problem List   Diagnosis    Open nondisplaced fracture of first cervical vertebra with routine healing    Closed fracture of 4th metacarpal    Trigeminal neuralgia    History of TIA (transient ischemic attack)    Rheumatoid arthritis ([address] )    Arteriosclerotic heart disease    Essential hypertension    Fatty liver    HLD (hyperlipidemia)    Other constipation    Nerve sheath tumor    Transient cerebral ischemia    KARIME (acute kidney injury) ([address] )    Syncope      Past Medical and Surgical History:     Past Medical History:   Diagnosis Date    Abnormal liver function test     last assessed: 3/25/2014    Anemia     Chronic constipation     last assessed: 12/2/2014    Herpes zoster     last assessed: 3/16/2016    Hyperlipidemia     Hypertension     Psoriatic arthritis ([address] )     last assessed;3/25/2014    Recent weight loss     last assessed: 7/13/2016    Rheumatoid arthritis [facility]     Stroke ([facility] )     Trigeminal neuritis      Past Surgical History:   Procedure Laterality Date    APPENDECTOMY      FOOT SURGERY      KIDNEY SURGERY        Family History:     Family History   Problem Relation Age of Onset    No Known Problems Father     No Known Problems Mother     No Known Problems Maternal Grandmother     No Known Problems Paternal Grandmother     No Known Problems Paternal Aunt     No Known Problems Paternal Aunt       Social History:        Social History     Socioeconomic History    Marital status: /Civil Union     Spouse name: None    Number of children: None    Years of education: high school graduate     Highest education level: None   Occupational History    Occupation: Retired   Social Needs    Financial resource strain: None    Food insecurity:     Worry: None     Inability: None    Transportation needs:     Medical: None     Non-medical: None   Tobacco Use    Smoking status: Never Smoker    Smokeless tobacco: Never Used   Substance and Sexual Activity    Alcohol use: Not Currently    Drug use: No    Sexual activity: None   Lifestyle    Physical activity:     Days per week: None     Minutes per session: None    Stress: None   Relationships    Social connections:     Talks on phone: None     Gets together: None     Attends [employer] service: None     Active member of club or organization: None     Attends meetings of clubs or organizations: None     Relationship status: None    Intimate partner violence:     Fear of current or ex partner: None     Emotionally abused: None     Physically abused: None     Forced sexual activity: None   Other Topics Concern    None   Social History Narrative    None      Medications and Allergies:     Current Outpatient Medications   Medication Sig Dispense Refill    clopidogrel (PLAVIX) 75 mg tablet Take 1 tablet (75 mg total) by mouth daily at bedtime 90 tablet 1    folic acid (FOLVITE) 1 mg tablet Take 1 mg by mouth 3 (three) times a day       latanoprost (XALATAN) 0 005 % ophthalmic solution Administer 1 drop to both eyes daily at bedtime       methotrexate 2 5 mg tablet Take 7 tablets (17 5 mg total) by mouth once a week 12 tablet 0    clobetasol (TEMOVATE) 0 05 % external solution Apply once a day 50 mL 0    diltiazem (TIAZAC) 240 MG 24 hr capsule Take 1 capsule (240 mg total) by mouth daily 90 capsule 1    Fluocinonide 0 1 % CREA Apply once a day  Prescribed by dermatology  0    simvastatin (ZOCOR) 40 mg tablet Take 1 tablet (40 mg total) by mouth daily at bedtime 90 tablet 1     No current facility-administered medications for this visit  Allergies   Allergen Reactions    Amoxicillin Edema    Penicillins Edema      Immunizations:     Immunization History   Administered Date(s) Administered    INFLUENZA 09/16/2012, 10/12/2013, 08/09/2014, 09/09/2014, 10/15/2015, 10/17/2015, 10/01/2016    Influenza Split High Dose Preservative Free IM 09/09/2014, 10/15/2015, 10/01/2016, 08/22/2017    Influenza TIV (IM) 10/12/2013    Influenza, high dose seasonal 0 5 mL 09/26/2018, 10/15/2019    Pneumococcal Conjugate 13-Valent 04/14/2015    Pneumococcal Polysaccharide PPV23 11/11/2008    Tdap 03/02/2018    Zoster 01/03/2009    Zoster Vaccine Recombinant 11/02/2019, 01/26/2020      Health Maintenance:         Topic Date Due    CRC Screening: Colonoscopy  1945    DXA SCAN  01/29/2022     There are no preventive care reminders to display for this patient  Medicare Screening Tests and Risk Assessments: Odilon Brown is here for her Subsequent Wellness visit  Last Medicare Wellness visit information reviewed, patient interviewed and updates made to the record today  Health Risk Assessment:   Patient rates overall health as good  Patient feels that their physical health rating is same  Eyesight was rated as same   Hearing was rated as same  Patient feels that their emotional and mental health rating is same  Pain experienced in the last 7 days has been none  Patient states that she has experienced no weight loss or gain in last 6 months  Depression Screening:   PHQ-2 Score: 0      Fall Risk Screening: In the past year, patient has experienced: no history of falling in past year      Urinary Incontinence Screening:   Patient has not leaked urine accidently in the last six months  Home Safety:  Patient does not have trouble with stairs inside or outside of their home  Patient has working smoke alarms and has working carbon monoxide detector  Home safety hazards include: none  Nutrition:   Current diet is Regular and Limited junk food  Medications:   Patient is currently taking over-the-counter supplements  OTC medications include: see medication list  Patient is able to manage medications  Activities of Daily Living (ADLs)/Instrumental Activities of Daily Living (IADLs):   Walk and transfer into and out of bed and chair?: Yes  Dress and groom yourself?: Yes    Bathe or shower yourself?: Yes    Feed yourself? Yes  Do your laundry/housekeeping?: Yes  Manage your money, pay your bills and track your expenses?: Yes  Make your own meals?: Yes    Do your own shopping?: Yes    Previous Hospitalizations:   Any hospitalizations or ED visits within the last 12 months?: Yes    How many hospitalizations have you had in the last year?: 1-2    Hospitalization Comments: Patient had syncopal episode  Advance Care Planning:   Living will: Yes    Durable POA for healthcare:  Yes    Advanced directive: Yes    End of Life Decisions reviewed with patient: Yes      Cognitive Screening:   Provider or family/friend/caregiver concerned regarding cognition?: No    PREVENTIVE SCREENINGS      Cardiovascular Screening:    General: Screening Not Indicated and History Lipid Disorder      Diabetes Screening:     General: Screening Current      Colorectal Cancer Screening:     General: Risks and Benefits Discussed    Due for: Cologuard      Breast Cancer Screening:     General: Screening Current      Cervical Cancer Screening:    General: Screening Not Indicated      Osteoporosis Screening:    General: Screening Current      Abdominal Aortic Aneurysm (AAA) Screening:        General: Screening Not Indicated      Lung Cancer Screening:     General: Screening Not Indicated      Hepatitis C Screening:    General: Screening Not Indicated         Physical Exam:     /60 (BP Location: Left arm, Patient Position: Sitting, Cuff Size: Adult)   Pulse 60   Temp (!) 97 2 °F (36 2 °C) (Tympanic)   Ht 5' 4 9" (1 648 m)   Wt 59 6 kg (131 lb 6 4 oz)   SpO2 97%   BMI 21 93 kg/m²       Rudy Sandhoff, DO

## 2020-02-27 NOTE — PROGRESS NOTES
Assessment/Plan:    Patient is 52-year-old female seen for follow-up of chronic medical conditions  Her blood pressure is mildly elevated at the visit but we will continue present medication  Diagnoses and all orders for this visit:    Essential hypertension  -     Comprehensive metabolic panel; Future  -     CBC and differential; Future  -     TSH, 3rd generation with Free T4 reflex; Future  -     Comprehensive metabolic panel  -     CBC and differential  -     TSH, 3rd generation with Free T4 reflex    Hyperlipidemia, unspecified hyperlipidemia type  -     Lipid Panel with Direct LDL reflex; Future  -     Lipid Panel with Direct LDL reflex    Medicare annual wellness visit, subsequent    Psoriasis  -     Discontinue: Fluocinonide 0 1 % CREA; Apply once a day  Prescribed by dermatology  -     Discontinue: clobetasol (TEMOVATE) 0 05 % external solution; Apply once a day    Colon cancer screening  -     Cologuard          Subjective:   Chief Complaint   Patient presents with    Medicare Wellness Visit        Patient ID: Guerda Castillo is a 66 y o  female  Patient is seen for follow-up of chronic medical conditions  She is compliant with her medications  The following portions of the patient's history were reviewed and updated as appropriate: allergies, current medications, past family history, past medical history, past social history, past surgical history and problem list     Review of Systems   Constitutional: Negative for chills and fever  HENT: Negative for congestion and sore throat  Respiratory: Negative for chest tightness  Cardiovascular: Negative for chest pain and palpitations  Gastrointestinal: Negative for abdominal pain, constipation, diarrhea and nausea  Genitourinary: Negative for difficulty urinating  Skin: Negative  Neurological: Negative for dizziness and headaches  Psychiatric/Behavioral: Negative            Objective:      /60 (BP Location: Left arm, Patient Position: Sitting, Cuff Size: Adult)   Pulse 60   Temp (!) 97 2 °F (36 2 °C) (Tympanic)   Ht 5' 4 9" (1 648 m)   Wt 59 6 kg (131 lb 6 4 oz)   SpO2 97%   BMI 21 93 kg/m²          Physical Exam   Constitutional: She is oriented to person, place, and time  She appears well-developed  No distress  Neck: Carotid bruit is not present  No thyromegaly present  Cardiovascular: Normal rate, regular rhythm and normal heart sounds  /60   Pulmonary/Chest: Effort normal and breath sounds normal    Musculoskeletal: She exhibits no edema  Lymphadenopathy:     She has no cervical adenopathy  Neurological: She is alert and oriented to person, place, and time  Skin: Skin is warm and dry  Psychiatric: She has a normal mood and affect  Nursing note and vitals reviewed

## 2020-02-27 NOTE — PATIENT INSTRUCTIONS
Older adult vitamin or a woman's vitamin but watch out for too much iron  Also take Vit D3  Medicare Preventive Visit Patient Instructions  Thank you for completing your Welcome to Medicare Visit or Medicare Annual Wellness Visit today  Your next wellness visit will be due in one year (2/27/2021)  The screening/preventive services that you may require over the next 5-10 years are detailed below  Some tests may not apply to you based off risk factors and/or age  Screening tests ordered at today's visit but not completed yet may show as past due  Also, please note that scanned in results may not display below  Preventive Screenings:  Service Recommendations Previous Testing/Comments   Colorectal Cancer Screening  * Colonoscopy    * Fecal Occult Blood Test (FOBT)/Fecal Immunochemical Test (FIT)  * Fecal DNA/Cologuard Test  * Flexible Sigmoidoscopy Age: 54-65 years old   Colonoscopy: every 10 years (may be performed more frequently if at higher risk)  OR  FOBT/FIT: every 1 year  OR  Cologuard: every 3 years  OR  Sigmoidoscopy: every 5 years  Screening may be recommended earlier than age 48 if at higher risk for colorectal cancer  Also, an individualized decision between you and your healthcare provider will decide whether screening between the ages of 74-80 would be appropriate  Colonoscopy: Not on file  FOBT/FIT: 01/30/2018  Cologuard: Not on file  Sigmoidoscopy: Not on file         Breast Cancer Screening Age: 36 years old  Frequency: every 1-2 years  Not required if history of left and right mastectomy Mammogram: 01/29/2020    Screening Current   Cervical Cancer Screening Between the ages of 21-29, pap smear recommended once every 3 years  Between the ages of 33-67, can perform pap smear with HPV co-testing every 5 years     Recommendations may differ for women with a history of total hysterectomy, cervical cancer, or abnormal pap smears in past  Pap Smear: Not on file    Screening Not Indicated Hepatitis C Screening Once for adults born between 1945 and 1965  More frequently in patients at high risk for Hepatitis C Hep C Antibody: Not on file       Diabetes Screening 1-2 times per year if you're at risk for diabetes or have pre-diabetes Fasting glucose: 91 mg/dL   A1C: No results in last 5 years    Screening Current   Cholesterol Screening Once every 5 years if you don't have a lipid disorder  May order more often based on risk factors  Lipid panel: 12/10/2019    Screening Not Indicated  History Lipid Disorder     Other Preventive Screenings Covered by Medicare:  1  Abdominal Aortic Aneurysm (AAA) Screening: covered once if your at risk  You're considered to be at risk if you have a family history of AAA  2  Lung Cancer Screening: covers low dose CT scan once per year if you meet all of the following conditions: (1) Age 50-69; (2) No signs or symptoms of lung cancer; (3) Current smoker or have quit smoking within the last 15 years; (4) You have a tobacco smoking history of at least 30 pack years (packs per day multiplied by number of years you smoked); (5) You get a written order from a healthcare provider  3  Glaucoma Screening: covered annually if you're considered high risk: (1) You have diabetes OR (2) Family history of glaucoma OR (3)  aged 48 and older OR (3)  American aged 72 and older  3  Osteoporosis Screening: covered every 2 years if you meet one of the following conditions: (1) You're estrogen deficient and at risk for osteoporosis based off medical history and other findings; (2) Have a vertebral abnormality; (3) On glucocorticoid therapy for more than 3 months; (4) Have primary hyperparathyroidism; (5) On osteoporosis medications and need to assess response to drug therapy  · Last bone density test (DXA Scan): 01/29/2020   5  HIV Screening: covered annually if you're between the age of 15-65   Also covered annually if you are younger than 13 and older than 72 with risk factors for HIV infection  For pregnant patients, it is covered up to 3 times per pregnancy  Immunizations:  Immunization Recommendations   Influenza Vaccine Annual influenza vaccination during flu season is recommended for all persons aged >= 6 months who do not have contraindications   Pneumococcal Vaccine (Prevnar and Pneumovax)  * Prevnar = PCV13  * Pneumovax = PPSV23   Adults 25-60 years old: 1-3 doses may be recommended based on certain risk factors  Adults 72 years old: Prevnar (PCV13) vaccine recommended followed by Pneumovax (PPSV23) vaccine  If already received PPSV23 since turning 65, then PCV13 recommended at least one year after PPSV23 dose  Hepatitis B Vaccine 3 dose series if at intermediate or high risk (ex: diabetes, end stage renal disease, liver disease)   Tetanus (Td) Vaccine - COST NOT COVERED BY MEDICARE PART B Following completion of primary series, a booster dose should be given every 10 years to maintain immunity against tetanus  Td may also be given as tetanus wound prophylaxis  Tdap Vaccine - COST NOT COVERED BY MEDICARE PART B Recommended at least once for all adults  For pregnant patients, recommended with each pregnancy  Shingles Vaccine (Shingrix) - COST NOT COVERED BY MEDICARE PART B  2 shot series recommended in those aged 48 and above     Health Maintenance Due:      Topic Date Due    CRC Screening: Colonoscopy  1945    DXA SCAN  01/29/2022     Immunizations Due:  There are no preventive care reminders to display for this patient  Advance Directives   What are advance directives? Advance directives are legal documents that state your wishes and plans for medical care  These plans are made ahead of time in case you lose your ability to make decisions for yourself  Advance directives can apply to any medical decision, such as the treatments you want, and if you want to donate organs  What are the types of advance directives?   There are many types of advance directives, and each state has rules about how to use them  You may choose a combination of any of the following:  · Living will: This is a written record of the treatment you want  You can also choose which treatments you do not want, which to limit, and which to stop at a certain time  This includes surgery, medicine, IV fluid, and tube feedings  · Durable power of  for healthcare [facility]): This is a written record that states who you want to make healthcare choices for you when you are unable to make them for yourself  This person, called a proxy, is usually a family member or a friend  You may choose more than 1 proxy  · Do not resuscitate (DNR) order:  A DNR order is used in case your heart stops beating or you stop breathing  It is a request not to have certain forms of treatment, such as CPR  A DNR order may be included in other types of advance directives  · Medical directive: This covers the care that you want if you are in a coma, near death, or unable to make decisions for yourself  You can list the treatments you want for each condition  Treatment may include pain medicine, surgery, blood transfusions, dialysis, IV or tube feedings, and a ventilator (breathing machine)  · Values history: This document has questions about your views, beliefs, and how you feel and think about life  This information can help others choose the care that you would choose  Why are advance directives important? An advance directive helps you control your care  Although spoken wishes may be used, it is better to have your wishes written down  Spoken wishes can be misunderstood, or not followed  Treatments may be given even if you do not want them  An advance directive may make it easier for your family to make difficult choices about your care  © Copyright [employer] 2018 Information is for End User's use only and may not be sold, redistributed or otherwise used for commercial purposes   All illustrations and images included in CareNotes® are the copyrighted property of A D A M , Inc  or Archana Cameron

## 2020-03-02 ENCOUNTER — TELEPHONE (OUTPATIENT)
Dept: FAMILY MEDICINE CLINIC | Facility: CLINIC | Age: 75
End: 2020-03-02

## 2020-03-02 NOTE — TELEPHONE ENCOUNTER
Pt LM stating she was supposed to call you back to notify which vitamins she currently takes  Pt takes a Centrum Silver for women over 50 has iron 8 mg  Vitamin d3 2000 IU daily and Vitamin d3 5000 IU daily

## 2020-03-09 DIAGNOSIS — I10 BENIGN ESSENTIAL HYPERTENSION: ICD-10-CM

## 2020-03-09 RX ORDER — DILTIAZEM HYDROCHLORIDE 240 MG/1
240 CAPSULE, EXTENDED RELEASE ORAL DAILY
Qty: 90 CAPSULE | Refills: 1 | Status: SHIPPED | OUTPATIENT
Start: 2020-03-09 | End: 2020-09-14 | Stop reason: SDUPTHER

## 2020-03-11 ENCOUNTER — TELEPHONE (OUTPATIENT)
Dept: FAMILY MEDICINE CLINIC | Facility: CLINIC | Age: 75
End: 2020-03-11

## 2020-03-14 DIAGNOSIS — E78.2 MIXED HYPERLIPIDEMIA: ICD-10-CM

## 2020-03-14 DIAGNOSIS — L40.9 PSORIASIS: ICD-10-CM

## 2020-03-14 RX ORDER — FLUOCINONIDE 1 MG/G
CREAM TOPICAL
Refills: 0
Start: 2020-03-14 | End: 2021-03-23 | Stop reason: ALTCHOICE

## 2020-03-14 RX ORDER — CLOBETASOL PROPIONATE 0.46 MG/ML
SOLUTION TOPICAL
Qty: 50 ML | Refills: 0
Start: 2020-03-14 | End: 2020-07-21 | Stop reason: ALTCHOICE

## 2020-03-14 RX ORDER — SIMVASTATIN 40 MG
40 TABLET ORAL
Qty: 90 TABLET | Refills: 1 | Status: SHIPPED | OUTPATIENT
Start: 2020-03-14 | End: 2020-09-14 | Stop reason: SDUPTHER

## 2020-04-29 DIAGNOSIS — Z86.73 HISTORY OF TIA (TRANSIENT ISCHEMIC ATTACK): ICD-10-CM

## 2020-04-29 RX ORDER — CLOPIDOGREL BISULFATE 75 MG/1
75 TABLET ORAL
Qty: 90 TABLET | Refills: 1 | Status: SHIPPED | OUTPATIENT
Start: 2020-04-29 | End: 2020-11-10 | Stop reason: SDUPTHER

## 2020-06-19 LAB
ALBUMIN SERPL-MCNC: 3.9 G/DL (ref 3.6–5.1)
ALBUMIN/GLOB SERPL: 1.5 (CALC) (ref 1–2.5)
ALP SERPL-CCNC: 51 U/L (ref 37–153)
ALT SERPL-CCNC: 9 U/L (ref 6–29)
AST SERPL-CCNC: 17 U/L (ref 10–35)
BASOPHILS # BLD AUTO: 33 CELLS/UL (ref 0–200)
BASOPHILS NFR BLD AUTO: 0.5 %
BILIRUB SERPL-MCNC: 0.6 MG/DL (ref 0.2–1.2)
BUN SERPL-MCNC: 15 MG/DL (ref 7–25)
BUN/CREAT SERPL: NORMAL (CALC) (ref 6–22)
CALCIUM SERPL-MCNC: 9.5 MG/DL (ref 8.6–10.4)
CHLORIDE SERPL-SCNC: 105 MMOL/L (ref 98–110)
CHOLEST SERPL-MCNC: 168 MG/DL
CHOLEST/HDLC SERPL: 2.2 (CALC)
CO2 SERPL-SCNC: 30 MMOL/L (ref 20–32)
CREAT SERPL-MCNC: 0.85 MG/DL (ref 0.6–0.93)
EOSINOPHIL # BLD AUTO: 92 CELLS/UL (ref 15–500)
EOSINOPHIL NFR BLD AUTO: 1.4 %
ERYTHROCYTE [DISTWIDTH] IN BLOOD BY AUTOMATED COUNT: 14.7 % (ref 11–15)
GLOBULIN SER CALC-MCNC: 2.6 G/DL (CALC) (ref 1.9–3.7)
GLUCOSE SERPL-MCNC: 97 MG/DL (ref 65–99)
HCT VFR BLD AUTO: 39.8 % (ref 35–45)
HDLC SERPL-MCNC: 78 MG/DL
HGB BLD-MCNC: 13.1 G/DL (ref 11.7–15.5)
LDLC SERPL CALC-MCNC: 72 MG/DL (CALC)
LYMPHOCYTES # BLD AUTO: 2264 CELLS/UL (ref 850–3900)
LYMPHOCYTES NFR BLD AUTO: 34.3 %
MCH RBC QN AUTO: 31.9 PG (ref 27–33)
MCHC RBC AUTO-ENTMCNC: 32.9 G/DL (ref 32–36)
MCV RBC AUTO: 96.8 FL (ref 80–100)
MONOCYTES # BLD AUTO: 653 CELLS/UL (ref 200–950)
MONOCYTES NFR BLD AUTO: 9.9 %
NEUTROPHILS # BLD AUTO: 3557 CELLS/UL (ref 1500–7800)
NEUTROPHILS NFR BLD AUTO: 53.9 %
NONHDLC SERPL-MCNC: 90 MG/DL (CALC)
PLATELET # BLD AUTO: 244 THOUSAND/UL (ref 140–400)
PMV BLD REES-ECKER: 10.8 FL (ref 7.5–12.5)
POTASSIUM SERPL-SCNC: 4 MMOL/L (ref 3.5–5.3)
PROT SERPL-MCNC: 6.5 G/DL (ref 6.1–8.1)
RBC # BLD AUTO: 4.11 MILLION/UL (ref 3.8–5.1)
SL AMB EGFR AFRICAN AMERICAN: 77 ML/MIN/1.73M2
SL AMB EGFR NON AFRICAN AMERICAN: 67 ML/MIN/1.73M2
SODIUM SERPL-SCNC: 142 MMOL/L (ref 135–146)
TRIGL SERPL-MCNC: 96 MG/DL
TSH SERPL-ACNC: 2.23 MIU/L (ref 0.4–4.5)
WBC # BLD AUTO: 6.6 THOUSAND/UL (ref 3.8–10.8)

## 2020-06-24 ENCOUNTER — TELEPHONE (OUTPATIENT)
Dept: FAMILY MEDICINE CLINIC | Facility: CLINIC | Age: 75
End: 2020-06-24

## 2020-06-29 ENCOUNTER — TELEPHONE (OUTPATIENT)
Dept: FAMILY MEDICINE CLINIC | Facility: CLINIC | Age: 75
End: 2020-06-29

## 2020-07-21 ENCOUNTER — TELEPHONE (OUTPATIENT)
Dept: ADMINISTRATIVE | Facility: OTHER | Age: 75
End: 2020-07-21

## 2020-07-21 ENCOUNTER — OFFICE VISIT (OUTPATIENT)
Dept: FAMILY MEDICINE CLINIC | Facility: CLINIC | Age: 75
End: 2020-07-21
Payer: MEDICARE

## 2020-07-21 VITALS
HEIGHT: 65 IN | WEIGHT: 126.4 LBS | BODY MASS INDEX: 21.06 KG/M2 | SYSTOLIC BLOOD PRESSURE: 122 MMHG | OXYGEN SATURATION: 97 % | DIASTOLIC BLOOD PRESSURE: 72 MMHG | TEMPERATURE: 98.6 F | HEART RATE: 65 BPM

## 2020-07-21 DIAGNOSIS — E78.5 HYPERLIPIDEMIA, UNSPECIFIED HYPERLIPIDEMIA TYPE: ICD-10-CM

## 2020-07-21 DIAGNOSIS — I10 ESSENTIAL HYPERTENSION: Primary | ICD-10-CM

## 2020-07-21 DIAGNOSIS — I49.3 PVC'S (PREMATURE VENTRICULAR CONTRACTIONS): ICD-10-CM

## 2020-07-21 DIAGNOSIS — M06.9 RHEUMATOID ARTHRITIS, INVOLVING UNSPECIFIED SITE, UNSPECIFIED RHEUMATOID FACTOR PRESENCE: ICD-10-CM

## 2020-07-21 DIAGNOSIS — I49.9 IRREGULAR CARDIAC RHYTHM: ICD-10-CM

## 2020-07-21 DIAGNOSIS — Z86.73 HISTORY OF TIA (TRANSIENT ISCHEMIC ATTACK): ICD-10-CM

## 2020-07-21 PROCEDURE — 1160F RVW MEDS BY RX/DR IN RCRD: CPT | Performed by: FAMILY MEDICINE

## 2020-07-21 PROCEDURE — 3074F SYST BP LT 130 MM HG: CPT | Performed by: FAMILY MEDICINE

## 2020-07-21 PROCEDURE — 3078F DIAST BP <80 MM HG: CPT | Performed by: FAMILY MEDICINE

## 2020-07-21 PROCEDURE — 99214 OFFICE O/P EST MOD 30 MIN: CPT | Performed by: FAMILY MEDICINE

## 2020-07-21 PROCEDURE — 1036F TOBACCO NON-USER: CPT | Performed by: FAMILY MEDICINE

## 2020-07-21 PROCEDURE — 4040F PNEUMOC VAC/ADMIN/RCVD: CPT | Performed by: FAMILY MEDICINE

## 2020-07-21 PROCEDURE — 93000 ELECTROCARDIOGRAM COMPLETE: CPT | Performed by: FAMILY MEDICINE

## 2020-07-21 PROCEDURE — 3008F BODY MASS INDEX DOCD: CPT | Performed by: FAMILY MEDICINE

## 2020-07-21 NOTE — TELEPHONE ENCOUNTER
Can we update it under the colonoscopy? That way it doesn't continue to show as a screening that is due  The cologuard essentially replaces the colonoscopy for every 3 years      Thank you,  Marisol Sorto

## 2020-07-21 NOTE — TELEPHONE ENCOUNTER
Upon review of your request/inquiry, we are requesting that you forward this request/concern to the [email or web address] email  The Quality team members assigned to this email will be more than happy to assist you  Any additional questions or concerns should be emailed to the Practice Liaisons via Clarity email, please do not reply via In Basket       Thank you  Hassel Aschoff

## 2020-07-21 NOTE — TELEPHONE ENCOUNTER
Upon review of your request/inquiry, we have found that the patient has aged out of the measure and/or the document date is outside of the measure timeframe  The patient has aged out of the measure at age 74,  Roxana will not populate into HM  Any additional questions or concerns should be emailed to the Practice Liaisons via [email or web address] email, please do not reply via In Basket       Thank you  Conrado Ngo

## 2020-07-21 NOTE — PROGRESS NOTES
Assessment/Plan:    Patient is 66-year-old female seen for follow-up of chronic medical conditions  HLD (hyperlipidemia)  Chol 168, LDL 72 - contniue Simvastatin  Essential hypertension  Stable on Diltiazem    History of TIA (transient ischemic attack)  Patient continues on Clopidogrel  While auscultating patient's heart, I heard ectopy  I did order an EKG while patient was in the office  EKG shows PVC's - will schedule Holter Monitor  she denies any dizziness, lightheadedness or palpitations recently  She is due for checkup in 4 months  But certainly, I will call her when we get her Holter monitor back  Diagnoses and all orders for this visit:    Essential hypertension    Hyperlipidemia, unspecified hyperlipidemia type    Rheumatoid arthritis, involving unspecified site, unspecified rheumatoid factor presence ([facility] [address] )    History of TIA (transient ischemic attack)    Irregular cardiac rhythm  -     POCT ECG    PVC's (premature ventricular contractions)  -     Holter monitor - 48 hour; Future          Subjective:   Chief Complaint   Patient presents with    Follow-up     4 month check, labs done 6/18      Patient ID: Chris Hancock is a 66 y o  female  Patient is seen for follow up of chronic medical conditions  She is feeling well  But her  is ill - very anemic  She is compliant with medication  The following portions of the patient's history were reviewed and updated as appropriate: allergies, current medications, past family history, past medical history, past social history, past surgical history and problem list     Review of Systems   Constitutional: Negative for chills and fever  HENT: Negative for congestion and sore throat  Respiratory: Negative for chest tightness  Cardiovascular: Negative for chest pain and palpitations  Gastrointestinal: Negative for abdominal pain, constipation, diarrhea and nausea  Genitourinary: Negative for difficulty urinating     Skin: Negative  Neurological: Negative for dizziness and headaches  Psychiatric/Behavioral: Negative  Objective:      /72 (BP Location: Left arm, Patient Position: Sitting, Cuff Size: Adult)   Pulse 65   Temp 98 6 °F (37 °C) (Tympanic)   Ht 5' 4 57" (1 64 m)   Wt 57 3 kg (126 lb 6 4 oz)   SpO2 97%   BMI 21 32 kg/m²          Physical Exam   Constitutional: She is oriented to person, place, and time  No distress  Neck: Carotid bruit is not present  No thyromegaly present  Cardiovascular: Normal rate  Extrasystoles are present  Pulmonary/Chest: Effort normal and breath sounds normal    Musculoskeletal: She exhibits no edema  Lymphadenopathy:     She has no cervical adenopathy  Neurological: She is alert and oriented to person, place, and time  Skin: Skin is warm and dry  Psychiatric: She has a normal mood and affect  Nursing note and vitals reviewed

## 2020-07-21 NOTE — TELEPHONE ENCOUNTER
----- Message from Grupo Velarde MA sent at 7/21/2020  8:25 AM EDT -----  07/21/20 8:25 AM    Hello, our patient Ivy Crenshaw has had CRC: Cologuard completed/performed  Please assist in updating the patient chart by pulling the document from the Media Tab  The date of service is 3/10/20       Thank you,  Grupo Velarde MA  [facility]

## 2020-07-27 ENCOUNTER — HOSPITAL ENCOUNTER (OUTPATIENT)
Dept: NON INVASIVE DIAGNOSTICS | Facility: HOSPITAL | Age: 75
Discharge: HOME | End: 2020-07-27
Payer: MEDICARE

## 2020-07-27 DIAGNOSIS — I49.3 PVC'S (PREMATURE VENTRICULAR CONTRACTIONS): ICD-10-CM

## 2020-07-27 PROCEDURE — 93226 XTRNL ECG REC<48 HR SCAN A/R: CPT

## 2020-07-27 PROCEDURE — 93225 XTRNL ECG REC<48 HRS REC: CPT

## 2020-07-30 PROCEDURE — 93227 XTRNL ECG REC<48 HR R&I: CPT | Performed by: INTERNAL MEDICINE

## 2020-09-14 DIAGNOSIS — E78.2 MIXED HYPERLIPIDEMIA: ICD-10-CM

## 2020-09-14 DIAGNOSIS — I10 BENIGN ESSENTIAL HYPERTENSION: ICD-10-CM

## 2020-09-15 RX ORDER — SIMVASTATIN 40 MG
40 TABLET ORAL
Qty: 90 TABLET | Refills: 1 | Status: SHIPPED | OUTPATIENT
Start: 2020-09-15 | End: 2021-03-14 | Stop reason: SDUPTHER

## 2020-09-15 RX ORDER — DILTIAZEM HYDROCHLORIDE 240 MG/1
240 CAPSULE, EXTENDED RELEASE ORAL DAILY
Qty: 90 CAPSULE | Refills: 1 | Status: SHIPPED | OUTPATIENT
Start: 2020-09-15 | End: 2021-03-05 | Stop reason: SDUPTHER

## 2020-10-16 ENCOUNTER — IMMUNIZATIONS (OUTPATIENT)
Dept: FAMILY MEDICINE CLINIC | Facility: CLINIC | Age: 75
End: 2020-10-16
Payer: MEDICARE

## 2020-10-16 DIAGNOSIS — Z23 ENCOUNTER FOR IMMUNIZATION: ICD-10-CM

## 2020-10-16 PROCEDURE — G0008 ADMIN INFLUENZA VIRUS VAC: HCPCS | Performed by: FAMILY MEDICINE

## 2020-10-16 PROCEDURE — 90662 IIV NO PRSV INCREASED AG IM: CPT | Performed by: FAMILY MEDICINE

## 2020-11-10 DIAGNOSIS — Z86.73 HISTORY OF TIA (TRANSIENT ISCHEMIC ATTACK): ICD-10-CM

## 2020-11-10 RX ORDER — CLOPIDOGREL BISULFATE 75 MG/1
75 TABLET ORAL
Qty: 90 TABLET | Refills: 1 | Status: SHIPPED | OUTPATIENT
Start: 2020-11-10 | End: 2021-05-10 | Stop reason: SDUPTHER

## 2020-11-23 ENCOUNTER — OFFICE VISIT (OUTPATIENT)
Dept: FAMILY MEDICINE CLINIC | Facility: CLINIC | Age: 75
End: 2020-11-23
Payer: MEDICARE

## 2020-11-23 VITALS
HEIGHT: 65 IN | BODY MASS INDEX: 21.19 KG/M2 | WEIGHT: 127.2 LBS | SYSTOLIC BLOOD PRESSURE: 122 MMHG | RESPIRATION RATE: 17 BRPM | DIASTOLIC BLOOD PRESSURE: 84 MMHG | HEART RATE: 56 BPM | TEMPERATURE: 98.6 F | OXYGEN SATURATION: 96 %

## 2020-11-23 DIAGNOSIS — E78.5 HYPERLIPIDEMIA, UNSPECIFIED HYPERLIPIDEMIA TYPE: ICD-10-CM

## 2020-11-23 DIAGNOSIS — L40.9 PSORIASIS: ICD-10-CM

## 2020-11-23 DIAGNOSIS — I10 ESSENTIAL HYPERTENSION: Primary | ICD-10-CM

## 2020-11-23 DIAGNOSIS — M06.9 RHEUMATOID ARTHRITIS, INVOLVING UNSPECIFIED SITE, UNSPECIFIED WHETHER RHEUMATOID FACTOR PRESENT (HCC): ICD-10-CM

## 2020-11-23 PROCEDURE — 99214 OFFICE O/P EST MOD 30 MIN: CPT | Performed by: FAMILY MEDICINE

## 2020-11-23 RX ORDER — CLOBETASOL PROPIONATE 0.46 MG/ML
SOLUTION TOPICAL DAILY
Qty: 50 ML | Refills: 1 | Status: SHIPPED | OUTPATIENT
Start: 2020-11-23 | End: 2021-10-18

## 2021-01-06 ENCOUNTER — TRANSCRIBE ORDERS (OUTPATIENT)
Dept: LAB | Facility: CLINIC | Age: 76
End: 2021-01-06

## 2021-01-06 ENCOUNTER — APPOINTMENT (OUTPATIENT)
Dept: LAB | Facility: CLINIC | Age: 76
End: 2021-01-06
Payer: MEDICARE

## 2021-01-06 DIAGNOSIS — L40.50 PSORIATIC ARTHROPATHY (HCC): Primary | ICD-10-CM

## 2021-01-06 DIAGNOSIS — L40.50 PSORIATIC ARTHROPATHY (HCC): ICD-10-CM

## 2021-01-06 LAB
ALBUMIN SERPL BCP-MCNC: 3.5 G/DL (ref 3.5–5)
ALP SERPL-CCNC: 68 U/L (ref 46–116)
ALT SERPL W P-5'-P-CCNC: 14 U/L (ref 12–78)
ANION GAP SERPL CALCULATED.3IONS-SCNC: 3 MMOL/L (ref 4–13)
AST SERPL W P-5'-P-CCNC: 16 U/L (ref 5–45)
BASOPHILS # BLD AUTO: 0.03 THOUSANDS/ΜL (ref 0–0.1)
BASOPHILS NFR BLD AUTO: 0 % (ref 0–1)
BILIRUB SERPL-MCNC: 0.63 MG/DL (ref 0.2–1)
BILIRUB UR QL STRIP: NEGATIVE
BUN SERPL-MCNC: 18 MG/DL (ref 5–25)
CALCIUM SERPL-MCNC: 9.3 MG/DL (ref 8.3–10.1)
CHLORIDE SERPL-SCNC: 109 MMOL/L (ref 100–108)
CLARITY UR: CLEAR
CO2 SERPL-SCNC: 29 MMOL/L (ref 21–32)
COLOR UR: YELLOW
CREAT SERPL-MCNC: 0.77 MG/DL (ref 0.6–1.3)
CRP SERPL QL: <3 MG/L
EOSINOPHIL # BLD AUTO: 0.09 THOUSAND/ΜL (ref 0–0.61)
EOSINOPHIL NFR BLD AUTO: 1 % (ref 0–6)
ERYTHROCYTE [DISTWIDTH] IN BLOOD BY AUTOMATED COUNT: 14.6 % (ref 11.6–15.1)
ERYTHROCYTE [SEDIMENTATION RATE] IN BLOOD: 23 MM/HOUR (ref 0–29)
GFR SERPL CREATININE-BSD FRML MDRD: 75 ML/MIN/1.73SQ M
GLUCOSE P FAST SERPL-MCNC: 96 MG/DL (ref 65–99)
GLUCOSE UR STRIP-MCNC: NEGATIVE MG/DL
HCT VFR BLD AUTO: 41.4 % (ref 34.8–46.1)
HGB BLD-MCNC: 13.4 G/DL (ref 11.5–15.4)
HGB UR QL STRIP.AUTO: NEGATIVE
IMM GRANULOCYTES # BLD AUTO: 0.02 THOUSAND/UL (ref 0–0.2)
IMM GRANULOCYTES NFR BLD AUTO: 0 % (ref 0–2)
KETONES UR STRIP-MCNC: NEGATIVE MG/DL
LEUKOCYTE ESTERASE UR QL STRIP: NEGATIVE
LYMPHOCYTES # BLD AUTO: 2.15 THOUSANDS/ΜL (ref 0.6–4.47)
LYMPHOCYTES NFR BLD AUTO: 32 % (ref 14–44)
MCH RBC QN AUTO: 32.9 PG (ref 26.8–34.3)
MCHC RBC AUTO-ENTMCNC: 32.4 G/DL (ref 31.4–37.4)
MCV RBC AUTO: 102 FL (ref 82–98)
MONOCYTES # BLD AUTO: 0.72 THOUSAND/ΜL (ref 0.17–1.22)
MONOCYTES NFR BLD AUTO: 11 % (ref 4–12)
NEUTROPHILS # BLD AUTO: 3.72 THOUSANDS/ΜL (ref 1.85–7.62)
NEUTS SEG NFR BLD AUTO: 56 % (ref 43–75)
NITRITE UR QL STRIP: NEGATIVE
NRBC BLD AUTO-RTO: 0 /100 WBCS
PH UR STRIP.AUTO: 6 [PH]
PLATELET # BLD AUTO: 231 THOUSANDS/UL (ref 149–390)
PMV BLD AUTO: 10.3 FL (ref 8.9–12.7)
POTASSIUM SERPL-SCNC: 3.7 MMOL/L (ref 3.5–5.3)
PROT SERPL-MCNC: 6.8 G/DL (ref 6.4–8.2)
PROT UR STRIP-MCNC: NEGATIVE MG/DL
RBC # BLD AUTO: 4.07 MILLION/UL (ref 3.81–5.12)
SODIUM SERPL-SCNC: 141 MMOL/L (ref 136–145)
SP GR UR STRIP.AUTO: 1.02 (ref 1–1.03)
UROBILINOGEN UR QL STRIP.AUTO: 1 E.U./DL
WBC # BLD AUTO: 6.73 THOUSAND/UL (ref 4.31–10.16)

## 2021-01-06 PROCEDURE — 86140 C-REACTIVE PROTEIN: CPT

## 2021-01-06 PROCEDURE — 80053 COMPREHEN METABOLIC PANEL: CPT

## 2021-01-06 PROCEDURE — 36415 COLL VENOUS BLD VENIPUNCTURE: CPT

## 2021-01-06 PROCEDURE — 81003 URINALYSIS AUTO W/O SCOPE: CPT

## 2021-01-06 PROCEDURE — 85652 RBC SED RATE AUTOMATED: CPT

## 2021-01-06 PROCEDURE — 85025 COMPLETE CBC W/AUTO DIFF WBC: CPT

## 2021-02-09 ENCOUNTER — TELEPHONE (OUTPATIENT)
Dept: FAMILY MEDICINE CLINIC | Facility: CLINIC | Age: 76
End: 2021-02-09

## 2021-02-09 DIAGNOSIS — Z12.31 ENCOUNTER FOR SCREENING MAMMOGRAM FOR MALIGNANT NEOPLASM OF BREAST: ICD-10-CM

## 2021-02-09 DIAGNOSIS — Z12.31 BREAST CANCER SCREENING BY MAMMOGRAM: Primary | ICD-10-CM

## 2021-03-05 DIAGNOSIS — I10 BENIGN ESSENTIAL HYPERTENSION: ICD-10-CM

## 2021-03-05 RX ORDER — DILTIAZEM HYDROCHLORIDE 240 MG/1
240 CAPSULE, EXTENDED RELEASE ORAL DAILY
Qty: 90 CAPSULE | Refills: 1 | Status: SHIPPED | OUTPATIENT
Start: 2021-03-05 | End: 2021-09-09 | Stop reason: SDUPTHER

## 2021-03-12 ENCOUNTER — APPOINTMENT (OUTPATIENT)
Dept: LAB | Facility: CLINIC | Age: 76
End: 2021-03-12
Payer: MEDICARE

## 2021-03-12 DIAGNOSIS — I10 ESSENTIAL HYPERTENSION: ICD-10-CM

## 2021-03-12 DIAGNOSIS — E78.5 HYPERLIPIDEMIA, UNSPECIFIED HYPERLIPIDEMIA TYPE: ICD-10-CM

## 2021-03-12 LAB
ALBUMIN SERPL BCP-MCNC: 3.6 G/DL (ref 3.5–5)
ALP SERPL-CCNC: 79 U/L (ref 46–116)
ALT SERPL W P-5'-P-CCNC: 20 U/L (ref 12–78)
ANION GAP SERPL CALCULATED.3IONS-SCNC: 4 MMOL/L (ref 4–13)
AST SERPL W P-5'-P-CCNC: 17 U/L (ref 5–45)
BASOPHILS # BLD AUTO: 0.04 THOUSANDS/ΜL (ref 0–0.1)
BASOPHILS NFR BLD AUTO: 1 % (ref 0–1)
BILIRUB SERPL-MCNC: 0.55 MG/DL (ref 0.2–1)
BUN SERPL-MCNC: 16 MG/DL (ref 5–25)
CALCIUM SERPL-MCNC: 9.1 MG/DL (ref 8.3–10.1)
CHLORIDE SERPL-SCNC: 107 MMOL/L (ref 100–108)
CHOLEST SERPL-MCNC: 161 MG/DL (ref 50–200)
CO2 SERPL-SCNC: 31 MMOL/L (ref 21–32)
CREAT SERPL-MCNC: 0.78 MG/DL (ref 0.6–1.3)
EOSINOPHIL # BLD AUTO: 0.11 THOUSAND/ΜL (ref 0–0.61)
EOSINOPHIL NFR BLD AUTO: 2 % (ref 0–6)
ERYTHROCYTE [DISTWIDTH] IN BLOOD BY AUTOMATED COUNT: 14.6 % (ref 11.6–15.1)
GFR SERPL CREATININE-BSD FRML MDRD: 74 ML/MIN/1.73SQ M
GLUCOSE P FAST SERPL-MCNC: 92 MG/DL (ref 65–99)
HCT VFR BLD AUTO: 42.1 % (ref 34.8–46.1)
HDLC SERPL-MCNC: 88 MG/DL
HGB BLD-MCNC: 13.5 G/DL (ref 11.5–15.4)
IMM GRANULOCYTES # BLD AUTO: 0.02 THOUSAND/UL (ref 0–0.2)
IMM GRANULOCYTES NFR BLD AUTO: 0 % (ref 0–2)
LDLC SERPL CALC-MCNC: 56 MG/DL (ref 0–100)
LYMPHOCYTES # BLD AUTO: 3.01 THOUSANDS/ΜL (ref 0.6–4.47)
LYMPHOCYTES NFR BLD AUTO: 40 % (ref 14–44)
MCH RBC QN AUTO: 33 PG (ref 26.8–34.3)
MCHC RBC AUTO-ENTMCNC: 32.1 G/DL (ref 31.4–37.4)
MCV RBC AUTO: 103 FL (ref 82–98)
MONOCYTES # BLD AUTO: 0.86 THOUSAND/ΜL (ref 0.17–1.22)
MONOCYTES NFR BLD AUTO: 11 % (ref 4–12)
NEUTROPHILS # BLD AUTO: 3.49 THOUSANDS/ΜL (ref 1.85–7.62)
NEUTS SEG NFR BLD AUTO: 46 % (ref 43–75)
NRBC BLD AUTO-RTO: 0 /100 WBCS
PLATELET # BLD AUTO: 241 THOUSANDS/UL (ref 149–390)
PMV BLD AUTO: 10.8 FL (ref 8.9–12.7)
POTASSIUM SERPL-SCNC: 3.6 MMOL/L (ref 3.5–5.3)
PROT SERPL-MCNC: 6.8 G/DL (ref 6.4–8.2)
RBC # BLD AUTO: 4.09 MILLION/UL (ref 3.81–5.12)
SODIUM SERPL-SCNC: 142 MMOL/L (ref 136–145)
TRIGL SERPL-MCNC: 86 MG/DL
WBC # BLD AUTO: 7.53 THOUSAND/UL (ref 4.31–10.16)

## 2021-03-12 PROCEDURE — 80053 COMPREHEN METABOLIC PANEL: CPT

## 2021-03-12 PROCEDURE — 80061 LIPID PANEL: CPT

## 2021-03-12 PROCEDURE — 85025 COMPLETE CBC W/AUTO DIFF WBC: CPT

## 2021-03-12 PROCEDURE — 36415 COLL VENOUS BLD VENIPUNCTURE: CPT

## 2021-03-14 DIAGNOSIS — E78.2 MIXED HYPERLIPIDEMIA: ICD-10-CM

## 2021-03-14 RX ORDER — SIMVASTATIN 40 MG
40 TABLET ORAL
Qty: 90 TABLET | Refills: 1 | Status: SHIPPED | OUTPATIENT
Start: 2021-03-14 | End: 2021-09-09 | Stop reason: SDUPTHER

## 2021-03-23 ENCOUNTER — OFFICE VISIT (OUTPATIENT)
Dept: FAMILY MEDICINE CLINIC | Facility: CLINIC | Age: 76
End: 2021-03-23
Payer: MEDICARE

## 2021-03-23 VITALS
DIASTOLIC BLOOD PRESSURE: 62 MMHG | HEIGHT: 65 IN | BODY MASS INDEX: 21.19 KG/M2 | HEART RATE: 61 BPM | SYSTOLIC BLOOD PRESSURE: 128 MMHG | RESPIRATION RATE: 18 BRPM | WEIGHT: 127.2 LBS | OXYGEN SATURATION: 96 % | TEMPERATURE: 98 F

## 2021-03-23 DIAGNOSIS — Z00.00 MEDICARE ANNUAL WELLNESS VISIT, SUBSEQUENT: ICD-10-CM

## 2021-03-23 DIAGNOSIS — I10 ESSENTIAL HYPERTENSION: Primary | ICD-10-CM

## 2021-03-23 DIAGNOSIS — M06.9 RHEUMATOID ARTHRITIS, INVOLVING UNSPECIFIED SITE, UNSPECIFIED WHETHER RHEUMATOID FACTOR PRESENT (HCC): ICD-10-CM

## 2021-03-23 DIAGNOSIS — E78.5 HYPERLIPIDEMIA, UNSPECIFIED HYPERLIPIDEMIA TYPE: ICD-10-CM

## 2021-03-23 PROBLEM — S62.308A CLOSED FRACTURE OF 4TH METACARPAL: Status: RESOLVED | Noted: 2018-03-03 | Resolved: 2021-03-23

## 2021-03-23 PROBLEM — S12.001D: Status: RESOLVED | Noted: 2018-03-03 | Resolved: 2021-03-23

## 2021-03-23 PROBLEM — N17.9 AKI (ACUTE KIDNEY INJURY) (HCC): Status: RESOLVED | Noted: 2020-01-11 | Resolved: 2021-03-23

## 2021-03-23 PROBLEM — R55 SYNCOPE: Status: RESOLVED | Noted: 2020-01-11 | Resolved: 2021-03-23

## 2021-03-23 PROCEDURE — 99214 OFFICE O/P EST MOD 30 MIN: CPT | Performed by: FAMILY MEDICINE

## 2021-03-23 PROCEDURE — G0439 PPPS, SUBSEQ VISIT: HCPCS | Performed by: FAMILY MEDICINE

## 2021-03-23 PROCEDURE — 1123F ACP DISCUSS/DSCN MKR DOCD: CPT | Performed by: FAMILY MEDICINE

## 2021-03-23 NOTE — PROGRESS NOTES
Assessment and Plan:   Patient is here for SUBSEquent Medicare exam   Problem List Items Addressed This Visit        Cardiovascular and Mediastinum    Essential hypertension - Primary    Relevant Orders    Comprehensive metabolic panel    CBC and differential    TSH, 3rd generation with Free T4 reflex       Musculoskeletal and Integument    Rheumatoid arthritis ([facility] [address] )       Other    HLD (hyperlipidemia)    Relevant Orders    Cholesterol, total      Other Visit Diagnoses     Medicare annual wellness visit, subsequent               Preventive health issues were discussed with patient, and age appropriate screening tests were ordered as noted in patient's After Visit Summary  Personalized health advice and appropriate referrals for health education or preventive services given if needed, as noted in patient's After Visit Summary  History of Present Illness:     Patient presents for Annual Medicare visit       Patient Care Team:  Jarod Rodarte DO as PCP - General  MD Abena Mcdowell DO     Review of Systems:        Problem List:     Patient Active Problem List   Diagnosis    Trigeminal neuralgia    History of TIA (transient ischemic attack)    Rheumatoid arthritis ([facility] [address] )    Arteriosclerotic heart disease    Essential hypertension    Fatty liver    HLD (hyperlipidemia)    Other constipation    Nerve sheath tumor    Transient cerebral ischemia      Past Medical and Surgical History:     Past Medical History:   Diagnosis Date    Abnormal liver function test     last assessed: 3/25/2014    KARIME (acute kidney injury) ([facility] [address] ) 1/11/2020    Anemia     Chronic constipation     last assessed: 12/2/2014    Closed fracture of 4th metacarpal 3/3/2018    Herpes zoster     last assessed: 3/16/2016    Hyperlipidemia     Hypertension     Open nondisplaced fracture of first cervical vertebra with routine healing 3/3/2018    Psoriatic arthritis ([facility] [address] )     last assessed;3/25/2014    Recent weight loss last assessed: 7/13/2016    Rheumatoid arthritis ([facility] [address] )     Stroke ([facility] [address] )     Syncope 1/11/2020    Trigeminal neuritis      Past Surgical History:   Procedure Laterality Date    APPENDECTOMY      FOOT SURGERY      KIDNEY SURGERY        Family History:     Family History   Problem Relation Age of Onset    No Known Problems Father     No Known Problems Mother     No Known Problems Maternal Grandmother     No Known Problems Paternal Grandmother     No Known Problems Paternal Aunt     No Known Problems Paternal Aunt       Social History:     E-Cigarette/Vaping    E-Cigarette Use Never User      E-Cigarette/Vaping Substances    Nicotine No     THC No     CBD No     Flavoring No     Other No     Unknown No      Social History     Socioeconomic History    Marital status: /Civil Union     Spouse name: None    Number of children: None    Years of education: high school graduate     Highest education level: None   Occupational History    Occupation: Retired   Social Needs    Financial resource strain: None    Food insecurity     Worry: None     Inability: None    Transportation needs     Medical: None     Non-medical: None   Tobacco Use    Smoking status: Never Smoker    Smokeless tobacco: Never Used   Substance and Sexual Activity    Alcohol use: Not Currently    Drug use: No    Sexual activity: None   Lifestyle    Physical activity     Days per week: None     Minutes per session: None    Stress: None   Relationships    Social connections     Talks on phone: None     Gets together: None     Attends Restoration service: None     Active member of club or organization: None     Attends meetings of clubs or organizations: None     Relationship status: None    Intimate partner violence     Fear of current or ex partner: None     Emotionally abused: None     Physically abused: None     Forced sexual activity: None   Other Topics Concern    None   Social History Narrative    None Medications and Allergies:     Current Outpatient Medications   Medication Sig Dispense Refill    clobetasol (TEMOVATE) 0 05 % external solution Apply topically daily 50 mL 1    clopidogrel (PLAVIX) 75 mg tablet Take 1 tablet (75 mg total) by mouth daily at bedtime 90 tablet 1    diltiazem (TIAZAC) 240 MG 24 hr capsule Take 1 capsule (240 mg total) by mouth daily 90 capsule 1    folic acid (FOLVITE) 1 mg tablet Take 1 mg by mouth 3 (three) times a day       latanoprost (XALATAN) 0 005 % ophthalmic solution Administer 1 drop to both eyes daily at bedtime       methotrexate 2 5 mg tablet Take 7 tablets (17 5 mg total) by mouth once a week 12 tablet 0    simvastatin (ZOCOR) 40 mg tablet Take 1 tablet (40 mg total) by mouth daily at bedtime 90 tablet 1     No current facility-administered medications for this visit  Allergies   Allergen Reactions    Amoxicillin Edema    Penicillins Edema      Immunizations:     Immunization History   Administered Date(s) Administered    INFLUENZA 09/16/2012, 10/12/2013, 08/09/2014, 09/09/2014, 10/15/2015, 10/17/2015, 10/01/2016    Influenza Split High Dose Preservative Free IM 09/09/2014, 10/15/2015, 10/01/2016, 08/22/2017    Influenza, high dose seasonal 0 7 mL 09/26/2018, 10/15/2019, 10/16/2020    Influenza, seasonal, injectable 10/12/2013    Pneumococcal Conjugate 13-Valent 04/14/2015    Pneumococcal Polysaccharide PPV23 11/11/2008    Tdap 03/02/2018    Zoster 01/03/2009    Zoster Vaccine Recombinant 11/02/2019, 01/26/2020      Health Maintenance:         Topic Date Due    DXA SCAN  01/29/2022         Topic Date Due    COVID-19 Vaccine (1) Never done      Medicare Screening Tests and Risk Assessments: Sia Chavez is here for her Subsequent Wellness visit  Last Medicare Wellness visit information reviewed, patient interviewed and updates made to the record today  Health Risk Assessment:   Patient rates overall health as good   Patient feels that their physical health rating is same  Patient is very satisfied with their life  Eyesight was rated as same  Hearing was rated as same  Patient feels that their emotional and mental health rating is same  Patients states they are never, rarely angry  Patient states they are never, rarely unusually tired/fatigued  Pain experienced in the last 7 days has been none  Patient states that she has experienced no weight loss or gain in last 6 months  Depression Screening:   PHQ-2 Score: 0      Fall Risk Screening: In the past year, patient has experienced: no history of falling in past year      Urinary Incontinence Screening:   Patient has not leaked urine accidently in the last six months  Home Safety:  Patient does not have trouble with stairs inside or outside of their home  Patient has working smoke alarms and has working carbon monoxide detector  Home safety hazards include: none  Nutrition:   Current diet is Regular  Medications:   Patient is currently taking over-the-counter supplements  OTC medications include: see medication list  Patient is able to manage medications  Activities of Daily Living (ADLs)/Instrumental Activities of Daily Living (IADLs):   Walk and transfer into and out of bed and chair?: Yes  Dress and groom yourself?: Yes    Bathe or shower yourself?: Yes    Feed yourself? Yes  Do your laundry/housekeeping?: Yes  Manage your money, pay your bills and track your expenses?: Yes  Make your own meals?: Yes    Do your own shopping?: Yes    Previous Hospitalizations:   Any hospitalizations or ED visits within the last 12 months?: No      Advance Care Planning:   Living will: Yes    Durable POA for healthcare:  Yes    Advanced directive: Yes    End of Life Decisions reviewed with patient: Yes      Cognitive Screening:   Provider or family/friend/caregiver concerned regarding cognition?: No    PREVENTIVE SCREENINGS      Cardiovascular Screening:    General: Screening Not Indicated and History Lipid Disorder      Diabetes Screening:     General: Screening Current      Breast Cancer Screening:     General: Screening Current      Cervical Cancer Screening:    General: Screening Not Indicated      Osteoporosis Screening:    General: Screening Current      Abdominal Aortic Aneurysm (AAA) Screening:        General: Screening Not Indicated      Lung Cancer Screening:     General: Screening Not Indicated      Hepatitis C Screening:    General: Screening Not Indicated    Screening, Brief Intervention, and Referral to Treatment (SBIRT)    Screening  Typical number of drinks in a day: 0  Typical number of drinks in a week: 0  Interpretation: Low risk drinking behavior      AUDIT-C Screenin) How often did you have a drink containing alcohol in the past year? never  2) How many drinks did you have on a typical day when you were drinking in the past year? never  3) How often did you have 6 or more drinks on one occasion in the past year? never    AUDIT-C Score: 0  Interpretation: Score 0-2 (female): Negative screen for alcohol misuse    Single Item Drug Screening:  How often have you used an illegal drug (including marijuana) or a prescription medication for non-medical reasons in the past year? never    Single Item Drug Screen Score: 0  Interpretation: Negative screen for possible drug use disorder         Physical Exam:     /62 (BP Location: Left arm, Patient Position: Sitting)   Pulse 61   Temp 98 °F (36 7 °C) (Tympanic)   Resp 18   Ht 5' 5 25" (1 657 m)   Wt 57 7 kg (127 lb 3 2 oz)   SpO2 96%   BMI 21 01 kg/m²          Jasmyne Barbour DO

## 2021-03-23 NOTE — PROGRESS NOTES
Assessment/Plan:    Patient is 24-year-old female seen for follow-up chronic medical conditions  1  Essential hypertension    Blood pressure stable on current medication   - Comprehensive metabolic panel; Future  - CBC and differential; Future  - TSH, 3rd generation with Free T4 reflex; Future    2  Hyperlipidemia, unspecified hyperlipidemia type    Cholesterol 161 and LDL 56  Continue present dose of statin  - Cholesterol, total; Future    3  Medicare annual wellness visit, subsequent    See 2nd note  4  Rheumatoid arthritis, involving unspecified site, unspecified whether rheumatoid factor present [facility])  Sees Dr Carl Bell every six months for Rheumatoid arthritis  Sees dentist and eye doctor regularly  Discussed COVID vaccine - she had held off on scheduling because of mammogram   Diagnoses and all orders for this visit:    Essential hypertension  -     Comprehensive metabolic panel; Future  -     CBC and differential; Future  -     TSH, 3rd generation with Free T4 reflex; Future    Hyperlipidemia, unspecified hyperlipidemia type  -     Cholesterol, total; Future    Medicare annual wellness visit, subsequent    Rheumatoid arthritis, involving unspecified site, unspecified whether rheumatoid factor present [facility])          Subjective:   Chief Complaint   Patient presents with    Follow-up    Medicare Wellness Visit        Patient ID: Gabriel Eldridge is a 66 y o  female  Patient is here for follow up of chronic medical conditions  She is feeling well  Her  is being treated for cancer  She has no concerns for herself except for her arthritis  The following portions of the patient's history were reviewed and updated as appropriate: allergies, current medications, past family history, past medical history, past social history, past surgical history and problem list     Review of Systems   Constitutional: Negative for chills and fever  HENT: Negative for congestion and sore throat  Respiratory: Negative for chest tightness  Cardiovascular: Negative for chest pain and palpitations  Gastrointestinal: Negative for abdominal pain, constipation, diarrhea and nausea  Genitourinary: Negative for difficulty urinating  Skin: Negative  Neurological: Negative for dizziness and headaches  Psychiatric/Behavioral: Negative  Objective:      /62 (BP Location: Left arm, Patient Position: Sitting)   Pulse 61   Temp 98 °F (36 7 °C) (Tympanic)   Resp 18   Ht 5' 5 25" (1 657 m)   Wt 57 7 kg (127 lb 3 2 oz)   SpO2 96%   BMI 21 01 kg/m²          Physical Exam  Vitals signs and nursing note reviewed  Constitutional:       General: She is not in acute distress  HENT:      Head: Normocephalic  Right Ear: Tympanic membrane normal       Left Ear: Tympanic membrane normal    Neck:      Thyroid: No thyromegaly  Cardiovascular:      Rate and Rhythm: Normal rate and regular rhythm  Heart sounds: Normal heart sounds  Pulmonary:      Effort: Pulmonary effort is normal       Breath sounds: Normal breath sounds  Musculoskeletal:      Right lower leg: No edema  Left lower leg: No edema  Lymphadenopathy:      Cervical: No cervical adenopathy  Skin:     General: Skin is warm and dry  Neurological:      Mental Status: She is alert and oriented to person, place, and time     Psychiatric:         Mood and Affect: Mood normal

## 2021-03-23 NOTE — PATIENT INSTRUCTIONS
Medicare Preventive Visit Patient Instructions  Thank you for completing your Welcome to Medicare Visit or Medicare Annual Wellness Visit today  Your next wellness visit will be due in one year (3/24/2022)  The screening/preventive services that you may require over the next 5-10 years are detailed below  Some tests may not apply to you based off risk factors and/or age  Screening tests ordered at today's visit but not completed yet may show as past due  Also, please note that scanned in results may not display below  Preventive Screenings:  Service Recommendations Previous Testing/Comments   Colorectal Cancer Screening  * Colonoscopy    * Fecal Occult Blood Test (FOBT)/Fecal Immunochemical Test (FIT)  * Fecal DNA/Cologuard Test  * Flexible Sigmoidoscopy Age: 54-65 years old   Colonoscopy: every 10 years (may be performed more frequently if at higher risk)  OR  FOBT/FIT: every 1 year  OR  Cologuard: every 3 years  OR  Sigmoidoscopy: every 5 years  Screening may be recommended earlier than age 48 if at higher risk for colorectal cancer  Also, an individualized decision between you and your healthcare provider will decide whether screening between the ages of 74-80 would be appropriate  Colonoscopy: Not on file  FOBT/FIT: Not on file  Cologuard: 03/10/2020  Sigmoidoscopy: Not on file          Breast Cancer Screening Age: 36 years old  Frequency: every 1-2 years  Not required if history of left and right mastectomy Mammogram: 01/29/2020    Screening Current   Cervical Cancer Screening Between the ages of 21-29, pap smear recommended once every 3 years  Between the ages of 33-67, can perform pap smear with HPV co-testing every 5 years     Recommendations may differ for women with a history of total hysterectomy, cervical cancer, or abnormal pap smears in past  Pap Smear: Not on file    Screening Not Indicated   Hepatitis C Screening Once for adults born between 1945 and 1965  More frequently in patients at high risk for Hepatitis C Hep C Antibody: Not on file        Diabetes Screening 1-2 times per year if you're at risk for diabetes or have pre-diabetes Fasting glucose: 92 mg/dL   A1C: No results in last 5 years    Screening Current   Cholesterol Screening Once every 5 years if you don't have a lipid disorder  May order more often based on risk factors  Lipid panel: 03/12/2021    Screening Not Indicated  History Lipid Disorder     Other Preventive Screenings Covered by Medicare:  1  Abdominal Aortic Aneurysm (AAA) Screening: covered once if your at risk  You're considered to be at risk if you have a family history of AAA  2  Lung Cancer Screening: covers low dose CT scan once per year if you meet all of the following conditions: (1) Age 50-69; (2) No signs or symptoms of lung cancer; (3) Current smoker or have quit smoking within the last 15 years; (4) You have a tobacco smoking history of at least 30 pack years (packs per day multiplied by number of years you smoked); (5) You get a written order from a healthcare provider  3  Glaucoma Screening: covered annually if you're considered high risk: (1) You have diabetes OR (2) Family history of glaucoma OR (3)  aged 48 and older OR (3)  American aged 72 and older  3  Osteoporosis Screening: covered every 2 years if you meet one of the following conditions: (1) You're estrogen deficient and at risk for osteoporosis based off medical history and other findings; (2) Have a vertebral abnormality; (3) On glucocorticoid therapy for more than 3 months; (4) Have primary hyperparathyroidism; (5) On osteoporosis medications and need to assess response to drug therapy  · Last bone density test (DXA Scan): 02/01/2020   5  HIV Screening: covered annually if you're between the age of 15-65  Also covered annually if you are younger than 13 and older than 72 with risk factors for HIV infection   For pregnant patients, it is covered up to 3 times per pregnancy  Immunizations:  Immunization Recommendations   Influenza Vaccine Annual influenza vaccination during flu season is recommended for all persons aged >= 6 months who do not have contraindications   Pneumococcal Vaccine (Prevnar and Pneumovax)  * Prevnar = PCV13  * Pneumovax = PPSV23   Adults 25-60 years old: 1-3 doses may be recommended based on certain risk factors  Adults 72 years old: Prevnar (PCV13) vaccine recommended followed by Pneumovax (PPSV23) vaccine  If already received PPSV23 since turning 65, then PCV13 recommended at least one year after PPSV23 dose  Hepatitis B Vaccine 3 dose series if at intermediate or high risk (ex: diabetes, end stage renal disease, liver disease)   Tetanus (Td) Vaccine - COST NOT COVERED BY MEDICARE PART B Following completion of primary series, a booster dose should be given every 10 years to maintain immunity against tetanus  Td may also be given as tetanus wound prophylaxis  Tdap Vaccine - COST NOT COVERED BY MEDICARE PART B Recommended at least once for all adults  For pregnant patients, recommended with each pregnancy  Shingles Vaccine (Shingrix) - COST NOT COVERED BY MEDICARE PART B  2 shot series recommended in those aged 48 and above     Health Maintenance Due:      Topic Date Due    DXA SCAN  01/29/2022     Immunizations Due:      Topic Date Due    COVID-19 Vaccine (1) Never done     Advance Directives   What are advance directives? Advance directives are legal documents that state your wishes and plans for medical care  These plans are made ahead of time in case you lose your ability to make decisions for yourself  Advance directives can apply to any medical decision, such as the treatments you want, and if you want to donate organs  What are the types of advance directives? There are many types of advance directives, and each state has rules about how to use them  You may choose a combination of any of the following:  · Living will:   This is a written record of the treatment you want  You can also choose which treatments you do not want, which to limit, and which to stop at a certain time  This includes surgery, medicine, IV fluid, and tube feedings  · Durable power of  for healthcare [facility]): This is a written record that states who you want to make healthcare choices for you when you are unable to make them for yourself  This person, called a proxy, is usually a family member or a friend  You may choose more than 1 proxy  · Do not resuscitate (DNR) order:  A DNR order is used in case your heart stops beating or you stop breathing  It is a request not to have certain forms of treatment, such as CPR  A DNR order may be included in other types of advance directives  · Medical directive: This covers the care that you want if you are in a coma, near death, or unable to make decisions for yourself  You can list the treatments you want for each condition  Treatment may include pain medicine, surgery, blood transfusions, dialysis, IV or tube feedings, and a ventilator (breathing machine)  · Values history: This document has questions about your views, beliefs, and how you feel and think about life  This information can help others choose the care that you would choose  Why are advance directives important? An advance directive helps you control your care  Although spoken wishes may be used, it is better to have your wishes written down  Spoken wishes can be misunderstood, or not followed  Treatments may be given even if you do not want them  An advance directive may make it easier for your family to make difficult choices about your care  © Copyright [employer] 2018 Information is for End User's use only and may not be sold, redistributed or otherwise used for commercial purposes   All illustrations and images included in CareNotes® are the copyrighted property of [employer]  or [facility] [employer]

## 2021-03-25 ENCOUNTER — TELEPHONE (OUTPATIENT)
Dept: FAMILY MEDICINE CLINIC | Facility: CLINIC | Age: 76
End: 2021-03-25

## 2021-03-25 NOTE — TELEPHONE ENCOUNTER
LMOM for pt to call back, she and her  were interested in COVID vaccines and there are openings at [address] [place] [address] next week

## 2021-03-30 ENCOUNTER — IMMUNIZATIONS (OUTPATIENT)
Dept: FAMILY MEDICINE CLINIC | Facility: HOSPITAL | Age: 76
End: 2021-03-30

## 2021-03-30 DIAGNOSIS — Z23 ENCOUNTER FOR IMMUNIZATION: Primary | ICD-10-CM

## 2021-03-30 PROCEDURE — 0011A SARS-COV-2 / COVID-19 MRNA VACCINE (MODERNA) 100 MCG: CPT

## 2021-03-30 PROCEDURE — 91301 SARS-COV-2 / COVID-19 MRNA VACCINE (MODERNA) 100 MCG: CPT

## 2021-03-31 ENCOUNTER — TELEPHONE (OUTPATIENT)
Dept: FAMILY MEDICINE CLINIC | Facility: CLINIC | Age: 76
End: 2021-03-31

## 2021-03-31 NOTE — TELEPHONE ENCOUNTER
Pt called asking about her mammo after COVID vaccine, advised pt to wait 6 weeks after vaccine to have her mammo

## 2021-04-29 ENCOUNTER — IMMUNIZATIONS (OUTPATIENT)
Dept: FAMILY MEDICINE CLINIC | Facility: HOSPITAL | Age: 76
End: 2021-04-29

## 2021-04-29 DIAGNOSIS — Z23 ENCOUNTER FOR IMMUNIZATION: Primary | ICD-10-CM

## 2021-04-29 PROCEDURE — 0012A SARS-COV-2 / COVID-19 MRNA VACCINE (MODERNA) 100 MCG: CPT

## 2021-04-29 PROCEDURE — 91301 SARS-COV-2 / COVID-19 MRNA VACCINE (MODERNA) 100 MCG: CPT

## 2021-05-10 DIAGNOSIS — Z86.73 HISTORY OF TIA (TRANSIENT ISCHEMIC ATTACK): ICD-10-CM

## 2021-05-10 RX ORDER — CLOPIDOGREL BISULFATE 75 MG/1
75 TABLET ORAL
Qty: 90 TABLET | Refills: 1 | Status: SHIPPED | OUTPATIENT
Start: 2021-05-10 | End: 2021-11-08 | Stop reason: SDUPTHER

## 2021-06-24 ENCOUNTER — HOSPITAL ENCOUNTER (OUTPATIENT)
Dept: MAMMOGRAPHY | Facility: MEDICAL CENTER | Age: 76
Discharge: HOME | End: 2021-06-24
Payer: MEDICARE

## 2021-06-24 VITALS — BODY MASS INDEX: 21.16 KG/M2 | HEIGHT: 65 IN | WEIGHT: 127 LBS

## 2021-06-24 DIAGNOSIS — Z12.31 ENCOUNTER FOR SCREENING MAMMOGRAM FOR MALIGNANT NEOPLASM OF BREAST: ICD-10-CM

## 2021-06-24 PROCEDURE — 77063 BREAST TOMOSYNTHESIS BI: CPT

## 2021-06-24 PROCEDURE — 77067 SCR MAMMO BI INCL CAD: CPT

## 2021-07-21 ENCOUNTER — APPOINTMENT (OUTPATIENT)
Dept: LAB | Facility: CLINIC | Age: 76
End: 2021-07-21
Payer: MEDICARE

## 2021-07-21 DIAGNOSIS — I10 ESSENTIAL HYPERTENSION: ICD-10-CM

## 2021-07-21 DIAGNOSIS — E78.5 HYPERLIPIDEMIA, UNSPECIFIED HYPERLIPIDEMIA TYPE: ICD-10-CM

## 2021-07-21 LAB
ALBUMIN SERPL BCP-MCNC: 3.6 G/DL (ref 3.5–5)
ALP SERPL-CCNC: 75 U/L (ref 46–116)
ALT SERPL W P-5'-P-CCNC: 22 U/L (ref 12–78)
ANION GAP SERPL CALCULATED.3IONS-SCNC: 7 MMOL/L (ref 4–13)
AST SERPL W P-5'-P-CCNC: 19 U/L (ref 5–45)
BASOPHILS # BLD AUTO: 0.03 THOUSANDS/ΜL (ref 0–0.1)
BASOPHILS NFR BLD AUTO: 1 % (ref 0–1)
BILIRUB SERPL-MCNC: 0.81 MG/DL (ref 0.2–1)
BUN SERPL-MCNC: 14 MG/DL (ref 5–25)
CALCIUM SERPL-MCNC: 9.6 MG/DL (ref 8.3–10.1)
CHLORIDE SERPL-SCNC: 104 MMOL/L (ref 100–108)
CHOLEST SERPL-MCNC: 158 MG/DL (ref 50–200)
CO2 SERPL-SCNC: 28 MMOL/L (ref 21–32)
CREAT SERPL-MCNC: 0.77 MG/DL (ref 0.6–1.3)
EOSINOPHIL # BLD AUTO: 0.03 THOUSAND/ΜL (ref 0–0.61)
EOSINOPHIL NFR BLD AUTO: 1 % (ref 0–6)
ERYTHROCYTE [DISTWIDTH] IN BLOOD BY AUTOMATED COUNT: 14.3 % (ref 11.6–15.1)
GFR SERPL CREATININE-BSD FRML MDRD: 75 ML/MIN/1.73SQ M
GLUCOSE P FAST SERPL-MCNC: 89 MG/DL (ref 65–99)
HCT VFR BLD AUTO: 44.8 % (ref 34.8–46.1)
HGB BLD-MCNC: 14.7 G/DL (ref 11.5–15.4)
IMM GRANULOCYTES # BLD AUTO: 0.03 THOUSAND/UL (ref 0–0.2)
IMM GRANULOCYTES NFR BLD AUTO: 1 % (ref 0–2)
LYMPHOCYTES # BLD AUTO: 1.49 THOUSANDS/ΜL (ref 0.6–4.47)
LYMPHOCYTES NFR BLD AUTO: 25 % (ref 14–44)
MCH RBC QN AUTO: 33.7 PG (ref 26.8–34.3)
MCHC RBC AUTO-ENTMCNC: 32.8 G/DL (ref 31.4–37.4)
MCV RBC AUTO: 103 FL (ref 82–98)
MONOCYTES # BLD AUTO: 0.72 THOUSAND/ΜL (ref 0.17–1.22)
MONOCYTES NFR BLD AUTO: 12 % (ref 4–12)
NEUTROPHILS # BLD AUTO: 3.79 THOUSANDS/ΜL (ref 1.85–7.62)
NEUTS SEG NFR BLD AUTO: 60 % (ref 43–75)
NRBC BLD AUTO-RTO: 0 /100 WBCS
PLATELET # BLD AUTO: 219 THOUSANDS/UL (ref 149–390)
PMV BLD AUTO: 10.7 FL (ref 8.9–12.7)
POTASSIUM SERPL-SCNC: 3.8 MMOL/L (ref 3.5–5.3)
PROT SERPL-MCNC: 7.2 G/DL (ref 6.4–8.2)
RBC # BLD AUTO: 4.36 MILLION/UL (ref 3.81–5.12)
SODIUM SERPL-SCNC: 139 MMOL/L (ref 136–145)
TSH SERPL DL<=0.05 MIU/L-ACNC: 1.38 UIU/ML (ref 0.36–3.74)
WBC # BLD AUTO: 6.09 THOUSAND/UL (ref 4.31–10.16)

## 2021-07-21 PROCEDURE — 85025 COMPLETE CBC W/AUTO DIFF WBC: CPT

## 2021-07-21 PROCEDURE — 84443 ASSAY THYROID STIM HORMONE: CPT

## 2021-07-21 PROCEDURE — 80053 COMPREHEN METABOLIC PANEL: CPT

## 2021-07-21 PROCEDURE — 82465 ASSAY BLD/SERUM CHOLESTEROL: CPT

## 2021-07-21 PROCEDURE — 36415 COLL VENOUS BLD VENIPUNCTURE: CPT

## 2021-07-27 ENCOUNTER — OFFICE VISIT (OUTPATIENT)
Dept: FAMILY MEDICINE CLINIC | Facility: CLINIC | Age: 76
End: 2021-07-27
Payer: MEDICARE

## 2021-07-27 VITALS
RESPIRATION RATE: 17 BRPM | HEART RATE: 90 BPM | HEIGHT: 65 IN | DIASTOLIC BLOOD PRESSURE: 62 MMHG | SYSTOLIC BLOOD PRESSURE: 126 MMHG | WEIGHT: 129.2 LBS | BODY MASS INDEX: 21.52 KG/M2 | TEMPERATURE: 99 F | OXYGEN SATURATION: 94 %

## 2021-07-27 DIAGNOSIS — Z86.73 HISTORY OF TIA (TRANSIENT ISCHEMIC ATTACK): ICD-10-CM

## 2021-07-27 DIAGNOSIS — M06.9 RHEUMATOID ARTHRITIS, INVOLVING UNSPECIFIED SITE, UNSPECIFIED WHETHER RHEUMATOID FACTOR PRESENT (HCC): ICD-10-CM

## 2021-07-27 DIAGNOSIS — E78.5 HYPERLIPIDEMIA, UNSPECIFIED HYPERLIPIDEMIA TYPE: ICD-10-CM

## 2021-07-27 DIAGNOSIS — I10 ESSENTIAL HYPERTENSION: Primary | ICD-10-CM

## 2021-07-27 PROCEDURE — 99213 OFFICE O/P EST LOW 20 MIN: CPT | Performed by: FAMILY MEDICINE

## 2021-07-27 NOTE — PROGRESS NOTES
Assessment/Plan:   patient is 24-year-old female seen for follow-up of chronic medical conditions  Her blood pressure stable  She she follows with rheumatology for rheumatoid arthritis  I will see her back   Diagnoses and all orders for this visit:    Essential hypertension    History of TIA (transient ischemic attack)    Hyperlipidemia, unspecified hyperlipidemia type    Rheumatoid arthritis, involving unspecified site, unspecified whether rheumatoid factor present [facility])          Subjective:   Chief Complaint   Patient presents with    Follow-up     4 month, BW review         Patient ID: Lorraine Starks is a 66 y o  female  Patient is here for follow up of chronic medical conditions  Since I saw her last she had skin cancer removed from nose - dermatologist across from [facility]    is on chemo  Sees Dr Juan Xavier next week  The following portions of the patient's history were reviewed and updated as appropriate: allergies, current medications, past family history, past medical history, past social history, past surgical history and problem list     Review of Systems   Constitutional: Negative for chills and fever  HENT: Negative for congestion and sore throat  Respiratory: Negative for chest tightness  Cardiovascular: Negative for chest pain and palpitations  Gastrointestinal: Negative for abdominal pain, constipation, diarrhea and nausea  Genitourinary: Negative for difficulty urinating  Musculoskeletal: Positive for arthralgias  Skin: Negative  Neurological: Negative for dizziness and headaches  Psychiatric/Behavioral: Negative  Objective:      /62 (BP Location: Right arm, Patient Position: Sitting, Cuff Size: Adult)   Pulse 90   Temp 99 °F (37 2 °C) (Temporal)   Resp 17   Ht 5' 5 25" (1 657 m)   Wt 58 6 kg (129 lb 3 2 oz)   SpO2 94%   BMI 21 34 kg/m²          Physical Exam  Vitals and nursing note reviewed     Constitutional: General: She is not in acute distress  Neck:      Thyroid: No thyromegaly  Cardiovascular:      Rate and Rhythm: Normal rate and regular rhythm  Heart sounds: Normal heart sounds  Pulmonary:      Effort: Pulmonary effort is normal       Breath sounds: Normal breath sounds  Lymphadenopathy:      Cervical: No cervical adenopathy  Skin:     General: Skin is warm and dry  Neurological:      Mental Status: She is alert and oriented to person, place, and time

## 2021-07-30 ENCOUNTER — APPOINTMENT (OUTPATIENT)
Dept: LAB | Facility: CLINIC | Age: 76
End: 2021-07-30
Payer: MEDICARE

## 2021-07-30 DIAGNOSIS — L40.50 PSORIATIC ARTHROPATHY (HCC): ICD-10-CM

## 2021-07-30 LAB
ALBUMIN SERPL BCP-MCNC: 3.6 G/DL (ref 3.5–5)
ALP SERPL-CCNC: 74 U/L (ref 46–116)
ALT SERPL W P-5'-P-CCNC: 18 U/L (ref 12–78)
ANION GAP SERPL CALCULATED.3IONS-SCNC: 3 MMOL/L (ref 4–13)
AST SERPL W P-5'-P-CCNC: 19 U/L (ref 5–45)
BASOPHILS # BLD AUTO: 0.02 THOUSANDS/ΜL (ref 0–0.1)
BASOPHILS NFR BLD AUTO: 0 % (ref 0–1)
BILIRUB SERPL-MCNC: 0.72 MG/DL (ref 0.2–1)
BUN SERPL-MCNC: 13 MG/DL (ref 5–25)
CALCIUM SERPL-MCNC: 9.6 MG/DL (ref 8.3–10.1)
CHLORIDE SERPL-SCNC: 106 MMOL/L (ref 100–108)
CO2 SERPL-SCNC: 29 MMOL/L (ref 21–32)
CREAT SERPL-MCNC: 0.79 MG/DL (ref 0.6–1.3)
EOSINOPHIL # BLD AUTO: 0.12 THOUSAND/ΜL (ref 0–0.61)
EOSINOPHIL NFR BLD AUTO: 2 % (ref 0–6)
ERYTHROCYTE [DISTWIDTH] IN BLOOD BY AUTOMATED COUNT: 14 % (ref 11.6–15.1)
ERYTHROCYTE [SEDIMENTATION RATE] IN BLOOD: 11 MM/HOUR (ref 0–29)
GFR SERPL CREATININE-BSD FRML MDRD: 72 ML/MIN/1.73SQ M
GLUCOSE P FAST SERPL-MCNC: 99 MG/DL (ref 65–99)
HCT VFR BLD AUTO: 44.5 % (ref 34.8–46.1)
HGB BLD-MCNC: 14.7 G/DL (ref 11.5–15.4)
IMM GRANULOCYTES # BLD AUTO: 0.02 THOUSAND/UL (ref 0–0.2)
IMM GRANULOCYTES NFR BLD AUTO: 0 % (ref 0–2)
LYMPHOCYTES # BLD AUTO: 2.03 THOUSANDS/ΜL (ref 0.6–4.47)
LYMPHOCYTES NFR BLD AUTO: 33 % (ref 14–44)
MCH RBC QN AUTO: 34.1 PG (ref 26.8–34.3)
MCHC RBC AUTO-ENTMCNC: 33 G/DL (ref 31.4–37.4)
MCV RBC AUTO: 103 FL (ref 82–98)
MONOCYTES # BLD AUTO: 0.6 THOUSAND/ΜL (ref 0.17–1.22)
MONOCYTES NFR BLD AUTO: 10 % (ref 4–12)
NEUTROPHILS # BLD AUTO: 3.35 THOUSANDS/ΜL (ref 1.85–7.62)
NEUTS SEG NFR BLD AUTO: 55 % (ref 43–75)
NRBC BLD AUTO-RTO: 0 /100 WBCS
PLATELET # BLD AUTO: 226 THOUSANDS/UL (ref 149–390)
PMV BLD AUTO: 10.7 FL (ref 8.9–12.7)
POTASSIUM SERPL-SCNC: 4.2 MMOL/L (ref 3.5–5.3)
PROT SERPL-MCNC: 7 G/DL (ref 6.4–8.2)
RBC # BLD AUTO: 4.31 MILLION/UL (ref 3.81–5.12)
SODIUM SERPL-SCNC: 138 MMOL/L (ref 136–145)
WBC # BLD AUTO: 6.14 THOUSAND/UL (ref 4.31–10.16)

## 2021-07-30 PROCEDURE — 36415 COLL VENOUS BLD VENIPUNCTURE: CPT

## 2021-07-30 PROCEDURE — 80053 COMPREHEN METABOLIC PANEL: CPT

## 2021-07-30 PROCEDURE — 85025 COMPLETE CBC W/AUTO DIFF WBC: CPT

## 2021-07-30 PROCEDURE — 85652 RBC SED RATE AUTOMATED: CPT

## 2021-09-09 DIAGNOSIS — E78.2 MIXED HYPERLIPIDEMIA: ICD-10-CM

## 2021-09-09 DIAGNOSIS — I10 BENIGN ESSENTIAL HYPERTENSION: ICD-10-CM

## 2021-09-09 RX ORDER — DILTIAZEM HYDROCHLORIDE 240 MG/1
240 CAPSULE, EXTENDED RELEASE ORAL DAILY
Qty: 90 CAPSULE | Refills: 1 | Status: SHIPPED | OUTPATIENT
Start: 2021-09-09 | End: 2022-03-03 | Stop reason: SDUPTHER

## 2021-09-09 RX ORDER — SIMVASTATIN 40 MG
40 TABLET ORAL
Qty: 90 TABLET | Refills: 1 | Status: SHIPPED | OUTPATIENT
Start: 2021-09-09 | End: 2022-03-11 | Stop reason: SDUPTHER

## 2021-10-18 DIAGNOSIS — L40.9 PSORIASIS: ICD-10-CM

## 2021-10-18 RX ORDER — CLOBETASOL PROPIONATE 0.46 MG/ML
SOLUTION TOPICAL
Qty: 50 ML | Refills: 0 | Status: SHIPPED | OUTPATIENT
Start: 2021-10-18 | End: 2022-03-07

## 2021-11-08 DIAGNOSIS — Z86.73 HISTORY OF TIA (TRANSIENT ISCHEMIC ATTACK): ICD-10-CM

## 2021-11-09 RX ORDER — CLOPIDOGREL BISULFATE 75 MG/1
75 TABLET ORAL
Qty: 90 TABLET | Refills: 1 | Status: SHIPPED | OUTPATIENT
Start: 2021-11-09 | End: 2022-05-04 | Stop reason: SDUPTHER

## 2021-11-30 ENCOUNTER — OFFICE VISIT (OUTPATIENT)
Dept: FAMILY MEDICINE CLINIC | Facility: CLINIC | Age: 76
End: 2021-11-30
Payer: MEDICARE

## 2021-11-30 VITALS
RESPIRATION RATE: 18 BRPM | HEART RATE: 60 BPM | BODY MASS INDEX: 20.56 KG/M2 | TEMPERATURE: 98.9 F | SYSTOLIC BLOOD PRESSURE: 132 MMHG | OXYGEN SATURATION: 98 % | DIASTOLIC BLOOD PRESSURE: 60 MMHG | WEIGHT: 123.4 LBS | HEIGHT: 65 IN

## 2021-11-30 DIAGNOSIS — E78.5 HYPERLIPIDEMIA, UNSPECIFIED HYPERLIPIDEMIA TYPE: ICD-10-CM

## 2021-11-30 DIAGNOSIS — I10 ESSENTIAL HYPERTENSION: Primary | ICD-10-CM

## 2021-11-30 DIAGNOSIS — M06.9 RHEUMATOID ARTHRITIS, INVOLVING UNSPECIFIED SITE, UNSPECIFIED WHETHER RHEUMATOID FACTOR PRESENT (HCC): ICD-10-CM

## 2021-11-30 DIAGNOSIS — E44.1 MILD PROTEIN-CALORIE MALNUTRITION (HCC): ICD-10-CM

## 2021-11-30 PROCEDURE — 99213 OFFICE O/P EST LOW 20 MIN: CPT | Performed by: FAMILY MEDICINE

## 2021-12-19 ENCOUNTER — APPOINTMENT (EMERGENCY)
Dept: RADIOLOGY | Facility: HOSPITAL | Age: 76
End: 2021-12-19
Payer: MEDICARE

## 2021-12-19 ENCOUNTER — HOSPITAL ENCOUNTER (EMERGENCY)
Facility: HOSPITAL | Age: 76
Discharge: HOME | End: 2021-12-19
Attending: EMERGENCY MEDICINE
Payer: MEDICARE

## 2021-12-19 ENCOUNTER — APPOINTMENT (EMERGENCY)
Dept: CT IMAGING | Facility: HOSPITAL | Age: 76
End: 2021-12-19
Payer: MEDICARE

## 2021-12-19 VITALS
DIASTOLIC BLOOD PRESSURE: 86 MMHG | BODY MASS INDEX: 20.98 KG/M2 | HEART RATE: 72 BPM | SYSTOLIC BLOOD PRESSURE: 170 MMHG | OXYGEN SATURATION: 99 % | RESPIRATION RATE: 16 BRPM | TEMPERATURE: 97.8 F | WEIGHT: 126.1 LBS

## 2021-12-19 DIAGNOSIS — S09.90XA INJURY OF HEAD, INITIAL ENCOUNTER: Primary | ICD-10-CM

## 2021-12-19 DIAGNOSIS — S01.81XA FACIAL LACERATION, INITIAL ENCOUNTER: ICD-10-CM

## 2021-12-19 DIAGNOSIS — S00.83XA CONTUSION OF FACE, INITIAL ENCOUNTER: ICD-10-CM

## 2021-12-19 DIAGNOSIS — S60.012A CONTUSION OF LEFT THUMB: ICD-10-CM

## 2021-12-19 LAB
ATRIAL RATE: 80 BPM
P AXIS: 65 DEGREES
PR INTERVAL: 148 MS
QRS AXIS: 57 DEGREES
QRSD INTERVAL: 78 MS
QT INTERVAL: 372 MS
QTC INTERVAL: 429 MS
T WAVE AXIS: 18 DEGREES
VENTRICULAR RATE: 80 BPM

## 2021-12-19 PROCEDURE — 99284 EMERGENCY DEPT VISIT MOD MDM: CPT

## 2021-12-19 PROCEDURE — 73030 X-RAY EXAM OF SHOULDER: CPT

## 2021-12-19 PROCEDURE — 90471 IMMUNIZATION ADMIN: CPT

## 2021-12-19 PROCEDURE — 90715 TDAP VACCINE 7 YRS/> IM: CPT | Performed by: EMERGENCY MEDICINE

## 2021-12-19 PROCEDURE — 12011 RPR F/E/E/N/L/M 2.5 CM/<: CPT | Performed by: EMERGENCY MEDICINE

## 2021-12-19 PROCEDURE — 73130 X-RAY EXAM OF HAND: CPT

## 2021-12-19 PROCEDURE — G1004 CDSM NDSC: HCPCS

## 2021-12-19 PROCEDURE — 70486 CT MAXILLOFACIAL W/O DYE: CPT

## 2021-12-19 PROCEDURE — 70450 CT HEAD/BRAIN W/O DYE: CPT

## 2021-12-19 PROCEDURE — 93010 ELECTROCARDIOGRAM REPORT: CPT

## 2021-12-19 PROCEDURE — 93005 ELECTROCARDIOGRAM TRACING: CPT

## 2021-12-19 PROCEDURE — 99284 EMERGENCY DEPT VISIT MOD MDM: CPT | Performed by: EMERGENCY MEDICINE

## 2021-12-19 RX ORDER — ACETAMINOPHEN 325 MG/1
975 TABLET ORAL ONCE
Status: COMPLETED | OUTPATIENT
Start: 2021-12-19 | End: 2021-12-19

## 2021-12-19 RX ORDER — LIDOCAINE HYDROCHLORIDE 10 MG/ML
10 INJECTION, SOLUTION EPIDURAL; INFILTRATION; INTRACAUDAL; PERINEURAL ONCE
Status: COMPLETED | OUTPATIENT
Start: 2021-12-19 | End: 2021-12-19

## 2021-12-19 RX ORDER — BACITRACIN, NEOMYCIN, POLYMYXIN B 400; 3.5; 5 [USP'U]/G; MG/G; [USP'U]/G
1 OINTMENT TOPICAL ONCE
Status: COMPLETED | OUTPATIENT
Start: 2021-12-19 | End: 2021-12-19

## 2021-12-19 RX ADMIN — TETANUS TOXOID, REDUCED DIPHTHERIA TOXOID AND ACELLULAR PERTUSSIS VACCINE, ADSORBED 0.5 ML: 5; 2.5; 8; 8; 2.5 SUSPENSION INTRAMUSCULAR at 16:44

## 2021-12-19 RX ADMIN — BACITRACIN ZINC, NEOMYCIN, POLYMYXIN B 1 SMALL APPLICATION: 400; 3.5; 5 OINTMENT TOPICAL at 19:30

## 2021-12-19 RX ADMIN — ACETAMINOPHEN 975 MG: 325 TABLET, FILM COATED ORAL at 16:42

## 2021-12-19 RX ADMIN — LIDOCAINE HYDROCHLORIDE 10 ML: 10 INJECTION, SOLUTION EPIDURAL; INFILTRATION; INTRACAUDAL at 16:47

## 2021-12-28 ENCOUNTER — OFFICE VISIT (OUTPATIENT)
Dept: FAMILY MEDICINE CLINIC | Facility: CLINIC | Age: 76
End: 2021-12-28
Payer: MEDICARE

## 2021-12-28 VITALS
WEIGHT: 122 LBS | DIASTOLIC BLOOD PRESSURE: 60 MMHG | OXYGEN SATURATION: 99 % | HEART RATE: 67 BPM | HEIGHT: 65 IN | TEMPERATURE: 98.7 F | SYSTOLIC BLOOD PRESSURE: 150 MMHG | BODY MASS INDEX: 20.33 KG/M2

## 2021-12-28 DIAGNOSIS — S01.81XD LACERATION OF FOREHEAD, SUBSEQUENT ENCOUNTER: Primary | ICD-10-CM

## 2021-12-28 DIAGNOSIS — Z48.02 VISIT FOR SUTURE REMOVAL: ICD-10-CM

## 2021-12-28 PROBLEM — S01.81XA LACERATION OF FOREHEAD: Status: ACTIVE | Noted: 2021-12-28

## 2021-12-28 PROCEDURE — 99213 OFFICE O/P EST LOW 20 MIN: CPT | Performed by: FAMILY MEDICINE

## 2022-01-14 ENCOUNTER — APPOINTMENT (OUTPATIENT)
Dept: LAB | Facility: CLINIC | Age: 77
End: 2022-01-14
Payer: MEDICARE

## 2022-01-14 DIAGNOSIS — L40.50 PSORIATIC ARTHROPATHY (HCC): ICD-10-CM

## 2022-01-14 LAB
ALBUMIN SERPL BCP-MCNC: 3.6 G/DL (ref 3.5–5)
ALP SERPL-CCNC: 62 U/L (ref 46–116)
ALT SERPL W P-5'-P-CCNC: 22 U/L (ref 12–78)
ANION GAP SERPL CALCULATED.3IONS-SCNC: 6 MMOL/L (ref 4–13)
AST SERPL W P-5'-P-CCNC: 18 U/L (ref 5–45)
BASOPHILS # BLD AUTO: 0.03 THOUSANDS/ΜL (ref 0–0.1)
BASOPHILS NFR BLD AUTO: 1 % (ref 0–1)
BILIRUB SERPL-MCNC: 0.83 MG/DL (ref 0.2–1)
BUN SERPL-MCNC: 15 MG/DL (ref 5–25)
CALCIUM SERPL-MCNC: 9.1 MG/DL (ref 8.3–10.1)
CHLORIDE SERPL-SCNC: 105 MMOL/L (ref 100–108)
CO2 SERPL-SCNC: 28 MMOL/L (ref 21–32)
CREAT SERPL-MCNC: 0.85 MG/DL (ref 0.6–1.3)
CRP SERPL QL: <3 MG/L
EOSINOPHIL # BLD AUTO: 0.13 THOUSAND/ΜL (ref 0–0.61)
EOSINOPHIL NFR BLD AUTO: 2 % (ref 0–6)
ERYTHROCYTE [DISTWIDTH] IN BLOOD BY AUTOMATED COUNT: 14.6 % (ref 11.6–15.1)
ERYTHROCYTE [SEDIMENTATION RATE] IN BLOOD: 17 MM/HOUR (ref 0–29)
GFR SERPL CREATININE-BSD FRML MDRD: 65 ML/MIN/1.73SQ M
GLUCOSE P FAST SERPL-MCNC: 95 MG/DL (ref 65–99)
HCT VFR BLD AUTO: 43 % (ref 34.8–46.1)
HGB BLD-MCNC: 14.2 G/DL (ref 11.5–15.4)
IMM GRANULOCYTES # BLD AUTO: 0.01 THOUSAND/UL (ref 0–0.2)
IMM GRANULOCYTES NFR BLD AUTO: 0 % (ref 0–2)
LYMPHOCYTES # BLD AUTO: 2.67 THOUSANDS/ΜL (ref 0.6–4.47)
LYMPHOCYTES NFR BLD AUTO: 43 % (ref 14–44)
MCH RBC QN AUTO: 33.2 PG (ref 26.8–34.3)
MCHC RBC AUTO-ENTMCNC: 33 G/DL (ref 31.4–37.4)
MCV RBC AUTO: 101 FL (ref 82–98)
MONOCYTES # BLD AUTO: 0.66 THOUSAND/ΜL (ref 0.17–1.22)
MONOCYTES NFR BLD AUTO: 11 % (ref 4–12)
NEUTROPHILS # BLD AUTO: 2.67 THOUSANDS/ΜL (ref 1.85–7.62)
NEUTS SEG NFR BLD AUTO: 43 % (ref 43–75)
NRBC BLD AUTO-RTO: 0 /100 WBCS
PLATELET # BLD AUTO: 211 THOUSANDS/UL (ref 149–390)
PMV BLD AUTO: 11.1 FL (ref 8.9–12.7)
POTASSIUM SERPL-SCNC: 3.4 MMOL/L (ref 3.5–5.3)
PROT SERPL-MCNC: 7.1 G/DL (ref 6.4–8.2)
RBC # BLD AUTO: 4.28 MILLION/UL (ref 3.81–5.12)
SODIUM SERPL-SCNC: 139 MMOL/L (ref 136–145)
WBC # BLD AUTO: 6.17 THOUSAND/UL (ref 4.31–10.16)

## 2022-01-14 PROCEDURE — 85025 COMPLETE CBC W/AUTO DIFF WBC: CPT

## 2022-01-14 PROCEDURE — 36415 COLL VENOUS BLD VENIPUNCTURE: CPT

## 2022-01-14 PROCEDURE — 86140 C-REACTIVE PROTEIN: CPT

## 2022-01-14 PROCEDURE — 85652 RBC SED RATE AUTOMATED: CPT

## 2022-01-14 PROCEDURE — 80053 COMPREHEN METABOLIC PANEL: CPT

## 2022-03-03 DIAGNOSIS — I10 BENIGN ESSENTIAL HYPERTENSION: ICD-10-CM

## 2022-03-03 RX ORDER — DILTIAZEM HYDROCHLORIDE 240 MG/1
240 CAPSULE, EXTENDED RELEASE ORAL DAILY
Qty: 90 CAPSULE | Refills: 1 | Status: SHIPPED | OUTPATIENT
Start: 2022-03-03

## 2022-03-05 DIAGNOSIS — L40.9 PSORIASIS: ICD-10-CM

## 2022-03-07 RX ORDER — CLOBETASOL PROPIONATE 0.46 MG/ML
SOLUTION TOPICAL
Qty: 50 ML | Refills: 0 | Status: SHIPPED | OUTPATIENT
Start: 2022-03-07 | End: 2022-06-15

## 2022-03-11 DIAGNOSIS — E78.2 MIXED HYPERLIPIDEMIA: ICD-10-CM

## 2022-03-12 RX ORDER — SIMVASTATIN 40 MG
40 TABLET ORAL
Qty: 90 TABLET | Refills: 1 | Status: SHIPPED | OUTPATIENT
Start: 2022-03-12 | End: 2022-06-15

## 2022-03-31 ENCOUNTER — APPOINTMENT (OUTPATIENT)
Dept: LAB | Facility: CLINIC | Age: 77
End: 2022-03-31
Payer: MEDICARE

## 2022-03-31 DIAGNOSIS — E78.5 HYPERLIPIDEMIA, UNSPECIFIED HYPERLIPIDEMIA TYPE: ICD-10-CM

## 2022-03-31 DIAGNOSIS — I10 ESSENTIAL HYPERTENSION: ICD-10-CM

## 2022-03-31 LAB
ALBUMIN SERPL BCP-MCNC: 3.5 G/DL (ref 3.5–5)
ALP SERPL-CCNC: 70 U/L (ref 46–116)
ALT SERPL W P-5'-P-CCNC: 20 U/L (ref 12–78)
ANION GAP SERPL CALCULATED.3IONS-SCNC: 8 MMOL/L (ref 4–13)
AST SERPL W P-5'-P-CCNC: 18 U/L (ref 5–45)
BASOPHILS # BLD AUTO: 0.04 THOUSANDS/ΜL (ref 0–0.1)
BASOPHILS NFR BLD AUTO: 1 % (ref 0–1)
BILIRUB SERPL-MCNC: 0.83 MG/DL (ref 0.2–1)
BUN SERPL-MCNC: 15 MG/DL (ref 5–25)
CALCIUM SERPL-MCNC: 9.4 MG/DL (ref 8.3–10.1)
CHLORIDE SERPL-SCNC: 105 MMOL/L (ref 100–108)
CHOLEST SERPL-MCNC: 169 MG/DL
CO2 SERPL-SCNC: 28 MMOL/L (ref 21–32)
CREAT SERPL-MCNC: 0.78 MG/DL (ref 0.6–1.3)
EOSINOPHIL # BLD AUTO: 0.13 THOUSAND/ΜL (ref 0–0.61)
EOSINOPHIL NFR BLD AUTO: 2 % (ref 0–6)
ERYTHROCYTE [DISTWIDTH] IN BLOOD BY AUTOMATED COUNT: 14.1 % (ref 11.6–15.1)
GFR SERPL CREATININE-BSD FRML MDRD: 73 ML/MIN/1.73SQ M
GLUCOSE P FAST SERPL-MCNC: 91 MG/DL (ref 65–99)
HCT VFR BLD AUTO: 45 % (ref 34.8–46.1)
HDLC SERPL-MCNC: 87 MG/DL
HGB BLD-MCNC: 14.1 G/DL (ref 11.5–15.4)
IMM GRANULOCYTES # BLD AUTO: 0.01 THOUSAND/UL (ref 0–0.2)
IMM GRANULOCYTES NFR BLD AUTO: 0 % (ref 0–2)
LDLC SERPL CALC-MCNC: 68 MG/DL (ref 0–100)
LYMPHOCYTES # BLD AUTO: 2.24 THOUSANDS/ΜL (ref 0.6–4.47)
LYMPHOCYTES NFR BLD AUTO: 37 % (ref 14–44)
MCH RBC QN AUTO: 32.5 PG (ref 26.8–34.3)
MCHC RBC AUTO-ENTMCNC: 31.3 G/DL (ref 31.4–37.4)
MCV RBC AUTO: 104 FL (ref 82–98)
MONOCYTES # BLD AUTO: 0.67 THOUSAND/ΜL (ref 0.17–1.22)
MONOCYTES NFR BLD AUTO: 11 % (ref 4–12)
NEUTROPHILS # BLD AUTO: 3.02 THOUSANDS/ΜL (ref 1.85–7.62)
NEUTS SEG NFR BLD AUTO: 49 % (ref 43–75)
NRBC BLD AUTO-RTO: 0 /100 WBCS
PLATELET # BLD AUTO: 230 THOUSANDS/UL (ref 149–390)
PMV BLD AUTO: 11 FL (ref 8.9–12.7)
POTASSIUM SERPL-SCNC: 3.4 MMOL/L (ref 3.5–5.3)
PROT SERPL-MCNC: 6.8 G/DL (ref 6.4–8.2)
RBC # BLD AUTO: 4.34 MILLION/UL (ref 3.81–5.12)
SODIUM SERPL-SCNC: 141 MMOL/L (ref 136–145)
TRIGL SERPL-MCNC: 69 MG/DL
TSH SERPL DL<=0.05 MIU/L-ACNC: 2.95 UIU/ML (ref 0.36–3.74)
WBC # BLD AUTO: 6.11 THOUSAND/UL (ref 4.31–10.16)

## 2022-03-31 PROCEDURE — 80053 COMPREHEN METABOLIC PANEL: CPT

## 2022-03-31 PROCEDURE — 84443 ASSAY THYROID STIM HORMONE: CPT

## 2022-03-31 PROCEDURE — 80061 LIPID PANEL: CPT

## 2022-03-31 PROCEDURE — 36415 COLL VENOUS BLD VENIPUNCTURE: CPT

## 2022-03-31 PROCEDURE — 85025 COMPLETE CBC W/AUTO DIFF WBC: CPT

## 2022-04-05 ENCOUNTER — OFFICE VISIT (OUTPATIENT)
Dept: FAMILY MEDICINE CLINIC | Facility: CLINIC | Age: 77
End: 2022-04-05
Payer: MEDICARE

## 2022-04-05 VITALS
HEIGHT: 65 IN | TEMPERATURE: 97 F | HEART RATE: 53 BPM | OXYGEN SATURATION: 99 % | DIASTOLIC BLOOD PRESSURE: 70 MMHG | WEIGHT: 122.6 LBS | SYSTOLIC BLOOD PRESSURE: 140 MMHG | BODY MASS INDEX: 20.43 KG/M2

## 2022-04-05 DIAGNOSIS — I10 ESSENTIAL HYPERTENSION: Primary | ICD-10-CM

## 2022-04-05 DIAGNOSIS — M06.9 RHEUMATOID ARTHRITIS, INVOLVING UNSPECIFIED SITE, UNSPECIFIED WHETHER RHEUMATOID FACTOR PRESENT (HCC): ICD-10-CM

## 2022-04-05 DIAGNOSIS — Z00.00 MEDICARE ANNUAL WELLNESS VISIT, SUBSEQUENT: ICD-10-CM

## 2022-04-05 DIAGNOSIS — E87.6 HYPOKALEMIA: ICD-10-CM

## 2022-04-05 DIAGNOSIS — E78.5 HYPERLIPIDEMIA, UNSPECIFIED HYPERLIPIDEMIA TYPE: ICD-10-CM

## 2022-04-05 DIAGNOSIS — E44.1 MILD PROTEIN-CALORIE MALNUTRITION (HCC): ICD-10-CM

## 2022-04-05 DIAGNOSIS — Z78.0 POST-MENOPAUSE: ICD-10-CM

## 2022-04-05 PROCEDURE — 1123F ACP DISCUSS/DSCN MKR DOCD: CPT | Performed by: FAMILY MEDICINE

## 2022-04-05 PROCEDURE — G0439 PPPS, SUBSEQ VISIT: HCPCS | Performed by: FAMILY MEDICINE

## 2022-04-05 PROCEDURE — 99213 OFFICE O/P EST LOW 20 MIN: CPT | Performed by: FAMILY MEDICINE

## 2022-04-05 NOTE — PATIENT INSTRUCTIONS
Potassium Content of Foods List   WHAT YOU NEED TO KNOW:   Potassium is a mineral that is found in most foods  Potassium helps to balance fluids and minerals in your body  It also helps your body maintain a normal blood pressure  Potassium helps your muscles contract and your nerves function normally  DISCHARGE INSTRUCTIONS:   Why you may need to change the amount of potassium you eat:   · You may need more potassium  if you have hypokalemia (low potassium levels) or high blood pressure  You may also need more potassium if you are taking diuretics  Diuretics and certain medicines cause your body to lose potassium  · You may need less potassium  in your diet if you have hyperkalemia (high potassium levels) or kidney disease  Potassium content of fruit:  The amount of potassium in milligrams (mg) contained in each fruit or serving of fruit is listed beside the item  · High-potassium foods (more than 200 mg per serving):      ? 1 medium banana (425)    ? ½ of a papaya (390)    ? ½ cup of prune juice (370)    ? ¼ cup of raisins (270)    ? 1 medium huy (325) or kiwi (240)    ? 1 small orange (240) or ½ cup of orange juice (235)    ? ½ cup of cubed cantaloupe (215) or diced honeydew melon (200)    ? 1 medium pear (200)    · Medium-potassium foods (50 to 200 mg per serving):      ? 1 medium peach (185)    ? 1 small apple or ½ cup of apple juice (150)    ? ½ cup of peaches canned in juice (120)    ? ½ cup of canned pineapple (100)    ? ½ cup of fresh, sliced strawberries (634)    ? ½ cup of watermelon (85)    · Low-potassium foods (less than 50 mg per serving):      ? ½ cup of cranberries (45) or cranberry juice cocktail (20)    ? ½ cup of nectar of papaya, huy, or pear (35)    Potassium content of vegetables:   · High-potassium foods (more than 200 mg per serving):      ? 1 medium baked potato, with skin (925)    ? 1 baked medium sweet potato, with skin (450)    ?  ½ cup of tomato or vegetable juice (275), or 1 medium raw tomato (290)    ? ½ cup of mushrooms (280)    ? ½ cup of fresh brussels sprouts (250)    ? ½ cup of cooked zucchini (220) or winter squash (250)    ? ¼ of a medium avocado (245)    ? ½ cup of broccoli (230)    · Medium-potassium foods (50 to 200 mg per serving):      ? ½ cup of corn (195)    ? ½ cup of fresh or cooked carrots (180)    ? ½ cup of fresh cauliflower (150)    ? ½ cup of asparagus (155)    ? ½ cup of canned peas (90)     ? 1 cup of lettuce, all types (100)    ? ½ cup of fresh green beans (90)    ? ½ cup of frozen green beans (85)    ? ½ cup of cucumber (80)    Potassium content of protein foods:   · High-potassium foods (more than 200 mg per serving):      ? ½ cup of cooked clemens beans (400) or lentils (365)    ? 1 cup of soy milk (300)    ? 3 ounces of baked or broiled salmon (319)    ? 3 ounces of roasted turkey, dark meat (250)    ? ¼ cup of sunflower seeds (241)    ? 3 ounces of cooked lean beef (224)    ? 2 tablespoons of smooth peanut butter (210)    · Medium-potassium foods (50 to 200 mg per serving):      ? 1 ounce of salted peanuts, almonds, or cashews (200)    ? 1 large egg (60 mg)    Potassium content of dairy foods:   · High-potassium foods (more than 200 mg per serving):      ? 6 ounces of yogurt (260 to 435)    ? 1 cup of nonfat, low-fat, or whole milk (350 to 380)    · Medium-potassium foods (50 to 200 mg per serving):      ? ½ cup of ricotta cheese (154)    ? ½ cup of vanilla ice cream (131)    ?  ½ cup of low-fat (2%) cottage cheese (110)    · Low-potassium foods (less than 50 mg per serving):      ? 1 ounce of cheese (20 to 30)      Potassium content of grains:   · 1 slice of white bread (30)    · ½ cup of white or brown rice (50)    · ½ cup of spaghetti or macaroni (30)    · 1 flour or corn tortilla (50)    · 1 four-inch waffle (50)    Potassium content of other foods:   · 1 tablespoon of molasses (295)    · 1½ ounces of chocolate (165)    · Some salt substitutes may contain a high amount of potassium  Check the food label to find the amount of potassium it contains  © Copyright [employer] 2022 Information is for End User's use only and may not be sold, redistributed or otherwise used for commercial purposes  All illustrations and images included in CareNotes® are the copyrighted property of A D A M [employer]  or Archana Cameron  The above information is an  only  It is not intended as medical advice for individual conditions or treatments  Talk to your doctor, nurse or pharmacist before following any medical regimen to see if it is safe and effective for you

## 2022-04-05 NOTE — PROGRESS NOTES
Assessment and Plan:     Problem List Items Addressed This Visit        Cardiovascular and Mediastinum    Essential hypertension - Primary    Relevant Orders    Comprehensive metabolic panel       Musculoskeletal and Integument    Rheumatoid arthritis ([address] )       Other    HLD (hyperlipidemia)     Cholesterol  169 and LDL 68         Mild protein-calorie malnutrition ([address] )      Other Visit Diagnoses     Post-menopause        Relevant Orders    DXA bone density spine hip and pelvis    Medicare annual wellness visit, subsequent        Hypokalemia        Relevant Orders    Comprehensive metabolic panel          Depression Screening and Follow-up Plan: Patient was screened for depression during today's encounter  They screened negative with a PHQ-2 score of 0  Preventive health issues were discussed with patient, and age appropriate screening tests were ordered as noted in patient's After Visit Summary  Personalized health advice and appropriate referrals for health education or preventive services given if needed, as noted in patient's After Visit Summary       History of Present Illness:     Patient presents for Medicare Annual Wellness visit    Patient Care Team:  Frank Stein DO as PCP - General  MD Tito Cevallos DO     Problem List:     Patient Active Problem List   Diagnosis    Trigeminal neuralgia    History of TIA (transient ischemic attack)    Rheumatoid arthritis ([address] )    Arteriosclerotic heart disease    Essential hypertension    Fatty liver    HLD (hyperlipidemia)    Other constipation    Nerve sheath tumor    Transient cerebral ischemia    Mild protein-calorie malnutrition ([address] )    Laceration of forehead      Past Medical and Surgical History:     Past Medical History:   Diagnosis Date    Abnormal liver function test     last assessed: 3/25/2014    KARIME (acute kidney injury) ([address] ) 1/11/2020    Anemia     Chronic constipation     last assessed: 12/2/2014    Closed fracture of 4th metacarpal 3/3/2018    Herpes zoster     last assessed: 3/16/2016    Hyperlipidemia     Hypertension     Open nondisplaced fracture of first cervical vertebra with routine healing 3/3/2018    Psoriatic arthritis ([facility] )     last assessed;3/25/2014    Recent weight loss     last assessed: 7/13/2016    Rheumatoid arthritis [facility] )     Stroke ([facility] )     Syncope 1/11/2020    Trigeminal neuritis      Past Surgical History:   Procedure Laterality Date    APPENDECTOMY      FOOT SURGERY      HYSTERECTOMY      KIDNEY SURGERY        Family History:     Family History   Problem Relation Age of Onset    No Known Problems Father     No Known Problems Mother     No Known Problems Maternal Grandmother     No Known Problems Paternal Grandmother     No Known Problems Paternal Aunt     No Known Problems Paternal Aunt     No Known Problems Maternal Grandfather     No Known Problems Paternal Grandfather       Social History:     Social History     Socioeconomic History    Marital status: /Civil Union     Spouse name: None    Number of children: None    Years of education: high school graduate     Highest education level: None   Occupational History    Occupation: Retired   Tobacco Use    Smoking status: Never Smoker    Smokeless tobacco: Never Used   Vaping Use    Vaping Use: Never used   Substance and Sexual Activity    Alcohol use: Not Currently    Drug use: No    Sexual activity: None   Other Topics Concern    None   Social History Narrative    None     Social Determinants of Health     Financial Resource Strain: Not on file   Food Insecurity: Not on file   Transportation Needs: Not on file   Physical Activity: Not on file   Stress: Not on file   Social Connections: Not on file   Intimate Partner Violence: Not on file   Housing Stability: Not on file      Medications and Allergies:     Current Outpatient Medications   Medication Sig Dispense Refill    clobetasol ([place]ery [place]) 0 05 % external solution Apply to affected area once daily 50 mL 0    clopidogrel (PLAVIX) 75 mg tablet Take 1 tablet (75 mg total) by mouth daily at bedtime 90 tablet 1    diltiazem (TIAZAC) 240 MG 24 hr capsule Take 1 capsule (240 mg total) by mouth daily 90 capsule 1    folic acid (FOLVITE) 1 mg tablet Take 1 mg by mouth 3 (three) times a day       latanoprost (XALATAN) 0 005 % ophthalmic solution Administer 1 drop to both eyes daily at bedtime       methotrexate 2 5 mg tablet Take 7 tablets (17 5 mg total) by mouth once a week 12 tablet 0    simvastatin (ZOCOR) 40 mg tablet Take 1 tablet (40 mg total) by mouth daily at bedtime 90 tablet 1     No current facility-administered medications for this visit       Allergies   Allergen Reactions    Amoxicillin Edema    Penicillins Edema      Immunizations:     Immunization History   Administered Date(s) Administered    COVID-19 MODERNA VACC 0 5 ML IM 03/30/2021, 04/29/2021, 11/10/2021    INFLUENZA 09/16/2012, 10/12/2013, 08/09/2014, 09/09/2014, 10/15/2015, 10/17/2015, 10/01/2016, 10/15/2021    Influenza Split High Dose Preservative Free IM 09/09/2014, 10/15/2015, 10/01/2016, 08/22/2017    Influenza, high dose seasonal 0 7 mL 09/26/2018, 10/15/2019, 10/16/2020    Influenza, seasonal, injectable 10/12/2013    Pneumococcal Conjugate 13-Valent 04/14/2015    Pneumococcal Polysaccharide PPV23 11/11/2008    Tdap 03/02/2018, 12/19/2021    Zoster 01/03/2009    Zoster Vaccine Recombinant 11/02/2019, 01/26/2020      Health Maintenance:         Topic Date Due    Hepatitis C Screening  Never done    DXA SCAN  01/29/2022         Topic Date Due    COVID-19 Vaccine (4 - Booster for Moderna series) 04/10/2022      Medicare Health Risk Assessment:     /70 (BP Location: Left arm, Patient Position: Sitting)   Pulse (!) 53   Temp (!) 97 °F (36 1 °C)   Ht 5' 5" (1 651 m)   Wt 55 6 kg (122 lb 9 6 oz)   SpO2 99%   BMI 20 40 kg/m²      Lita Garcia is here for her Subsequent Wellness visit  Health Risk Assessment:   Patient rates overall health as good  Patient feels that their physical health rating is same  Patient is very satisfied with their life  Eyesight was rated as same  Hearing was rated as same  Patient feels that their emotional and mental health rating is same  Patients states they are never, rarely angry  Patient states they are sometimes unusually tired/fatigued  Pain experienced in the last 7 days has been none  Patient states that she has experienced no weight loss or gain in last 6 months  Depression Screening:   PHQ-2 Score: 0      Fall Risk Screening: In the past year, patient has experienced: no history of falling in past year      Urinary Incontinence Screening:   Patient has not leaked urine accidently in the last six months  Home Safety:  Patient does not have trouble with stairs inside or outside of their home  Patient has working smoke alarms and has working carbon monoxide detector  Home safety hazards include: none  Nutrition:   Current diet is Regular and Limited junk food  Medications:   Patient is currently taking over-the-counter supplements  OTC medications include: see medication list  Patient is able to manage medications  Activities of Daily Living (ADLs)/Instrumental Activities of Daily Living (IADLs):   Walk and transfer into and out of bed and chair?: Yes  Dress and groom yourself?: Yes    Bathe or shower yourself?: Yes    Feed yourself? Yes  Do your laundry/housekeeping?: Yes  Manage your money, pay your bills and track your expenses?: Yes  Make your own meals?: Yes    Do your own shopping?: Yes    Previous Hospitalizations:   Any hospitalizations or ED visits within the last 12 months?: No      Advance Care Planning:   Living will: Yes    Durable POA for healthcare:  Yes    Advanced directive: Yes    End of Life Decisions reviewed with patient: Yes      Cognitive Screening:   Provider or family/friend/caregiver concerned regarding cognition?: No    PREVENTIVE SCREENINGS      Cardiovascular Screening:    General: Screening Not Indicated and History Lipid Disorder      Diabetes Screening:     General: Screening Current      Colorectal Cancer Screening:     General: Screening Current      Breast Cancer Screening:     General: Screening Current      Cervical Cancer Screening:    General: Screening Not Indicated      Abdominal Aortic Aneurysm (AAA) Screening:        General: Screening Not Indicated      Lung Cancer Screening:     General: Screening Not Indicated      Hepatitis C Screening:    General: Screening Not Indicated    Screening, Brief Intervention, and Referral to Treatment (SBIRT)    Screening  Typical number of drinks in a day: 0  Typical number of drinks in a week: 0  Interpretation: Low risk drinking behavior  Single Item Drug Screening:  How often have you used an illegal drug (including marijuana) or a prescription medication for non-medical reasons in the past year? never    Single Item Drug Screen Score: 0  Interpretation: Negative screen for possible drug use disorder    Brief Intervention  Alcohol & drug use screenings were reviewed  No concerns regarding substance use disorder identified         Lambert Freeman DO

## 2022-05-02 NOTE — PROGRESS NOTES
Assessment/Plan:  Patient is seen for follow up of chronic medical conditions  HLD (hyperlipidemia)  Cholesterol  169 and LDL 68       Diagnoses and all orders for this visit:    Essential hypertension  -     Comprehensive metabolic panel; Future    Hyperlipidemia, unspecified hyperlipidemia type    Post-menopause  -     DXA bone density spine hip and pelvis; Future    Rheumatoid arthritis, involving unspecified site, unspecified whether rheumatoid factor present ([facility] )    Medicare annual wellness visit, subsequent    Hypokalemia  -     Comprehensive metabolic panel; Future    Mild protein-calorie malnutrition ([facility] )          Subjective:   Chief Complaint   Patient presents with    Medicare Wellness Visit        Patient ID: Savannah  is a 64 y o  female  Patient is seen for follow up f chronic medical conditions  The following portions of the patient's history were reviewed and updated as appropriate: allergies, current medications, past family history, past medical history, past social history, past surgical history and problem list     Review of Systems   Constitutional: Negative for chills and fever  HENT: Negative for congestion and sore throat  Respiratory: Negative for chest tightness  Cardiovascular: Negative for chest pain and palpitations  Gastrointestinal: Negative for abdominal pain, constipation, diarrhea and nausea  Genitourinary: Negative for difficulty urinating  Skin: Negative  Neurological: Negative for dizziness and headaches  Psychiatric/Behavioral: Negative  Objective:      /70 (BP Location: Left arm, Patient Position: Sitting)   Pulse (!) 53   Temp (!) 97 °F (36 1 °C)   Ht 5' 5" (1 651 m)   Wt 55 6 kg (122 lb 9 6 oz)   SpO2 99%   BMI 20 40 kg/m²          Physical Exam  Vitals and nursing note reviewed  Constitutional:       General: She is not in acute distress  Neck:      Thyroid: No thyromegaly     Cardiovascular:      Rate and Rhythm: Normal rate and regular rhythm  Heart sounds: Normal heart sounds  Pulmonary:      Effort: Pulmonary effort is normal       Breath sounds: Normal breath sounds  Lymphadenopathy:      Cervical: No cervical adenopathy  Skin:     General: Skin is warm and dry  Neurological:      Mental Status: She is alert and oriented to person, place, and time

## 2022-05-04 DIAGNOSIS — Z86.73 HISTORY OF TIA (TRANSIENT ISCHEMIC ATTACK): ICD-10-CM

## 2022-05-04 RX ORDER — CLOPIDOGREL BISULFATE 75 MG/1
75 TABLET ORAL
Qty: 90 TABLET | Refills: 1 | Status: SHIPPED | OUTPATIENT
Start: 2022-05-04

## 2022-05-13 ENCOUNTER — HOSPITAL ENCOUNTER (OUTPATIENT)
Dept: BONE DENSITY | Facility: MEDICAL CENTER | Age: 77
Discharge: HOME | End: 2022-05-13
Payer: MEDICARE

## 2022-05-13 DIAGNOSIS — Z78.0 POST-MENOPAUSE: ICD-10-CM

## 2022-05-13 PROCEDURE — 77080 DXA BONE DENSITY AXIAL: CPT

## 2022-05-25 ENCOUNTER — TELEPHONE (OUTPATIENT)
Dept: FAMILY MEDICINE CLINIC | Facility: CLINIC | Age: 77
End: 2022-05-25

## 2022-05-25 NOTE — TELEPHONE ENCOUNTER
Pt GIFTY stating she has been having weakness in her legs and its sometimes very hard for her to move around  Pt would like a call from you to discuss

## 2022-05-26 ENCOUNTER — HOSPITAL ENCOUNTER (INPATIENT)
Facility: HOSPITAL | Age: 77
LOS: 7 days | DRG: 558 | End: 2022-06-02
Attending: EMERGENCY MEDICINE | Admitting: INTERNAL MEDICINE
Payer: MEDICARE

## 2022-05-26 ENCOUNTER — APPOINTMENT (EMERGENCY)
Dept: CT IMAGING | Facility: HOSPITAL | Age: 77
DRG: 558 | End: 2022-05-26
Payer: MEDICARE

## 2022-05-26 ENCOUNTER — APPOINTMENT (EMERGENCY)
Dept: RADIOLOGY | Facility: HOSPITAL | Age: 77
DRG: 558 | End: 2022-05-26
Payer: MEDICARE

## 2022-05-26 ENCOUNTER — APPOINTMENT (INPATIENT)
Dept: NON INVASIVE DIAGNOSTICS | Facility: HOSPITAL | Age: 77
DRG: 558 | End: 2022-05-26
Payer: MEDICARE

## 2022-05-26 DIAGNOSIS — R29.898 WEAKNESS OF BOTH LOWER EXTREMITIES: Primary | ICD-10-CM

## 2022-05-26 DIAGNOSIS — R93.89 ABNORMAL X-RAY: ICD-10-CM

## 2022-05-26 DIAGNOSIS — M62.82 RHABDOMYOLYSIS: ICD-10-CM

## 2022-05-26 DIAGNOSIS — T50.905A ADVERSE EFFECT OF DRUG, INITIAL ENCOUNTER: ICD-10-CM

## 2022-05-26 DIAGNOSIS — R29.898 BILATERAL LEG WEAKNESS: Primary | ICD-10-CM

## 2022-05-26 PROBLEM — R74.8 ELEVATED LIVER ENZYMES: Status: ACTIVE | Noted: 2022-05-26

## 2022-05-26 PROBLEM — R26.2 AMBULATORY DYSFUNCTION: Status: ACTIVE | Noted: 2022-05-26

## 2022-05-26 LAB
2HR DELTA HS TROPONIN: 10 NG/L
4HR DELTA HS TROPONIN: 12 NG/L
ALBUMIN SERPL BCP-MCNC: 3 G/DL (ref 3.5–5)
ALP SERPL-CCNC: 58 U/L (ref 46–116)
ALT SERPL W P-5'-P-CCNC: 177 U/L (ref 12–78)
AMORPH URATE CRY URNS QL MICRO: ABNORMAL /HPF
ANION GAP SERPL CALCULATED.3IONS-SCNC: 7 MMOL/L (ref 4–13)
AST SERPL W P-5'-P-CCNC: 242 U/L (ref 5–45)
ATRIAL RATE: 65 BPM
BACTERIA UR QL AUTO: ABNORMAL /HPF
BASOPHILS # BLD AUTO: 0.03 THOUSANDS/ΜL (ref 0–0.1)
BASOPHILS NFR BLD AUTO: 0 % (ref 0–1)
BILIRUB SERPL-MCNC: 0.67 MG/DL (ref 0.2–1)
BILIRUB UR QL STRIP: NEGATIVE
BUN SERPL-MCNC: 13 MG/DL (ref 5–25)
CALCIUM ALBUM COR SERPL-MCNC: 9.7 MG/DL (ref 8.3–10.1)
CALCIUM SERPL-MCNC: 8.9 MG/DL (ref 8.3–10.1)
CARDIAC TROPONIN I PNL SERPL HS: 102 NG/L
CARDIAC TROPONIN I PNL SERPL HS: 112 NG/L
CARDIAC TROPONIN I PNL SERPL HS: 114 NG/L
CHLORIDE SERPL-SCNC: 101 MMOL/L (ref 100–108)
CK MB SERPL-MCNC: 33.4 NG/ML (ref 0–5)
CK MB SERPL-MCNC: <1 % (ref 0–2.5)
CK SERPL-CCNC: 6976 U/L (ref 26–192)
CLARITY UR: CLEAR
CO2 SERPL-SCNC: 31 MMOL/L (ref 21–32)
COARSE GRAN CASTS URNS QL MICRO: ABNORMAL /LPF
COLOR UR: YELLOW
CREAT SERPL-MCNC: 0.61 MG/DL (ref 0.6–1.3)
EOSINOPHIL # BLD AUTO: 0.01 THOUSAND/ΜL (ref 0–0.61)
EOSINOPHIL NFR BLD AUTO: 0 % (ref 0–6)
ERYTHROCYTE [DISTWIDTH] IN BLOOD BY AUTOMATED COUNT: 14.1 % (ref 11.6–15.1)
GFR SERPL CREATININE-BSD FRML MDRD: 87 ML/MIN/1.73SQ M
GLUCOSE SERPL-MCNC: 104 MG/DL (ref 65–140)
GLUCOSE UR STRIP-MCNC: NEGATIVE MG/DL
HCT VFR BLD AUTO: 43.9 % (ref 34.8–46.1)
HGB BLD-MCNC: 14.2 G/DL (ref 11.5–15.4)
HGB UR QL STRIP.AUTO: ABNORMAL
IMM GRANULOCYTES # BLD AUTO: 0.05 THOUSAND/UL (ref 0–0.2)
IMM GRANULOCYTES NFR BLD AUTO: 1 % (ref 0–2)
KETONES UR STRIP-MCNC: NEGATIVE MG/DL
LEUKOCYTE ESTERASE UR QL STRIP: NEGATIVE
LYMPHOCYTES # BLD AUTO: 1.61 THOUSANDS/ΜL (ref 0.6–4.47)
LYMPHOCYTES NFR BLD AUTO: 18 % (ref 14–44)
MAGNESIUM SERPL-MCNC: 2.5 MG/DL (ref 1.6–2.6)
MCH RBC QN AUTO: 32.5 PG (ref 26.8–34.3)
MCHC RBC AUTO-ENTMCNC: 32.3 G/DL (ref 31.4–37.4)
MCV RBC AUTO: 101 FL (ref 82–98)
MONOCYTES # BLD AUTO: 0.71 THOUSAND/ΜL (ref 0.17–1.22)
MONOCYTES NFR BLD AUTO: 8 % (ref 4–12)
NEUTROPHILS # BLD AUTO: 6.78 THOUSANDS/ΜL (ref 1.85–7.62)
NEUTS SEG NFR BLD AUTO: 73 % (ref 43–75)
NITRITE UR QL STRIP: NEGATIVE
NON-SQ EPI CELLS URNS QL MICRO: ABNORMAL /HPF
NRBC BLD AUTO-RTO: 0 /100 WBCS
P AXIS: 76 DEGREES
PH UR STRIP.AUTO: 6 [PH]
PHOSPHATE SERPL-MCNC: 3.5 MG/DL (ref 2.3–4.1)
PLATELET # BLD AUTO: 250 THOUSANDS/UL (ref 149–390)
PLATELET # BLD AUTO: 256 THOUSANDS/UL (ref 149–390)
PMV BLD AUTO: 10.2 FL (ref 8.9–12.7)
PMV BLD AUTO: 9.8 FL (ref 8.9–12.7)
POTASSIUM SERPL-SCNC: 4 MMOL/L (ref 3.5–5.3)
PR INTERVAL: 150 MS
PROT SERPL-MCNC: 6.5 G/DL (ref 6.4–8.2)
PROT UR STRIP-MCNC: ABNORMAL MG/DL
QRS AXIS: -15 DEGREES
QRSD INTERVAL: 80 MS
QT INTERVAL: 416 MS
QTC INTERVAL: 432 MS
RBC # BLD AUTO: 4.37 MILLION/UL (ref 3.81–5.12)
RBC #/AREA URNS AUTO: ABNORMAL /HPF
SODIUM SERPL-SCNC: 139 MMOL/L (ref 136–145)
SP GR UR STRIP.AUTO: 1.01 (ref 1–1.03)
T WAVE AXIS: -11 DEGREES
TSH SERPL DL<=0.05 MIU/L-ACNC: 1.46 UIU/ML (ref 0.45–4.5)
UROBILINOGEN UR QL STRIP.AUTO: 1 E.U./DL
VENTRICULAR RATE: 65 BPM
WBC # BLD AUTO: 9.19 THOUSAND/UL (ref 4.31–10.16)
WBC #/AREA URNS AUTO: ABNORMAL /HPF

## 2022-05-26 PROCEDURE — 72131 CT LUMBAR SPINE W/O DYE: CPT

## 2022-05-26 PROCEDURE — 72128 CT CHEST SPINE W/O DYE: CPT

## 2022-05-26 PROCEDURE — 71045 X-RAY EXAM CHEST 1 VIEW: CPT

## 2022-05-26 PROCEDURE — 84484 ASSAY OF TROPONIN QUANT: CPT | Performed by: INTERNAL MEDICINE

## 2022-05-26 PROCEDURE — 85049 AUTOMATED PLATELET COUNT: CPT | Performed by: INTERNAL MEDICINE

## 2022-05-26 PROCEDURE — 73630 X-RAY EXAM OF FOOT: CPT

## 2022-05-26 PROCEDURE — 84443 ASSAY THYROID STIM HORMONE: CPT | Performed by: EMERGENCY MEDICINE

## 2022-05-26 PROCEDURE — 80074 ACUTE HEPATITIS PANEL: CPT | Performed by: INTERNAL MEDICINE

## 2022-05-26 PROCEDURE — 73610 X-RAY EXAM OF ANKLE: CPT

## 2022-05-26 PROCEDURE — 81001 URINALYSIS AUTO W/SCOPE: CPT | Performed by: EMERGENCY MEDICINE

## 2022-05-26 PROCEDURE — 99285 EMERGENCY DEPT VISIT HI MDM: CPT | Performed by: EMERGENCY MEDICINE

## 2022-05-26 PROCEDURE — 99285 EMERGENCY DEPT VISIT HI MDM: CPT

## 2022-05-26 PROCEDURE — 84100 ASSAY OF PHOSPHORUS: CPT | Performed by: EMERGENCY MEDICINE

## 2022-05-26 PROCEDURE — 82553 CREATINE MB FRACTION: CPT | Performed by: EMERGENCY MEDICINE

## 2022-05-26 PROCEDURE — 96361 HYDRATE IV INFUSION ADD-ON: CPT

## 2022-05-26 PROCEDURE — 36415 COLL VENOUS BLD VENIPUNCTURE: CPT | Performed by: EMERGENCY MEDICINE

## 2022-05-26 PROCEDURE — 84484 ASSAY OF TROPONIN QUANT: CPT | Performed by: EMERGENCY MEDICINE

## 2022-05-26 PROCEDURE — 82550 ASSAY OF CK (CPK): CPT | Performed by: EMERGENCY MEDICINE

## 2022-05-26 PROCEDURE — 93005 ELECTROCARDIOGRAM TRACING: CPT

## 2022-05-26 PROCEDURE — 80053 COMPREHEN METABOLIC PANEL: CPT | Performed by: EMERGENCY MEDICINE

## 2022-05-26 PROCEDURE — 99223 1ST HOSP IP/OBS HIGH 75: CPT | Performed by: INTERNAL MEDICINE

## 2022-05-26 PROCEDURE — 93010 ELECTROCARDIOGRAM REPORT: CPT | Performed by: INTERNAL MEDICINE

## 2022-05-26 PROCEDURE — 96360 HYDRATION IV INFUSION INIT: CPT

## 2022-05-26 PROCEDURE — 93970 EXTREMITY STUDY: CPT

## 2022-05-26 PROCEDURE — 85025 COMPLETE CBC W/AUTO DIFF WBC: CPT | Performed by: EMERGENCY MEDICINE

## 2022-05-26 PROCEDURE — 83735 ASSAY OF MAGNESIUM: CPT | Performed by: EMERGENCY MEDICINE

## 2022-05-26 RX ORDER — SODIUM CHLORIDE 9 MG/ML
125 INJECTION, SOLUTION INTRAVENOUS CONTINUOUS
Status: DISPENSED | OUTPATIENT
Start: 2022-05-26 | End: 2022-05-27

## 2022-05-26 RX ORDER — FOLIC ACID 1 MG/1
1 TABLET ORAL 3 TIMES DAILY
Status: DISCONTINUED | OUTPATIENT
Start: 2022-05-26 | End: 2022-06-02 | Stop reason: HOSPADM

## 2022-05-26 RX ORDER — ONDANSETRON 2 MG/ML
4 INJECTION INTRAMUSCULAR; INTRAVENOUS EVERY 6 HOURS PRN
Status: DISCONTINUED | OUTPATIENT
Start: 2022-05-26 | End: 2022-06-02 | Stop reason: HOSPADM

## 2022-05-26 RX ORDER — DOCUSATE SODIUM 100 MG/1
100 CAPSULE, LIQUID FILLED ORAL 2 TIMES DAILY
Status: DISCONTINUED | OUTPATIENT
Start: 2022-05-26 | End: 2022-06-02 | Stop reason: HOSPADM

## 2022-05-26 RX ORDER — LATANOPROST 50 UG/ML
1 SOLUTION/ DROPS OPHTHALMIC
Status: DISCONTINUED | OUTPATIENT
Start: 2022-05-26 | End: 2022-06-02 | Stop reason: HOSPADM

## 2022-05-26 RX ORDER — ACETAMINOPHEN 325 MG/1
650 TABLET ORAL EVERY 6 HOURS PRN
Status: DISCONTINUED | OUTPATIENT
Start: 2022-05-26 | End: 2022-06-02 | Stop reason: HOSPADM

## 2022-05-26 RX ORDER — DILTIAZEM HYDROCHLORIDE 240 MG/1
240 CAPSULE, COATED, EXTENDED RELEASE ORAL DAILY
Status: DISCONTINUED | OUTPATIENT
Start: 2022-05-27 | End: 2022-06-02 | Stop reason: HOSPADM

## 2022-05-26 RX ORDER — HEPARIN SODIUM 5000 [USP'U]/ML
5000 INJECTION, SOLUTION INTRAVENOUS; SUBCUTANEOUS EVERY 8 HOURS SCHEDULED
Status: DISCONTINUED | OUTPATIENT
Start: 2022-05-26 | End: 2022-06-02 | Stop reason: HOSPADM

## 2022-05-26 RX ORDER — MAGNESIUM HYDROXIDE/ALUMINUM HYDROXICE/SIMETHICONE 120; 1200; 1200 MG/30ML; MG/30ML; MG/30ML
30 SUSPENSION ORAL EVERY 6 HOURS PRN
Status: DISCONTINUED | OUTPATIENT
Start: 2022-05-26 | End: 2022-06-02 | Stop reason: HOSPADM

## 2022-05-26 RX ORDER — SODIUM CHLORIDE 9 MG/ML
150 INJECTION, SOLUTION INTRAVENOUS CONTINUOUS
Status: DISCONTINUED | OUTPATIENT
Start: 2022-05-26 | End: 2022-05-26

## 2022-05-26 RX ORDER — CLOPIDOGREL BISULFATE 75 MG/1
75 TABLET ORAL
Status: DISCONTINUED | OUTPATIENT
Start: 2022-05-26 | End: 2022-06-02 | Stop reason: HOSPADM

## 2022-05-26 RX ADMIN — CLOPIDOGREL BISULFATE 75 MG: 75 TABLET ORAL at 21:40

## 2022-05-26 RX ADMIN — FOLIC ACID 1 MG: 1 TABLET ORAL at 21:40

## 2022-05-26 RX ADMIN — SODIUM CHLORIDE 1000 ML: 0.9 INJECTION, SOLUTION INTRAVENOUS at 14:39

## 2022-05-26 RX ADMIN — LATANOPROST 1 DROP: 50 SOLUTION OPHTHALMIC at 21:41

## 2022-05-26 RX ADMIN — SODIUM CHLORIDE 125 ML/HR: 0.9 INJECTION, SOLUTION INTRAVENOUS at 18:29

## 2022-05-26 RX ADMIN — HEPARIN SODIUM 5000 UNITS: 5000 INJECTION INTRAVENOUS; SUBCUTANEOUS at 21:45

## 2022-05-26 RX ADMIN — HEPARIN SODIUM 5000 UNITS: 5000 INJECTION INTRAVENOUS; SUBCUTANEOUS at 18:29

## 2022-05-26 NOTE — H&P
[address] [facility]  H&P- Jean Carlos Cordon 1945, 64 y o  female MRN: [id number]  Unit/Bed#: ED 18 Encounter: [id number]  Primary Care Provider: Lambert Freeman DO   Date and time admitted to hospital: 5/26/2022  1:41 PM    Assessment and Plan  * Rhabdomyolysis  Assessment & Plan  Patient presenting with 2 weeks of leg weakness concerning for drug-induced rhabdomyolysis  Prior to admit on diltiazem as well as simvastatin  Hold simvastatin for now  Trend CK levels  Renal function within normal limits, will place on gentle IV hydration over the next 18 hours  Will need physical therapy and occupational therapy eval in the morning  Likely will need rehab based upon weakness  Check lower extremity vascular ultrasounds    Elevated liver enzymes  Assessment & Plan  Likely secondary to rhabdomyolysis  Bilirubin normal  Check hepatitis panel    Ambulatory dysfunction  Assessment & Plan  Patient with lower extremity weakness for approximately 2 weeks in the setting of immobility  Did have recent death of her  a few weeks prior  PT OT consultations for safe disposition  No other focal neurologic deficits and pain is bilateral  Reflexes present  If no improvement in the next 24 hours consider neurology consultation and additional neuro imaging  CT of the spine without any acute finding    HLD (hyperlipidemia)  Assessment & Plan  Hold simvastatin for now     Essential hypertension  Assessment & Plan  Continue home medication with hold parameters    Rheumatoid arthritis (HCC)  Assessment & Plan  Takes methotrexate 17 5 mg on Monday  Continue folic acid      History of TIA (transient ischemic attack)  Assessment & Plan  History of TIA, maintained on Plavix        Code Status:  Full code    VTE Prophylaxis: Heparin  / sequential compression device     POLST: There is no POLST form on file for this patient (pre-hospital)  Discussion with family:   listed in medical chart however he recently passed away    Anticipated Length of Stay:  Patient will be admitted on an Inpatient basis with an anticipated length of stay of  greater than 2 midnights  Justification for Hospital Stay: Rhabdomyolysis , lower extremity weakness    Total Time for Visit, including Counseling / Coordination of Care: 45 minutes  Greater than 50% of this total time spent on direct patient counseling and coordination of care  Chief Complaint:     Chief Complaint   Patient presents with    Extremity Weakness     Pt c/o bilateral leg weakness for the last week and a 1/2 that has been causing the pt to have frequent falls, legs feel heavy, pt hx of arthritis in her knees but this feel different  Denies headaches, sp , sob  Pt  passed away a few weeks ago but she feels that she has been staying active since then  History of Present Illness: Starla Cortez is a 64 y o  female who presents with bilateral lower extremity weakness for at least 2 weeks  The patient recently at the death of her   She reports that she was ambulatory prior  She does have a history of rheumatoid arthritis, hyperlipidemia hypertension  She also has a previous history of stroke  In the emergency room CT imaging was negative for any acute fracture  The patient denies any difficulty swallowing, no difficulty breathing  She does have rheumatoid arthritis for which he takes methotrexate and has been taking her folic acid      She denies any recent travel or trip history    Review of Systems:    A complete and comprehensive 14 point organ system review was performed and all other systems are negative other than stated above in the HPI    Past Medical and Surgical History:     Past Medical History:   Diagnosis Date    Abnormal liver function test     last assessed: 3/25/2014    KARIME [facility]acute kidney injury) ([facility] [address] ) 1/11/2020    Anemia     Chronic constipation     last assessed: 12/2/2014    Closed fracture of 4th metacarpal 3/3/2018    Herpes zoster     last assessed: 3/16/2016    Hyperlipidemia     Hypertension     Open nondisplaced fracture of first cervical vertebra with routine healing 3/3/2018    Psoriatic arthritis ([facility] [address] )     last assessed;3/25/2014    Recent weight loss     last assessed: 7/13/2016    Rheumatoid arthritis ([facility] [address] )     Stroke ([facility] )     Syncope 1/11/2020    Trigeminal neuritis        Past Surgical History:   Procedure Laterality Date    APPENDECTOMY      FOOT SURGERY      HYSTERECTOMY      KIDNEY SURGERY         Meds/Allergies:    Prior to Admission medications    Medication Sig Start Date End Date Taking? Authorizing Provider   clopidogrel (PLAVIX) 75 mg tablet Take 1 tablet (75 mg total) by mouth daily at bedtime 5/4/22  Yes Jules Jain DO   diltiazem (TIAZAC) 240 MG 24 hr capsule Take 1 capsule (240 mg total) by mouth daily 3/3/22  Yes Jules Jain DO   folic acid (FOLVITE) 1 mg tablet Take 1 mg by mouth 3 (three) times a day  9/5/12  Yes Historical Provider, MD   latanoprost (XALATAN) 0 005 % ophthalmic solution Administer 1 drop to both eyes daily at bedtime  9/5/12  Yes Historical Provider, MD   methotrexate 2 5 mg tablet Take 7 tablets (17 5 mg total) by mouth once a week 1/12/20  Yes Adrienne Márquez MD   simvastatin (ZOCOR) 40 mg tablet Take 1 tablet (40 mg total) by mouth daily at bedtime 3/12/22  Yes Jules Jain DO   clobetasol (TEMOVATE) 0 05 % external solution Apply to affected area once daily  Patient not taking: Reported on 5/26/2022 3/7/22   Jules Jain DO     I have reviewed home medications with patient personally  Allergies:    Allergies   Allergen Reactions    Amoxicillin Edema    Penicillins Edema       Social History:     Marital Status: /Civil Union   Occupation:  Retired    Substance Use History:   Social History     Substance and Sexual Activity   Alcohol Use Not Currently     Social History     Tobacco Use   Smoking Status Never Smoker   Smokeless Tobacco Never Used     Social History     Substance and Sexual Activity   Drug Use No       Family History:    Family History   Problem Relation Age of Onset    No Known Problems Father     No Known Problems Mother     No Known Problems Maternal Grandmother     No Known Problems Paternal Grandmother     No Known Problems Paternal Aunt     No Known Problems Paternal Aunt     No Known Problems Maternal Grandfather     No Known Problems Paternal Grandfather        Physical Exam:     Vitals:   Blood Pressure: 155/70 (05/26/22 1545)  Pulse: 56 (05/26/22 1545)  Temperature: 97 7 °F (36 5 °C) (05/26/22 1349)  Temp Source: Oral (05/26/22 1349)  Respirations: 18 (05/26/22 1545)  SpO2: 98 % (05/26/22 1545)      General: well appearing, no acute distress  HEENT: atraumatic, PERRLA, moist mucosa, normal pharynx, normal tonsils and adenoids, normal tongue, no fluid in sinuses  Neck: Trachea midline, no carotid bruit, no masses  Respiratory: normal chest wall expansion, CTA B, no r/r/w, no rubs  Cardiovascular: RRR, no m/r/g, Normal S1 and S2  Abdomen: Soft, non-tender, non-distended, normal bowel sounds in all quadrants, no hepatosplenomegaly, no tympany  Rectal: deferred  Musculoskeletal: normal ROM in upper extremities  Integumentary: warm, dry, and pink, with no rash, purpura, or petechia  Heme/Lymph: no lymphadenopathy, no bruises  Neurological: Cranial Nerves II-XII grossly intact  Psychiatric: cooperative with normal mood, affect, and cognition    Additional Data:     Lab Results: I have personally reviewed pertinent reports        Results from last 7 days   Lab Units 05/26/22  1440   WBC Thousand/uL 9 19   HEMOGLOBIN g/dL 14 2   HEMATOCRIT % 43 9   PLATELETS Thousands/uL 250   NEUTROS PCT % 73   LYMPHS PCT % 18   MONOS PCT % 8   EOS PCT % 0     Results from last 7 days   Lab Units 05/26/22  1440   SODIUM mmol/L 139   POTASSIUM mmol/L 4 0   CHLORIDE mmol/L 101   CO2 mmol/L 31   BUN mg/dL 13   CREATININE mg/dL 0 61   ANION GAP mmol/L 7   CALCIUM mg/dL 8 9   ALBUMIN g/dL 3 0*   TOTAL BILIRUBIN mg/dL 0 67   ALK PHOS U/L 58   ALT U/L 177*   AST U/L 242*   GLUCOSE RANDOM mg/dL 104                     Results from last 7 days   Lab Units 05/26/22  1640 05/26/22  1440   HS TNI 0HR ng/L  --  102*   HS TNI 2HR ng/L 112*  --        Imaging: I have personally reviewed pertinent reports  CT spine thoracic & lumbar wo contrast   Final Result by Andres Mora DO (05/26 1613)      No acute osseous abnormality  Multilevel degenerative changes, as described, most significant at L4-L5, as described  Workstation performed: [id number]         XR chest 1 view portable   ED Interpretation by Zulema Frazier MD (05/26 1537)   No infiltrates  No masses  No widened mediastinum  No congestive heart failure  Final Result by Rony Ortiz MD (05/26 1626)      No acute cardiopulmonary disease  Workstation performed: [id number]         XR foot 3+ views LEFT   ED Interpretation by Zulema Frazier MD (05/26 1541)   Fracture of the base of the left 1st proximal phalanx  No dislocation      Final Result by Rony Ortiz MD (05/26 1631)         1  Acute appearing minimally displaced oblique fracture involving the medial base of the proximal phalanx of the 1st digit  2   Degenerative and postoperative change as noted  Workstation performed: [id number]         XR ankle 3+ views LEFT   ED Interpretation by Zulema Frazier MD (05/26 1540)   No fracture or dislocation      Final Result by Rony Ortiz MD (05/26 1631)         1  Acute appearing minimally displaced oblique fracture involving the medial base of the proximal phalanx of the 1st digit  2   Degenerative and postoperative change as noted                 Workstation performed: [id number]         VAS lower limb venous duplex study, complete bilateral    (Results Pending)       EKG, Pathology, and Other Studies Reviewed on Admission: · CT of the spine reviewed, no acute fracture    Allscripts / Epic Records Reviewed: Yes     ** Please Note: This note was completed in part utilizing M-Modal Fluency Direct Software  Grammatical errors, random word insertions, spelling mistakes, and incomplete sentences may be an occasional consequence of this system secondary to software limitations, ambient noise, and hardware issues  If you have any questions or concerns about the content, text, or information contained within the body of this dictation, please contact the provider for clarification  **

## 2022-05-26 NOTE — PROGRESS NOTES
I spoke to patient last night about the weakness in her legs  I calld to check on her today and she  sounded very weak  She told me that she has been feeling like her legs are jelly for about 10 days  Her  passed away on the 10th of this month  Some of this could be depression, not eating, taking fluids, but prior to this she was driving and taking care of everything at home  I spoke to her neighbor who came over to walk her to the bathroom  He and his wife have been coming over to help her ambulate   He will take her to the ER at [address] [place] [address]

## 2022-05-26 NOTE — ASSESSMENT & PLAN NOTE
Patient with lower extremity weakness for approximately 2 weeks in the setting of immobility  Did have recent death of her  a few weeks prior  PT OT consultations for safe disposition  No other focal neurologic deficits and pain is bilateral  Reflexes present  If no improvement in the next 24 hours consider neurology consultation and additional neuro imaging  CT of the spine without any acute finding

## 2022-05-26 NOTE — TELEPHONE ENCOUNTER
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

## 2022-05-26 NOTE — ASSESSMENT & PLAN NOTE
Patient presenting with 2 weeks of leg weakness concerning for drug-induced rhabdomyolysis  Prior to admit on diltiazem as well as simvastatin  Hold simvastatin for now  Trend CK levels  Renal function within normal limits, will place on gentle IV hydration over the next 18 hours  Will need physical therapy and occupational therapy eval in the morning  Likely will need rehab based upon weakness  Check lower extremity vascular ultrasounds

## 2022-05-26 NOTE — ED PROVIDER NOTES
History  Chief Complaint   Patient presents with    Extremity Weakness     Pt c/o bilateral leg weakness for the last week and a 1/2 that has been causing the pt to have frequent falls, legs feel heavy, pt hx of arthritis in her knees but this feel different  Denies headaches, sp , sob  Pt  passed away a few weeks ago but she feels that she has been staying active since then  Patient is a 47-year-old female  She is recently   She has a history of a stroke  She presents to the emergency room complaining of a one and half week history of weakness to both for legs  It is gotten a point where she can no longer do ADLs at home  She is having difficulty walking  She fell and injured her left great toe  Denies striking her head or other injuries  She denies any pain in her legs  Denies low back pain  Denies numbness or paresthesias  She reports that her legs feel heavy  No recent viral illnesses  Symptoms are severe  No aggravating or relieving factors  Prior to Admission Medications   Prescriptions Last Dose Informant Patient Reported? Taking?    clobetasol (TEMOVATE) 0 05 % external solution Not Taking at Unknown time  No No   Sig: Apply to affected area once daily   Patient not taking: Reported on 5/26/2022   clopidogrel (PLAVIX) 75 mg tablet 5/25/2022 at Unknown time  No Yes   Sig: Take 1 tablet (75 mg total) by mouth daily at bedtime   diltiazem (TIAZAC) 240 MG 24 hr capsule 5/26/2022 at Unknown time  No Yes   Sig: Take 1 capsule (240 mg total) by mouth daily   folic acid (FOLVITE) 1 mg tablet 5/26/2022 at Unknown time  Yes Yes   Sig: Take 1 mg by mouth 3 (three) times a day    latanoprost (XALATAN) 0 005 % ophthalmic solution 5/25/2022 at Unknown time  Yes Yes   Sig: Administer 1 drop to both eyes daily at bedtime    methotrexate 2 5 mg tablet 5/26/2022 at Unknown time  No Yes   Sig: Take 7 tablets (17 5 mg total) by mouth once a week   simvastatin (ZOCOR) 40 mg tablet 5/25/2022 at Unknown time  No Yes   Sig: Take 1 tablet (40 mg total) by mouth daily at bedtime      Facility-Administered Medications: None       Past Medical History:   Diagnosis Date    Abnormal liver function test     last assessed: 3/25/2014    [facility] 1/11/2020    Anemia     Chronic constipation     last assessed: 12/2/2014    Closed fracture of 4th metacarpal 3/3/2018    Herpes zoster     last assessed: 3/16/2016    Hyperlipidemia     Hypertension     Open nondisplaced fracture of first cervical vertebra with routine healing 3/3/2018    Psoriatic arthritis ([facility] )     last assessed;3/25/2014    Recent weight loss     last assessed: 7/13/2016    Rheumatoid arthritis ([facility] )     Stroke ([facility] )     Syncope 1/11/2020    Trigeminal neuritis        Past Surgical History:   Procedure Laterality Date    APPENDECTOMY      FOOT SURGERY      HYSTERECTOMY      KIDNEY SURGERY         Family History   Problem Relation Age of Onset    No Known Problems Father     No Known Problems Mother     No Known Problems Maternal Grandmother     No Known Problems Paternal Grandmother     No Known Problems Paternal Aunt     No Known Problems Paternal Aunt     No Known Problems Maternal Grandfather     No Known Problems Paternal Grandfather      I have reviewed and agree with the history as documented  E-Cigarette/Vaping    E-Cigarette Use Never User      E-Cigarette/Vaping Substances    Nicotine No     THC No     CBD No     Flavoring No     Other No     Unknown No      Social History     Tobacco Use    Smoking status: Never Smoker    Smokeless tobacco: Never Used   Vaping Use    Vaping Use: Never used   Substance Use Topics    Alcohol use: Not Currently    Drug use: No       Review of Systems   Constitutional: Negative for chills and fever  HENT: Negative for rhinorrhea and sore throat  Eyes: Negative for pain, redness and visual disturbance     Respiratory: Negative for cough and shortness of breath  Cardiovascular: Negative for chest pain and leg swelling  Gastrointestinal: Negative for abdominal pain, diarrhea and vomiting  Endocrine: Negative for polydipsia and polyuria  Genitourinary: Negative for dysuria, frequency, hematuria, vaginal bleeding and vaginal discharge  Musculoskeletal: Positive for arthralgias  Negative for back pain and neck pain  Skin: Negative for rash and wound  Allergic/Immunologic: Negative for immunocompromised state  Neurological: Positive for weakness  Negative for numbness and headaches  Hematological: Does not bruise/bleed easily  Psychiatric/Behavioral: Negative for hallucinations and suicidal ideas  All other systems reviewed and are negative  Physical Exam  Physical Exam  Vitals reviewed  Constitutional:       General: She is not in acute distress  HENT:      Head: Normocephalic and atraumatic  Mouth/Throat:      Mouth: Mucous membranes are moist    Eyes:      General:         Right eye: No discharge  Left eye: No discharge  Conjunctiva/sclera: Conjunctivae normal    Cardiovascular:      Rate and Rhythm: Normal rate and regular rhythm  Pulses: Normal pulses  Heart sounds: Normal heart sounds  No murmur heard  No friction rub  No gallop  Pulmonary:      Effort: Pulmonary effort is normal  No respiratory distress  Breath sounds: Normal breath sounds  No stridor  No wheezing, rhonchi or rales  Abdominal:      General: Bowel sounds are normal  There is no distension  Palpations: Abdomen is soft  Tenderness: There is no abdominal tenderness  There is no right CVA tenderness, left CVA tenderness, guarding or rebound  Musculoskeletal:         General: No swelling, tenderness, deformity or signs of injury  Normal range of motion  Cervical back: Neck supple  No rigidity  Right lower leg: No edema  Left lower leg: No edema     Skin:     General: Skin is warm and dry  Neurological:      Mental Status: She is alert and oriented to person, place, and time  Cranial Nerves: No dysarthria or facial asymmetry  Sensory: No sensory deficit  Motor: Weakness present  Comments: There is weakness to both lower extremities  Patient is unable to lift her legs off the bed on her own     Psychiatric:         Mood and Affect: Mood normal          Behavior: Behavior normal          Vital Signs  ED Triage Vitals   Temperature Pulse Respirations Blood Pressure SpO2   05/26/22 1349 05/26/22 1349 05/26/22 1349 05/26/22 1349 05/26/22 1349   97 7 °F (36 5 °C) 65 16 160/73 96 %      Temp Source Heart Rate Source Patient Position - Orthostatic VS BP Location FiO2 (%)   05/26/22 1349 05/26/22 1545 05/26/22 1349 05/26/22 1349 --   Oral Monitor Lying Right arm       Pain Score       05/26/22 1545       7           Vitals:    05/26/22 1349 05/26/22 1545 05/26/22 1729   BP: 160/73 155/70 (!) 192/79   Pulse: 65 56 77   Patient Position - Orthostatic VS: Lying Lying Lying         Visual Acuity  Visual Acuity    Flowsheet Row Most Recent Value   L Pupil Size (mm) 3   R Pupil Size (mm) 3          ED Medications  Medications   clopidogrel (PLAVIX) tablet 75 mg (has no administration in time range)   folic acid (FOLVITE) tablet 1 mg (has no administration in time range)   latanoprost (XALATAN) 0 005 % ophthalmic solution 1 drop (has no administration in time range)   diltiazem (CARDIZEM CD) 24 hr capsule 240 mg (has no administration in time range)   sodium chloride 0 9 % infusion (has no administration in time range)   acetaminophen (TYLENOL) tablet 650 mg (has no administration in time range)   docusate sodium (COLACE) capsule 100 mg (has no administration in time range)   ondansetron (ZOFRAN) injection 4 mg (has no administration in time range)   aluminum-magnesium hydroxide-simethicone (MYLANTA) oral suspension 30 mL (has no administration in time range)   heparin (porcine) subcutaneous injection 5,000 Units (has no administration in time range)   sodium chloride 0 9 % bolus 1,000 mL (0 mL Intravenous Stopped 5/26/22 1632)       Diagnostic Studies  Results Reviewed     Procedure Component Value Units Date/Time    Hepatitis panel, acute [[id number]] Collected: 05/26/22 1728    Lab Status: In process Specimen: Blood from Arm, Right Updated: 05/26/22 1744    HS Troponin I 2hr [[id number]]  (Abnormal) Collected: 05/26/22 1640    Lab Status: Final result Specimen: Blood from Arm, Left Updated: 05/26/22 1715     hs TnI 2hr 112 ng/L      Delta 2hr hsTnI 10 ng/L     CKMB [[id number]]  (Abnormal) Collected: 05/26/22 1440    Lab Status: Final result Specimen: Blood from Arm, Right Updated: 05/26/22 1611     CK-MB Index <1 0 %      CK-MB 33 4 ng/mL     HS Troponin I 4hr [[id number]]     Lab Status: No result Specimen: Blood     TSH [[id number]]  (Normal) Collected: 05/26/22 1440    Lab Status: Final result Specimen: Blood from Arm, Right Updated: 05/26/22 1604     TSH 3RD GENERATON 1 457 uIU/mL     Narrative:      Patients undergoing fluorescein dye angiography may retain small amounts of fluorescein in the body for 48-72 hours post procedure  Samples containing fluorescein can produce falsely depressed TSH values  If the patient had this procedure,a specimen should be resubmitted post fluorescein clearance        Magnesium [[id number]]  (Normal) Collected: 05/26/22 1440    Lab Status: Final result Specimen: Blood from Arm, Right Updated: 05/26/22 1604     Magnesium 2 5 mg/dL     Phosphorus [[id number]]  (Normal) Collected: 05/26/22 1440    Lab Status: Final result Specimen: Blood from Arm, Right Updated: 05/26/22 1604     Phosphorus 3 5 mg/dL     CK Total with Reflex CKMB [[id number]]  (Abnormal) Collected: 05/26/22 1440    Lab Status: Final result Specimen: Blood from Arm, Right Updated: 05/26/22 1604     Total CK 6,976 U/L     Urine Microscopic [[id number]]  (Abnormal) Collected: 05/26/22 1505    Lab Status: Final result Specimen: Urine, Clean Catch Updated: 05/26/22 1557     RBC, UA 1-2 /hpf      WBC, UA 1-2 /hpf      Epithelial Cells Occasional /hpf      Bacteria, UA Occasional /hpf      COARSE GRANULAR CASTS 1-2 /lpf      AMORPH URATES Occasional /hpf     HS Troponin 0hr (reflex protocol) [[id number]]  (Abnormal) Collected: 05/26/22 1440    Lab Status: Final result Specimen: Blood from Arm, Right Updated: 05/26/22 1525     hs TnI 0hr 102 ng/L     Comprehensive metabolic panel [[id number]]  (Abnormal) Collected: 05/26/22 1440    Lab Status: Final result Specimen: Blood from Arm, Right Updated: 05/26/22 1524     Sodium 139 mmol/L      Potassium 4 0 mmol/L      Chloride 101 mmol/L      CO2 31 mmol/L      ANION GAP 7 mmol/L      BUN 13 mg/dL      Creatinine 0 61 mg/dL      Glucose 104 mg/dL      Calcium 8 9 mg/dL      Corrected Calcium 9 7 mg/dL       U/L       U/L      Alkaline Phosphatase 58 U/L      Total Protein 6 5 g/dL      Albumin 3 0 g/dL      Total Bilirubin 0 67 mg/dL      eGFR 87 ml/min/1 73sq m     Narrative:      [place] guidelines for Chronic Kidney Disease (CKD):     Stage 1 with normal or high GFR (GFR > 90 mL/min/1 73 square meters)    Stage 2 Mild CKD (GFR = 60-89 mL/min/1 73 square meters)    Stage 3A Moderate CKD (GFR = 45-59 mL/min/1 73 square meters)    Stage 3B Moderate CKD (GFR = 30-44 mL/min/1 73 square meters)    Stage 4 Severe CKD (GFR = 15-29 mL/min/1 73 square meters)    Stage 5 End Stage CKD (GFR <15 mL/min/1 73 square meters)  Note: GFR calculation is accurate only with a steady state creatinine    UA w Reflex to Microscopic w Reflex to Culture [[id number]]  (Abnormal) Collected: 05/26/22 1505    Lab Status: Final result Specimen: Urine, Clean Catch Updated: 05/26/22 1516     Color, UA Yellow     Clarity, UA Clear     Specific Gravity, UA 1 015     pH, UA 6 0     Leukocytes, UA Negative     Nitrite, UA Negative     Protein, UA 30 (1+) mg/dl      Glucose, UA Negative mg/dl      Ketones, UA Negative mg/dl      Urobilinogen, UA 1 0 E U /dl      Bilirubin, UA Negative     Blood, UA Large    CBC and differential [[id number]]  (Abnormal) Collected: 05/26/22 1440    Lab Status: Final result Specimen: Blood from Arm, Right Updated: 05/26/22 1451     WBC 9 19 Thousand/uL      RBC 4 37 Million/uL      Hemoglobin 14 2 g/dL      Hematocrit 43 9 %       fL      MCH 32 5 pg      MCHC 32 3 g/dL      RDW 14 1 %      MPV 9 8 fL      Platelets 296 Thousands/uL      nRBC 0 /100 WBCs      Neutrophils Relative 73 %      Immat GRANS % 1 %      Lymphocytes Relative 18 %      Monocytes Relative 8 %      Eosinophils Relative 0 %      Basophils Relative 0 %      Neutrophils Absolute 6 78 Thousands/µL      Immature Grans Absolute 0 05 Thousand/uL      Lymphocytes Absolute 1 61 Thousands/µL      Monocytes Absolute 0 71 Thousand/µL      Eosinophils Absolute 0 01 Thousand/µL      Basophils Absolute 0 03 Thousands/µL                  CT spine thoracic & lumbar wo contrast   Final Result by Pb Hyatt DO (05/26 1613)      No acute osseous abnormality  Multilevel degenerative changes, as described, most significant at L4-L5, as described  Workstation performed: [id number]         XR chest 1 view portable   ED Interpretation by Mayra Tran MD (05/26 1537)   No infiltrates  No masses  No widened mediastinum  No congestive heart failure  Final Result by Ayad De León MD (05/26 1626)      No acute cardiopulmonary disease  Workstation performed: [id number]         XR foot 3+ views LEFT   ED Interpretation by Mayra Tran MD (05/26 1541)   Fracture of the base of the left 1st proximal phalanx  No dislocation      Final Result by Ayad De León MD (05/26 1631)         1  Acute appearing minimally displaced oblique fracture involving the medial base of the proximal phalanx of the 1st digit     2  Degenerative and postoperative change as noted  Workstation performed: [id number]         XR ankle 3+ views LEFT   ED Interpretation by Arely Jeronimo MD (05/26 2370)   No fracture or dislocation      Final Result by Trudi Eubanks MD (05/26 8241)         1  Acute appearing minimally displaced oblique fracture involving the medial base of the proximal phalanx of the 1st digit  2   Degenerative and postoperative change as noted  Workstation performed: [id number]         VAS lower limb venous duplex study, complete bilateral    (Results Pending)              Procedures  ECG 12 Lead Documentation Only    Date/Time: 5/26/2022 3:29 PM  Performed by: Arely Jeronimo MD  Authorized by: Arely Jeronimo MD     ECG reviewed by me, the ED Provider: yes    Patient location:  ED  Comments:      Normal sinus rhythm with PACs  Nonspecific ST and T-wave abnormality  Abnormal EKG  No acute ischemic changes  ED Course                               SBIRT 22yo+    Flowsheet Row Most Recent Value   SBIRT (25 yo +)    In order to provide better care to our patients, we are screening all of our patients for alcohol and drug use  Would it be okay to ask you these screening questions? No Filed at: 05/26/2022 1440                    [facility]  Number of Diagnoses or Management Options  Diagnosis management comments: Imaging of the spine was unremarkable  There were mild elevations in her LFTs  She had a marked elevation in her CK  I suspect she has myopathy/rhabdomyolysis secondary to simvastatin  She was hydrated  Consulted with hospitalist for admission  Patient lives alone and is unable to walk         Amount and/or Complexity of Data Reviewed  Clinical lab tests: ordered and reviewed  Tests in the radiology section of CPT®: ordered and reviewed  Independent visualization of images, tracings, or specimens: yes        Disposition  Final diagnoses:   Bilateral leg weakness   Adverse effect of drug, initial encounter   Rhabdomyolysis     Time reflects when diagnosis was documented in both MDM as applicable and the Disposition within this note     Time User Action Codes Description Comment    5/26/2022  4:53 PM Patricia Valles Add [R29 898] Bilateral leg weakness     5/26/2022  4:53 PM Patricia Gouge Add [T50 905A] Adverse effect of drug, initial encounter     5/26/2022  4:53 PM Patricia Chaudharyuge Add [R56 [id number]] Rhabdomyolysis       ED Disposition     ED Disposition   Admit    Condition   Stable    Date/Time   u May 26, 2022  4:52 PM    Comment              Follow-up Information    None         Current Discharge Medication List      CONTINUE these medications which have NOT CHANGED    Details   clopidogrel (PLAVIX) 75 mg tablet Take 1 tablet (75 mg total) by mouth daily at bedtime  Qty: 90 tablet, Refills: 1    Associated Diagnoses: History of TIA (transient ischemic attack)      diltiazem (TIAZAC) 240 MG 24 hr capsule Take 1 capsule (240 mg total) by mouth daily  Qty: 90 capsule, Refills: 1    Associated Diagnoses: Benign essential hypertension      folic acid (FOLVITE) 1 mg tablet Take 1 mg by mouth 3 (three) times a day       latanoprost (XALATAN) 0 005 % ophthalmic solution Administer 1 drop to both eyes daily at bedtime       methotrexate 2 5 mg tablet Take 7 tablets (17 5 mg total) by mouth once a week  Qty: 12 tablet, Refills: 0    Associated Diagnoses: Rheumatoid arthritis, involving unspecified site, unspecified rheumatoid factor presence      simvastatin (ZOCOR) 40 mg tablet Take 1 tablet (40 mg total) by mouth daily at bedtime  Qty: 90 tablet, Refills: 1    Associated Diagnoses: Mixed hyperlipidemia      clobetasol (TEMOVATE) 0 05 % external solution Apply to affected area once daily  Qty: 50 mL, Refills: 0    Associated Diagnoses: Psoriasis             No discharge procedures on file      PDMP Review     None          ED Provider  Electronically Signed by           Corrinne Ambrosia, MD  05/26/22 4258

## 2022-05-27 ENCOUNTER — HOME HEALTH ADMISSION (OUTPATIENT)
Dept: HOME HEALTH SERVICES | Facility: HOME HEALTHCARE | Age: 77
End: 2022-05-27

## 2022-05-27 LAB
ALBUMIN SERPL BCP-MCNC: 2.5 G/DL (ref 3.5–5)
ALP SERPL-CCNC: 49 U/L (ref 46–116)
ALT SERPL W P-5'-P-CCNC: 157 U/L (ref 12–78)
ANION GAP SERPL CALCULATED.3IONS-SCNC: 9 MMOL/L (ref 4–13)
AST SERPL W P-5'-P-CCNC: 215 U/L (ref 5–45)
ATRIAL RATE: 55 BPM
BASOPHILS # BLD AUTO: 0.03 THOUSANDS/ΜL (ref 0–0.1)
BASOPHILS NFR BLD AUTO: 0 % (ref 0–1)
BILIRUB SERPL-MCNC: 0.64 MG/DL (ref 0.2–1)
BUN SERPL-MCNC: 8 MG/DL (ref 5–25)
CALCIUM ALBUM COR SERPL-MCNC: 9.3 MG/DL (ref 8.3–10.1)
CALCIUM SERPL-MCNC: 8.1 MG/DL (ref 8.3–10.1)
CHLORIDE SERPL-SCNC: 106 MMOL/L (ref 100–108)
CK MB SERPL-MCNC: 32.1 NG/ML (ref 0–5)
CK MB SERPL-MCNC: <1 % (ref 0–2.5)
CK SERPL-CCNC: 5003 U/L (ref 26–192)
CO2 SERPL-SCNC: 26 MMOL/L (ref 21–32)
CREAT SERPL-MCNC: 0.62 MG/DL (ref 0.6–1.3)
CRP SERPL QL: <3 MG/L
EOSINOPHIL # BLD AUTO: 0.06 THOUSAND/ΜL (ref 0–0.61)
EOSINOPHIL NFR BLD AUTO: 1 % (ref 0–6)
ERYTHROCYTE [DISTWIDTH] IN BLOOD BY AUTOMATED COUNT: 14.3 % (ref 11.6–15.1)
GFR SERPL CREATININE-BSD FRML MDRD: 86 ML/MIN/1.73SQ M
GLUCOSE SERPL-MCNC: 79 MG/DL (ref 65–140)
HAV IGM SER QL: NORMAL
HBV CORE IGM SER QL: NORMAL
HBV SURFACE AG SER QL: NORMAL
HCT VFR BLD AUTO: 41.7 % (ref 34.8–46.1)
HCV AB SER QL: NORMAL
HGB BLD-MCNC: 13.7 G/DL (ref 11.5–15.4)
IMM GRANULOCYTES # BLD AUTO: 0.04 THOUSAND/UL (ref 0–0.2)
IMM GRANULOCYTES NFR BLD AUTO: 0 % (ref 0–2)
LYMPHOCYTES # BLD AUTO: 1.84 THOUSANDS/ΜL (ref 0.6–4.47)
LYMPHOCYTES NFR BLD AUTO: 20 % (ref 14–44)
MCH RBC QN AUTO: 34.1 PG (ref 26.8–34.3)
MCHC RBC AUTO-ENTMCNC: 32.9 G/DL (ref 31.4–37.4)
MCV RBC AUTO: 104 FL (ref 82–98)
MONOCYTES # BLD AUTO: 0.72 THOUSAND/ΜL (ref 0.17–1.22)
MONOCYTES NFR BLD AUTO: 8 % (ref 4–12)
NEUTROPHILS # BLD AUTO: 6.44 THOUSANDS/ΜL (ref 1.85–7.62)
NEUTS SEG NFR BLD AUTO: 71 % (ref 43–75)
NRBC BLD AUTO-RTO: 0 /100 WBCS
P AXIS: 90 DEGREES
PLATELET # BLD AUTO: 240 THOUSANDS/UL (ref 149–390)
PMV BLD AUTO: 10.9 FL (ref 8.9–12.7)
POTASSIUM SERPL-SCNC: 3.4 MMOL/L (ref 3.5–5.3)
PR INTERVAL: 146 MS
PROT SERPL-MCNC: 5.5 G/DL (ref 6.4–8.2)
QRS AXIS: -6 DEGREES
QRSD INTERVAL: 90 MS
QT INTERVAL: 434 MS
QTC INTERVAL: 415 MS
RBC # BLD AUTO: 4.02 MILLION/UL (ref 3.81–5.12)
SODIUM SERPL-SCNC: 141 MMOL/L (ref 136–145)
T WAVE AXIS: -11 DEGREES
VENTRICULAR RATE: 55 BPM
WBC # BLD AUTO: 9.13 THOUSAND/UL (ref 4.31–10.16)

## 2022-05-27 PROCEDURE — 82553 CREATINE MB FRACTION: CPT | Performed by: INTERNAL MEDICINE

## 2022-05-27 PROCEDURE — 86235 NUCLEAR ANTIGEN ANTIBODY: CPT | Performed by: PHYSICIAN ASSISTANT

## 2022-05-27 PROCEDURE — 99222 1ST HOSP IP/OBS MODERATE 55: CPT | Performed by: PSYCHIATRY & NEUROLOGY

## 2022-05-27 PROCEDURE — 97163 PT EVAL HIGH COMPLEX 45 MIN: CPT

## 2022-05-27 PROCEDURE — 82550 ASSAY OF CK (CPK): CPT | Performed by: INTERNAL MEDICINE

## 2022-05-27 PROCEDURE — 83516 IMMUNOASSAY NONANTIBODY: CPT | Performed by: PHYSICIAN ASSISTANT

## 2022-05-27 PROCEDURE — 93010 ELECTROCARDIOGRAM REPORT: CPT | Performed by: INTERNAL MEDICINE

## 2022-05-27 PROCEDURE — 86140 C-REACTIVE PROTEIN: CPT | Performed by: PHYSICIAN ASSISTANT

## 2022-05-27 PROCEDURE — 99232 SBSQ HOSP IP/OBS MODERATE 35: CPT | Performed by: INTERNAL MEDICINE

## 2022-05-27 PROCEDURE — 80053 COMPREHEN METABOLIC PANEL: CPT | Performed by: INTERNAL MEDICINE

## 2022-05-27 PROCEDURE — 83520 IMMUNOASSAY QUANT NOS NONAB: CPT | Performed by: PHYSICIAN ASSISTANT

## 2022-05-27 PROCEDURE — 85025 COMPLETE CBC W/AUTO DIFF WBC: CPT | Performed by: INTERNAL MEDICINE

## 2022-05-27 PROCEDURE — 93970 EXTREMITY STUDY: CPT | Performed by: SURGERY

## 2022-05-27 PROCEDURE — 97167 OT EVAL HIGH COMPLEX 60 MIN: CPT

## 2022-05-27 RX ORDER — SODIUM CHLORIDE 9 MG/ML
125 INJECTION, SOLUTION INTRAVENOUS CONTINUOUS
Status: DISPENSED | OUTPATIENT
Start: 2022-05-27 | End: 2022-05-28

## 2022-05-27 RX ORDER — POTASSIUM CHLORIDE 20 MEQ/1
40 TABLET, EXTENDED RELEASE ORAL ONCE
Status: COMPLETED | OUTPATIENT
Start: 2022-05-27 | End: 2022-05-27

## 2022-05-27 RX ADMIN — DOCUSATE SODIUM 100 MG: 100 CAPSULE, LIQUID FILLED ORAL at 16:27

## 2022-05-27 RX ADMIN — HEPARIN SODIUM 5000 UNITS: 5000 INJECTION INTRAVENOUS; SUBCUTANEOUS at 21:21

## 2022-05-27 RX ADMIN — POTASSIUM CHLORIDE 40 MEQ: 1500 TABLET, EXTENDED RELEASE ORAL at 14:48

## 2022-05-27 RX ADMIN — DILTIAZEM HYDROCHLORIDE 240 MG: 240 CAPSULE, COATED, EXTENDED RELEASE ORAL at 08:30

## 2022-05-27 RX ADMIN — FOLIC ACID 1 MG: 1 TABLET ORAL at 21:21

## 2022-05-27 RX ADMIN — DOCUSATE SODIUM 100 MG: 100 CAPSULE, LIQUID FILLED ORAL at 08:29

## 2022-05-27 RX ADMIN — FOLIC ACID 1 MG: 1 TABLET ORAL at 08:29

## 2022-05-27 RX ADMIN — SODIUM CHLORIDE 125 ML/HR: 0.9 INJECTION, SOLUTION INTRAVENOUS at 14:48

## 2022-05-27 RX ADMIN — FOLIC ACID 1 MG: 1 TABLET ORAL at 16:27

## 2022-05-27 RX ADMIN — LATANOPROST 1 DROP: 50 SOLUTION OPHTHALMIC at 21:21

## 2022-05-27 RX ADMIN — CLOPIDOGREL BISULFATE 75 MG: 75 TABLET ORAL at 21:21

## 2022-05-27 RX ADMIN — SODIUM CHLORIDE 125 ML/HR: 0.9 INJECTION, SOLUTION INTRAVENOUS at 08:31

## 2022-05-27 NOTE — CASE MANAGEMENT
Case Management Assessment & Discharge Planning Note    Patient name Liza Cisse  Location [facility] [address]* MRN [id number]  : 1945 Date 2022       Current Admission Date: 2022  Current Admission Diagnosis:Rhabdomyolysis   Patient Active Problem List    Diagnosis Date Noted    Ambulatory dysfunction 2022    Rhabdomyolysis 2022    Elevated liver enzymes 2022    Laceration of forehead 2021    Mild protein-calorie malnutrition ([facility] ) 2021    Other constipation 2019    Trigeminal neuralgia 2018    History of TIA (transient ischemic attack) 2018    Rheumatoid arthritis ([facility] ) 2018    Nerve sheath tumor 2016    Fatty liver 2014    Arteriosclerotic heart disease 2014    Essential hypertension 2013    Transient cerebral ischemia 2012    HLD (hyperlipidemia) 2012      LOS (days): 1  Geometric Mean LOS (GMLOS) (days): 3 10  Days to GMLOS:2 2     OBJECTIVE:    Risk of Unplanned Readmission Score: 13 51         Current admission status: Inpatient       [facility] [address] [address]  Phone: [phone] Fax: [phone]    Primary Care Provider: Verna Meíja DO    Primary Insurance: MEDICARE  Secondary Insurance: [address] [place] [address]    ASSESSMENT:  Active Health Care Proxies    There are no active Health Care Proxies on file         Advance Directives  Does patient have a [address]?: Yes  Does patient have Advance Directives?: Yes  Primary Contact: TERE Lopez/Friend, [phone]         Readmission Root Cause  30 Day Readmission: No    Patient Information  Admitted from[de-identified] Home  Mental Status: Alert  During Assessment patient was accompanied by: Not accompanied during assessment  Assessment information provided by[de-identified] Patient  Primary Caregiver: Self  Support Systems: Friends/neighbors, [address] of Residence: [address] do you live in?: [address] entry access options   Select all that apply : Stairs (1+1)  Do the steps have railings?: Yes  Type of Current Residence: [place]  In the last 12 months, was there a time when you were not able to pay the mortgage or rent on time?: No  In the last 12 months, how many places have you lived?: 1  In the last 12 months, was there a time when you did not have a steady place to sleep or slept in a shelter (including now)?: No  Homeless/housing insecurity resource given?: N/A  Living Arrangements: Lives Alone  Is patient a ?: No    Activities of Daily Living Prior to Admission  Functional Status: Independent  Completes ADLs independently?: Yes  Ambulates independently?: Yes  Does patient use assisted devices?: No  Does patient currently own DME?: Yes  What DME does the patient currently own?: [employer] Burns  Does patient have a history of Outpatient Therapy (PT/OT)?:  (No )  Does the patient have a history of Short-Term Rehab?: No  Does patient have a history of HHC?: No         Patient Information Continued  Income Source: Pension/FDC  Does patient have prescription coverage?: Yes  Within the past 12 months, you worried that your food would run out before you got the money to buy more : Never true  Within the past 12 months, the food you bought just didn't last and you didn't have money to get more : Never true  Food insecurity resource given?: N/A  Does patient receive dialysis treatments?: No  Does patient have a history of substance abuse?: No  Does patient have a history of Mental Health Diagnosis?: No         Means of Transportation  Means of Transport to Appts[de-identified] Friends (Neighbors)  In the past 12 months, has lack of transportation kept you from medical appointments or from getting medications?: No  In the past 12 months, has lack of transportation kept you from meetings, work, or from getting things needed for daily living?: No  Was application for public transport provided?: N/A        DISCHARGE DETAILS:    Discharge planning discussed with[de-identified] Pt  Freedom of Choice: Yes  Comments - Freedom of Choice: Pt refusing STR, would like to use [facility] for HHC    CM contacted family/caregiver?: No- see comments (Pt does not want TERE Miranda, called at this time )             Contacts  Patient Contacts: Angely Wilson  Relationship to Patient[de-identified] Friend (POA)  Contact Method: Other (Comment) (Not called at pt's request)    [address] [place] [address]         Is the patient interested in [facility] at discharge?: Yes  [address] requested[de-identified] Occupational Therapy, Physical [address] [facility] Provider[de-identified] PCP  Home Health Services Needed[de-identified] Evaluate Functional Status and Safety, Gait/ADL Training, Strengthening/Theraputic Exercises to Improve Function  Homebound Criteria Met[de-identified] Requires the Assistance of Another Person for Safe Ambulation or to Leave the Home, Uses an Assist Device (i e  cane, walker, etc)  Supporting Clincal Findings[de-identified] Fatigues Easliy in [employer], Limited Endurance       Would you like to participate in our [address] service program?  : No - Declined    Treatment Team Recommendation: Short Term Rehab

## 2022-05-27 NOTE — ASSESSMENT & PLAN NOTE
69-year-old female with psoriatic arthritis, history of TIA versus stroke, history of trigeminal neuralgia, dyslipidemia, hypertension, presents with bilateral lower extremity weakness  Onset rather sudden around 5/16/22  On exam, patient has proximal BL LE weakness and labs consistent with rhabdo  Patient has been unable to ambulate independently since onset of LE weakness  CT T and L spine unremarkable  ?myopathy vs  L-spine etiology of weakness  Plan:  -check ESR, CRP, myomarker 3 profile, HMGCR antibodies  -hold statin  -recommend consulting Rheumatology re: methotrexate and possible med-induced myopathy   -check MRI L-spine  -PT/OT    -treatment of rhabdo as per primary team

## 2022-05-27 NOTE — OCCUPATIONAL THERAPY NOTE
Occupational Therapy Evaluation     Patient Name: Tamara Perry  CPCWD'F Date: 2022  Problem List  Principal Problem:    Rhabdomyolysis  Active Problems:    History of TIA (transient ischemic attack)    Rheumatoid arthritis ([facility] [address] )    Essential hypertension    HLD (hyperlipidemia)    Ambulatory dysfunction    Elevated liver enzymes    Past Medical History  Past Medical History:   Diagnosis Date    Abnormal liver function test     last assessed: 3/25/2014    KARIME (acute kidney injury) ([facility] [address] ) 2020    Anemia     Chronic constipation     last assessed: 2014    Closed fracture of 4th metacarpal 3/3/2018    Herpes zoster     last assessed: 3/16/2016    Hyperlipidemia     Hypertension     Open nondisplaced fracture of first cervical vertebra with routine healing 3/3/2018    Psoriatic arthritis ([facility] )     last assessed;3/25/2014    Recent weight loss     last assessed: 2016    Rheumatoid arthritis ([facility] )     Stroke ([facility] )     Syncope 2020    Trigeminal neuritis      Past Surgical History  Past Surgical History:   Procedure Laterality Date    APPENDECTOMY      FOOT SURGERY      HYSTERECTOMY      KIDNEY SURGERY             22   OT Last Visit   OT Visit Date 22   Note Type   Note type Evaluation   Restrictions/Precautions   Weight Bearing Precautions Per Order No   Other Precautions Chair Alarm; Bed Alarm; Fall Risk;Multiple lines   Pain Assessment   Pain Assessment Tool 0-10   Pain Score No Pain   Home Living   Type of [address] [place] [address] One level;Stairs to enter without rails; Able to live on main level with bedroom/bathroom  (1+1 BISI)   Bathroom Shower/Tub Walk-in shower   Bathroom Toilet Standard  (standard toilet w/ raiser)   Bathroom Equipment Built-in shower seat; Toilet raiser   [address] Walker   Additional Comments Pt lives alone in a one level house with 1+1 BISI  Of note, pt's spouse recently  (x2 wks)   Pt has supportive neighbor who checks in daily, multiple times a day  Prior Function   Level of [place] Independent with ADLs and functional mobility   Lives With Alone   Receives Help From Neighbor   ADL Assistance   (I prior to 2 wks ago; Requiring assist w/ ADLs x2 wks)   IADLs   (I prior to 2 wks ago; Requiring assist w/ IADLs x2 wks)   Falls in the last 6 months 1 to 4  (1 per pt report)   Vocational Retired   Comments At baseline, pt reports being I w/ ADLs, IADLs, and functional transfers/mobility w/o use of AD  Pt notes functional decline x2 wks  For the last two wks, pt requiring assist from neighbor for all ADLs (except dressing), IADLs, and functional transfers/mobility  (+)   (+) fall PTA  Lifestyle   Autonomy At baseline, pt reports being I w/ ADLs, IADLs, and functional transfers/mobility w/o use of AD  Pt notes functional decline x2 wks  For the last two wks, pt requiring assist from neighbor for all ADLs (except dressing), IADLs, and functional transfers/mobility  (+)   (+) fall PTA  Reciprocal Relationships Neighbor   Service to Others Retired   Intrinsic Gratification Watching TV   ADL   Where Assessed Chair   Eating Assistance 7  3 [facility] 5  [address] [facility] 4  Minimal Assistance   LB [address] 3  Moderate Assistance   [address] 4  [address] 2  Maximal [address]  3  Moderate Assistance   Functional Assistance 3  Moderate Assistance   Bed Mobility   Supine to Sit 3  Moderate assistance   Additional items Assist x 2;Bedrails;HOB elevated; Increased time required;Verbal cues;LE management   Sit to Supine Unable to assess   Additional Comments Pt seated OOB in chair with chair alarm activated at end of session  Call bell and phone within reach  All needs met and pt reports no further questions for OT at this time     Transfers   Sit to Stand 3  Moderate assistance Additional items Assist x 2; Increased time required;Verbal cues   Stand to Sit 4  Minimal assistance   Additional items Assist x 2;Armrests; Increased time required;Verbal cues   Toilet transfer 3  Moderate assistance   Additional items Assist x 2; Increased time required;Verbal cues; Commode  (Mod A to stand, Min A to sit)   Additional Comments Cues for safe technique and hand placement  Increased assist to initiate forward weight shift for sit>stand   Functional Mobility   Functional Mobility 4  Minimal assistance   Additional Comments Assist x2 for balance/steadying   Additional items Rolling walker   Balance   Static Sitting Fair   Dynamic Sitting Fair -   Static Standing Fair -   Dynamic Standing Poor +   Ambulatory Poor +   Activity Tolerance   Activity Tolerance Treatment limited secondary to medical complications (Comment)  (self-limiting)   Medical Staff Made Daya Luo PT   Nurse Made Aware yes; Bolivar Rea RN   RUE Assessment   RUE Assessment WFL   RUE Strength   RUE Overall Strength Within Functional Limits - able to perform ADL tasks with strength  (4-/5 throughout)   LUE Assessment   LUE Assessment WFL   LUE Strength   LUE Overall Strength Within Functional Limits - able to perform ADL tasks with strength  (4-/5 throughout)   Hand Function   Gross Motor Coordination Functional   Fine Motor Coordination Functional   Sensation   Light Touch No apparent deficits   Proprioception   Proprioception No apparent deficits   Vision-Basic Assessment   Current Vision Wears glasses all the time   Vision - Complex Assessment   Ocular Range of Motion [facility]   Acuity Able to read clock/calendar on wall without difficulty; Able to read employee name badge without difficulty   Perception   Inattention/Neglect Appears intact   Cognition   Overall Cognitive Status [facility]   Arousal/Participation Alert   Attention Attends with cues to redirect   Orientation Level Oriented X4   Memory Decreased recall of precautions   Following Commands Follows all commands and directions without difficulty   Comments Self-limiting behaviors, increased encouragement required  Limited insight into deficits   Assessment   Limitation Decreased ADL status; Decreased UE strength;Decreased Safe judgement during ADL;Decreased endurance;Decreased self-care trans;Decreased high-level ADLs   Prognosis Good   Assessment Pt is a 64 y o  female seen for OT evaluation s/p adm to [facility] on 2022 w/ B/L LE weakness x2 wks and admitted w/ Rhabdomyolysis, Elevated liver enzymes, and Ambulatory dysfunction  Comorbidities affecting pts functional performance include a significant PMH of Anemia, HLD, HTN, Stroke, Syncope  Pt with active OT orders and activity orders for Ambulate  Pt lives alone in a one level house with 1+1 BISI  Of note, pt's spouse recently  (x2 wks)  Pt has supportive neighbor who checks in daily, multiple times a day  At baseline, pt reports being I w/ ADLs, IADLs, and functional transfers/mobility w/o use of AD  Pt notes functional decline x2 wks  For the last two wks, pt requiring assist from neighbor for all ADLs (except dressing), IADLs, and functional transfers/mobility  (+)   (+) fall PTA  Upon evaluation, pt currently requires Min A for UB ADLs, Max-Mod A for LB ADLs, Mod A for toileting, Mod A of 2 for bed mobility, and Min A of 2 for functional mobility/transfers 2* the following deficits impacting occupational performance: decreased strength, decreased balance, decreased tolerance and decreased safety awareness  These impairments, as well at pts fall risk, steps to enter environment, limited home support, difficulty performing ADLS, difficulty performing IADLS  and limited insight into deficits limit pts ability to safely engage in all baseline areas of occupation  Based on the aforementioned OT evaluation, functional performance deficits, and assessments, pt has been identified as a High complexity evaluation   Pt to continue to benefit from continued acute OT services during hospital stay to address defined deficits and to maximize level of functional independence in the following Occupational Performance areas: grooming, bathing/shower, toilet hygiene, dressing, medication management, health maintenance, functional mobility, community mobility, clothing management, cleaning, meal prep and household maintenance  From OT standpoint, recommend STR upon D/C  OT will continue to follow pt 3-5x/wk to address the following goals to  w/in 10-14 days:   Goals   Patient Goals None offered   LTG Time Frame 10-   Long Term Goal Please refer to LTGs listed below   Plan   Treatment Interventions ADL retraining;Functional transfer training;UE strengthening/ROM; Endurance training;Patient/family training;Equipment evaluation/education; Compensatory technique education; Activityengagement   Goal Expiration Date 06/10/22   OT Treatment Day 0   OT Frequency 3-5x/wk   Recommendation   OT Discharge Recommendation Post acute rehabilitation services   OT - OK to Discharge Yes  (when medically cleared to rehab)   Additional Comments  The patient's raw score on the AM-PAC Daily Activity inpatient short form is 16, standardized score is 35 96, less than 39 4  Patients at this level are likely to benefit from discharge to post-acute rehabilitation services  Please refer to the recommendation of the Occupational Therapist for safe discharge planning     AM-PAC Daily Activity Inpatient   Lower Body Dressing 2   Bathing 2   Toileting 2   Upper Body Dressing 3   Grooming 3   Eating 4   Daily Activity Raw Score 16   Daily Activity Standardized Score (Calc for Raw Score >=11) 35 96   AM-[facility] Applied Cognition Inpatient   Following a Speech/Presentation 4   Understanding Ordinary Conversation 4   Taking Medications 3   Remembering Where Things Are Placed or Put Away 3   Remembering List of 4-5 Errands 3   Taking Care of Complicated Tasks 3   Applied Cognition Raw Score 20   Applied Cognition Standardized Score 41 76       GOALS    Pt will improve activity tolerance to G for min 30 min txment sessions for increase engagement in functional tasks    Pt will complete bed mobility at a Mod I level w/ G balance/safety demonstrated to decrease caregiver assistance required     Pt will complete UB dressing/self care w/ mod I using adaptive device and DME as needed     Pt will complete LB dressing/self care w/ mod I using adaptive device and DME as needed    Pt will complete toileting w/ mod I w/ G hygiene/thoroughness using DME as needed    Pt will improve functional transfers to Mod I on/off all surfaces using DME as needed w/ G balance/safety     Pt will improve functional mobility during ADL/IADL/leisure tasks to Mod I using DME as needed w/ G balance/safety     Pt will be attentive 100% of the time during ongoing cognitive assessment w/ G participation to assist w/ safe d/c planning/recommendations    Pt will demonstrate G carryover of pt/caregiver education and training as appropriate w/o cues w/ good tolerance to increase safety during functional tasks    Pt will increase BUE strength by 1MM grade via AROM exercises to increase independence in ADLs and transfers    Pt will verbalize 3 potential fall hazards and identify appropriate compensatory techniques to decrease fall risk in home environment     Pt will increase standing tolerance to 8-10 mins with Fair+ dynamic standing balance to increase safety during participation in ADLs       Jorge Cha, OTR/L

## 2022-05-27 NOTE — PLAN OF CARE
Problem: Potential for Falls  Goal: Patient will remain free of falls  Description: INTERVENTIONS:  - Educate patient/family on patient safety including physical limitations  - Instruct patient to call for assistance with activity   - Consult OT/PT to assist with strengthening/mobility   - Keep Call bell within reach  - Keep bed low and locked with side rails adjusted as appropriate  - Keep care items and personal belongings within reach  - Initiate and maintain comfort rounds  - Make Fall Risk Sign visible to staff  - Offer Toileting every 2 Hours, in advance of need  - Initiate/Maintain bed alarm  - Obtain necessary fall risk management equipment: walker  - Apply yellow socks and bracelet for high fall risk patients  - Consider moving patient to room near nurses station  Outcome: Progressing     Problem: MOBILITY - ADULT  Goal: Maintain or return to baseline ADL function  Description: INTERVENTIONS:  -  Assess patient's ability to carry out ADLs; assess patient's baseline for ADL function and identify physical deficits which impact ability to perform ADLs (bathing, care of mouth/teeth, toileting, grooming, dressing, etc )  - Assess/evaluate cause of self-care deficits   - Assess range of motion  - Assess patient's mobility; develop plan if impaired  - Assess patient's need for assistive devices and provide as appropriate  - Encourage maximum independence but intervene and supervise when necessary  - Involve family in performance of ADLs  - Assess for home care needs following discharge   - Consider OT consult to assist with ADL evaluation and planning for discharge  - Provide patient education as appropriate  Outcome: Progressing  Goal: Maintains/Returns to pre admission functional level  Description: INTERVENTIONS:  - Perform BMAT or MOVE assessment daily    - Set and communicate daily mobility goal to care team and patient/family/caregiver     - Collaborate with rehabilitation services on mobility goals if consulted  - Perform Range of Motion 3 times a day  - Reposition patient every 3 hours    - Dangle patient 3 times a day  - Stand patient 3 times a day  - Ambulate patient 3 times a day  - Out of bed to chair 3 times a day   - Out of bed for meals 3 times a day  - Out of bed for toileting  - Record patient progress and toleration of activity level   Outcome: Progressing     Problem: Prexisting or High Potential for Compromised Skin Integrity  Goal: Skin integrity is maintained or improved  Description: INTERVENTIONS:  - Identify patients at risk for skin breakdown  - Assess and monitor skin integrity  - Assess and monitor nutrition and hydration status  - Monitor labs   - Assess for incontinence   - Turn and reposition patient  - Assist with mobility/ambulation  - Relieve pressure over bony prominences  - Avoid friction and shearing  - Provide appropriate hygiene as needed including keeping skin clean and dry  - Evaluate need for skin moisturizer/barrier cream  - Collaborate with interdisciplinary team   - Patient/family teaching  - Consider wound care consult   Outcome: Progressing     Problem: MUSCULOSKELETAL - ADULT  Goal: Maintain or return mobility to safest level of function  Description: INTERVENTIONS:  - Assess patient's ability to carry out ADLs; assess patient's baseline for ADL function and identify physical deficits which impact ability to perform ADLs (bathing, care of mouth/teeth, toileting, grooming, dressing, etc )  - Assess/evaluate cause of self-care deficits   - Assess range of motion  - Assess patient's mobility  - Assess patient's need for assistive devices and provide as appropriate  - Encourage maximum independence but intervene and supervise when necessary  - Involve family in performance of ADLs  - Assess for home care needs following discharge   - Consider OT consult to assist with ADL evaluation and planning for discharge  - Provide patient education as appropriate  Outcome: Progressing  Goal: Maintain proper alignment of affected body part  Description: INTERVENTIONS:  - Support, maintain and protect limb and body alignment  - Provide patient/ family with appropriate education  Outcome: Progressing

## 2022-05-27 NOTE — PHYSICAL THERAPY NOTE
PHYSICAL THERAPY EVALUATION          Patient Name: Dirk Simmons  RHGBS'N Date: 2022  PT EVALUATION    64 y o     [id number]    Rhabdomyolysis [M62 82]  Weakness [R53 1]  Bilateral leg weakness [R29 898]  Adverse effect of drug, initial encounter Juan Diego Machado    Past Medical History:   Diagnosis Date    Abnormal liver function test     last assessed: 3/25/2014    KARIME (acute kidney injury) ([facility] [address] ) 2020    Anemia     Chronic constipation     last assessed: 2014    Closed fracture of 4th metacarpal 3/3/2018    Herpes zoster     last assessed: 3/16/2016    Hyperlipidemia     Hypertension     Open nondisplaced fracture of first cervical vertebra with routine healing 3/3/2018    Psoriatic arthritis ([facility] [address] )     last assessed;3/25/2014    Recent weight loss     last assessed: 2016    Rheumatoid arthritis ([facility] [address] )     Stroke ([facility] [address] )     Syncope 2020    Trigeminal neuritis      Past Surgical History:   Procedure Laterality Date    APPENDECTOMY      FOOT SURGERY      HYSTERECTOMY      KIDNEY SURGERY          22 0826   PT Last Visit   PT Visit Date 22   Note Type   Note type Evaluation   Pain Assessment   Pain Assessment Tool 0-10   Pain Score No Pain   Restrictions/Precautions   Other Precautions Chair Alarm; Bed Alarm;Multiple lines; Fall Risk   Home Living   Type of [address] [place] [address] One level;Stairs to enter without rails   Bathroom Shower/Tub Walk-in shower   Bathroom Toilet Raised  (standard toilet w raiser)   Bathroom Equipment Built-in shower seat; Toilet raiser   Home Equipment Walker   Additional Comments 1+1 BISI  pt reports first floor set up   Prior Function   Lives With Alone  ( recently )   [address] Help From [facility] in the last 6 months 1 to 4  (1 per pt)   Comments pt reports for the past two weeks needing daily assist from neighbor for ADLs (except dressing) and IADLs as well as mobility   prior to that pt was indep for ADLs, IADLs and ambulation without an AD  +  denies local support outside of neighbor   General   Additional Pertinent History pt admitted 5/26/22 for rhabdomyolysis  ambulate patient orders  per imaging w acute L 1st digit fx  pmhx significant for psoriatic arthritis, RA, stroke   Cognition   Overall Cognitive Status WFL   Arousal/Participation Cooperative   Orientation Level Oriented X4   Memory Within functional limits   Following Commands Follows all commands and directions without difficulty   Comments self limiting behaviors  limited insight  Subjective   Subjective "I cant" "Sorry im in a bad mood"   RLE Assessment   RLE Assessment X  (limited by effort)   LLE Assessment   LLE Assessment X  (limited by effort)   Coordination   Sensation [facility]   Bed Mobility   Supine to Sit 3  Moderate assistance   Additional items Assist x 2;Bedrails; Increased time required;Verbal cues;LE management; Other   Additional Comments assist for BLE and trunk support  able to initiate rolling using UEs and able to initiate BLE movement   Transfers   Sit to Stand 3  Moderate assistance   Additional items Assist x 2; Increased time required;Verbal cues; Other  (AD)   Stand to Sit 4  Minimal assistance   Additional items Assist x 2;Armrests; Increased time required; Impulsive;Verbal cues; Other  (AD)   Toilet transfer   (min to sit -mod to stand)   Additional items Assist x 2; Increased time required;Verbal cues; Commode; Other  (AD)   Ambulation/Elevation   Gait pattern Improper Weight shift; Short stride; Excessively slow   Gait Assistance 4  Minimal assist   Additional items Assist x 2;Verbal cues   Assistive Device Rolling walker   Distance 2'x2   Balance   Static Standing Fair -   Dynamic Standing Poor +   Ambulatory Poor +   Endurance Deficit   Endurance Deficit No   Activity Tolerance   Activity Tolerance Other (Comment); Treatment limited secondary to agitation  (self limiting)   Medical Staff 115 Mansi Vogt OT   Nurse Made Aware Silvia RN   Assessment   Prognosis Good   Problem List Decreased strength; Impaired balance;Decreased mobility; Impaired judgement;Decreased safety awareness   Assessment Ino Barrera is a 64 y o  female admitted to [facility] on 5/26/2022 for Rhabdomyolysis  PT was consulted and pt was seen on 5/27/2022 for mobility assessment and d/c planning  Pt presents w high fall risk, multiple lines  Pt reports functional decline over the past two weeks; at baseline is indep and now requires assist from neighbor for ADLs, IADLs and ambulation  Pt is currently functioning at a min-mod Ax2 for bed mobility, transfers and ambulation w RW  Pt demonstrated self limiting behaviors impacting effort and assessment of mobility during session  Requires much encouragement to participate  Unable to ambulate household distances or steps to enter home  Is at increased risk for falls at current LOF  Pt will benefit from continued skilled IP PT to address the above mentioned impairments  in order to maximize recovery and increase functional independence when completing mobility and ADLs  At this time PT recommendations for d/c are STR given social and environmental barriers, functional decline impacting safe dc home  Barriers to Discharge Decreased caregiver support   Barriers to Discharge Comments lives alone   Goals   Patient Goals none offered   STG Expiration Date 06/10/22   Short Term Goal #1 1)  Pt will perform bed mobility with S demonstrating appropriate technique 100% of the time in order to improve function  2)  Perform all transfers with S demonstrating safe and appropriate technique 100% of the time in order to improve ability to negotiate safely in home environment  3) Amb with least restrictive AD > 50'x1 with S in order to demonstrate ability to negotiate in home environment  4)  Improve overall strength and balance 1/2 grade in order to optimize ability to perform functional tasks and reduce fall risk  5) Increase activity tolerance to 45 minutes in order to improve endurance to functional tasks  6)  Negotiate stairs using most appropriate technique and min A in order to be able to negotiate safely in home environment  7) PT for ongoing patient and family/caregiver education, DME needs and d/c planning in order to promote highest level of function in least restrictive environment  Plan   Treatment/Interventions Functional transfer training;LE strengthening/ROM; Elevations; Therapeutic exercise;Patient/family training;Bed mobility; Equipment eval/education;Gait training; Compensatory technique education;Continued evaluation;Spoke to nursing;OT   PT Frequency Other (Comment)  (4-5x)   Recommendation   PT Discharge Recommendation Post acute rehabilitation services   AM-PAC Basic Mobility Inpatient   Turning in Bed Without Bedrails 2   Lying on Back to Sitting on Edge of Flat Bed 1   Moving Bed to Chair 2   Standing Up From Chair 1   Walk in Room 3   Climb 3-5 Stairs 1   Basic Mobility Inpatient Raw Score 10   Turning Head Towards Sound 4   Follow Simple Instructions 3   Low Function Basic Mobility Raw Score 17   Low Function Basic Mobility Standardized Score 27 46   Highest Level Of Mobility   JH-HLM Goal 4: Move to chair/commode   JH-HLM Achieved 4: Move to chair/commode   End of Consult   Patient Position at End of Consult Bedside chair;Bed/Chair alarm activated; All needs within reach   History: co - morbidities, social background (lives alone), past experience, fall risk, assist for adl's and mobility, multiple lines  Exam: impairments in systems including musculoskeletal (strength), neuromuscular (balance, gait, transfers, motor function and sensation), joint integrity (1st digit fx), cognition, am-pac  Clinical: unstable/unpredictable  Complexity:high      Lorena Norwood, PT

## 2022-05-27 NOTE — CONSULTS
Consultation - Neurology   Magan Velarde 64 y o  female MRN: [id number]  Unit/Bed#: [place] [address] [address] 220-01 Encounter: [id number]      Assessment/Plan     Ambulatory dysfunction  Assessment & Plan  73-year-old female with psoriatic arthritis, history of TIA versus stroke, history of trigeminal neuralgia, dyslipidemia, hypertension, presents with bilateral lower extremity weakness  Onset rather sudden around 5/16/22  On exam, patient has proximal BL LE weakness and labs consistent with rhabdo  Patient has been unable to ambulate independently since onset of LE weakness  CT T and L spine unremarkable  ?myopathy vs  L-spine etiology of weakness  Plan:  -check ESR, CRP, myomarker 3 profile, HMGCR antibodies  -hold statin  -recommend consulting Rheumatology re: methotrexate and possible med-induced myopathy   -check MRI L-spine  -PT/OT  -treatment of rhabdo as per primary team     Rheumatoid arthritis ([facility] [address] )  Assessment & Plan  Psoriatic arthritis - followed by Rheumatology at [address]  On maintenance methotrexate  History of TIA (transient ischemic attack)  Assessment & Plan  On Plavix and statin at baseline  Hold statin  Recommendations for outpatient neurological follow up have yet to be determined  History of Present Illness     Reason for Consult / Principal Problem: BL LE weakness  Hx and PE limited by: n/a  HPI: Magan Velarde is a 64 y o  right handed female with psoriatic arthritis, history of TIA versus stroke, history of trigeminal neuralgia, dyslipidemia, hypertension, presents with bilateral lower extremity weakness  Patient relates around 05/16/2022, she was walking around normally in the morning when she attempted to bend over and pick something up from the floor, and suddenly felt that her legs were jelly like  She was unable to support her weight and fell to the floor    Since that time, she has had to call her neighbor to come and help her walk to the restroom and assist with her ADLs as her legs have been too weak to do so on her own  Of note, patient's  passed away from cancer on 05/10/2022  Patient states that she was able to eat normally and drive and complete her ADLs independently until the event that occurred on 05/16/2022  She is unable to arise from the seated position unassisted  She denies overt myalgias but does feel some discomfort in her left quad  She denies numbness/paresthesias but describes a heaviness in her legs  No bowel or bladder incontinence but has been constipated, which she attributes to not getting around much or being active recently  No back pain  Denies weakness elsewhere  CK on presentation was 6976, down to 5003 today  LFTs were elevated on presentation with  and   Renal function remained intact  On exam, patient has proximal bilateral lower extremity weakness (primarily hip flexion) but strength is otherwise essentially intact  Perhaps some sensory deficit of the left lower extremity as compared to right  Reflexes appear hyporeflexic throughout, but present  Inpatient consult to Neurology  Consult performed by: Arnold Wong PA-C  Consult ordered by: Benigno Parker DO          Review of Systems   Constitutional: Negative  HENT: Negative  Eyes: Negative  Respiratory: Negative  Cardiovascular: Negative  Gastrointestinal: Negative  Endocrine: Negative  Genitourinary: Negative  Musculoskeletal: Positive for gait problem  Skin: Negative for rash  Allergic/Immunologic: Negative  Neurological: Positive for weakness  As above  Hematological: Negative  Psychiatric/Behavioral: Negative          Historical Information   Past Medical History:   Diagnosis Date    Abnormal liver function test     last assessed: 3/25/2014    KARIME (acute kidney injury) ([facility] [address] ) 1/11/2020    Anemia     Chronic constipation     last assessed: 12/2/2014    Closed fracture of 4th metacarpal 3/3/2018    Herpes zoster     last assessed: 3/16/2016    Hyperlipidemia     Hypertension     Open nondisplaced fracture of first cervical vertebra with routine healing 3/3/2018    Psoriatic arthritis ([facility] [address] )     last assessed;3/25/2014    Recent weight loss     last assessed: 7/13/2016    Rheumatoid arthritis ([facility] [address] )     Stroke ([facility] )     Syncope 1/11/2020    Trigeminal neuritis      Past Surgical History:   Procedure Laterality Date    APPENDECTOMY      FOOT SURGERY      HYSTERECTOMY      KIDNEY SURGERY       Social History   Social History     Substance and Sexual Activity   Alcohol Use Not Currently     Social History     Substance and Sexual Activity   Drug Use No     E-Cigarette/Vaping    E-Cigarette Use Never User      E-Cigarette/Vaping Substances    Nicotine No     THC No     CBD No     Flavoring No     Other No     Unknown No      Social History     Tobacco Use   Smoking Status Never Smoker   Smokeless Tobacco Never Used     Family History:   Family History   Problem Relation Age of Onset    No Known Problems Father     No Known Problems Mother     No Known Problems Maternal Grandmother     No Known Problems Paternal Grandmother     No Known Problems Paternal Aunt     No Known Problems Paternal Aunt     No Known Problems Maternal Grandfather     No Known Problems Paternal Grandfather        Review of previous medical records was completed  Meds/Allergies   PTA meds:   Prior to Admission Medications   Prescriptions Last Dose Informant Patient Reported? Taking?    clobetasol (TEMOVATE) 0 05 % external solution Not Taking at Unknown time  No No   Sig: Apply to affected area once daily   Patient not taking: Reported on 5/26/2022   clopidogrel (PLAVIX) 75 mg tablet 5/25/2022 at Unknown time  No Yes   Sig: Take 1 tablet (75 mg total) by mouth daily at bedtime   diltiazem (TIAZAC) 240 MG 24 hr capsule 5/26/2022 at Unknown time  No Yes   Sig: Take 1 capsule (240 mg total) by mouth daily folic acid (FOLVITE) 1 mg tablet 5/26/2022 at Unknown time  Yes Yes   Sig: Take 1 mg by mouth 3 (three) times a day    latanoprost (XALATAN) 0 005 % ophthalmic solution 5/25/2022 at Unknown time  Yes Yes   Sig: Administer 1 drop to both eyes daily at bedtime    methotrexate 2 5 mg tablet 5/26/2022 at Unknown time  No Yes   Sig: Take 7 tablets (17 5 mg total) by mouth once a week   simvastatin (ZOCOR) 40 mg tablet 5/25/2022 at Unknown time  No Yes   Sig: Take 1 tablet (40 mg total) by mouth daily at bedtime      Facility-Administered Medications: None       Allergies   Allergen Reactions    Amoxicillin Edema    Penicillins Edema       Objective   Vitals:Blood pressure 106/61, pulse 62, temperature 97 6 °F (36 4 °C), resp  rate 20, height 5' 5" (1 651 m), weight 55 2 kg (121 lb 11 1 oz), SpO2 97 %, not currently breastfeeding  ,Body mass index is 20 25 kg/m²  Intake/Output Summary (Last 24 hours) at 5/27/2022 1645  Last data filed at 5/27/2022 1402  Gross per 24 hour   Intake --   Output 350 ml   Net -350 ml       Invasive Devices: Invasive Devices  Report    Peripheral Intravenous Line  Duration           Peripheral IV 05/26/22 Left Forearm 1 day                Physical Exam   General:  Patient is well-developed, frail-appearing BM 20 25, and in no acute distress  HEENT:  Head normocephalic  Eyes anicteric  Cardiovascular:  With regular rhythm  Lungs:  Normal effort  Nonlabored breathing  Extremities:  With no significant edema  Skin: No rashes  Neurologic Exam  Neurologic:  Patient is alert, pleasantly interactive, and appropriately conversational   No obvious symbolic language difficulty or dysarthria, and the patient is fully oriented  Gait deferred for safety  Cranial Nerves:   II: Visual fields full to confrontation  Pupils equal, round, reactive to light with normal accomodation  Cannot visualize optic fundi  III,IV,VI: extraocular movements intact with no nystagmus     V: Sensation in the V1 through V3 distributions intact to light touch bilaterally  VII: Face is symmetric with no weakness noted  VIII: Audition intact to finger rub bilaterally  IX/X: Uvula midline  Soft palate elevation symmetric  XII: Tongue midline with no atrophy or fasciculations with appropriate movement  Coordination:  Accurate with finger-to-nose maneuvers bilaterally  Motor testing with 3 to 4-/5 bilateral hip flexion  Full or near-full strength noted bilateral hip extension, abduction, adduction, knee flexion/extension, ankle dorsiflexion/plantarflexion  Sensory testing with diminished pin, temp, vibratory sense LLE but otherwise intact throughout  Proprioception intact at the R great toe and L 5th toe (L great toe not tested as it is fractured)  Diffusely hyporeflexic throughout, but reflexes are present  Toe responses are downgoing bilaterally  Lab Results:   CBC:   Results from last 7 days   Lab Units 05/27/22 0453 05/26/22 2004 05/26/22  1440   WBC Thousand/uL 9 13  --  9 19   RBC Million/uL 4 02  --  4 37   HEMOGLOBIN g/dL 13 7  --  14 2   HEMATOCRIT % 41 7  --  43 9   MCV fL 104*  --  101*   PLATELETS Thousands/uL 240 256 250   , BMP/CMP:   Results from last 7 days   Lab Units 05/27/22  0453 05/26/22  1440   SODIUM mmol/L 141 139   POTASSIUM mmol/L 3 4* 4 0   CHLORIDE mmol/L 106 101   CO2 mmol/L 26 31   BUN mg/dL 8 13   CREATININE mg/dL 0 62 0 61   CALCIUM mg/dL 8 1* 8 9   AST U/L 215* 242*   ALT U/L 157* 177*   ALK PHOS U/L 49 58   EGFR ml/min/1 73sq m 86 87     Imaging Studies: I have personally reviewed pertinent reports  and I have personally reviewed pertinent films in PACS  EKG, Pathology, and Other Studies: I have personally reviewed pertinent reports      VTE Prophylaxis: Heparin    Code Status: Level 1 - Full Code  Advance Directive and Living Will:      Power of :    POLST:

## 2022-05-27 NOTE — PLAN OF CARE
Problem: OCCUPATIONAL THERAPY ADULT  Goal: Performs self-care activities at highest level of function for planned discharge setting  See evaluation for individualized goals  Description: Treatment Interventions: ADL retraining, Functional transfer training, UE strengthening/ROM, Endurance training, Patient/family training, Equipment evaluation/education, Compensatory technique education, Activityengagement          See flowsheet documentation for full assessment, interventions and recommendations  Note: Limitation: Decreased ADL status, Decreased UE strength, Decreased Safe judgement during ADL, Decreased endurance, Decreased self-care trans, Decreased high-level ADLs  Prognosis: Good  Assessment: Pt is a 64 y o  female seen for OT evaluation s/p adm to [facility] on 2022 w/ B/L LE weakness x2 wks and admitted w/ Rhabdomyolysis, Elevated liver enzymes, and Ambulatory dysfunction  Comorbidities affecting pts functional performance include a significant PMH of Anemia, HLD, HTN, Stroke, Syncope  Pt with active OT orders and activity orders for Ambulate  Pt lives alone in a one level house with 1+1 BISI  Of note, pt's spouse recently  (x2 wks)  Pt has supportive neighbor who checks in daily, multiple times a day  At baseline, pt reports being I w/ ADLs, IADLs, and functional transfers/mobility w/o use of AD  Pt notes functional decline x2 wks  For the last two wks, pt requiring assist from neighbor for all ADLs (except dressing), IADLs, and functional transfers/mobility  (+)   (+) fall PTA  Upon evaluation, pt currently requires Min A for UB ADLs, Max-Mod A for LB ADLs, Mod A for toileting, Mod A of 2 for bed mobility, and Min A of 2 for functional mobility/transfers 2* the following deficits impacting occupational performance: decreased strength, decreased balance, decreased tolerance and decreased safety awareness   These impairments, as well at pts fall risk, steps to enter environment, limited home support, difficulty performing ADLS, difficulty performing IADLS  and limited insight into deficits limit pts ability to safely engage in all baseline areas of occupation  Based on the aforementioned OT evaluation, functional performance deficits, and assessments, pt has been identified as a High complexity evaluation  Pt to continue to benefit from continued acute OT services during hospital stay to address defined deficits and to maximize level of functional independence in the following Occupational Performance areas: grooming, bathing/shower, toilet hygiene, dressing, medication management, health maintenance, functional mobility, community mobility, clothing management, cleaning, meal prep and household maintenance  From OT standpoint, recommend STR upon D/C   OT will continue to follow pt 3-5x/wk to address the following goals to  w/in 10-14 days:     OT Discharge Recommendation: Post acute rehabilitation services  OT - OK to Discharge: Yes (when medically cleared to rehab)

## 2022-05-27 NOTE — PROGRESS NOTES
[facility]  Progress Note - Aida Arce 1945, 64 y o  female MRN: [id number]  Unit/Bed#: [facility] 87 220-01 Encounter: [id number]  Primary Care Provider: Rudy Sandhoff, DO   Date and time admitted to hospital: 5/26/2022  1:41 PM    * Rhabdomyolysis  Assessment & Plan  Patient presenting with 2 weeks of leg weakness concerning for drug-induced rhabdomyolysis  Likely due to combination of diltiazem as well as simvastatin  Hold simvastatin for now  Trend CK levels  Continue IV fluids    Elevated liver enzymes  Assessment & Plan  Likely secondary to rhabdomyolysis  Hepatitis panel unremarkable    Ambulatory dysfunction  Assessment & Plan  Patient with lower extremity weakness for approximately 2 weeks in the setting of immobility  Did have recent death of her  a few weeks prior  CT spine unremarkable on admission  Possibly due to rhabdomyolysis  Appreciate Neurology recommendations    HLD (hyperlipidemia)  Assessment & Plan  Hold simvastatin for now as likely contributing to rhabdomyolysis    Essential hypertension  Assessment & Plan  Continue Cardizem    Rheumatoid arthritis ([facility] [address] )  Assessment & Plan  Takes methotrexate 17 5 mg on Mondays  Continue folic acid      History of TIA (transient ischemic attack)  Assessment & Plan  History of TIA, maintained on Plavix        VTE Pharmacologic Prophylaxis:  Heparin    Patient Centered Rounds:  Patient care rounds were performed with nursing    Education and Discussions with Family / Patient:  Patient declined    Time Spent for Care: 30  More than 50% of total time spent on counseling and coordination of care as described above  Current Length of Stay: 1 day(s)    Current Patient Status: Inpatient   Certification Statement: The patient will continue to require additional inpatient hospital stay due to need for IV fluids    Discharge Plan:   To rehab when medically stable    Code Status: Level 1 - Full Code      Subjective:   Patient seen and evaluated at bedside  No overnight events  She feels well with improving strength  Objective:     Vitals:   Temp (24hrs), Av 1 °F (36 7 °C), Min:97 9 °F (36 6 °C), Max:98 2 °F (36 8 °C)    Temp:  [97 9 °F (36 6 °C)-98 2 °F (36 8 °C)] 98 2 °F (36 8 °C)  HR:  [56-77] 60  Resp:  [18] 18  BP: (147-192)/(67-79) 147/67  SpO2:  [96 %-98 %] 96 %  Body mass index is 20 25 kg/m²  Input and Output Summary (last 24 hours): Intake/Output Summary (Last 24 hours) at 2022 1402  Last data filed at 2022 1667  Gross per 24 hour   Intake 1000 ml   Output 150 ml   Net 850 ml       Physical Exam:     Physical Exam  Vitals reviewed  Constitutional:       General: She is not in acute distress  Appearance: She is well-developed  She is not ill-appearing, toxic-appearing or diaphoretic  HENT:      Head: Normocephalic and atraumatic  Mouth/Throat:      Pharynx: No oropharyngeal exudate  Eyes:      General: No scleral icterus  Conjunctiva/sclera: Conjunctivae normal    Cardiovascular:      Rate and Rhythm: Normal rate and regular rhythm  Heart sounds: Normal heart sounds  Pulmonary:      Effort: Pulmonary effort is normal  No respiratory distress  Breath sounds: Normal breath sounds  No wheezing or rales  Abdominal:      General: There is no distension  Palpations: Abdomen is soft  Tenderness: There is no abdominal tenderness  There is no guarding or rebound  Musculoskeletal:         General: No swelling, tenderness or deformity  Skin:     General: Skin is warm and dry  Neurological:      General: No focal deficit present  Mental Status: She is alert  Mental status is at baseline  Psychiatric:         Mood and Affect: Mood normal          Behavior: Behavior normal          Thought Content: Thought content normal          Judgment: Judgment normal          Additional Data:     Labs:  I have reviewed pertinent results     Results from last 7 days   Lab Units 05/27/22  0453   WBC Thousand/uL 9 13   HEMOGLOBIN g/dL 13 7   HEMATOCRIT % 41 7   PLATELETS Thousands/uL 240   NEUTROS PCT % 71   LYMPHS PCT % 20   MONOS PCT % 8   EOS PCT % 1     Results from last 7 days   Lab Units 05/27/22  0453   SODIUM mmol/L 141   POTASSIUM mmol/L 3 4*   CHLORIDE mmol/L 106   CO2 mmol/L 26   BUN mg/dL 8   CREATININE mg/dL 0 62   ANION GAP mmol/L 9   CALCIUM mg/dL 8 1*   ALBUMIN g/dL 2 5*   TOTAL BILIRUBIN mg/dL 0 64   ALK PHOS U/L 49   ALT U/L 157*   AST U/L 215*   GLUCOSE RANDOM mg/dL 79                         Imaging: I have reviewed pertinent imaging       Recent Cultures (last 7 days):           Last 24 Hours Medication List:   Current Facility-Administered Medications   Medication Dose Route Frequency Provider Last Rate    acetaminophen  650 mg Oral Q6H PRN Chas Flores, DO      aluminum-magnesium hydroxide-simethicone  30 mL Oral Q6H PRN Merlinvivian Sosafe, DO      clopidogrel  75 mg Oral HS Chas Flores, DO      diltiazem  240 mg Oral Daily Chas Flores, DO      docusate sodium  100 mg Oral BID Chas Flores, DO      folic acid  1 mg Oral TID Chas Flores DO      heparin (porcine)  5,000 Units Subcutaneous Q8H [address] Chas Flores, DO      latanoprost  1 drop Both Eyes HS Chas Flores, DO      ondansetron  4 mg Intravenous Q6H PRN Merlin Chafe, DO      potassium chloride  40 mEq Oral Once Chris Keenan DO      sodium chloride  125 mL/hr Intravenous Continuous Chris Keenan DO          Today, Patient Was Seen By: Chris Keenan DO    ** Please Note: Dictation voice to text software may have been used in the creation of this document   **

## 2022-05-27 NOTE — ASSESSMENT & PLAN NOTE
Psoriatic arthritis - followed by Rheumatology at [address]  On maintenance methotrexate

## 2022-05-27 NOTE — ASSESSMENT & PLAN NOTE
Patient with lower extremity weakness for approximately 2 weeks in the setting of immobility  Did have recent death of her  a few weeks prior  CT spine unremarkable on admission  Possibly due to rhabdomyolysis  Appreciate Neurology recommendations

## 2022-05-27 NOTE — ASSESSMENT & PLAN NOTE
Patient presenting with 2 weeks of leg weakness concerning for drug-induced rhabdomyolysis  Likely due to combination of diltiazem as well as simvastatin  Hold simvastatin for now  Trend CK levels  Continue IV fluids

## 2022-05-27 NOTE — PLAN OF CARE
Problem: Potential for Falls  Goal: Patient will remain free of falls  Description: INTERVENTIONS:  - Educate patient/family on patient safety including physical limitations  - Instruct patient to call for assistance with activity   - Consult OT/PT to assist with strengthening/mobility   - Keep Call bell within reach  - Keep bed low and locked with side rails adjusted as appropriate  - Keep care items and personal belongings within reach  - Initiate and maintain comfort rounds  - Make Fall Risk Sign visible to staff  - Offer Toileting every  Hours, in advance of need  - Initiate/Maintain alarm  - Obtain necessary fall risk management equipment:   - Apply yellow socks and bracelet for high fall risk patients  - Consider moving patient to room near nurses station  5/27/2022 0115 by Cindy Leon RN  Outcome: Progressing  5/27/2022 0114 by Cindy Leon RN  Outcome: Progressing     Problem: MOBILITY - ADULT  Goal: Maintain or return to baseline ADL function  Description: INTERVENTIONS:  -  Assess patient's ability to carry out ADLs; assess patient's baseline for ADL function and identify physical deficits which impact ability to perform ADLs (bathing, care of mouth/teeth, toileting, grooming, dressing, etc )  - Assess/evaluate cause of self-care deficits   - Assess range of motion  - Assess patient's mobility; develop plan if impaired  - Assess patient's need for assistive devices and provide as appropriate  - Encourage maximum independence but intervene and supervise when necessary  - Involve family in performance of ADLs  - Assess for home care needs following discharge   - Consider OT consult to assist with ADL evaluation and planning for discharge  - Provide patient education as appropriate  5/27/2022 0115 by Cindy Leon RN  Outcome: Progressing  5/27/2022 0114 by Cindy Leon RN  Outcome: Progressing  Goal: Maintains/Returns to pre admission functional level  Description: INTERVENTIONS:  - Perform BMAT or MOVE assessment daily    - Set and communicate daily mobility goal to care team and patient/family/caregiver  - Collaborate with rehabilitation services on mobility goals if consulted  - Perform Range of Motion  times a day  - Reposition patient every  hours    - Dangle patient  times a day  - Stand patient  times a day  - Ambulate patient  times a day  - Out of bed to chair  times a day   - Out of bed for meals  times a day  - Out of bed for toileting  - Record patient progress and toleration of activity level   5/27/2022 0115 by Tavo Muir RN  Outcome: Progressing  5/27/2022 0114 by Tavo Muir RN  Outcome: Progressing     Problem: Prexisting or High Potential for Compromised Skin Integrity  Goal: Skin integrity is maintained or improved  Description: INTERVENTIONS:  - Identify patients at risk for skin breakdown  - Assess and monitor skin integrity  - Assess and monitor nutrition and hydration status  - Monitor labs   - Assess for incontinence   - Turn and reposition patient  - Assist with mobility/ambulation  - Relieve pressure over bony prominences  - Avoid friction and shearing  - Provide appropriate hygiene as needed including keeping skin clean and dry  - Evaluate need for skin moisturizer/barrier cream  - Collaborate with interdisciplinary team   - Patient/family teaching  - Consider wound care consult   5/27/2022 0115 by Tavo Muir RN  Outcome: Progressing  5/27/2022 0114 by Tavo Muir RN  Outcome: Progressing     Problem: NEUROSENSORY - ADULT  Goal: Achieves stable or improved neurological status  Description: INTERVENTIONS  - Monitor and report changes in neurological status  - Monitor vital signs such as temperature, blood pressure, glucose, and any other labs ordered   - Initiate measures to prevent increased intracranial pressure  - Monitor for seizure activity and implement precautions if appropriate      Outcome: Progressing  Goal: Achieves maximal functionality and self care  Description: INTERVENTIONS  - Monitor swallowing and airway patency with patient fatigue and changes in neurological status  - Encourage and assist patient to increase activity and self care     - Encourage visually impaired, hearing impaired and aphasic patients to use assistive/communication devices  Outcome: Progressing     Problem: SKIN/TISSUE INTEGRITY - ADULT  Goal: Skin Integrity remains intact(Skin Breakdown Prevention)  Description: Assess:  -Perform Noam assessment every   -Clean and moisturize skin every   -Inspect skin when repositioning, toileting, and assisting with ADLS  -Assess under medical devices such as  every   -Assess extremities for adequate circulation and sensation     Bed Management:  -Have minimal linens on bed & keep smooth, unwrinkled  -Change linens as needed when moist or perspiring  -Avoid sitting or lying in one position for more than  hours while in bed  -Keep HOB at degrees     Toileting:  -Offer bedside commode  -Assess for incontinence every   -Use incontinent care products after each incontinent episode such as     Activity:  -Mobilize patient  times a day  -Encourage activity and walks on unit  -Encourage or provide ROM exercises   -Turn and reposition patient every  Hours  -Use appropriate equipment to lift or move patient in bed  -Instruct/ Assist with weight shifting every  when out of bed in chair  -Consider limitation of chair time  hour intervals    Skin Care:  -Avoid use of baby powder, tape, friction and shearing, hot water or constrictive clothing  -Relieve pressure over bony prominences using   -Do not massage red bony areas    Next Steps:  -Teach patient strategies to minimize risks such as    -Consider consults to  interdisciplinary teams such as  Outcome: Progressing     Problem: MUSCULOSKELETAL - ADULT  Goal: Maintain or return mobility to safest level of function  Description: INTERVENTIONS:  - Assess patient's ability to carry out ADLs; assess patient's baseline for ADL function and identify physical deficits which impact ability to perform ADLs (bathing, care of mouth/teeth, toileting, grooming, dressing, etc )  - Assess/evaluate cause of self-care deficits   - Assess range of motion  - Assess patient's mobility  - Assess patient's need for assistive devices and provide as appropriate  - Encourage maximum independence but intervene and supervise when necessary  - Involve family in performance of ADLs  - Assess for home care needs following discharge   - Consider OT consult to assist with ADL evaluation and planning for discharge  - Provide patient education as appropriate  Outcome: Progressing  Goal: Maintain proper alignment of affected body part  Description: INTERVENTIONS:  - Support, maintain and protect limb and body alignment  - Provide patient/ family with appropriate education  Outcome: Progressing

## 2022-05-27 NOTE — PLAN OF CARE
Problem: PHYSICAL THERAPY ADULT  Goal: Performs mobility at highest level of function for planned discharge setting  See evaluation for individualized goals  Description: Treatment/Interventions: Functional transfer training, LE strengthening/ROM, Elevations, Therapeutic exercise, Patient/family training, Bed mobility, Equipment eval/education, Gait training, Compensatory technique education, Continued evaluation, Spoke to nursing, OT          See flowsheet documentation for full assessment, interventions and recommendations  5/27/2022 7341 by Kin Marcos, PT  Note: Prognosis: Good  Problem List: Decreased strength, Impaired balance, Decreased mobility, Impaired judgement, Decreased safety awareness  Assessment: Dirk Simmons is a 64 y o  female admitted to [facility] on 5/26/2022 for Rhabdomyolysis  PT was consulted and pt was seen on 5/27/2022 for mobility assessment and d/c planning  Pt presents w high fall risk, multiple lines  Pt reports functional decline over the past two weeks; at baseline is indep and now requires assist from neighbor for ADLs, IADLs and ambulation  Pt is currently functioning at a min-mod Ax2 for bed mobility, transfers and ambulation w RW  Pt demonstrated self limiting behaviors impacting effort and assessment of mobility during session  Requires much encouragement to participate  Unable to ambulate household distances or steps to enter home  Is at increased risk for falls at current LOF  Pt will benefit from continued skilled IP PT to address the above mentioned impairments  in order to maximize recovery and increase functional independence when completing mobility and ADLs  At this time PT recommendations for d/c are STR given social and environmental barriers, functional decline impacting safe dc home    Barriers to Discharge: Decreased caregiver support  Barriers to Discharge Comments: lives alone     PT Discharge Recommendation: Post acute rehabilitation services          See flowsheet documentation for full assessment  5/27/2022 6875 by Mariana Salinas, PT  Note: Prognosis: Good  Problem List: Decreased strength, Impaired balance, Decreased mobility, Impaired judgement, Decreased safety awareness  Assessment: Teodoro Thayer is a 64 y o  female admitted to [facility] on 5/26/2022 for Rhabdomyolysis  PT was consulted and pt was seen on 5/27/2022 for mobility assessment and d/c planning  Pt presents w high fall risk, multiple lines  Pt reports functional decline over the past two weeks; at baseline is indep and now requires assist from neighbor for ADLs, IADLs and ambulation  Pt is currently functioning at a min-mod Ax2 for bed mobility, transfers and ambulation w RW  Pt demonstrated self limiting behaviors impacting effort and assessment of mobility during session  Requires much encouragement to participate  Unable to ambulate household distances or steps to enter home  Is at increased risk for falls at current LOF  Pt will benefit from continued skilled IP PT to address the above mentioned impairments  in order to maximize recovery and increase functional independence when completing mobility and ADLs  At this time PT recommendations for d/c are STR given social and environmental barriers, functional decline impacting safe dc home  Barriers to Discharge: Decreased caregiver support  Barriers to Discharge Comments: lives alone     PT Discharge Recommendation: Post acute rehabilitation services          See flowsheet documentation for full assessment

## 2022-05-28 LAB
ANION GAP SERPL CALCULATED.3IONS-SCNC: 5 MMOL/L (ref 4–13)
BUN SERPL-MCNC: 6 MG/DL (ref 5–25)
CALCIUM SERPL-MCNC: 8 MG/DL (ref 8.3–10.1)
CHLORIDE SERPL-SCNC: 109 MMOL/L (ref 100–108)
CK MB SERPL-MCNC: 17.2 NG/ML (ref 0–5)
CK MB SERPL-MCNC: <1 % (ref 0–2.5)
CK SERPL-CCNC: 2261 U/L (ref 26–192)
CO2 SERPL-SCNC: 28 MMOL/L (ref 21–32)
CREAT SERPL-MCNC: 0.54 MG/DL (ref 0.6–1.3)
GFR SERPL CREATININE-BSD FRML MDRD: 90 ML/MIN/1.73SQ M
GLUCOSE SERPL-MCNC: 90 MG/DL (ref 65–140)
POTASSIUM SERPL-SCNC: 3.7 MMOL/L (ref 3.5–5.3)
SODIUM SERPL-SCNC: 142 MMOL/L (ref 136–145)

## 2022-05-28 PROCEDURE — 82553 CREATINE MB FRACTION: CPT | Performed by: INTERNAL MEDICINE

## 2022-05-28 PROCEDURE — 99232 SBSQ HOSP IP/OBS MODERATE 35: CPT | Performed by: INTERNAL MEDICINE

## 2022-05-28 PROCEDURE — 80048 BASIC METABOLIC PNL TOTAL CA: CPT | Performed by: INTERNAL MEDICINE

## 2022-05-28 PROCEDURE — 82550 ASSAY OF CK (CPK): CPT | Performed by: INTERNAL MEDICINE

## 2022-05-28 RX ORDER — SODIUM CHLORIDE 9 MG/ML
75 INJECTION, SOLUTION INTRAVENOUS CONTINUOUS
Status: DISPENSED | OUTPATIENT
Start: 2022-05-28 | End: 2022-05-29

## 2022-05-28 RX ADMIN — DILTIAZEM HYDROCHLORIDE 240 MG: 240 CAPSULE, COATED, EXTENDED RELEASE ORAL at 09:04

## 2022-05-28 RX ADMIN — FOLIC ACID 1 MG: 1 TABLET ORAL at 15:39

## 2022-05-28 RX ADMIN — FOLIC ACID 1 MG: 1 TABLET ORAL at 09:04

## 2022-05-28 RX ADMIN — CLOPIDOGREL BISULFATE 75 MG: 75 TABLET ORAL at 22:19

## 2022-05-28 RX ADMIN — HEPARIN SODIUM 5000 UNITS: 5000 INJECTION INTRAVENOUS; SUBCUTANEOUS at 22:19

## 2022-05-28 RX ADMIN — DOCUSATE SODIUM 100 MG: 100 CAPSULE, LIQUID FILLED ORAL at 15:39

## 2022-05-28 RX ADMIN — FOLIC ACID 1 MG: 1 TABLET ORAL at 22:19

## 2022-05-28 RX ADMIN — HEPARIN SODIUM 5000 UNITS: 5000 INJECTION INTRAVENOUS; SUBCUTANEOUS at 06:39

## 2022-05-28 RX ADMIN — SODIUM CHLORIDE 125 ML/HR: 0.9 INJECTION, SOLUTION INTRAVENOUS at 00:01

## 2022-05-28 RX ADMIN — LATANOPROST 1 DROP: 50 SOLUTION OPHTHALMIC at 22:19

## 2022-05-28 RX ADMIN — DOCUSATE SODIUM 100 MG: 100 CAPSULE, LIQUID FILLED ORAL at 09:04

## 2022-05-28 NOTE — ASSESSMENT & PLAN NOTE
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

## 2022-05-28 NOTE — PLAN OF CARE
Problem: Potential for Falls  Goal: Patient will remain free of falls  Description: INTERVENTIONS:  - Educate patient/family on patient safety including physical limitations  - Instruct patient to call for assistance with activity   - Consult OT/PT to assist with strengthening/mobility   - Keep Call bell within reach  - Keep bed low and locked with side rails adjusted as appropriate  - Keep care items and personal belongings within reach  - Initiate and maintain comfort rounds  - Make Fall Risk Sign visible to staff  - Offer Toileting every 2 Hours, in advance of need  - Initiate/Maintain bed alarm  - Obtain necessary fall risk management equipment: walker  - Apply yellow socks and bracelet for high fall risk patients  - Consider moving patient to room near nurses station  Outcome: Progressing     Problem: MOBILITY - ADULT  Goal: Maintain or return to baseline ADL function  Description: INTERVENTIONS:  -  Assess patient's ability to carry out ADLs; assess patient's baseline for ADL function and identify physical deficits which impact ability to perform ADLs (bathing, care of mouth/teeth, toileting, grooming, dressing, etc )  - Assess/evaluate cause of self-care deficits   - Assess range of motion  - Assess patient's mobility; develop plan if impaired  - Assess patient's need for assistive devices and provide as appropriate  - Encourage maximum independence but intervene and supervise when necessary  - Involve family in performance of ADLs  - Assess for home care needs following discharge   - Consider OT consult to assist with ADL evaluation and planning for discharge  - Provide patient education as appropriate  Outcome: Progressing  Goal: Maintains/Returns to pre admission functional level  Description: INTERVENTIONS:  - Perform BMAT or MOVE assessment daily    - Set and communicate daily mobility goal to care team and patient/family/caregiver     - Collaborate with rehabilitation services on mobility goals if consulted  - Perform Range of Motion 3 times a day  - Reposition patient every 3 hours  - Dangle patient 3 times a day  - Stand patient 3 times a day  - Ambulate patient 3 times a day  - Out of bed to chair 3 times a day   - Out of bed for meals 3 times a day  - Out of bed for toileting  - Record patient progress and toleration of activity level   Outcome: Progressing     Problem: Prexisting or High Potential for Compromised Skin Integrity  Goal: Skin integrity is maintained or improved  Description: INTERVENTIONS:  - Identify patients at risk for skin breakdown  - Assess and monitor skin integrity  - Assess and monitor nutrition and hydration status  - Monitor labs   - Assess for incontinence   - Turn and reposition patient  - Assist with mobility/ambulation  - Relieve pressure over bony prominences  - Avoid friction and shearing  - Provide appropriate hygiene as needed including keeping skin clean and dry  - Evaluate need for skin moisturizer/barrier cream  - Collaborate with interdisciplinary team   - Patient/family teaching  - Consider wound care consult   Outcome: Progressing     Problem: NEUROSENSORY - ADULT  Goal: Achieves stable or improved neurological status  Description: INTERVENTIONS  - Monitor and report changes in neurological status  - Monitor vital signs such as temperature, blood pressure, glucose, and any other labs ordered   - Initiate measures to prevent increased intracranial pressure  - Monitor for seizure activity and implement precautions if appropriate      Outcome: Progressing  Goal: Achieves maximal functionality and self care  Description: INTERVENTIONS  - Monitor swallowing and airway patency with patient fatigue and changes in neurological status  - Encourage and assist patient to increase activity and self care     - Encourage visually impaired, hearing impaired and aphasic patients to use assistive/communication devices  Outcome: Progressing     Problem: SKIN/TISSUE INTEGRITY - ADULT  Goal: Skin Integrity remains intact(Skin Breakdown Prevention)  Description: Assess:  -Perform Noam assessment every shift  -Clean and moisturize skin every shift  -Inspect skin when repositioning, toileting, and assisting with ADLS  -Assess under medical devices such as IV every shift  -Assess extremities for adequate circulation and sensation     Bed Management:  -Have minimal linens on bed & keep smooth, unwrinkled  -Change linens as needed when moist or perspiring  -Avoid sitting or lying in one position for more than 2 hours while in bed  -Keep HOB at 30 degrees     Toileting:  -Offer bedside commode  -Assess for incontinence every shift  -Use incontinent care products after each incontinent episode such as foam cleanser    Activity:  -Mobilize patient 3 times a day  -Encourage activity and walks on unit  -Encourage or provide ROM exercises   -Turn and reposition patient every 2 Hours  -Use appropriate equipment to lift or move patient in bed  -Instruct/ Assist with weight shifting every hour when out of bed in chair  -Consider limitation of chair time 2 hour intervals    Skin Care:  -Avoid use of baby powder, tape, friction and shearing, hot water or constrictive clothing  -Relieve pressure over bony prominences using pillows  -Do not massage red bony areas    Next Steps:  -Teach patient strategies to minimize risks such as skin breakdown   -Consider consults to  interdisciplinary teams such as wound care  Outcome: Progressing     Problem: MUSCULOSKELETAL - ADULT  Goal: Maintain or return mobility to safest level of function  Description: INTERVENTIONS:  - Assess patient's ability to carry out ADLs; assess patient's baseline for ADL function and identify physical deficits which impact ability to perform ADLs (bathing, care of mouth/teeth, toileting, grooming, dressing, etc )  - Assess/evaluate cause of self-care deficits   - Assess range of motion  - Assess patient's mobility  - Assess patient's need for assistive devices and provide as appropriate  - Encourage maximum independence but intervene and supervise when necessary  - Involve family in performance of ADLs  - Assess for home care needs following discharge   - Consider OT consult to assist with ADL evaluation and planning for discharge  - Provide patient education as appropriate  Outcome: Progressing  Goal: Maintain proper alignment of affected body part  Description: INTERVENTIONS:  - Support, maintain and protect limb and body alignment  - Provide patient/ family with appropriate education  Outcome: Progressing

## 2022-05-28 NOTE — PROGRESS NOTES
[facility]  Progress Note - Barbara Mayers 1945, 64 y o  female MRN: [id number]  Unit/Bed#: [facility] 68 2 [facility] 87 [id number] Encounter: [id number]  Primary Care Provider: Shayne Allen DO   Date and time admitted to hospital: 5/26/2022  1:41 PM    * Rhabdomyolysis  Assessment & Plan  Patient presenting with 2 weeks of leg weakness concerning for drug-induced rhabdomyolysis  Likely due to combination of diltiazem as well as simvastatin  Hold simvastatin   Trend CK levels  Myositis panel sent by Neurology  Recommended for rheumatology evaluation  Continue IV fluids for the next 24 hours and then discontinue    Elevated liver enzymes  Assessment & Plan  Likely secondary to rhabdomyolysis  Hepatitis panel unremarkable    Ambulatory dysfunction  Assessment & Plan  Patient with lower extremity weakness for approximately 2 weeks in setting of rhabdomyolysis  Did have recent death of her  a few weeks prior  CT spine unremarkable on admission  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  Myositis panel pending  Patient is 2 assist for ambulation and lives alone  Strongly recommend rehab  Patient is thinking about it  Appreciate Neurology recommendations    HLD (hyperlipidemia)  Assessment & Plan  Hold simvastatin for now as likely contributing to rhabdomyolysis    Essential hypertension  Assessment & Plan  Continue Cardizem    Rheumatoid arthritis (HCC)  Assessment & Plan  Takes methotrexate 17 5 mg on Mondays  Continue folic acid  Hold until rheumatology evaluates      History of TIA (transient ischemic attack)  Assessment & Plan  History of TIA, maintained on Plavix        VTE Pharmacologic Prophylaxis:  Heparin    Patient Centered Rounds:  Patient care rounds were performed with nursing    Education and Discussions with Family / Patient:  Patient declined    Time Spent for Care: 30  More than 50% of total time spent on counseling and coordination of care as described above      Current Length of Stay: 2 day(s)    Current Patient Status: Inpatient   Certification Statement: The patient will continue to require additional inpatient hospital stay due to need for IV fluids    Discharge Plan:  Recommend discharge to rehab when medically stable  Code Status: Level 1 - Full Code      Subjective:   Patient seen and evaluated  She continues to feel week and have stiffness in her thighs  Objective:     Vitals:   Temp (24hrs), Av 8 °F (36 6 °C), Min:97 6 °F (36 4 °C), Max:98 °F (36 7 °C)    Temp:  [97 6 °F (36 4 °C)-98 °F (36 7 °C)] 98 °F (36 7 °C)  HR:  [58-70] 58  Resp:  [18-20] 20  BP: (106-179)/(60-77) 140/60  SpO2:  [95 %-97 %] 95 %  Body mass index is 21 09 kg/m²  Input and Output Summary (last 24 hours): Intake/Output Summary (Last 24 hours) at 2022 1224  Last data filed at 2022 0401  Gross per 24 hour   Intake --   Output 1200 ml   Net -1200 ml       Physical Exam:     Physical Exam  Vitals reviewed  Constitutional:       General: She is not in acute distress  Appearance: She is well-developed  She is not ill-appearing, toxic-appearing or diaphoretic  HENT:      Head: Normocephalic and atraumatic  Mouth/Throat:      Mouth: Mucous membranes are moist       Pharynx: No oropharyngeal exudate  Eyes:      General: No scleral icterus  Extraocular Movements: Extraocular movements intact  Conjunctiva/sclera: Conjunctivae normal    Cardiovascular:      Rate and Rhythm: Normal rate and regular rhythm  Heart sounds: Normal heart sounds  Pulmonary:      Effort: Pulmonary effort is normal  No respiratory distress  Breath sounds: Normal breath sounds  No wheezing or rales  Abdominal:      General: There is no distension  Palpations: Abdomen is soft  Tenderness: There is no abdominal tenderness  There is no guarding or rebound  Musculoskeletal:         General: No swelling, tenderness or deformity  Skin:     General: Skin is warm and dry  Neurological:      General: No focal deficit present  Mental Status: She is alert  Mental status is at baseline  Psychiatric:         Mood and Affect: Mood normal          Behavior: Behavior normal          Thought Content: Thought content normal          Judgment: Judgment normal          Additional Data:     Labs: I have reviewed pertinent results     Results from last 7 days   Lab Units 05/27/22  0453   WBC Thousand/uL 9 13   HEMOGLOBIN g/dL 13 7   HEMATOCRIT % 41 7   PLATELETS Thousands/uL 240   NEUTROS PCT % 71   LYMPHS PCT % 20   MONOS PCT % 8   EOS PCT % 1     Results from last 7 days   Lab Units 05/28/22  0639 05/27/22  0453   SODIUM mmol/L 142 141   POTASSIUM mmol/L 3 7 3 4*   CHLORIDE mmol/L 109* 106   CO2 mmol/L 28 26   BUN mg/dL 6 8   CREATININE mg/dL 0 54* 0 62   ANION GAP mmol/L 5 9   CALCIUM mg/dL 8 0* 8 1*   ALBUMIN g/dL  --  2 5*   TOTAL BILIRUBIN mg/dL  --  0 64   ALK PHOS U/L  --  49   ALT U/L  --  157*   AST U/L  --  215*   GLUCOSE RANDOM mg/dL 90 79                         Imaging: I have reviewed pertinent imaging       Recent Cultures (last 7 days):           Last 24 Hours Medication List:   Current Facility-Administered Medications   Medication Dose Route Frequency Provider Last Rate    acetaminophen  650 mg Oral Q6H PRN Chas Flores DO      aluminum-magnesium hydroxide-simethicone  30 mL Oral Q6H PRN Benigno Elena, DO      clopidogrel  75 mg Oral HS Chas Flores,       diltiazem  240 mg Oral Daily Chas Flores,       docusate sodium  100 mg Oral BID Chas Flores DO      folic acid  1 mg Oral TID Chas Flores DO      heparin (porcine)  5,000 Units Subcutaneous Q8H [facility] assisted Chas Flores DO      latanoprost  1 drop Both Eyes HS Chas Flores DO      ondansetron  4 mg Intravenous Q6H PRN Benigno Elena, DO          Today, Patient Was Seen By: Grace Bañuelos DO    ** Please Note: Dictation voice to text software may have been used in the creation of this document   **

## 2022-05-28 NOTE — ASSESSMENT & PLAN NOTE
Patient presenting with 2 weeks of leg weakness concerning for drug-induced rhabdomyolysis  Likely due to combination of diltiazem as well as simvastatin  Hold simvastatin   Trend CK levels  Myositis panel sent by Neurology  Recommended for rheumatology evaluation  Continue IV fluids for the next 24 hours and then discontinue

## 2022-05-29 ENCOUNTER — APPOINTMENT (INPATIENT)
Dept: MRI IMAGING | Facility: HOSPITAL | Age: 77
DRG: 558 | End: 2022-05-29
Payer: MEDICARE

## 2022-05-29 LAB
ANION GAP SERPL CALCULATED.3IONS-SCNC: 5 MMOL/L (ref 4–13)
BUN SERPL-MCNC: 4 MG/DL (ref 5–25)
CALCIUM SERPL-MCNC: 8.2 MG/DL (ref 8.3–10.1)
CHLORIDE SERPL-SCNC: 108 MMOL/L (ref 100–108)
CK MB SERPL-MCNC: 16.6 NG/ML (ref 0–5)
CK MB SERPL-MCNC: <1 % (ref 0–2.5)
CK SERPL-CCNC: 2034 U/L (ref 26–192)
CO2 SERPL-SCNC: 29 MMOL/L (ref 21–32)
CREAT SERPL-MCNC: 0.54 MG/DL (ref 0.6–1.3)
GFR SERPL CREATININE-BSD FRML MDRD: 90 ML/MIN/1.73SQ M
GLUCOSE SERPL-MCNC: 90 MG/DL (ref 65–140)
POTASSIUM SERPL-SCNC: 3.6 MMOL/L (ref 3.5–5.3)
SODIUM SERPL-SCNC: 142 MMOL/L (ref 136–145)

## 2022-05-29 PROCEDURE — 82553 CREATINE MB FRACTION: CPT | Performed by: INTERNAL MEDICINE

## 2022-05-29 PROCEDURE — 80048 BASIC METABOLIC PNL TOTAL CA: CPT | Performed by: INTERNAL MEDICINE

## 2022-05-29 PROCEDURE — 99232 SBSQ HOSP IP/OBS MODERATE 35: CPT | Performed by: INTERNAL MEDICINE

## 2022-05-29 PROCEDURE — 82550 ASSAY OF CK (CPK): CPT | Performed by: INTERNAL MEDICINE

## 2022-05-29 RX ORDER — SODIUM CHLORIDE 9 MG/ML
75 INJECTION, SOLUTION INTRAVENOUS CONTINUOUS
Status: DISCONTINUED | OUTPATIENT
Start: 2022-05-29 | End: 2022-05-30

## 2022-05-29 RX ADMIN — FOLIC ACID 1 MG: 1 TABLET ORAL at 08:16

## 2022-05-29 RX ADMIN — HEPARIN SODIUM 5000 UNITS: 5000 INJECTION INTRAVENOUS; SUBCUTANEOUS at 06:43

## 2022-05-29 RX ADMIN — DILTIAZEM HYDROCHLORIDE 240 MG: 240 CAPSULE, COATED, EXTENDED RELEASE ORAL at 08:16

## 2022-05-29 RX ADMIN — FOLIC ACID 1 MG: 1 TABLET ORAL at 22:13

## 2022-05-29 RX ADMIN — DOCUSATE SODIUM 100 MG: 100 CAPSULE, LIQUID FILLED ORAL at 08:16

## 2022-05-29 RX ADMIN — LATANOPROST 1 DROP: 50 SOLUTION OPHTHALMIC at 22:13

## 2022-05-29 RX ADMIN — HEPARIN SODIUM 5000 UNITS: 5000 INJECTION INTRAVENOUS; SUBCUTANEOUS at 14:23

## 2022-05-29 RX ADMIN — HEPARIN SODIUM 5000 UNITS: 5000 INJECTION INTRAVENOUS; SUBCUTANEOUS at 22:13

## 2022-05-29 RX ADMIN — FOLIC ACID 1 MG: 1 TABLET ORAL at 14:21

## 2022-05-29 RX ADMIN — SODIUM CHLORIDE 75 ML/HR: 0.9 INJECTION, SOLUTION INTRAVENOUS at 02:32

## 2022-05-29 RX ADMIN — CLOPIDOGREL BISULFATE 75 MG: 75 TABLET ORAL at 22:13

## 2022-05-29 RX ADMIN — SODIUM CHLORIDE 75 ML/HR: 0.9 INJECTION, SOLUTION INTRAVENOUS at 15:51

## 2022-05-29 RX ADMIN — SODIUM CHLORIDE 75 ML/HR: 0.9 INJECTION, SOLUTION INTRAVENOUS at 08:16

## 2022-05-29 NOTE — CASE MANAGEMENT
Case Management Discharge Planning Note    Patient name Samantha Luciano  Location [place] [facility] [facility] Carmina Singer* MRN [id number]  : 1945 Date 2022       Current Admission Date: 2022  Current Admission Diagnosis:Rhabdomyolysis   Patient Active Problem List    Diagnosis Date Noted    Ambulatory dysfunction 2022    Rhabdomyolysis 2022    Elevated liver enzymes 2022    Laceration of forehead 2021    Mild protein-calorie malnutrition ([facility] [address] ) 2021    Other constipation 2019    Trigeminal neuralgia 2018    History of TIA (transient ischemic attack) 2018    Rheumatoid arthritis ([facility] [address] ) 2018    Nerve sheath tumor 2016    Fatty liver 2014    Arteriosclerotic heart disease 2014    Essential hypertension 2013    Transient cerebral ischemia 2012    HLD (hyperlipidemia) 2012      LOS (days): 3  Geometric Mean LOS (GMLOS) (days): 3 10  Days to GMLOS:0 3     OBJECTIVE:  Risk of Unplanned Readmission Score: 13 74         Current admission status: Inpatient   Preferred Pharmacy:   [id number] [address]   Phone: [phone] Fax: [phone]    Primary Care Provider: Bessy Mckenzie DO    Primary Insurance: MEDICARE  Secondary Insurance: [facility]    DISCHARGE DETAILS:    Discharge planning discussed with[de-identified] PT  Freedom of Choice: Yes  Comments - Freedom of Choice: Pt now agreeable to STR, no preference for facility, only wants to stay local to her home  Referrals sent via [place]    CM contacted family/caregiver?: No- see comments (Pt does not want POA to be called, at this time )  Were Treatment Team discharge recommendations reviewed with patient/caregiver?: Yes  Did patient/caregiver verbalize understanding of patient care needs?: N/A- going to facility  Were patient/caregiver advised of the risks associated with not following Treatment Team discharge recommendations?: Yes       Treatment Team Recommendation: Short Term Rehab

## 2022-05-29 NOTE — PROGRESS NOTES
[facility]  Progress Note - Sulaiman Camargo 1945, 64 y o  female MRN: [id number]  Unit/Bed#: [facility] 87 220-01 Encounter: [id number]  Primary Care Provider: Olga Jaramillo DO   Date and time admitted to hospital: 5/26/2022  1:41 PM    * Rhabdomyolysis  Assessment & Plan  Patient presenting with 2 weeks of leg weakness concerning for drug-induced rhabdomyolysis  Likely due to combination of diltiazem as well as simvastatin  Hold simvastatin   Trend CK levels  Myositis panel sent by Neurology  Recommended for rheumatology evaluation  Continue IV fluids for the next 24 hours and then discontinue    Elevated liver enzymes  Assessment & Plan  Likely secondary to rhabdomyolysis  Hepatitis panel unremarkable  Recheck in the a m  Ambulatory dysfunction  Assessment & Plan  Patient with lower extremity weakness for approximately 2 weeks in setting of rhabdomyolysis  Did have recent death of her  a few weeks prior  CT spine unremarkable on admission  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  Neurology evaluated  Myositis panel pending  MRI lumbar spine pending  Patient is 2 assist for ambulation and lives alone  Strongly recommend rehab  Patient is agreeable    Essential hypertension  Assessment & Plan  Continue Cardizem    Rheumatoid arthritis (HCC)  Assessment & Plan  Takes methotrexate 17 5 mg on Mondays  Continue folic acid  Hold until rheumatology evaluates      History of TIA (transient ischemic attack)  Assessment & Plan  History of TIA, maintained on Plavix        VTE Pharmacologic Prophylaxis:  Heparin    Patient Centered Rounds:  Patient care rounds were performed with nursing    Time Spent for Care: 30  More than 50% of total time spent on counseling and coordination of care as described above      Current Length of Stay: 3 day(s)    Current Patient Status: Inpatient   Certification Statement: The patient will continue to require additional inpatient hospital stay due to ongoing workup for lower extremity weakness, IV fluids for rhabdo    Discharge Plan:  Discharge to rehab when medically cleared    Code Status: Level 1 - Full Code      Subjective:   Patient seen evaluated at bedside  She feels well  She is agreeable to rehab  Objective:     Vitals:   Temp (24hrs), Av 2 °F (36 8 °C), Min:97 9 °F (36 6 °C), Max:98 3 °F (36 8 °C)    Temp:  [97 9 °F (36 6 °C)-98 3 °F (36 8 °C)] 97 9 °F (36 6 °C)  HR:  [55-66] 66  Resp:  [15-19] 15  BP: (139-153)/(61-66) 153/66  SpO2:  [94 %-97 %] 94 %  Body mass index is 20 91 kg/m²  Input and Output Summary (last 24 hours): Intake/Output Summary (Last 24 hours) at 2022 1246  Last data filed at 2022 0232  Gross per 24 hour   Intake --   Output 700 ml   Net -700 ml       Physical Exam:     Physical Exam  Vitals reviewed  Constitutional:       General: She is not in acute distress  Appearance: She is well-developed  She is not ill-appearing, toxic-appearing or diaphoretic  HENT:      Head: Normocephalic and atraumatic  Mouth/Throat:      Mouth: Mucous membranes are moist       Pharynx: No oropharyngeal exudate  Eyes:      General: No scleral icterus  Extraocular Movements: Extraocular movements intact  Conjunctiva/sclera: Conjunctivae normal    Cardiovascular:      Rate and Rhythm: Normal rate and regular rhythm  Heart sounds: Normal heart sounds  Pulmonary:      Effort: Pulmonary effort is normal  No respiratory distress  Breath sounds: Normal breath sounds  No wheezing or rales  Abdominal:      General: There is no distension  Palpations: Abdomen is soft  Tenderness: There is no abdominal tenderness  There is no guarding or rebound  Musculoskeletal:         General: No swelling, tenderness or deformity  Skin:     General: Skin is warm and dry  Neurological:      Mental Status: She is alert  Mental status is at baseline        Comments: Generalized lower extremity weakness seems to be related to stiffness and pain   Psychiatric:         Mood and Affect: Mood normal          Behavior: Behavior normal          Thought Content: Thought content normal          Judgment: Judgment normal          Additional Data:     Labs: I have reviewed pertinent results     Results from last 7 days   Lab Units 05/27/22  0453   WBC Thousand/uL 9 13   HEMOGLOBIN g/dL 13 7   HEMATOCRIT % 41 7   PLATELETS Thousands/uL 240   NEUTROS PCT % 71   LYMPHS PCT % 20   MONOS PCT % 8   EOS PCT % 1     Results from last 7 days   Lab Units 05/29/22  0602 05/28/22  0639 05/27/22  0453   SODIUM mmol/L 142   < > 141   POTASSIUM mmol/L 3 6   < > 3 4*   CHLORIDE mmol/L 108   < > 106   CO2 mmol/L 29   < > 26   BUN mg/dL 4*   < > 8   CREATININE mg/dL 0 54*   < > 0 62   ANION GAP mmol/L 5   < > 9   CALCIUM mg/dL 8 2*   < > 8 1*   ALBUMIN g/dL  --   --  2 5*   TOTAL BILIRUBIN mg/dL  --   --  0 64   ALK PHOS U/L  --   --  49   ALT U/L  --   --  157*   AST U/L  --   --  215*   GLUCOSE RANDOM mg/dL 90   < > 79    < > = values in this interval not displayed                           Imaging: I have reviewed pertinent imaging       Recent Cultures (last 7 days):           Last 24 Hours Medication List:   Current Facility-Administered Medications   Medication Dose Route Frequency Provider Last Rate    acetaminophen  650 mg Oral Q6H PRN Chas Flores, DO      aluminum-magnesium hydroxide-simethicone  30 mL Oral Q6H PRN [place] Pyo, DO      clopidogrel  75 mg Oral HS Chas Flores, DO      diltiazem  240 mg Oral Daily Chas Flores DO      docusate sodium  100 mg Oral BID Chas Flores DO      folic acid  1 mg Oral TID [place] Pyo, DO      heparin (porcine)  5,000 Units Subcutaneous Q8H [facility] Chas Flores DO      latanoprost  1 drop Both Eyes HS Chas Flores DO      ondansetron  4 mg Intravenous Q6H PRN [place] Pyo, DO      sodium chloride  75 mL/hr Intravenous Continuous Arthor Coho, DO 75 mL/hr (05/29/22 6996) Today, Patient Was Seen By: Leonila Castillo DO    ** Please Note: Dictation voice to text software may have been used in the creation of this document   **

## 2022-05-29 NOTE — PLAN OF CARE
Problem: Potential for Falls  Goal: Patient will remain free of falls  Description: INTERVENTIONS:  - Educate patient/family on patient safety including physical limitations  - Instruct patient to call for assistance with activity   - Consult OT/PT to assist with strengthening/mobility   - Keep Call bell within reach  - Keep bed low and locked with side rails adjusted as appropriate  - Keep care items and personal belongings within reach  - Initiate and maintain comfort rounds  - Make Fall Risk Sign visible to staff  - Offer Toileting every 2 Hours, in advance of need  - Initiate/Maintain bed alarm  - Obtain necessary fall risk management equipment: walker  - Apply yellow socks and bracelet for high fall risk patients  - Consider moving patient to room near nurses station  Outcome: Progressing     Problem: MOBILITY - ADULT  Goal: Maintain or return to baseline ADL function  Description: INTERVENTIONS:  -  Assess patient's ability to carry out ADLs; assess patient's baseline for ADL function and identify physical deficits which impact ability to perform ADLs (bathing, care of mouth/teeth, toileting, grooming, dressing, etc )  - Assess/evaluate cause of self-care deficits   - Assess range of motion  - Assess patient's mobility; develop plan if impaired  - Assess patient's need for assistive devices and provide as appropriate  - Encourage maximum independence but intervene and supervise when necessary  - Involve family in performance of ADLs  - Assess for home care needs following discharge   - Consider OT consult to assist with ADL evaluation and planning for discharge  - Provide patient education as appropriate  Outcome: Progressing  Goal: Maintains/Returns to pre admission functional level  Description: INTERVENTIONS:  - Perform BMAT or MOVE assessment daily    - Set and communicate daily mobility goal to care team and patient/family/caregiver     - Collaborate with rehabilitation services on mobility goals if consulted  - Perform Range of Motion 3 times a day  - Reposition patient every 3 hours  - Dangle patient 3 times a day  - Stand patient 3 times a day  - Ambulate patient 3 times a day  - Out of bed to chair 3 times a day   - Out of bed for meals 3 times a day  - Out of bed for toileting  - Record patient progress and toleration of activity level   Outcome: Progressing     Problem: Prexisting or High Potential for Compromised Skin Integrity  Goal: Skin integrity is maintained or improved  Description: INTERVENTIONS:  - Identify patients at risk for skin breakdown  - Assess and monitor skin integrity  - Assess and monitor nutrition and hydration status  - Monitor labs   - Assess for incontinence   - Turn and reposition patient  - Assist with mobility/ambulation  - Relieve pressure over bony prominences  - Avoid friction and shearing  - Provide appropriate hygiene as needed including keeping skin clean and dry  - Evaluate need for skin moisturizer/barrier cream  - Collaborate with interdisciplinary team   - Patient/family teaching  - Consider wound care consult   Outcome: Progressing     Problem: NEUROSENSORY - ADULT  Goal: Achieves stable or improved neurological status  Description: INTERVENTIONS  - Monitor and report changes in neurological status  - Monitor vital signs such as temperature, blood pressure, glucose, and any other labs ordered   - Initiate measures to prevent increased intracranial pressure  - Monitor for seizure activity and implement precautions if appropriate      Outcome: Progressing  Goal: Achieves maximal functionality and self care  Description: INTERVENTIONS  - Monitor swallowing and airway patency with patient fatigue and changes in neurological status  - Encourage and assist patient to increase activity and self care     - Encourage visually impaired, hearing impaired and aphasic patients to use assistive/communication devices  Outcome: Progressing     Problem: SKIN/TISSUE INTEGRITY - ADULT  Goal: Skin Integrity remains intact(Skin Breakdown Prevention)  Description: Assess:  -Inspect skin when repositioning, toileting, and assisting with ADLS  -Assess extremities for adequate circulation and sensation     Bed Management:  -Have minimal linens on bed & keep smooth, unwrinkled  -Change linens as needed when moist or perspiring      Toileting:  -Offer bedside commode      Activity:  -Encourage activity and walks on unit  -Encourage or provide ROM exercises   -Use appropriate equipment to lift or move patient in bed      Skin Care:  -Avoid use of baby powder, tape, friction and shearing, hot water or constrictive clothing  -Do not massage red bony areas    Outcome: Progressing     Problem: MUSCULOSKELETAL - ADULT  Goal: Maintain or return mobility to safest level of function  Description: INTERVENTIONS:  - Assess patient's ability to carry out ADLs; assess patient's baseline for ADL function and identify physical deficits which impact ability to perform ADLs (bathing, care of mouth/teeth, toileting, grooming, dressing, etc )  - Assess/evaluate cause of self-care deficits   - Assess range of motion  - Assess patient's mobility  - Assess patient's need for assistive devices and provide as appropriate  - Encourage maximum independence but intervene and supervise when necessary  - Involve family in performance of ADLs  - Assess for home care needs following discharge   - Consider OT consult to assist with ADL evaluation and planning for discharge  - Provide patient education as appropriate  Outcome: Progressing  Goal: Maintain proper alignment of affected body part  Description: INTERVENTIONS:  - Support, maintain and protect limb and body alignment  - Provide patient/ family with appropriate education  Outcome: Progressing

## 2022-05-29 NOTE — ASSESSMENT & PLAN NOTE
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

## 2022-05-29 NOTE — PLAN OF CARE
Problem: Potential for Falls  Goal: Patient will remain free of falls  Description: INTERVENTIONS:  - Educate patient/family on patient safety including physical limitations  - Instruct patient to call for assistance with activity   - Consult OT/PT to assist with strengthening/mobility   - Keep Call bell within reach  - Keep bed low and locked with side rails adjusted as appropriate  - Keep care items and personal belongings within reach  - Initiate and maintain comfort rounds  - Make Fall Risk Sign visible to staff  - Offer Toileting every 2 Hours, in advance of need  - Initiate/Maintain bed alarm  - Obtain necessary fall risk management equipment: walker  - Apply yellow socks and bracelet for high fall risk patients  - Consider moving patient to room near nurses station  Outcome: Progressing     Problem: MOBILITY - ADULT  Goal: Maintain or return to baseline ADL function  Description: INTERVENTIONS:  -  Assess patient's ability to carry out ADLs; assess patient's baseline for ADL function and identify physical deficits which impact ability to perform ADLs (bathing, care of mouth/teeth, toileting, grooming, dressing, etc )  - Assess/evaluate cause of self-care deficits   - Assess range of motion  - Assess patient's mobility; develop plan if impaired  - Assess patient's need for assistive devices and provide as appropriate  - Encourage maximum independence but intervene and supervise when necessary  - Involve family in performance of ADLs  - Assess for home care needs following discharge   - Consider OT consult to assist with ADL evaluation and planning for discharge  - Provide patient education as appropriate  Outcome: Progressing  Goal: Maintains/Returns to pre admission functional level  Description: INTERVENTIONS:  - Perform BMAT or MOVE assessment daily    - Set and communicate daily mobility goal to care team and patient/family/caregiver     - Collaborate with rehabilitation services on mobility goals if consulted  - Perform Range of Motion 3 times a day  - Reposition patient every 3 hours  - Dangle patient 3 times a day  - Stand patient 3 times a day  - Ambulate patient 3 times a day  - Out of bed to chair 3 times a day   - Out of bed for meals 3 times a day  - Out of bed for toileting  - Record patient progress and toleration of activity level   Outcome: Progressing     Problem: Prexisting or High Potential for Compromised Skin Integrity  Goal: Skin integrity is maintained or improved  Description: INTERVENTIONS:  - Identify patients at risk for skin breakdown  - Assess and monitor skin integrity  - Assess and monitor nutrition and hydration status  - Monitor labs   - Assess for incontinence   - Turn and reposition patient  - Assist with mobility/ambulation  - Relieve pressure over bony prominences  - Avoid friction and shearing  - Provide appropriate hygiene as needed including keeping skin clean and dry  - Evaluate need for skin moisturizer/barrier cream  - Collaborate with interdisciplinary team   - Patient/family teaching  - Consider wound care consult   Outcome: Progressing     Problem: NEUROSENSORY - ADULT  Goal: Achieves stable or improved neurological status  Description: INTERVENTIONS  - Monitor and report changes in neurological status  - Monitor vital signs such as temperature, blood pressure, glucose, and any other labs ordered   - Initiate measures to prevent increased intracranial pressure  - Monitor for seizure activity and implement precautions if appropriate      Outcome: Progressing  Goal: Achieves maximal functionality and self care  Description: INTERVENTIONS  - Monitor swallowing and airway patency with patient fatigue and changes in neurological status  - Encourage and assist patient to increase activity and self care     - Encourage visually impaired, hearing impaired and aphasic patients to use assistive/communication devices  Outcome: Progressing        Problem: MUSCULOSKELETAL - ADULT  Goal: Maintain or return mobility to safest level of function  Description: INTERVENTIONS:  - Assess patient's ability to carry out ADLs; assess patient's baseline for ADL function and identify physical deficits which impact ability to perform ADLs (bathing, care of mouth/teeth, toileting, grooming, dressing, etc )  - Assess/evaluate cause of self-care deficits   - Assess range of motion  - Assess patient's mobility  - Assess patient's need for assistive devices and provide as appropriate  - Encourage maximum independence but intervene and supervise when necessary  - Involve family in performance of ADLs  - Assess for home care needs following discharge   - Consider OT consult to assist with ADL evaluation and planning for discharge  - Provide patient education as appropriate  Outcome: Progressing  Goal: Maintain proper alignment of affected body part  Description: INTERVENTIONS:  - Support, maintain and protect limb and body alignment  - Provide patient/ family with appropriate education  Outcome: Progressing

## 2022-05-30 PROBLEM — R29.898 WEAKNESS OF BOTH LOWER EXTREMITIES: Status: ACTIVE | Noted: 2022-05-30

## 2022-05-30 LAB
ANION GAP SERPL CALCULATED.3IONS-SCNC: 6 MMOL/L (ref 4–13)
BUN SERPL-MCNC: 5 MG/DL (ref 5–25)
CALCIUM SERPL-MCNC: 8.4 MG/DL (ref 8.3–10.1)
CHLORIDE SERPL-SCNC: 106 MMOL/L (ref 100–108)
CK MB SERPL-MCNC: 15.4 NG/ML (ref 0–5)
CK MB SERPL-MCNC: <1 % (ref 0–2.5)
CK SERPL-CCNC: 2029 U/L (ref 26–192)
CO2 SERPL-SCNC: 30 MMOL/L (ref 21–32)
CREAT SERPL-MCNC: 0.5 MG/DL (ref 0.6–1.3)
GFR SERPL CREATININE-BSD FRML MDRD: 92 ML/MIN/1.73SQ M
GLUCOSE SERPL-MCNC: 90 MG/DL (ref 65–140)
POTASSIUM SERPL-SCNC: 3.5 MMOL/L (ref 3.5–5.3)
SODIUM SERPL-SCNC: 142 MMOL/L (ref 136–145)

## 2022-05-30 PROCEDURE — 82550 ASSAY OF CK (CPK): CPT | Performed by: INTERNAL MEDICINE

## 2022-05-30 PROCEDURE — 86140 C-REACTIVE PROTEIN: CPT | Performed by: INTERNAL MEDICINE

## 2022-05-30 PROCEDURE — 99232 SBSQ HOSP IP/OBS MODERATE 35: CPT | Performed by: INTERNAL MEDICINE

## 2022-05-30 PROCEDURE — 82553 CREATINE MB FRACTION: CPT | Performed by: INTERNAL MEDICINE

## 2022-05-30 PROCEDURE — 80048 BASIC METABOLIC PNL TOTAL CA: CPT | Performed by: INTERNAL MEDICINE

## 2022-05-30 RX ADMIN — HEPARIN SODIUM 5000 UNITS: 5000 INJECTION INTRAVENOUS; SUBCUTANEOUS at 14:34

## 2022-05-30 RX ADMIN — FOLIC ACID 1 MG: 1 TABLET ORAL at 08:55

## 2022-05-30 RX ADMIN — LATANOPROST 1 DROP: 50 SOLUTION OPHTHALMIC at 21:52

## 2022-05-30 RX ADMIN — HEPARIN SODIUM 5000 UNITS: 5000 INJECTION INTRAVENOUS; SUBCUTANEOUS at 21:49

## 2022-05-30 RX ADMIN — DILTIAZEM HYDROCHLORIDE 240 MG: 240 CAPSULE, COATED, EXTENDED RELEASE ORAL at 08:55

## 2022-05-30 RX ADMIN — CLOPIDOGREL BISULFATE 75 MG: 75 TABLET ORAL at 21:49

## 2022-05-30 RX ADMIN — DOCUSATE SODIUM 100 MG: 100 CAPSULE, LIQUID FILLED ORAL at 08:55

## 2022-05-30 RX ADMIN — HEPARIN SODIUM 5000 UNITS: 5000 INJECTION INTRAVENOUS; SUBCUTANEOUS at 06:08

## 2022-05-30 RX ADMIN — DOCUSATE SODIUM 100 MG: 100 CAPSULE, LIQUID FILLED ORAL at 16:21

## 2022-05-30 RX ADMIN — FOLIC ACID 1 MG: 1 TABLET ORAL at 21:49

## 2022-05-30 RX ADMIN — FOLIC ACID 1 MG: 1 TABLET ORAL at 16:21

## 2022-05-30 NOTE — ASSESSMENT & PLAN NOTE
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

## 2022-05-30 NOTE — PLAN OF CARE
Problem: Potential for Falls  Goal: Patient will remain free of falls  Description: INTERVENTIONS:  - Educate patient/family on patient safety including physical limitations  - Instruct patient to call for assistance with activity   - Consult OT/PT to assist with strengthening/mobility   - Keep Call bell within reach  - Keep bed low and locked with side rails adjusted as appropriate  - Keep care items and personal belongings within reach  - Initiate and maintain comfort rounds  - Make Fall Risk Sign visible to staff  - Offer Toileting every 2 Hours, in advance of need  - Initiate/Maintain bed alarm  - Obtain necessary fall risk management equipment: walker  - Apply yellow socks and bracelet for high fall risk patients  - Consider moving patient to room near nurses station  Outcome: Progressing     Problem: MOBILITY - ADULT  Goal: Maintain or return to baseline ADL function  Description: INTERVENTIONS:  -  Assess patient's ability to carry out ADLs; assess patient's baseline for ADL function and identify physical deficits which impact ability to perform ADLs (bathing, care of mouth/teeth, toileting, grooming, dressing, etc )  - Assess/evaluate cause of self-care deficits   - Assess range of motion  - Assess patient's mobility; develop plan if impaired  - Assess patient's need for assistive devices and provide as appropriate  - Encourage maximum independence but intervene and supervise when necessary  - Involve family in performance of ADLs  - Assess for home care needs following discharge   - Consider OT consult to assist with ADL evaluation and planning for discharge  - Provide patient education as appropriate  Outcome: Progressing  Goal: Maintains/Returns to pre admission functional level  Description: INTERVENTIONS:  - Perform BMAT or MOVE assessment daily    - Set and communicate daily mobility goal to care team and patient/family/caregiver     - Collaborate with rehabilitation services on mobility goals if consulted  - Perform Range of Motion 3 times a day  - Reposition patient every 3 hours  - Dangle patient 3 times a day  - Stand patient 3 times a day  - Ambulate patient 3 times a day  - Out of bed to chair 3 times a day   - Out of bed for meals 3 times a day  - Out of bed for toileting  - Record patient progress and toleration of activity level   Outcome: Progressing     Problem: Prexisting or High Potential for Compromised Skin Integrity  Goal: Skin integrity is maintained or improved  Description: INTERVENTIONS:  - Identify patients at risk for skin breakdown  - Assess and monitor skin integrity  - Assess and monitor nutrition and hydration status  - Monitor labs   - Assess for incontinence   - Turn and reposition patient  - Assist with mobility/ambulation  - Relieve pressure over bony prominences  - Avoid friction and shearing  - Provide appropriate hygiene as needed including keeping skin clean and dry  - Evaluate need for skin moisturizer/barrier cream  - Collaborate with interdisciplinary team   - Patient/family teaching  - Consider wound care consult   Outcome: Progressing     Problem: NEUROSENSORY - ADULT  Goal: Achieves stable or improved neurological status  Description: INTERVENTIONS  - Monitor and report changes in neurological status  - Monitor vital signs such as temperature, blood pressure, glucose, and any other labs ordered   - Initiate measures to prevent increased intracranial pressure  - Monitor for seizure activity and implement precautions if appropriate      Outcome: Progressing  Goal: Achieves maximal functionality and self care  Description: INTERVENTIONS  - Monitor swallowing and airway patency with patient fatigue and changes in neurological status  - Encourage and assist patient to increase activity and self care     - Encourage visually impaired, hearing impaired and aphasic patients to use assistive/communication devices  Outcome: Progressing     Problem: SKIN/TISSUE INTEGRITY - ADULT  Goal: Skin Integrity remains intact(Skin Breakdown Prevention)  Description: Assess:  -Perform Noam assessment every  -Clean and moisturize skin every   -Inspect skin when repositioning, toileting, and assisting with ADLS  -Assess under medical devices such as  every   -Assess extremities for adequate circulation and sensation     Bed Management:  -Have minimal linens on bed & keep smooth, unwrinkled  -Change linens as needed when moist or perspiring  -Avoid sitting or lying in one position for more than hours while in bed  -Keep HOB atdegrees     Toileting:  -Offer bedside commode  -Assess for incontinence every  -Use incontinent care products after each incontinent episode such as    Activity:  -Mobilize patient times a day  -Encourage activity and walks on unit  -Encourage or provide ROM exercises   -Turn and reposition patient every Hours  -Use appropriate equipment to lift or move patient in bed  -Instruct/ Assist with weight shifting every when out of bed in chair  -Consider limitation of chair time hour intervals    Skin Care:  -Avoid use of baby powder, tape, friction and shearing, hot water or constrictive clothing  -Relieve pressure over bony prominences using  -Do not massage red bony areas    Next Steps:  -Teach patient strategies to minimize risks such as   -Consider consults to  interdisciplinary teams such as  Outcome: Progressing     Problem: MUSCULOSKELETAL - ADULT  Goal: Maintain or return mobility to safest level of function  Description: INTERVENTIONS:  - Assess patient's ability to carry out ADLs; assess patient's baseline for ADL function and identify physical deficits which impact ability to perform ADLs (bathing, care of mouth/teeth, toileting, grooming, dressing, etc )  - Assess/evaluate cause of self-care deficits   - Assess range of motion  - Assess patient's mobility  - Assess patient's need for assistive devices and provide as appropriate  - Encourage maximum independence but intervene and supervise when necessary  - Involve family in performance of ADLs  - Assess for home care needs following discharge   - Consider OT consult to assist with ADL evaluation and planning for discharge  - Provide patient education as appropriate  Outcome: Progressing  Goal: Maintain proper alignment of affected body part  Description: INTERVENTIONS:  - Support, maintain and protect limb and body alignment  - Provide patient/ family with appropriate education  Outcome: Progressing

## 2022-05-30 NOTE — PROGRESS NOTES
[facility] 46  Progress Note - Liza Cisse 1945, 64 y o  female MRN: [id number]  Unit/Bed#: [facility] 87 220-01 Encounter: [id number]  Primary Care Provider: Verna Mejía DO   Date and time admitted to hospital: 5/26/2022  1:41 PM    * Rhabdomyolysis  Assessment & Plan  Patient presenting with 2 weeks of leg weakness concerning for drug-induced rhabdomyolysis  Likely due to combination of diltiazem as well as simvastatin  Hold simvastatin   Trend CK levels  Myositis panel sent by Neurology  Recommended for rheumatology evaluation    Weakness of both lower extremities  Assessment & Plan  Exact etiology unclear  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  Neurology evaluated the patient  MRI of the LS spine ordered  Currently pending  Elevated liver enzymes  Assessment & Plan  Likely secondary to rhabdomyolysis  Hepatitis panel unremarkable  Recheck in the a m  Ambulatory dysfunction  Assessment & Plan  Patient with lower extremity weakness for approximately 2 weeks in setting of rhabdomyolysis  Did have recent death of her  a few weeks prior  CT spine unremarkable on admission  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  Neurology evaluated  Myositis panel pending  MRI lumbar spine pending  Patient is 2 assist for ambulation and lives alone  Strongly recommend rehab  Patient is agreeable    Essential hypertension  Assessment & Plan  Continue Cardizem        VTE Pharmacologic Prophylaxis:   Pharmacologic: Heparin  Mechanical VTE Prophylaxis in Place: Yes    Patient Centered Rounds: I have performed bedside rounds with nursing staff today  Discussions with Specialists or Other Care Team Provider:     Education and Discussions with Family / Patient: pt  She said she will call her family    Time Spent for Care: 20 minutes  More than 50% of total time spent on counseling and coordination of care as described above      Current Length of Stay: 4 day(s)    Current Patient Status: Inpatient   Certification Statement: The patient will continue to require additional inpatient hospital stay due to above    Discharge Plan:     Code Status: Level 1 - Full Code      Subjective:   Pt seen and examined by me this morning  Pt complained of persistent bilateral lower extremity weakness  Unchanged since admission  Objective:     Vitals:   Temp (24hrs), Av 3 °F (36 8 °C), Min:98 2 °F (36 8 °C), Max:98 4 °F (36 9 °C)    Temp:  [98 2 °F (36 8 °C)-98 4 °F (36 9 °C)] 98 2 °F (36 8 °C)  HR:  [55-60] 55  Resp:  [16-18] 18  BP: (128-155)/(49-63) 155/63  SpO2:  [94 %-96 %] 95 %  Body mass index is 20 8 kg/m²  Input and Output Summary (last 24 hours): Intake/Output Summary (Last 24 hours) at 2022 1400  Last data filed at 2022 0640  Gross per 24 hour   Intake 1125 ml   Output 1875 ml   Net -750 ml       Physical Exam:     Physical Exam    Constitutional: Pt appears comfortable  Not in any acute distress  HENT:   Head: Normocephalic and atraumatic  Eyes: EOM are normal    Neck: Neck supple  Cardiovascular: Normal rate, regular rhythm, normal heart sounds  No murmur heard  Pulmonary/Chest: Effort normal, air entry b/l equal  No respiratory distress  Pt has no wheezes or crackles  Abdominal: Soft  Non-distended, Non-tender  Bowel sounds are normal    Neurological: awake, alert  Bilateral lower extremity motor strength 3/5, no numbness  Psychiatric: normal mood and affect       Additional Data:     Labs:    Results from last 7 days   Lab Units 22  0453   WBC Thousand/uL 9 13   HEMOGLOBIN g/dL 13 7   HEMATOCRIT % 41 7   PLATELETS Thousands/uL 240   NEUTROS PCT % 71   LYMPHS PCT % 20   MONOS PCT % 8   EOS PCT % 1     Results from last 7 days   Lab Units 22  0618 22  0639 22  0453   SODIUM mmol/L 142   < > 141   POTASSIUM mmol/L 3 5   < > 3 4*   CHLORIDE mmol/L 106   < > 106   CO2 mmol/L 30   < > 26   BUN mg/dL 5   < > 8   CREATININE mg/dL 0 50*   < > 0  [address] mmol/L 6   < > 9   CALCIUM mg/dL 8 4   < > 8 1*   ALBUMIN g/dL  --   --  2 5*   TOTAL BILIRUBIN mg/dL  --   --  0 64   ALK PHOS U/L  --   --  49   ALT U/L  --   --  157*   AST U/L  --   --  215*   GLUCOSE RANDOM mg/dL 90   < > 79    < > = values in this interval not displayed  * I Have Reviewed All Lab Data Listed Above  * Additional Pertinent Lab Tests Reviewed: Angi 64 Admission Reviewed    Imaging:    Imaging Reports Reviewed Today Include:   Imaging Personally Reviewed by Myself Includes:     Recent Cultures (last 7 days):           Last 24 Hours Medication List:   Current Facility-Administered Medications   Medication Dose Route Frequency Provider Last Rate    acetaminophen  650 mg Oral Q6H PRN Chas Flores, DO      aluminum-magnesium hydroxide-simethicone  30 mL Oral Q6H PRN Saroj Campos,       clopidogrel  75 mg Oral HS Chas Flores, DO      diltiazem  240 mg Oral Daily Chas Flores, DO      docusate sodium  100 mg Oral BID Chas Flores, DO      folic acid  1 mg Oral TID Chas Flores, DO      heparin (porcine)  5,000 Units Subcutaneous Q8H [address] Chas GABE Flores, DO      latanoprost  1 drop Both Eyes HS Chas Flores, DO      ondansetron  4 mg Intravenous Q6H PRN Saroj Campos, DO          Today, Patient Was Seen By: Papa Estrada MD    ** Please Note: Dictation voice to text software may have been used in the creation of this document   **

## 2022-05-30 NOTE — ASSESSMENT & PLAN NOTE
Patient presenting with 2 weeks of leg weakness concerning for drug-induced rhabdomyolysis  Likely due to combination of diltiazem as well as simvastatin  Hold simvastatin   Trend CK levels  Myositis panel sent by Neurology  Recommended for rheumatology evaluation

## 2022-05-30 NOTE — PLAN OF CARE
Problem: Potential for Falls  Goal: Patient will remain free of falls  Description: INTERVENTIONS:  - Educate patient/family on patient safety including physical limitations  - Instruct patient to call for assistance with activity   - Consult OT/PT to assist with strengthening/mobility   - Keep Call bell within reach  - Keep bed low and locked with side rails adjusted as appropriate  - Keep care items and personal belongings within reach  - Initiate and maintain comfort rounds  - Make Fall Risk Sign visible to staff  - Offer Toileting every 2 Hours, in advance of need  - Initiate/Maintain bed alarm  - Obtain necessary fall risk management equipment: walker  - Apply yellow socks and bracelet for high fall risk patients  - Consider moving patient to room near nurses station  Outcome: Progressing     Problem: MOBILITY - ADULT  Goal: Maintain or return to baseline ADL function  Description: INTERVENTIONS:  -  Assess patient's ability to carry out ADLs; assess patient's baseline for ADL function and identify physical deficits which impact ability to perform ADLs (bathing, care of mouth/teeth, toileting, grooming, dressing, etc )  - Assess/evaluate cause of self-care deficits   - Assess range of motion  - Assess patient's mobility; develop plan if impaired  - Assess patient's need for assistive devices and provide as appropriate  - Encourage maximum independence but intervene and supervise when necessary  - Involve family in performance of ADLs  - Assess for home care needs following discharge   - Consider OT consult to assist with ADL evaluation and planning for discharge  - Provide patient education as appropriate  Outcome: Progressing  Goal: Maintains/Returns to pre admission functional level  Description: INTERVENTIONS:  - Perform BMAT or MOVE assessment daily    - Set and communicate daily mobility goal to care team and patient/family/caregiver     - Collaborate with rehabilitation services on mobility goals if consulted  - Perform Range of Motion 3 times a day  - Reposition patient every 3 hours  - Dangle patient 3 times a day  - Stand patient 3 times a day  - Ambulate patient 3 times a day  - Out of bed to chair 3 times a day   - Out of bed for meals 3 times a day  - Out of bed for toileting  - Record patient progress and toleration of activity level   Outcome: Progressing     Problem: Prexisting or High Potential for Compromised Skin Integrity  Goal: Skin integrity is maintained or improved  Description: INTERVENTIONS:  - Identify patients at risk for skin breakdown  - Assess and monitor skin integrity  - Assess and monitor nutrition and hydration status  - Monitor labs   - Assess for incontinence   - Turn and reposition patient  - Assist with mobility/ambulation  - Relieve pressure over bony prominences  - Avoid friction and shearing  - Provide appropriate hygiene as needed including keeping skin clean and dry  - Evaluate need for skin moisturizer/barrier cream  - Collaborate with interdisciplinary team   - Patient/family teaching  - Consider wound care consult   Outcome: Progressing     Problem: NEUROSENSORY - ADULT  Goal: Achieves stable or improved neurological status  Description: INTERVENTIONS  - Monitor and report changes in neurological status  - Monitor vital signs such as temperature, blood pressure, glucose, and any other labs ordered   - Initiate measures to prevent increased intracranial pressure  - Monitor for seizure activity and implement precautions if appropriate      Outcome: Progressing  Goal: Achieves maximal functionality and self care  Description: INTERVENTIONS  - Monitor swallowing and airway patency with patient fatigue and changes in neurological status  - Encourage and assist patient to increase activity and self care     - Encourage visually impaired, hearing impaired and aphasic patients to use assistive/communication devices  Outcome: Progressing     Problem: SKIN/TISSUE INTEGRITY - ADULT  Goal: Skin Integrity remains intact(Skin Breakdown Prevention)  Description: Assess:  -Perform Noam assessment every   -Clean and moisturize skin every   -Inspect skin when repositioning, toileting, and assisting with ADLS  -Assess under medical devices such as  every   -Assess extremities for adequate circulation and sensation     Bed Management:  -Have minimal linens on bed & keep smooth, unwrinkled  -Change linens as needed when moist or perspiring  -Avoid sitting or lying in one position for more than  hours while in bed  -Keep HOB at degrees     Toileting:  -Offer bedside commode  -Assess for incontinence every   -Use incontinent care products after each incontinent episode such as     Activity:  -Mobilize patient  times a day  -Encourage activity and walks on unit  -Encourage or provide ROM exercises   -Turn and reposition patient every  Hours  -Use appropriate equipment to lift or move patient in bed  -Instruct/ Assist with weight shifting every  when out of bed in chair  -Consider limitation of chair time  hour intervals    Skin Care:  -Avoid use of baby powder, tape, friction and shearing, hot water or constrictive clothing  -Relieve pressure over bony prominences using   -Do not massage red bony areas    Next Steps:  -Teach patient strategies to minimize risks such as    -Consider consults to  interdisciplinary teams such as   -Outcome: Progressing     Problem: MUSCULOSKELETAL - ADULT  Goal: Maintain or return mobility to safest level of function  Description: INTERVENTIONS:  - Assess patient's ability to carry out ADLs; assess patient's baseline for ADL function and identify physical deficits which impact ability to perform ADLs (bathing, care of mouth/teeth, toileting, grooming, dressing, etc )  - Assess/evaluate cause of self-care deficits   - Assess range of motion  - Assess patient's mobility  - Assess patient's need for assistive devices and provide as appropriate  - Encourage maximum independence but intervene and supervise when necessary  - Involve family in performance of ADLs  - Assess for home care needs following discharge   - Consider OT consult to assist with ADL evaluation and planning for discharge  - Provide patient education as appropriate  Outcome: Progressing  Goal: Maintain proper alignment of affected body part  Description: INTERVENTIONS:  - Support, maintain and protect limb and body alignment  - Provide patient/ family with appropriate education  Outcome: Progressing

## 2022-05-31 ENCOUNTER — APPOINTMENT (INPATIENT)
Dept: NON INVASIVE DIAGNOSTICS | Facility: HOSPITAL | Age: 77
DRG: 558 | End: 2022-05-31
Payer: MEDICARE

## 2022-05-31 ENCOUNTER — APPOINTMENT (INPATIENT)
Dept: MRI IMAGING | Facility: HOSPITAL | Age: 77
DRG: 558 | End: 2022-05-31
Payer: MEDICARE

## 2022-05-31 LAB — CRP SERPL QL: <3 MG/L

## 2022-05-31 PROCEDURE — 93923 UPR/LXTR ART STDY 3+ LVLS: CPT

## 2022-05-31 PROCEDURE — 99232 SBSQ HOSP IP/OBS MODERATE 35: CPT | Performed by: STUDENT IN AN ORGANIZED HEALTH CARE EDUCATION/TRAINING PROGRAM

## 2022-05-31 PROCEDURE — 97116 GAIT TRAINING THERAPY: CPT

## 2022-05-31 PROCEDURE — 99222 1ST HOSP IP/OBS MODERATE 55: CPT | Performed by: PODIATRIST

## 2022-05-31 PROCEDURE — G1004 CDSM NDSC: HCPCS

## 2022-05-31 PROCEDURE — 93923 UPR/LXTR ART STDY 3+ LVLS: CPT | Performed by: SURGERY

## 2022-05-31 PROCEDURE — 97110 THERAPEUTIC EXERCISES: CPT

## 2022-05-31 PROCEDURE — 97530 THERAPEUTIC ACTIVITIES: CPT

## 2022-05-31 PROCEDURE — 72148 MRI LUMBAR SPINE W/O DYE: CPT

## 2022-05-31 PROCEDURE — 97535 SELF CARE MNGMENT TRAINING: CPT

## 2022-05-31 RX ADMIN — HEPARIN SODIUM 5000 UNITS: 5000 INJECTION INTRAVENOUS; SUBCUTANEOUS at 22:56

## 2022-05-31 RX ADMIN — LATANOPROST 1 DROP: 50 SOLUTION OPHTHALMIC at 22:56

## 2022-05-31 RX ADMIN — FOLIC ACID 1 MG: 1 TABLET ORAL at 17:21

## 2022-05-31 RX ADMIN — FOLIC ACID 1 MG: 1 TABLET ORAL at 20:43

## 2022-05-31 RX ADMIN — DILTIAZEM HYDROCHLORIDE 240 MG: 240 CAPSULE, COATED, EXTENDED RELEASE ORAL at 08:22

## 2022-05-31 RX ADMIN — CLOPIDOGREL BISULFATE 75 MG: 75 TABLET ORAL at 22:56

## 2022-05-31 RX ADMIN — FOLIC ACID 1 MG: 1 TABLET ORAL at 08:22

## 2022-05-31 RX ADMIN — HEPARIN SODIUM 5000 UNITS: 5000 INJECTION INTRAVENOUS; SUBCUTANEOUS at 05:42

## 2022-05-31 RX ADMIN — DOCUSATE SODIUM 100 MG: 100 CAPSULE, LIQUID FILLED ORAL at 17:21

## 2022-05-31 NOTE — PHYSICAL THERAPY NOTE
Physical Therapy Treatment Note     05/31/22 1440   PT Last Visit   PT Visit Date 05/31/22   Note Type   Note Type Treatment   Pain Assessment   Pain Assessment Tool 0-10   Pain Score No Pain   Restrictions/Precautions   Weight Bearing Precautions Per Order No   Other Precautions Bed Alarm; Fall Risk;Telemetry   General   Chart Reviewed Yes   Subjective   Subjective pt  agreeable to PT  Pt  initially reported she is not in a good mood reporting everything her is wrong  However patient was cooperative and pleasant t/o session  Bed Mobility   Supine to Sit 3  Moderate assistance   Additional items Assist x 1; Increased time required;HOB elevated; Bedrails;LE management;Verbal cues   Transfers   Sit to Stand   (Min - Mod A)   Additional items Assist x 1; Armrests; Increased time required;Verbal cues; Bedrails   Stand to Sit 4  Minimal assistance   Additional items Assist x 1; Increased time required;Verbal cues;Armrests   Stand pivot 4  Minimal assistance   Additional items Assist x 1; Increased time required;Verbal cues   Ambulation/Elevation   Gait pattern Decreased foot clearance; Foward flexed; Short stride; Excessively slow;Decreased heel strike;Decreased toe off   Gait Assistance 4  Minimal assist   Additional items Assist x 1;Verbal cues  (2nd A for chair follow)   Assistive Device Rolling walker   Distance 72 ft x 2   Balance   Static Sitting Fair +   Dynamic Sitting Fair   Static Standing Fair   Dynamic Standing Fair -   Ambulatory Fair -   Endurance Deficit   Endurance Deficit Yes   Endurance Deficit Description Fatigue   Activity Tolerance   Activity Tolerance Patient tolerated treatment well   Nurse Made Aware Yes   Exercises   THR Supine;Sitting;Bilateral;AROM;AAROM;20 reps  (SLR, HS, quad sets, AP, LAQ, hip abd)   Assessment   Prognosis Good   Problem List Decreased strength;Decreased range of motion;Decreased endurance;Decreased mobility; Impaired judgement   Assessment Pt  noted with decreased strength and ROM in LLE comapre to RLE  Pt  needed icnreased assist for LLE ROM  Seated rest between ambualtion due to fatigue  Overall no LOB balance noted  Cues for increasing the heel sttrike and reduce the excess WBing of UEs onto the RW during ambulation given  Assistance for RW negotiation during ambulation was also given  Increased A for STS transfer from lower surface chair noted and cues for hand placement given as well  Pt  reported no icnreased discomfort post session  Improved gait as session progressed with increased step length and continuous steps  However overall slow gait noted  pt  noted with great improvement in her mobility and needed decreased assist  However patient continue to be below her baseline and needs assist for all mobility and safety  Pt  positioned on toilet with OT present at the end of session  Will continue to follow per PT POC as she will continue to benefit from skilled PT to address her strength, mobility and endurance deficits  Barriers to Discharge Decreased caregiver support; Inaccessible home environment   Goals   Patient Goals None reported   STG Expiration Date 06/10/22   PT Treatment Day 1   Plan   Treatment/Interventions Functional transfer training;LE strengthening/ROM; Therapeutic exercise;Gait training;Bed mobility; Equipment eval/education;Patient/family training;Spoke to nursing;OT   Progress Progressing toward goals   PT Frequency Other (Comment)  (4-5x/week)   Recommendation   PT Discharge Recommendation Post acute rehabilitation services   Equipment Recommended [facility] Recommended Wheeled walker   AM-PAC Basic Mobility Inpatient   Turning in Bed Without Bedrails 2   Lying on Back to Sitting on Edge of Flat Bed 3   Moving Bed to Chair 3   Standing Up From Chair 3   Walk in Room 3   Climb 3-5 Stairs 2   Basic Mobility Inpatient Raw Score 16   Basic Mobility Standardized Score 38 32   Turning Head Towards Sound 4   Follow Simple Instructions 4   Low Function Basic Mobility Raw Score 24   Low Function Basic Mobility Standardized Score 38 53   Highest Level Of Mobility   -HLM Goal 5: Stand one or more mins   JH-HLM Achieved 7: Walk 25 feet or more   End of Consult   Patient Position at End of Consult Other (comment); All needs within reach  (on toilet with OT)         Izabella Reyes PTA    An AM-PAC basic mobility standardized score less than 42 9 suggest the patient may benefit from discharge to post-acute rehab services

## 2022-05-31 NOTE — PLAN OF CARE
Problem: PHYSICAL THERAPY ADULT  Goal: Performs mobility at highest level of function for planned discharge setting  See evaluation for individualized goals  Description: Treatment/Interventions: Functional transfer training, LE strengthening/ROM, Elevations, Therapeutic exercise, Patient/family training, Bed mobility, Equipment eval/education, Gait training, Compensatory technique education, Continued evaluation, Spoke to nursing, OT          See flowsheet documentation for full assessment, interventions and recommendations  Outcome: Progressing  Note: Prognosis: Good  Problem List: Decreased strength, Decreased range of motion, Decreased endurance, Decreased mobility, Impaired judgement  Assessment: Pt  noted with decreased strength and ROM in LLE comapre to RLE  Pt  needed icnreased assist for LLE ROM  Seated rest between ambualtion due to fatigue  Overall no LOB balance noted  Cues for increasing the heel sttrike and reduce the excess WBing of UEs onto the RW during ambulation given  Assistance for RW negotiation during ambulation was also given  Increased A for STS transfer from lower surface chair noted and cues for hand placement given as well  Pt  reported no icnreased discomfort post session  Improved gait as session progressed with increased step length and continuous steps  However overall slow gait noted  pt  noted with great improvement in her mobility and needed decreased assist  However patient continue to be below her baseline and needs assist for all mobility and safety  Pt  positioned on toilet with OT present at the end of session  Will continue to follow per PT POC as she will continue to benefit from skilled PT to address her strength, mobility and endurance deficits    Barriers to Discharge: Decreased caregiver support, Inaccessible home environment  Barriers to Discharge Comments: lives alone     PT Discharge Recommendation: Post acute rehabilitation services          See flowsheet documentation for full assessment

## 2022-05-31 NOTE — CASE MANAGEMENT
Case Management Discharge Planning Note    Patient name Moody Cueto  Location [facility] 68 2 /South 2 Reggie Loja* MRN [id number]  : 1945 Date 2022       Current Admission Date: 2022  Current Admission Diagnosis:Rhabdomyolysis   Patient Active Problem List    Diagnosis Date Noted    Weakness of both lower extremities 2022    Ambulatory dysfunction 2022    Rhabdomyolysis 2022    Elevated liver enzymes 2022    Laceration of forehead 2021    Mild protein-calorie malnutrition ([facility] [address] ) 2021    Other constipation 2019    Trigeminal neuralgia 2018    History of TIA (transient ischemic attack) 2018    Rheumatoid arthritis ([facility] [address] ) 2018    Nerve sheath tumor 2016    Fatty liver 2014    Arteriosclerotic heart disease 2014    Essential hypertension 2013    Transient cerebral ischemia 2012    HLD (hyperlipidemia) 2012      LOS (days): 5  Geometric Mean LOS (GMLOS) (days): 3 10  Days to GMLOS:-1 9     OBJECTIVE:  Risk of Unplanned Readmission Score: 11 2         Current admission status: Inpatient   Preferred Pharmacy:   [facility] Kai Higgins, [address] [address]  Phone: [phone] Fax: [phone]    Primary Care Provider: Artis Botello DO    Primary Insurance: MEDICARE  Secondary Insurance: [facility]    DISCHARGE DETAILS:    Discharge planning discussed with[de-identified] pt  Freedom of Choice: Yes  Comments - Freedom of Choice: Agreeable to STR- would like to avoid a physical nursing home if able- agreeable to [facility] or [address] [place] [address] TCF- awaiting accepting facilitiy     Were Treatment Team discharge recommendations reviewed with patient/caregiver?: Yes  Did patient/caregiver verbalize understanding of patient care needs?: Yes                                Treatment Team Recommendation: Short Term Rehab  Discharge Destination Plan[de-identified] Short Term Rehab (facility TBD)  Transport at Discharge : Wheelchair Bharathi Deck (aware of OOP cost-agreeable to same)

## 2022-05-31 NOTE — PROGRESS NOTES
[facility]  Progress Note - [place] 1945, 64 y o  female MRN: [id number]  Unit/Bed#: [place] 68 2 [facility] 87 220-01 Encounter: [id number]  Primary Care Provider: Adeline Mccoy DO   Date and time admitted to hospital: 5/26/2022  1:41 PM    * Rhabdomyolysis  Assessment & Plan  64year old female presenting with leg weakness concerning for drug-induced rhabdomyolysis  - Statin held  - Neurology recommendations appreciated  - Rheumatology recommendations pending  Sent tiger text today for follow-up  - Myositis panel pending  - MRI lumbar spine showing severe facet degenerative change L4-5 results in anterolisthesis resulting in moderate central stenosis, moderate right foraminal narrowing, and moderate to severe right lateral recess stenosis        Elevated liver enzymes  Assessment & Plan  Likely secondary to rhabdomyolysis  - Trend    No results for input(s): AST, ALT, TBILI, DBILI, ALKPHOS, INR in the last 72 hours  Ambulatory dysfunction  Assessment & Plan  Lower extremity weakness possibly due to rhabdomyolysis  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    - See plan above    HLD (hyperlipidemia)  Assessment & Plan  Statin held due to rhabdomyolysis    Essential hypertension  Assessment & Plan  Continue Cardizem    History of TIA (transient ischemic attack)  Assessment & Plan  History of TIA, maintained on Plavix        VTE Pharmacologic Prophylaxis:   Pharmacologic: Heparin  Mechanical VTE Prophylaxis in Place: Yes    Discussions with Specialists or Other Care Team Provider: nursing, rheumatology    Education and Discussions with Family / Patient: patient    Time Spent for Care: 30 minutes  More than 50% of total time spent on counseling and coordination of care as described above      Current Length of Stay: 5 day(s)    Current Patient Status: Inpatient   Certification Statement: The patient will continue to require additional inpatient hospital stay due to rheum eval, neurology reeval, ptot    Discharge Plan: active    Code Status: Level 1 - Full Code      Subjective:   Patient seen and examined at bedside  Comfortable  No chest pain or shortness of breath  Objective:     Vitals:   Temp (24hrs), Av 2 °F (36 8 °C), Min:97 8 °F (36 6 °C), Max:98 5 °F (36 9 °C)    Temp:  [97 8 °F (36 6 °C)-98 5 °F (36 9 °C)] 97 8 °F (36 6 °C)  HR:  [61-62] 61  Resp:  [18] 18  BP: (143-152)/(63-68) 143/68  SpO2:  [95 %] 95 %  Body mass index is 20 76 kg/m²  Input and Output Summary (last 24 hours): Intake/Output Summary (Last 24 hours) at 2022 1503  Last data filed at 2022 0501  Gross per 24 hour   Intake --   Output 750 ml   Net -750 ml       Physical Exam:     Physical Exam  Vitals reviewed  Constitutional:       General: She is not in acute distress  HENT:      Head: Normocephalic  Nose: Nose normal       Mouth/Throat:      Mouth: Mucous membranes are moist    Eyes:      General: No scleral icterus  Cardiovascular:      Rate and Rhythm: Normal rate and regular rhythm  Pulmonary:      Effort: Pulmonary effort is normal  No respiratory distress  Abdominal:      General: There is no distension  Tenderness: There is no abdominal tenderness  Skin:     General: Skin is warm  Neurological:      Mental Status: She is alert  Motor: Weakness (bilateral lower extremities 3-4/5) present     Psychiatric:         Mood and Affect: Mood normal          Behavior: Behavior normal        Additional Data:     Labs:    Results from last 7 days   Lab Units 22  0453   WBC Thousand/uL 9 13   HEMOGLOBIN g/dL 13 7   HEMATOCRIT % 41 7   PLATELETS Thousands/uL 240   NEUTROS PCT % 71   LYMPHS PCT % 20   MONOS PCT % 8   EOS PCT % 1     Results from last 7 days   Lab Units 22  0618 22  0639 22  0453   SODIUM mmol/L 142   < > 141   POTASSIUM mmol/L 3 5   < > 3 4*   CHLORIDE mmol/L 106   < > 106   CO2 mmol/L 30   < > 26   BUN mg/dL 5   < > 8   CREATININE mg/dL 0 50*   < > 0 62   ANION GAP mmol/L 6   < > 9   CALCIUM mg/dL 8 4   < > 8 1*   ALBUMIN g/dL  --   --  2 5*   TOTAL BILIRUBIN mg/dL  --   --  0 64   ALK PHOS U/L  --   --  49   ALT U/L  --   --  157*   AST U/L  --   --  215*   GLUCOSE RANDOM mg/dL 90   < > 79    < > = values in this interval not displayed  * I Have Reviewed All Lab Data Listed Above  * Additional Pertinent Lab Tests Reviewed: Angi [address] Admission Reviewed    Imaging:    Imaging Reports Reviewed Today Include:  MRI lumbar spine, VAS lower limb, CT spine thoracic and lumbar, x-ray ankle left x-ray foot left, x-ray chest portable    Recent Cultures (last 7 days):           Last 24 Hours Medication List:   Current Facility-Administered Medications   Medication Dose Route Frequency Provider Last Rate    acetaminophen  650 mg Oral Q6H PRN Chas Flores, DO      aluminum-magnesium hydroxide-simethicone  30 mL Oral Q6H PRN Jen Lovelace, DO      clopidogrel  75 mg Oral HS Chas Flores, DO      diltiazem  240 mg Oral Daily Chas Flores, DO      docusate sodium  100 mg Oral BID Chas Flores, DO      folic acid  1 mg Oral TID Chas Flores, DO      heparin (porcine)  5,000 Units Subcutaneous Q8H [address] Chas GABE Flores, DO      latanoprost  1 drop Both Eyes HS Chas Flores, DO      ondansetron  4 mg Intravenous Q6H PRN Jen Lovelace, DO          Today, Patient Was Seen By: Kraig Pichardo MD    ** Please Note: Dictation voice to text software may have been used in the creation of this document   **

## 2022-05-31 NOTE — CONSULTS
[address] Podiatry - Consultation    Patient Information:   Mariaa Gimenez 64 y o  female MRN: [id number]  Unit/Bed#: [place] [address] [facility] [address] [facility] 220-01 Encounter: [id number]  PCP: Uma Stallworth DO  Date of Admission:  5/26/2022  Date of Consultation: 05/31/22  Requesting Physician: Missael Nguyen MD      ASSESSMENT:    Mariaa Gimenez is a 64 y o  female with:    1  Left hallux nondisplaced hallux proximal phalanx fracture  2  PAD  3  Rhabdomyolysis  4  Local edema left foot  5  History of TIA    PLAN:    · Bilateral surgical shoe for ambulation  · Will order baseline JONATHON's for PAD  · Encourage elevation for edema control  · Rest of care per primary team   · Will discuss this plan with my attending and update as needed  Antibiotics: none needed for podiatric complaints  Weight Bearing Status: WBAT bilaterally     SUBJECTIVE    History of Present Illness: Mariaa Gimenez is a 64 y o  female with past medical history of Rheumatoid arthritis, TIA, ambulatory disfunction who presents after a recent fall with proximal phalanx closed non-displaced fracture found on pan xray  She has mild pain noted on palpation of the proximal phalanx  She was able to ambulate with physical therapy today without issue  She states she has been diagnosed with osteoporosis in the past but does not currently take vitamin D  She denies any previous falls at this time  Review of Systems:    Constitutional: Negative  HENT: Negative  Eyes: Negative  Respiratory: Negative  Cardiovascular: Negative  Gastrointestinal: Negative  Musculoskeletal: weakness   Skin:edema and echymosis   Neurological: negative   Psych: Negative       Past Medical and Surgical History:     Past Medical History:   Diagnosis Date    Abnormal liver function test     last assessed: 3/25/2014    KARIME (acute kidney injury) ([facility] [address] ) 1/11/2020    Anemia     Chronic constipation     last assessed: 12/2/2014    Closed fracture of 4th metacarpal 3/3/2018    Herpes zoster     last assessed: 3/16/2016    Hyperlipidemia     Hypertension     Open nondisplaced fracture of first cervical vertebra with routine healing 3/3/2018    Psoriatic arthritis ([facility] )     last assessed;3/25/2014    Recent weight loss     last assessed: 7/13/2016    Rheumatoid arthritis ([facility] )     Stroke ([facility] )     Syncope 1/11/2020    Trigeminal neuritis        Past Surgical History:   Procedure Laterality Date    APPENDECTOMY      FOOT SURGERY      HYSTERECTOMY      KIDNEY SURGERY         Meds/Allergies:    Medications Prior to Admission   Medication    clopidogrel (PLAVIX) 75 mg tablet    diltiazem (TIAZAC) 240 MG 24 hr capsule    folic acid (FOLVITE) 1 mg tablet    latanoprost (XALATAN) 0 005 % ophthalmic solution    methotrexate 2 5 mg tablet    simvastatin (ZOCOR) 40 mg tablet    clobetasol (TEMOVATE) 0 05 % external solution       Allergies   Allergen Reactions    Amoxicillin Edema    Penicillins Edema       Social History:     Marital Status: /Civil Union    Substance Use History:   Social History     Substance and Sexual Activity   Alcohol Use Not Currently     Social History     Tobacco Use   Smoking Status Never Smoker   Smokeless Tobacco Never Used     Social History     Substance and Sexual Activity   Drug Use No       Family History:    Family History   Problem Relation Age of Onset    No Known Problems Father     No Known Problems Mother     No Known Problems Maternal Grandmother     No Known Problems Paternal Grandmother     No Known Problems Paternal Aunt     No Known Problems Paternal Aunt     No Known Problems Maternal Grandfather     No Known Problems Paternal Grandfather          OBJECTIVE:    Vitals:   Blood Pressure: 119/63 (05/31/22 1532)  Pulse: 67 (05/31/22 1532)  Temperature: 98 1 °F (36 7 °C) (05/31/22 1532)  Temp Source: Oral (05/31/22 1532)  Respirations: 18 (05/31/22 1532)  Height: 5' 5" (165 1 cm) (05/26/22 1846)  Weight - Scale: 56 6 kg (124 lb 12 5 oz) (05/31/22 0600)  SpO2: 98 % (05/31/22 1532)    Physical Exam:     General Appearance: Alert, cooperative, no distress  HEENT: Head normocephalic, atraumatic, without obvious abnormality  Heart: Normal rate and rhythm  Lungs: Non-labored breathing  No respiratory distress  Abdomen: Without distension  Psychiatric: AAOx3  Lower Extremity:  Vascular:   DP/PT pedal pulses on the left are present but faint  DP/PT pedal pulses on the right are present  CRT sluggish at the digits  Pedal hair is absent  1+ edema noted at bilateral lower extremities  Skin temperature is abnormal cool to the foot and toes bilaterally  Musculoskeletal:  MMT is 5/5 in all muscle compartments bilaterally  Passive ROM at the 1st MPJ and ankle joint are Decreased bilaterally with the leg extended  Active ROM at the lesser digits is intact  Pain on palpation of left medial hallux proximal phalanx  Dermatological:    Ecchymosis noted of dorsal forefoot and 1st MTPJ  Neurological:  Gross sensation is intact  Protective sensation is intact  Patient Denies numbness and/or paresthesias  Clinical Images 05/31/22: Additional Data:     Lab Results: I have personally reviewed pertinent labs including:    Results from last 7 days   Lab Units 05/27/22  0453   WBC Thousand/uL 9 13   HEMOGLOBIN g/dL 13 7   HEMATOCRIT % 41 7   PLATELETS Thousands/uL 240   NEUTROS PCT % 71   LYMPHS PCT % 20   MONOS PCT % 8   EOS PCT % 1     Results from last 7 days   Lab Units 05/30/22  0618 05/28/22  0639 05/27/22  0453   POTASSIUM mmol/L 3 5   < > 3 4*   CHLORIDE mmol/L 106   < > 106   CO2 mmol/L 30   < > 26   BUN mg/dL 5   < > 8   CREATININE mg/dL 0 50*   < > 0 62   CALCIUM mg/dL 8 4   < > 8 1*   ALK PHOS U/L  --   --  49   ALT U/L  --   --  157*   AST U/L  --   --  215*    < > = values in this interval not displayed             Cultures: I have personally reviewed pertinent cultures including:              Imaging: I have personally reviewed pertinent films in PACS  EKG, Pathology, and Other Studies: I have personally reviewed pertinent reports  ** Please Note: Portions of the record may have been created with voice recognition software  Occasional wrong word or "sound a like" substitutions may have occurred due to the inherent limitations of voice recognition software  Read the chart carefully and recognize, using context, where substitutions have occurred   **

## 2022-05-31 NOTE — ASSESSMENT & PLAN NOTE
Likely secondary to rhabdomyolysis  - Trend    No results for input(s): AST, ALT, TBILI, DBILI, ALKPHOS, INR in the last 72 hours

## 2022-05-31 NOTE — PLAN OF CARE
Problem: MOBILITY - ADULT  Goal: Maintain or return to baseline ADL function  Description: INTERVENTIONS:  -  Assess patient's ability to carry out ADLs; assess patient's baseline for ADL function and identify physical deficits which impact ability to perform ADLs (bathing, care of mouth/teeth, toileting, grooming, dressing, etc )  - Assess/evaluate cause of self-care deficits   - Assess range of motion  - Assess patient's mobility; develop plan if impaired  - Assess patient's need for assistive devices and provide as appropriate  - Encourage maximum independence but intervene and supervise when necessary  - Involve family in performance of ADLs  - Assess for home care needs following discharge   - Consider OT consult to assist with ADL evaluation and planning for discharge  - Provide patient education as appropriate  Outcome: Progressing  Goal: Maintains/Returns to pre admission functional level  Description: INTERVENTIONS:  - Perform BMAT or MOVE assessment daily    - Set and communicate daily mobility goal to care team and patient/family/caregiver  - Collaborate with rehabilitation services on mobility goals if consulted  - Perform Range of Motion 3 times a day  - Reposition patient every 3 hours    - Dangle patient 3 times a day  - Stand patient 3 times a day  - Ambulate patient 3 times a day  - Out of bed to chair 3 times a day   - Out of bed for meals 3 times a day  - Out of bed for toileting  - Record patient progress and toleration of activity level   Outcome: Progressing     Problem: NEUROSENSORY - ADULT  Goal: Achieves stable or improved neurological status  Description: INTERVENTIONS  - Monitor and report changes in neurological status  - Monitor vital signs such as temperature, blood pressure, glucose, and any other labs ordered   - Initiate measures to prevent increased intracranial pressure  - Monitor for seizure activity and implement precautions if appropriate      Outcome: Progressing  Goal: Achieves maximal functionality and self care  Description: INTERVENTIONS  - Monitor swallowing and airway patency with patient fatigue and changes in neurological status  - Encourage and assist patient to increase activity and self care     - Encourage visually impaired, hearing impaired and aphasic patients to use assistive/communication devices  Outcome: Progressing     Problem: MUSCULOSKELETAL - ADULT  Goal: Maintain or return mobility to safest level of function  Description: INTERVENTIONS:  - Assess patient's ability to carry out ADLs; assess patient's baseline for ADL function and identify physical deficits which impact ability to perform ADLs (bathing, care of mouth/teeth, toileting, grooming, dressing, etc )  - Assess/evaluate cause of self-care deficits   - Assess range of motion  - Assess patient's mobility  - Assess patient's need for assistive devices and provide as appropriate  - Encourage maximum independence but intervene and supervise when necessary  - Involve family in performance of ADLs  - Assess for home care needs following discharge   - Consider OT consult to assist with ADL evaluation and planning for discharge  - Provide patient education as appropriate  Outcome: Progressing  Goal: Maintain proper alignment of affected body part  Description: INTERVENTIONS:  - Support, maintain and protect limb and body alignment  - Provide patient/ family with appropriate education  Outcome: Progressing

## 2022-05-31 NOTE — ASSESSMENT & PLAN NOTE
64year old female presenting with leg weakness concerning for drug-induced rhabdomyolysis  - Statin held  - Neurology recommendations appreciated  - Rheumatology recommendations pending  Sent tiger text today for follow-up  - Myositis panel pending  - MRI lumbar spine showing severe facet degenerative change L4-5 results in anterolisthesis resulting in moderate central stenosis, moderate right foraminal narrowing, and moderate to severe right lateral recess stenosis

## 2022-05-31 NOTE — ASSESSMENT & PLAN NOTE
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

## 2022-05-31 NOTE — PLAN OF CARE
Problem: OCCUPATIONAL THERAPY ADULT  Goal: Performs self-care activities at highest level of function for planned discharge setting  See evaluation for individualized goals  Description: Treatment Interventions: ADL retraining, Functional transfer training, UE strengthening/ROM, Endurance training, Patient/family training, Equipment evaluation/education, Compensatory technique education, Activityengagement          See flowsheet documentation for full assessment, interventions and recommendations  Outcome: Progressing  Note: Limitation: Decreased ADL status, Decreased UE strength, Decreased Safe judgement during ADL, Decreased endurance, Decreased self-care trans, Decreased high-level ADLs  Prognosis: Good  Assessment: Pt seen for OT treatment session focusing on functional activity tolerance, bed mobility, ADLs, UE ROM/strengthening, and functional transfers/mobility  Pt alert and cooperative throughout session  Pt seated EOB w/ PT at start of session  Transfers (sit<>stand) completed w/ Mod-Min A with verbal cues for safe technique and hand placement  Pt required Mod A to initiate forward weight shift from lower surface levels (ie: bedside chair, standard toilet) for sit>stand  Min A required for functional mobility with assist for balance/steadying w/ use of RW  Pt required 1 seated rest break 2* increased fatigue demonstrated  Toileting tasks completed w/ Mod A with assist for steadying during clothing management up/down 2* decreased dynamic standing balance demonstrated  Pt able to complete perineal hygiene tasks w/ Supervision while seated  Grooming tasks completed w/ Supervision while standing at sink to wash/dry hands and brush hair  LB dressing completed w/ Mod A  Pt able to doff B/L socks w/ increased time/effort and required assist to don B/L socks 2* limited functional reach  UE exercises completed while seated OOB in chair to increase UE ROM/strength needed for ADLs/transfers   Pt tolerated well, no c/o pain or fatigue  Pt seated OOB in chair with chair alarm activated at end of session  Call bell and phone within reach  All needs met and pt reports no further questions for OT at this time  Continue to recommend STR when medically cleared  OT to follow pt on caseload       OT Discharge Recommendation: Post acute rehabilitation services  OT - OK to Discharge: Yes (when medically cleared to rehab)

## 2022-05-31 NOTE — OCCUPATIONAL THERAPY NOTE
Occupational Therapy Progress Note     Patient Name: Artur Sierra  QTSCM'O Date: 5/31/2022  Problem List  Principal Problem:    Rhabdomyolysis  Active Problems:    History of TIA (transient ischemic attack)    Rheumatoid arthritis ([address] )    Essential hypertension    HLD (hyperlipidemia)    Ambulatory dysfunction    Elevated liver enzymes    Weakness of both lower extremities            05/31/22 1452   OT Last Visit   OT Visit Date 05/31/22   Note Type   Note Type Treatment   Restrictions/Precautions   Weight Bearing Precautions Per Order No   Other Precautions Chair Alarm; Bed Alarm;Multiple lines;Pain   General   Response to Previous Treatment Patient with no complaints from previous session   Pain Assessment   Pain Assessment Tool 0-10   Pain Score No Pain   ADL   Where Assessed Chair   Grooming Assistance 5  Supervision/Setup   Grooming Deficit Setup;Supervision/safety; Increased time to complete;Wash/dry hands;Brushing hair   Grooming Comments Standing at sink   LB Dressing Assistance 3  Moderate Assistance   LB Dressing Deficit Setup;Verbal cueing;Supervision/safety; Increased time to complete; Don/doff R sock; Don/doff L sock   Toileting Assistance  3  Moderate Assistance   Toileting Deficit Setup;Steadying;Verbal cueing;Supervison/safety; Increased time to complete;Clothing management down;Clothing management up   Functional Standing Tolerance   Time 3-4 mins   Activity Dynamic standing balance activity   Comments Min A for balance/steadying w/ use of RW   Bed Mobility   Supine to Sit Unable to assess   Sit to Supine Unable to assess   Additional Comments Pt seated OOB in chair with chair alarm activated at end of session  Call bell and phone within reach  All needs met and pt reports no further questions for OT at this time  Transfers   Sit to Stand 3  Moderate assistance   Additional items Assist x 1;Bedrails;Armrests; Increased time required;Verbal cues   Stand to Sit 4  Minimal assistance   Additional items Assist x 1; Armrests; Increased time required;Verbal cues   Toilet transfer 3  Moderate assistance   Additional items Assist x 1; Increased time required;Verbal cues; Commode   Additional Comments Cues for safe technique and hand placement   Functional Mobility   Functional Mobility 4  Minimal assistance   Additional Comments Assist x1   Additional items Rolling walker   Cognition   Overall Cognitive Status WFL   Arousal/Participation Alert; Cooperative   Attention Attends with cues to redirect   Orientation Level Oriented X4   Memory Decreased recall of precautions   Following Commands Follows one step commands without difficulty   Comments Pleasant and cooperative   Activity Tolerance   Activity Tolerance Patient limited by fatigue;Patient tolerated treatment well   Medical Staff Made Aware Silvia RN; Tashia Castillo PTA   Assessment   Assessment Pt seen for OT treatment session focusing on functional activity tolerance, bed mobility, ADLs, UE ROM/strengthening, and functional transfers/mobility  Pt alert and cooperative throughout session  Pt seated EOB w/ PT at start of session  Transfers (sit<>stand) completed w/ Mod-Min A with verbal cues for safe technique and hand placement  Pt required Mod A to initiate forward weight shift from lower surface levels (ie: bedside chair, standard toilet) for sit>stand  Min A required for functional mobility with assist for balance/steadying w/ use of RW  Pt required 1 seated rest break 2* increased fatigue demonstrated  Toileting tasks completed w/ Mod A with assist for steadying during clothing management up/down 2* decreased dynamic standing balance demonstrated  Pt able to complete perineal hygiene tasks w/ Supervision while seated  Grooming tasks completed w/ Supervision while standing at sink to wash/dry hands and brush hair  LB dressing completed w/ Mod A  Pt able to doff B/L socks w/ increased time/effort and required assist to don B/L socks 2* limited functional reach   UE exercises completed while seated OOB in chair to increase UE ROM/strength needed for ADLs/transfers  Pt tolerated well, no c/o pain or fatigue  Pt seated OOB in chair with chair alarm activated at end of session  Call bell and phone within reach  All needs met and pt reports no further questions for OT at this time  Continue to recommend STR when medically cleared  OT to follow pt on caseload  Plan   Treatment Interventions ADL retraining;Functional transfer training;UE strengthening/ROM; Endurance training;Patient/family training;Equipment evaluation/education; Compensatory technique education; Energy conservation; Activityengagement   Goal Expiration Date 06/10/22   OT Treatment Day 1   OT Frequency 3-5x/wk   Recommendation   OT Discharge Recommendation Post acute rehabilitation services   OT - OK to Discharge Yes  (when medically cleared to rehab)   Additional Comments  The patient's raw score on the AM-PAC Daily Activity inpatient short form is 16, standardized score is 35 96, less than 39 4  Patients at this level are likely to benefit from discharge to post-acute rehabilitation services  Please refer to the recommendation of the Occupational Therapist for safe discharge planning     AM-PAC Daily Activity Inpatient   Lower Body Dressing 2   Bathing 2   Toileting 2   Upper Body Dressing 3   Grooming 3   Eating 4   Daily Activity Raw Score 16   Daily Activity Standardized Score (Calc for Raw Score >=11) 35 96   AM-[facility] Applied Cognition Inpatient   Following a Speech/Presentation 4   Understanding Ordinary Conversation 4   Taking Medications 3   Remembering Where Things Are Placed or Put Away 3   Remembering List of 4-5 Errands 3   Taking Care of Complicated Tasks 3   Applied Cognition Raw Score 20   Applied Cognition Standardized Score 41 76         Hoda Le, OTR/L

## 2022-06-01 PROBLEM — S92.919A FRACTURE, TOE: Status: ACTIVE | Noted: 2022-06-01

## 2022-06-01 PROBLEM — L40.50 PSORIATIC ARTHRITIS (HCC): Status: ACTIVE | Noted: 2018-03-12

## 2022-06-01 LAB
ALBUMIN SERPL BCP-MCNC: 2.5 G/DL (ref 3.5–5)
ALP SERPL-CCNC: 55 U/L (ref 46–116)
ALT SERPL W P-5'-P-CCNC: 110 U/L (ref 12–78)
ANION GAP SERPL CALCULATED.3IONS-SCNC: 7 MMOL/L (ref 4–13)
ANION GAP SERPL CALCULATED.3IONS-SCNC: 8 MMOL/L (ref 4–13)
AST SERPL W P-5'-P-CCNC: 63 U/L (ref 5–45)
BACTERIA UR QL AUTO: ABNORMAL /HPF
BILIRUB SERPL-MCNC: 0.47 MG/DL (ref 0.2–1)
BILIRUB UR QL STRIP: NEGATIVE
BUN SERPL-MCNC: 11 MG/DL (ref 5–25)
BUN SERPL-MCNC: 9 MG/DL (ref 5–25)
CALCIUM ALBUM COR SERPL-MCNC: 9.6 MG/DL (ref 8.3–10.1)
CALCIUM SERPL-MCNC: 8.4 MG/DL (ref 8.3–10.1)
CALCIUM SERPL-MCNC: 8.5 MG/DL (ref 8.3–10.1)
CHLORIDE SERPL-SCNC: 103 MMOL/L (ref 100–108)
CHLORIDE SERPL-SCNC: 105 MMOL/L (ref 100–108)
CK MB SERPL-MCNC: 6.4 NG/ML (ref 0–5)
CK MB SERPL-MCNC: <1 % (ref 0–2.5)
CK SERPL-CCNC: 731 U/L (ref 26–192)
CLARITY UR: CLEAR
CO2 SERPL-SCNC: 30 MMOL/L (ref 21–32)
CO2 SERPL-SCNC: 31 MMOL/L (ref 21–32)
COLOR UR: YELLOW
CREAT SERPL-MCNC: 0.52 MG/DL (ref 0.6–1.3)
CREAT SERPL-MCNC: 0.56 MG/DL (ref 0.6–1.3)
CREAT UR-MCNC: 98.9 MG/DL
ERYTHROCYTE [SEDIMENTATION RATE] IN BLOOD: 17 MM/HOUR (ref 0–29)
FLUAV RNA RESP QL NAA+PROBE: NEGATIVE
FLUBV RNA RESP QL NAA+PROBE: NEGATIVE
GFR SERPL CREATININE-BSD FRML MDRD: 89 ML/MIN/1.73SQ M
GFR SERPL CREATININE-BSD FRML MDRD: 91 ML/MIN/1.73SQ M
GLUCOSE SERPL-MCNC: 86 MG/DL (ref 65–140)
GLUCOSE SERPL-MCNC: 87 MG/DL (ref 65–140)
GLUCOSE UR STRIP-MCNC: NEGATIVE MG/DL
HGB UR QL STRIP.AUTO: NEGATIVE
KETONES UR STRIP-MCNC: NEGATIVE MG/DL
LEUKOCYTE ESTERASE UR QL STRIP: NEGATIVE
NITRITE UR QL STRIP: NEGATIVE
NON-SQ EPI CELLS URNS QL MICRO: ABNORMAL /HPF
PH UR STRIP.AUTO: 5.5 [PH]
POTASSIUM SERPL-SCNC: 3.5 MMOL/L (ref 3.5–5.3)
POTASSIUM SERPL-SCNC: 3.6 MMOL/L (ref 3.5–5.3)
PROT SERPL-MCNC: 5.7 G/DL (ref 6.4–8.2)
PROT UR STRIP-MCNC: NEGATIVE MG/DL
PROT UR-MCNC: 6 MG/DL
PROT/CREAT UR: 0.06 MG/G{CREAT} (ref 0–0.1)
RBC #/AREA URNS AUTO: ABNORMAL /HPF
RSV RNA RESP QL NAA+PROBE: NEGATIVE
SARS-COV-2 RNA RESP QL NAA+PROBE: NEGATIVE
SODIUM SERPL-SCNC: 141 MMOL/L (ref 136–145)
SODIUM SERPL-SCNC: 143 MMOL/L (ref 136–145)
SP GR UR STRIP.AUTO: >=1.03 (ref 1–1.03)
UROBILINOGEN UR QL STRIP.AUTO: 0.2 E.U./DL
WBC #/AREA URNS AUTO: ABNORMAL /HPF

## 2022-06-01 PROCEDURE — 80053 COMPREHEN METABOLIC PANEL: CPT | Performed by: STUDENT IN AN ORGANIZED HEALTH CARE EDUCATION/TRAINING PROGRAM

## 2022-06-01 PROCEDURE — 81001 URINALYSIS AUTO W/SCOPE: CPT | Performed by: INTERNAL MEDICINE

## 2022-06-01 PROCEDURE — 82085 ASSAY OF ALDOLASE: CPT | Performed by: INTERNAL MEDICINE

## 2022-06-01 PROCEDURE — 82550 ASSAY OF CK (CPK): CPT | Performed by: STUDENT IN AN ORGANIZED HEALTH CARE EDUCATION/TRAINING PROGRAM

## 2022-06-01 PROCEDURE — 86038 ANTINUCLEAR ANTIBODIES: CPT | Performed by: INTERNAL MEDICINE

## 2022-06-01 PROCEDURE — 0241U HB NFCT DS VIR RESP RNA 4 TRGT: CPT | Performed by: STUDENT IN AN ORGANIZED HEALTH CARE EDUCATION/TRAINING PROGRAM

## 2022-06-01 PROCEDURE — 84156 ASSAY OF PROTEIN URINE: CPT | Performed by: INTERNAL MEDICINE

## 2022-06-01 PROCEDURE — 82553 CREATINE MB FRACTION: CPT | Performed by: STUDENT IN AN ORGANIZED HEALTH CARE EDUCATION/TRAINING PROGRAM

## 2022-06-01 PROCEDURE — 86235 NUCLEAR ANTIGEN ANTIBODY: CPT | Performed by: INTERNAL MEDICINE

## 2022-06-01 PROCEDURE — 82570 ASSAY OF URINE CREATININE: CPT | Performed by: INTERNAL MEDICINE

## 2022-06-01 PROCEDURE — 86430 RHEUMATOID FACTOR TEST QUAL: CPT | Performed by: INTERNAL MEDICINE

## 2022-06-01 PROCEDURE — 99232 SBSQ HOSP IP/OBS MODERATE 35: CPT | Performed by: STUDENT IN AN ORGANIZED HEALTH CARE EDUCATION/TRAINING PROGRAM

## 2022-06-01 PROCEDURE — 86200 CCP ANTIBODY: CPT | Performed by: INTERNAL MEDICINE

## 2022-06-01 PROCEDURE — 80048 BASIC METABOLIC PNL TOTAL CA: CPT | Performed by: STUDENT IN AN ORGANIZED HEALTH CARE EDUCATION/TRAINING PROGRAM

## 2022-06-01 PROCEDURE — 85652 RBC SED RATE AUTOMATED: CPT | Performed by: PHYSICIAN ASSISTANT

## 2022-06-01 RX ADMIN — LATANOPROST 1 DROP: 50 SOLUTION OPHTHALMIC at 22:39

## 2022-06-01 RX ADMIN — FOLIC ACID 1 MG: 1 TABLET ORAL at 08:47

## 2022-06-01 RX ADMIN — HEPARIN SODIUM 5000 UNITS: 5000 INJECTION INTRAVENOUS; SUBCUTANEOUS at 22:07

## 2022-06-01 RX ADMIN — DILTIAZEM HYDROCHLORIDE 240 MG: 240 CAPSULE, COATED, EXTENDED RELEASE ORAL at 08:47

## 2022-06-01 RX ADMIN — FOLIC ACID 1 MG: 1 TABLET ORAL at 18:19

## 2022-06-01 RX ADMIN — FOLIC ACID 1 MG: 1 TABLET ORAL at 22:07

## 2022-06-01 RX ADMIN — CLOPIDOGREL BISULFATE 75 MG: 75 TABLET ORAL at 22:07

## 2022-06-01 RX ADMIN — HEPARIN SODIUM 5000 UNITS: 5000 INJECTION INTRAVENOUS; SUBCUTANEOUS at 05:48

## 2022-06-01 NOTE — ASSESSMENT & PLAN NOTE
Takes methotrexate 17 5 mg on Mondays   Hold for now per rheumatology  - Outpatient followup with rheumatology

## 2022-06-01 NOTE — CASE MANAGEMENT
Case Management Discharge Planning Note    Patient name Mariaa Gimenez  Location [facility]* MRN [id number]  : 1945 Date 2022       Current Admission Date: 2022  Current Admission Diagnosis:Rhabdomyolysis   Patient Active Problem List    Diagnosis Date Noted    Fracture, toe 2022    Weakness of both lower extremities 2022    Ambulatory dysfunction 2022    Rhabdomyolysis 2022    Elevated liver enzymes 2022    Laceration of forehead 2021    Mild protein-calorie malnutrition ([facility] ) 2021    Other constipation 2019    Trigeminal neuralgia 2018    History of TIA (transient ischemic attack) 2018    Psoriatic arthritis ([facility] ) 2018    Nerve sheath tumor 2016    Fatty liver 2014    Arteriosclerotic heart disease 2014    Essential hypertension 2013    Transient cerebral ischemia 2012    HLD (hyperlipidemia) 2012      LOS (days): 6  Geometric Mean LOS (GMLOS) (days): 3 10  Days to GMLOS:-2 9     OBJECTIVE:  Risk of Unplanned Readmission Score: 10 76         Current admission status: Inpatient   Preferred Pharmacy:   [facility] [address] [address]  Phone: [phone] Fax: [phone]    Primary Care Provider: Uma Stallworth DO    Primary Insurance: MEDICARE  Secondary Insurance: [address] [place] [address] DETAILS:        Treatment Team Recommendation: Short Term Rehab  Discharge Destination Plan[de-identified] Short Term Rehab, Acute Rehab  Transport at Discharge : Wheelchair van  Dispatcher Contacted: Yes  Number/Name of Dispatcher: [employer]  Transported by [employer] and Unit #):  [facility]  ETA of Transport (Date): 22  ETA of Transport (Time): 1400              IMM Given (Date):: 22  IMM Given to[de-identified] Patient          Accepting Facility Name, [facility] ED [facility] : [place]  Receiving Facility/Agency Phone Number: [phone]

## 2022-06-01 NOTE — RESTORATIVE TECHNICIAN NOTE
Restorative Technician Note      Patient Name: Ino Barrera     Restorative Tech Visit Date: 6/1/2022  Note Type: Mobility  Patient Position Upon Consult: Supine  Activity Performed: Ambulated (Ax1 with RW)  Assistive Device: Roller walker  Patient Position at End of Consult: Bedside chair;  All needs within reach; Bed/Chair alarm activated

## 2022-06-01 NOTE — PLAN OF CARE
Problem: Potential for Falls  Goal: Patient will remain free of falls  Description: INTERVENTIONS:  - Educate patient/family on patient safety including physical limitations  - Instruct patient to call for assistance with activity   - Consult OT/PT to assist with strengthening/mobility   - Keep Call bell within reach  - Keep bed low and locked with side rails adjusted as appropriate  - Keep care items and personal belongings within reach  - Initiate and maintain comfort rounds  - Make Fall Risk Sign visible to staff  - Offer Toileting every 2 Hours, in advance of need  - Initiate/Maintain bed alarm  - Obtain necessary fall risk management equipment: walker  - Apply yellow socks and bracelet for high fall risk patients  - Consider moving patient to room near nurses station  Outcome: Progressing     Problem: MOBILITY - ADULT  Goal: Maintain or return to baseline ADL function  Description: INTERVENTIONS:  -  Assess patient's ability to carry out ADLs; assess patient's baseline for ADL function and identify physical deficits which impact ability to perform ADLs (bathing, care of mouth/teeth, toileting, grooming, dressing, etc )  - Assess/evaluate cause of self-care deficits   - Assess range of motion  - Assess patient's mobility; develop plan if impaired  - Assess patient's need for assistive devices and provide as appropriate  - Encourage maximum independence but intervene and supervise when necessary  - Involve family in performance of ADLs  - Assess for home care needs following discharge   - Consider OT consult to assist with ADL evaluation and planning for discharge  - Provide patient education as appropriate  Outcome: Progressing  Goal: Maintains/Returns to pre admission functional level  Description: INTERVENTIONS:  - Perform BMAT or MOVE assessment daily    - Set and communicate daily mobility goal to care team and patient/family/caregiver     - Collaborate with rehabilitation services on mobility goals if consulted  - Perform Range of Motion 3 times a day  - Reposition patient every 3 hours  - Dangle patient 3 times a day  - Stand patient 3 times a day  - Ambulate patient 3 times a day  - Out of bed to chair 3 times a day   - Out of bed for meals 3 times a day  - Out of bed for toileting  - Record patient progress and toleration of activity level   Outcome: Progressing     Problem: Prexisting or High Potential for Compromised Skin Integrity  Goal: Skin integrity is maintained or improved  Description: INTERVENTIONS:  - Identify patients at risk for skin breakdown  - Assess and monitor skin integrity  - Assess and monitor nutrition and hydration status  - Monitor labs   - Assess for incontinence   - Turn and reposition patient  - Assist with mobility/ambulation  - Relieve pressure over bony prominences  - Avoid friction and shearing  - Provide appropriate hygiene as needed including keeping skin clean and dry  - Evaluate need for skin moisturizer/barrier cream  - Collaborate with interdisciplinary team   - Patient/family teaching  - Consider wound care consult   Outcome: Progressing     Problem: NEUROSENSORY - ADULT  Goal: Achieves stable or improved neurological status  Description: INTERVENTIONS  - Monitor and report changes in neurological status  - Monitor vital signs such as temperature, blood pressure, glucose, and any other labs ordered   - Initiate measures to prevent increased intracranial pressure  - Monitor for seizure activity and implement precautions if appropriate      Outcome: Progressing  Goal: Achieves maximal functionality and self care  Description: INTERVENTIONS  - Monitor swallowing and airway patency with patient fatigue and changes in neurological status  - Encourage and assist patient to increase activity and self care     - Encourage visually impaired, hearing impaired and aphasic patients to use assistive/communication devices  Outcome: Progressing     Problem: SKIN/TISSUE INTEGRITY - ADULT  Goal: Skin Integrity remains intact(Skin Breakdown Prevention)  Description: Assess:  -Perform Noam assessment every shift  -Clean and moisturize skin every shift and needed  -Inspect skin when repositioning, toileting, and assisting with ADLS  -Assess extremities for adequate circulation and sensation     Bed Management:  -Have minimal linens on bed & keep smooth, unwrinkled  -Change linens as needed when moist or perspiring  -Avoid sitting or lying in one position for more than 2 hours while in bed  -Keep HOB at 45 degrees     Toileting:  -Offer bedside commode  -Assess for incontinence every 2 hours  -Use incontinent care products after each incontinent episode such as bedpad    Activity:  -Mobilize patient 3 times a day  -Encourage activity and walks on unit  -Encourage or provide ROM exercises   -Turn and reposition patient every 2 Hours  -Use appropriate equipment to lift or move patient in bed  -Instruct/ Assist with weight shifting every 30 minutes when out of bed in chair  -Consider limitation of chair time 3 hour intervals    Skin Care:  -Avoid use of baby powder, tape, friction and shearing, hot water or constrictive clothing  -Relieve pressure over bony prominences using cushion while in chair  -Do not massage red bony areas    Next Steps:  -Teach patient strategies to minimize risks such as shifting weight  -Consider consults to  interdisciplinary teams such as PT/OT  Outcome: Progressing     Problem: MUSCULOSKELETAL - ADULT  Goal: Maintain or return mobility to safest level of function  Description: INTERVENTIONS:  - Assess patient's ability to carry out ADLs; assess patient's baseline for ADL function and identify physical deficits which impact ability to perform ADLs (bathing, care of mouth/teeth, toileting, grooming, dressing, etc )  - Assess/evaluate cause of self-care deficits   - Assess range of motion  - Assess patient's mobility  - Assess patient's need for assistive devices and provide as appropriate  - Encourage maximum independence but intervene and supervise when necessary  - Involve family in performance of ADLs  - Assess for home care needs following discharge   - Consider OT consult to assist with ADL evaluation and planning for discharge  - Provide patient education as appropriate  Outcome: Progressing  Goal: Maintain proper alignment of affected body part  Description: INTERVENTIONS:  - Support, maintain and protect limb and body alignment  - Provide patient/ family with appropriate education  Outcome: Progressing

## 2022-06-01 NOTE — ASSESSMENT & PLAN NOTE
Acute appearing minimally displaced oblique fracture involving the medial base of the proximal phalanx of the 1st digit  - Podiatry recommendations pending  JONATHON performed

## 2022-06-01 NOTE — QUICK NOTE
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; I agree with statin being discontinued  Her significantly elevated CK on admission was consistent with rhabdomyolysis but the fact that it improved with IV fluids alone and no steroids is inconsistent with any kind of inflammatory myositis or autoimmune process  Autoimmune workup, such as the CARLIE, HMGCR autoantibody, anti Hawa 1, and myositis panel is pending, and can be followed up outpatient; suspect it will return unremarkable  No further workup needed  Will communicate to patient's outside rheumatologist at [facility], Dr Micky Morse office regarding my decision to hold methotrexate for the time-being; patient should follow-up as soon as possible with her outside rheumatologist upon discharge  Her psoriatic arthritis seems to be stable; her inflammatory markers are completely normal  Please feel free to contact me with any questions or concerns via [facility]

## 2022-06-01 NOTE — ASSESSMENT & PLAN NOTE
64year old female presenting with leg weakness concerning for drug-induced rhabdomyolysis  - Statin held  - Neurology recommendations appreciated  - Discussed her case with Rheumatology  Dr Britta Peabody does not think it is an inflammatory myopathy, she does recommend holding methotrexate for the time being with close outpatient follow-up with her outpatient rheumatologist   - Myositis panel pending  - MRI lumbar spine showing severe facet degenerative change L4-5 results in anterolisthesis resulting in moderate central stenosis, moderate right foraminal narrowing, and moderate to severe right lateral recess stenosis

## 2022-06-01 NOTE — PROGRESS NOTES
[facility]  Progress Note - Alfredo Green 1945, 64 y o  female MRN: [id number]  [facility] 220-01 Encounter: [id number]  Primary Care Provider: Tessy Palmer DO   Date and time admitted to hospital: 5/26/2022  1:41 PM    * Rhabdomyolysis  Assessment & Plan  64year old female presenting with leg weakness concerning for drug-induced rhabdomyolysis  - Statin held  - Neurology recommendations appreciated  - Discussed her case with Rheumatology  Dr Aaron Tello does not think it is an inflammatory myopathy, she does recommend holding methotrexate for the time being with close outpatient follow-up with her outpatient rheumatologist   - Myositis panel pending  - MRI lumbar spine showing severe facet degenerative change L4-5 results in anterolisthesis resulting in moderate central stenosis, moderate right foraminal narrowing, and moderate to severe right lateral recess stenosis        Fracture, toe  Assessment & Plan  Acute appearing minimally displaced oblique fracture involving the medial base of the proximal phalanx of the 1st digit  - Podiatry recommendations pending  JONATHON performed  Weakness of both lower extremities  Assessment & Plan  Due to statin induced myopathy  For ARC  Elevated liver enzymes  Assessment & Plan  Likely secondary to statin induced rhabdomyolysis      Ambulatory dysfunction  Assessment & Plan  Lower extremity weakness possibly due to rhabdomyolysis  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    - See plan above    HLD (hyperlipidemia)  Assessment & Plan  Statin held due to rhabdomyolysis    Essential hypertension  Assessment & Plan  Continue Cardizem    Psoriatic arthritis [facility])  Assessment & Plan  Takes methotrexate 17 5 mg on Mondays   Hold for now per rheumatology  - Outpatient followup with rheumatology    History of TIA (transient ischemic attack)  Assessment & Plan  History of TIA, maintained on Plavix    VTE Pharmacologic Prophylaxis: Pharmacologic: Heparin  Mechanical VTE Prophylaxis in Place: Yes    Discussions with Specialists or Other Care Team Provider: nursing    Education and Discussions with Family / Patient: patient    Time Spent for Care: 30 minutes  More than 50% of total time spent on counseling and coordination of care as described above  Current Length of Stay: 6 day(s)    Current Patient Status: Inpatient   Certification Statement: The patient will continue to require additional inpatient hospital stay due to placement after rheum eval and PODIATRY CLEARANCE    Discharge Plan: 24 hours    Code Status: Level 1 - Full Code      Subjective:   Patient seen and examined at bedside  She continues to have bilateral lower extremity weakness, but currently has no new complaints  Objective:     Vitals:   Temp (24hrs), Av 8 °F (36 6 °C), Min:97 3 °F (36 3 °C), Max:98 1 °F (36 7 °C)    Temp:  [97 3 °F (36 3 °C)-98 1 °F (36 7 °C)] 97 3 °F (36 3 °C)  HR:  [59-67] 63  Resp:  [15-18] 16  BP: (119-123)/(54-63) 121/54  SpO2:  [96 %-98 %] 96 %  Body mass index is 20 84 kg/m²  Input and Output Summary (last 24 hours): Intake/Output Summary (Last 24 hours) at 2022 1332  Last data filed at 2022 0554  Gross per 24 hour   Intake --   Output 500 ml   Net -500 ml       Physical Exam:     Physical Exam  Vitals reviewed  Constitutional:       General: She is not in acute distress  HENT:      Head: Normocephalic  Nose: Nose normal       Mouth/Throat:      Mouth: Mucous membranes are moist    Eyes:      General: No scleral icterus  Cardiovascular:      Rate and Rhythm: Normal rate  Pulmonary:      Effort: Pulmonary effort is normal  No respiratory distress  Abdominal:      Palpations: Abdomen is soft  Tenderness: There is no abdominal tenderness  Skin:     General: Skin is warm  Neurological:      Mental Status: She is alert  Motor: Weakness (3/5 BLLE) present     Psychiatric:         Mood and Affect: Mood normal          Behavior: Behavior normal        Additional Data:     Labs:    Results from last 7 days   Lab Units 05/27/22  0453   WBC Thousand/uL 9 13   HEMOGLOBIN g/dL 13 7   HEMATOCRIT % 41 7   PLATELETS Thousands/uL 240   NEUTROS PCT % 71   LYMPHS PCT % 20   MONOS PCT % 8   EOS PCT % 1     Results from last 7 days   Lab Units 06/01/22  0545 05/28/22  0639 05/27/22  0453   SODIUM mmol/L 143   < > 141   POTASSIUM mmol/L 3 5   < > 3 4*   CHLORIDE mmol/L 105   < > 106   CO2 mmol/L 31   < > 26   BUN mg/dL 9   < > 8   CREATININE mg/dL 0 56*   < > 0 62   ANION GAP mmol/L 7   < > 9   CALCIUM mg/dL 8 5   < > 8 1*   ALBUMIN g/dL  --   --  2 5*   TOTAL BILIRUBIN mg/dL  --   --  0 64   ALK PHOS U/L  --   --  49   ALT U/L  --   --  157*   AST U/L  --   --  215*   GLUCOSE RANDOM mg/dL 87   < > 79    < > = values in this interval not displayed  * I Have Reviewed All Lab Data Listed Above  * Additional Pertinent Lab Tests Reviewed:  [facility]    Imaging:    Imaging Reports Reviewed Today Include: no new imaging    Recent Cultures (last 7 days):           Last 24 Hours Medication List:   Current Facility-Administered Medications   Medication Dose Route Frequency Provider Last Rate    acetaminophen  650 mg Oral Q6H PRN Chas Flores, DO      aluminum-magnesium hydroxide-simethicone  30 mL Oral Q6H PRN Areta Romance, DO      clopidogrel  75 mg Oral HS Chas Flores, DO      diltiazem  240 mg Oral Daily Chas Flores, DO      docusate sodium  100 mg Oral BID Chas Flores, DO      folic acid  1 mg Oral TID Chas Flores, DO      heparin (porcine)  5,000 Units Subcutaneous Q8H [facility] Chas Flores, DO      latanoprost  1 drop Both Eyes HS Chas Flores, DO      ondansetron  4 mg Intravenous Q6H PRN Areta Romance, DO          Today, Patient Was Seen By: Ayse Siddiqi MD    ** Please Note: Dictation voice to text software may have been used in the creation of this document   **

## 2022-06-01 NOTE — PROGRESS NOTES
PHYSICAL MEDICINE AND REHABILITATION   PREADMISSION ASSESSMENT     Projected [facility] Diagnoses:  Impairment of mobility, safety and Activities of Daily Living (ADLs) due to Neurologic Conditions:  03 8  Neuromuscular Disorders  Etiologic: Rhabdomyolysis, Myopathy  (statin induced)   Date of Onset: 5/26/22     Date of surgery: N/A    PATIENT INFORMATION  Name: Figueroa Singer Phone #: [phone] (home)   Address: [address] [place] [id number]  YOB: 1945 Age: 64 y o  SS#   Marital Status: /Civil Union  Ethnicity:    Employment Status: retired  Extended Emergency Contact Information  Primary Emergency Contact: [address]  Mobile Phone: [phone]  Relation: Friend   needed? No  Advance Directive: Full Code - no ACP docs     INSURANCE/COVERAGE:     Primary Payor: MEDICARE / Plan: MEDICARE A AND B / Product Type: Medicare A & B Fee for Service /   Secondary Payer:<NONE>   Payer Contact:  Payer Contact:   Contact Phone:  Contact Phone:     Authorization #:   Coverage Dates:  LCD:   MEDICARE #: [id number]  Medicare Days: 60/30/60  Medical Record #: [id number]    REFERRAL SOURCE:   Referring provider: Coretta Baez MD  Referring facility: [address] [place] [address]   Room: [place] [address]/[facility] 2 *  PCP: Henok Olson DO PCP phone number: [phone]    MEDICAL INFORMATION  HPI: Patient is a 64 year female admitted to [facility] with complaints of  bilateral lower extremity weakness for at least 2 weeks  Patient reports that she fully independent and ambulatory prior to admission however reported a fall at home approximately 10 days prior to current admission due progressive lower extremity weakness  Patient has a history of rheumatoid arthritis for which she takes methotrexate and folic acid  Patient also with a history of previous stroke, hyperlipidemia and hypertension    Upon admission patient was found to have rhabdomyolysis with elevated CKs  An x-ray of the right foot found a displaced oblique fracture involving the medical base of the proximal phalanx of the 1st digit  Patient was evaluated by Podiatry who recommended a surgical shoe with WBAT to the right lower extremity  Patient was evaluated by Neurology due to concern for possible statin induced myopathy due to proximal more than distal weakness and elevated CKs, therefore patient's statin was held  Neurology consulted with Rheumatology who felt that patent's symptoms were inconsistent with any kind of inflammatory myositis or autoimmune process but recommended to hold methotrexate for the time-being and felt patient should follow-up as soon as possible with her outside rheumatologist upon discharge  MRI of L-Spine found moderate central canal stenosis L4-5, Moderate right foraminal narrowing and moderate to sever right lateral recess stenosis  Patient has been evaluated by PT and OT during the course of hospitalization  Patient currently demonstrates a decline in functional in the areas of ambulation, transfers, functional mobility, ADLs and IADLs due deficits in areas of strength, balance and endurance  [facility]'s [facility], patient is appropriate for acute rehabilitation program as patient requires 24 hour physician oversight during rehabilitation program and has demonstrated the ability to tolerate 3-4 hours of therapy per day consisting of two or more rehabilitation disciplines, one of which is physical therapy  Patient is medically stable and ready for discharge to [facility] today  COVID-19: Vaccinated 3/30/21, 4/29/21 11/10/21, COVID negative 6/1    Past Medical History:   Past Surgical History:    Allergies:     Past Medical History:   Diagnosis Date    Abnormal liver function test     last assessed: 3/25/2014    KARIME (acute kidney injury) ([facility] ) 1/11/2020    Anemia     Chronic constipation last assessed: 12/2/2014    Closed fracture of 4th metacarpal 3/3/2018    Herpes zoster     last assessed: 3/16/2016    Hyperlipidemia     Hypertension     Open nondisplaced fracture of first cervical vertebra with routine healing 3/3/2018    Psoriatic arthritis ([facility] [address] )     last assessed;3/25/2014    Recent weight loss     last assessed: 7/13/2016    Rheumatoid arthritis ([facility] [address] )     Stroke ([facility] )     Syncope 1/11/2020    Trigeminal neuritis     Past Surgical History:   Procedure Laterality Date    APPENDECTOMY      FOOT SURGERY      HYSTERECTOMY      KIDNEY SURGERY       Allergies   Allergen Reactions    Amoxicillin Edema    Penicillins Edema         Medical/functional conditions requiring inpatient rehabilitation: Rhabdomyolysis, toe fracture - gait dysfunction, lower extremity weakness, hypertension, psoriatic arthritis, ADL decline, iADL decline  Risk for medical/clinical complications:  Fall risk, DVT/PE, loss of ROM/Strength, loss of ADL ability      Comorbidities/Surgeries in the last 100 days: Rhabdomyolysis, L4-5 severe facet degenerative changes with moderate central stenosis, right foraminal narrowing and moderate to severe right lateral recess stenosis    Toe fracture - medial base of proximal phalanx of right 1st digit, elevated liver enzymes, ambulatory dysfunction, hyperlipemia, essential hypertension, psoriatic arthritis, history of TIA     CURRENT VITAL SIGNS:   Temp:  [97 6 °F (36 4 °C)-98 5 °F (36 9 °C)] 97 6 °F (36 4 °C)  HR:  [54-59] 56  Resp:  [15-18] 18  BP: (133-146)/(55-60) 133/55   Intake/Output Summary (Last 24 hours) at 6/2/2022 1138  Last data filed at 6/2/2022 0600  Gross per 24 hour   Intake --   Output 450 ml   Net -450 ml        LABORATORY RESULTS:      Lab Results   Component Value Date    HGB 13 7 05/27/2022    HGB 10 6 (L) 05/24/2016    HGB 10 6 (L) 05/24/2016    HGB 10 6 (L) 05/24/2016    HCT 41 7 05/27/2022    HCT 33 0 (L) 05/24/2016    HCT 33 0 (L) 05/24/2016 HCT 33 0 (L) 05/24/2016    WBC 9 13 05/27/2022    WBC 7 1 05/24/2016    WBC 7 1 05/24/2016    WBC 7 1 05/24/2016     Lab Results   Component Value Date    BUN 11 06/01/2022    BUN 15 06/18/2020     12/19/2017    K 3 6 06/01/2022    K 4 0 06/18/2020     06/01/2022     06/18/2020    GLUCOSE 95 04/14/2015    CREATININE 0 52 (L) 06/01/2022    CREATININE 1 11 (H) 12/19/2017     Lab Results   Component Value Date    PROTIME 13 2 01/11/2020    PROTIME 10 1 05/24/2016    PROTIME 10 1 05/24/2016    INR 0 99 01/11/2020    INR 1 0 05/24/2016    INR 1 0 05/24/2016        DIAGNOSTIC STUDIES:  XR chest 1 view portable    Result Date: 5/26/2022  Impression: No acute cardiopulmonary disease  Workstation performed: [id number]     XR ankle 3+ views LEFT    Result Date: 5/26/2022  Impression: 1  Acute appearing minimally displaced oblique fracture involving the medial base of the proximal phalanx of the 1st digit  2   Degenerative and postoperative change as noted  Workstation performed: [id number]     XR foot 3+ views LEFT    Result Date: 5/26/2022  Impression: 1  Acute appearing minimally displaced oblique fracture involving the medial base of the proximal phalanx of the 1st digit  2   Degenerative and postoperative change as noted  Workstation performed: [id number]     MRI lumbar spine wo contrast    Result Date: 5/31/2022  Impression: Severe facet degenerative change L4-5 results in anterolisthesis resulting in moderate central stenosis, moderate right foraminal narrowing, and moderate to severe right lateral recess stenosis  Mild degenerative changes elsewhere as described  Workstation performed: [id number]     CT spine thoracic & lumbar wo contrast    Result Date: 5/26/2022  Impression: No acute osseous abnormality  Multilevel degenerative changes, as described, most significant at L4-L5, as described    Workstation performed: [id number]       PRECAUTIONS/SPECIAL NEEDS:  Weight Bearing Precautions: weight bearing as tolerated, Splints/Braces: surgical shoe and Anticoagulation:  heparin, Fall Risk    MEDICATIONS:     Current Facility-Administered Medications:     acetaminophen (TYLENOL) tablet 650 mg, 650 mg, Oral, Q6H PRN, Chas Flores DO    aluminum-magnesium hydroxide-simethicone (MYLANTA) oral suspension 30 mL, 30 mL, Oral, Q6H PRN, Story Regulus, DO    clopidogrel (PLAVIX) tablet 75 mg, 75 mg, Oral, HS, Chas Flores DO, 75 mg at 06/01/22 2207    diltiazem (CARDIZEM CD) 24 hr capsule 240 mg, 240 mg, Oral, Daily, Chas Flores DO, 240 mg at 06/02/22 0844    docusate sodium (COLACE) capsule 100 mg, 100 mg, Oral, BID, Chas Flores DO, 100 mg at 14/79/02 9571    folic acid (FOLVITE) tablet 1 mg, 1 mg, Oral, TID, Chas Flores DO, 1 mg at 06/02/22 0845    heparin (porcine) subcutaneous injection 5,000 Units, 5,000 Units, Subcutaneous, Q8H [address], 5,000 Units at 06/02/22 0550 **AND** [COMPLETED] Platelet count, , , Once, Chas Flores DO    latanoprost (XALATAN) 0 005 % ophthalmic solution 1 drop, 1 drop, Both Eyes, HS, Chas Flores DO, 1 drop at 06/01/22 2239    ondansetron (ZOFRAN) injection 4 mg, 4 mg, Intravenous, Q6H PRN, Chas Flores DO    SKIN INTEGRITY:   Bruising leg and toe - Left   Healed Pressure Injury - Buttocks     PRIOR LEVEL OF FUNCTION:  She lives in a(n) single family home  Lawrence Converse is recently  (2 weeks ago) and now lives alone  Reports  recently passed away from Cancer at home, he was receiving hospice services  Patient reports functional decline since  passed away  Self Care: Independent, Indoor Mobility: Independent, Stairs (in/outdoor): Independent and Cognition: Independent    FALLS IN THE LAST 6 MONTHS: 1    HOME ENVIRONMENT:  The living area: 1st floor set up, main living area has bed/bath  There is 1 step  to enter the home  The patient will not have 24 hour supervision/physical assistance available upon discharge    Patient has very supportive neighbor couple who assist patient as needed  Patient's neighbor "More [place]" checks on patient multiple times per day  PREVIOUS DME:  Equipment in home (previous DME): Rolling Walker and built-in shower seat, raised toilet seat    FUNCTIONAL STATUS: Per PT and OT   Physical Therapy Occupational Therapy Speech Therapy   05/31/22 1440    PT Last Visit   PT Visit Date 05/31/22   Note Type   Note Type Treatment   Pain Assessment   Pain Assessment Tool 0-10   Pain Score No Pain   Restrictions/Precautions   Weight Bearing Precautions Per Order No   Other Precautions Bed Alarm; Fall Risk;Telemetry   General   Chart Reviewed Yes   Subjective   Subjective pt  agreeable to PT  Pt  initially reported she is not in a good mood reporting everything her is wrong  However patient was cooperative and pleasant t/o session  Bed Mobility   Supine to Sit 3  Moderate assistance   Additional items Assist x 1; Increased time required;HOB elevated; Bedrails;LE management;Verbal cues   Transfers   Sit to Stand    (Min - Mod A)   Additional items Assist x 1; Armrests; Increased time required;Verbal cues; Bedrails   Stand to Sit 4  Minimal assistance   Additional items Assist x 1; Increased time required;Verbal cues;Armrests   Stand pivot 4  Minimal assistance   Additional items Assist x 1; Increased time required;Verbal cues   Ambulation/Elevation   Gait pattern Decreased foot clearance; Foward flexed; Short stride; Excessively slow;Decreased heel strike;Decreased toe off   Gait Assistance 4  Minimal assist   Additional items Assist x 1;Verbal cues  (2nd A for chair follow)   Assistive Device Rolling walker   Distance 72 ft x 2   Balance   Static Sitting Fair +   Dynamic Sitting Fair   Static Standing Fair   Dynamic Standing Fair -   Ambulatory Fair -   Endurance Deficit   Endurance Deficit Yes   Endurance Deficit Description Fatigue   Activity Tolerance   Activity Tolerance Patient tolerated treatment well   Nurse Made Aware Yes Exercises   THR Supine;Sitting;Bilateral;AROM;AAROM;20 reps  (SLR, HS, quad sets, AP, LAQ, hip abd)   Assessment   Prognosis Good   Problem List Decreased strength;Decreased range of motion;Decreased endurance;Decreased mobility; Impaired judgement   Assessment Pt  noted with decreased strength and ROM in LLE comapre to RLE  Pt  needed icnreased assist for LLE ROM  Seated rest between ambualtion due to fatigue  Overall no LOB balance noted  Cues for increasing the heel sttrike and reduce the excess WBing of UEs onto the RW during ambulation given  Assistance for RW negotiation during ambulation was also given  Increased A for STS transfer from lower surface chair noted and cues for hand placement given as well  Pt  reported no icnreased discomfort post session  Improved gait as session progressed with increased step length and continuous steps  However overall slow gait noted  pt  noted with great improvement in her mobility and needed decreased assist  However patient continue to be below her baseline and needs assist for all mobility and safety  Pt  positioned on toilet with OT present at the end of session  Will continue to follow per PT POC as she will continue to benefit from skilled PT to address her strength, mobility and endurance deficits  05/31/22 7464    OT Last Visit   OT Visit Date 05/31/22   Note Type   Note Type Treatment   Restrictions/Precautions   Weight Bearing Precautions Per Order No   Other Precautions Chair Alarm; Bed Alarm;Multiple lines;Pain   General   Response to Previous Treatment Patient with no complaints from previous session   Pain Assessment   Pain Assessment Tool 0-10   Pain Score No Pain   ADL   Where Assessed Chair   Grooming Assistance 5  Supervision/Setup   Grooming Deficit Setup;Supervision/safety; Increased time to complete;Wash/dry hands;Brushing hair   Grooming Comments Standing at sink   LB Dressing Assistance 3  Moderate Assistance   LB Dressing Deficit Setup;Verbal cueing;Supervision/safety; Increased time to complete; Don/doff R sock; Don/doff L sock   Toileting Assistance  3  Moderate Assistance   Toileting Deficit Setup;Steadying;Verbal cueing;Supervison/safety; Increased time to complete;Clothing management down;Clothing management up   Functional Standing Tolerance   Time 3-4 mins   Activity Dynamic standing balance activity   Comments Min A for balance/steadying w/ use of RW   Bed Mobility   Supine to Sit Unable to assess   Sit to Supine Unable to assess   Additional Comments Pt seated OOB in chair with chair alarm activated at end of session  Call bell and phone within reach  All needs met and pt reports no further questions for OT at this time  Transfers   Sit to Stand 3  Moderate assistance   Additional items Assist x 1;Bedrails;Armrests; Increased time required;Verbal cues   Stand to Sit 4  Minimal assistance   Additional items Assist x 1; Armrests; Increased time required;Verbal cues   Toilet transfer 3  Moderate assistance   Additional items Assist x 1; Increased time required;Verbal cues; Commode   Additional Comments Cues for safe technique and hand placement   Functional Mobility   Functional Mobility 4  Minimal assistance   Additional Comments Assist x1   Additional items Rolling walker   Cognition   Overall Cognitive Status WFL   Arousal/Participation Alert; Cooperative   Attention Attends with cues to redirect   Orientation Level Oriented X4   Memory Decreased recall of precautions   Following Commands Follows one step commands without difficulty   Comments Pleasant and cooperative   Activity Tolerance   Activity Tolerance Patient limited by fatigue;Patient tolerated treatment well   Medical Staff Made Aware Silvia RN; Ray Yang PTA   Assessment   Assessment Pt seen for OT treatment session focusing on functional activity tolerance, bed mobility, ADLs, UE ROM/strengthening, and functional transfers/mobility  Pt alert and cooperative throughout session   Pt seated EOB w/ PT at start of session  Transfers (sit<>stand) completed w/ Mod-Min A with verbal cues for safe technique and hand placement  Pt required Mod A to initiate forward weight shift from lower surface levels (ie: bedside chair, standard toilet) for sit>stand  Min A required for functional mobility with assist for balance/steadying w/ use of RW  Pt required 1 seated rest break 2* increased fatigue demonstrated  Toileting tasks completed w/ Mod A with assist for steadying during clothing management up/down 2* decreased dynamic standing balance demonstrated  Pt able to complete perineal hygiene tasks w/ Supervision while seated  Grooming tasks completed w/ Supervision while standing at sink to wash/dry hands and brush hair  LB dressing completed w/ Mod A  Pt able to doff B/L socks w/ increased time/effort and required assist to don B/L socks 2* limited functional reach  UE exercises completed while seated OOB in chair to increase UE ROM/strength needed for ADLs/transfers  Pt tolerated well, no c/o pain or fatigue  Pt seated OOB in chair with chair alarm activated at end of session  Call bell and phone within reach  All needs met and pt reports no further questions for OT at this time  Continue to recommend STR when medically cleared  OT to follow pt on caseload             CARE SCORES:  Self Care:  Eatin: Setup or clean-up assistance  Oral hygiene: 04: Supervision or touching  assistance  Toilet hygiene: 03: Partial/moderate assistance  Shower/bathing self: 03: Partial/moderate assistance  Upper body dressin: Supervision or touching  assistance  Lower body dressin: Partial/moderate assistance  Putting on/taking off footwear: 03: Partial/moderate assistance  Transfers:  Roll left and right: 03: Partial/moderate assistance  Sit to lyin: Supervision or touching  assistance  Lying to sitting on side of bed: 03: Partial/moderate assistance  Sit to stand: 03: Partial/moderate assistance  Chair/bed to chair transfer: 03: Partial/moderate assistance  Toilet transfer: 03: Partial/moderate assistance  Mobility:  Walk 10 ft: 03: Partial/moderate assistance  Walk 50 ft with two turns: 03: Partial/moderate assistance  Walk 150ft: 88: Not attempted due to medical conditions or safety concerns    CURRENT GAP IN FUNCTION  Prior to Admission: Functional Status: Patient was independent with mobility/ambulation, transfers, ADL's, IADL's  Estimated length of stay: 7 to 10 days    Anticipated Post-Discharge Disposition/Treatment  Disposition: Return to previous home/apartment  Outpatient Services: Physical Therapy (PT) and Occupational Therapy (OT)    BARRIERS TO DISCHARGE  Weakness, Pain, Diminished cognition/Mentation change, Balance Difficulty, Fatigue, Home Accessibility, Caregiver Accessibility, Equipment Needs and Lives Alone    INTERVENTIONS FOR DISCHARGE  Adaptive equipment, Patient/Family/Caregiver Education, Arrange DME needs, Home Modifcations, Therapy exercises, Energy conservation education  and Fall Prevention, Home Safety     REQUIRED THERAPY:  Patient will require PT and OT 90 minutes each per day, five days per week to achieve rehab goals  REQUIRED FUNCTIONAL AND MEDICAL MANAGEMENT FOR INPATIENT REHABILITATION:  Skin:  There are no pressure sores currently, continue with pressure reduction strategies  , Pain Management: Overall pain is well controlled, Fluid/Electrolytes/Nutrition:  Current Diet: general, Deep Vein Thrombosis (DVT) Prophylaxis:  heparin and SCD's while in bed  Nursing to provide education/training for medication and disease management, provide bowel/bladder management training  Internal Medicine to monitor and manage medical conditions, PM&R to maximize function and provide medical oversight of rehabilitation program   PT/OT interventions, Speech Therapy evaluation and treatment as clinically indicated    Patient/family education and training and additional consults will be provided as coleman Hidalgo RECOMMENDED LEVEL OF CARE: Patient is a 64-year-old male admitted to [facility] with complaints of progressive bilateral lower extremity weakness for last 3 weeks with fall at home 2 weeks ago  Patient diagnosed with rhabdomyolysis and found to have a right toe fracture  Prior to admission patient was fully independent with functional mobility, self-care and IADL tasks  Patient currently requires Min A with transfers and ambulation up to 20 feet with use of a RW  Patient also requires Min A with UB bathing and dressing and Mod A with LB bathing and dressing  Patient will benefit from continued PT and OT services to progress functional mobility and self-care skills to highest level of independence  Nursing is recommended to provide continued patient/family education and training in areas of medication management, chronic disease management and bowel/bladder training  Internal Medicine will continue to monitor medical conditions, PM&R to maximize function and provide medical oversight of rehabilitation program    In-patient rehabilitation is required to maximize self-care, mobility, strength, and endurance for discharge home with family

## 2022-06-02 ENCOUNTER — HOSPITAL ENCOUNTER (INPATIENT)
Facility: HOSPITAL | Age: 77
LOS: 13 days | Discharge: HOME HEALTH | DRG: 558 | End: 2022-06-15
Payer: MEDICARE

## 2022-06-02 VITALS
TEMPERATURE: 97.6 F | OXYGEN SATURATION: 97 % | SYSTOLIC BLOOD PRESSURE: 133 MMHG | WEIGHT: 119.05 LBS | HEART RATE: 56 BPM | DIASTOLIC BLOOD PRESSURE: 55 MMHG | RESPIRATION RATE: 18 BRPM | BODY MASS INDEX: 19.83 KG/M2 | HEIGHT: 65 IN

## 2022-06-02 DIAGNOSIS — K59.00 CONSTIPATION: ICD-10-CM

## 2022-06-02 DIAGNOSIS — Z78.9 IMPAIRED MOBILITY AND ADLS: ICD-10-CM

## 2022-06-02 DIAGNOSIS — M81.0 OSTEOPOROSIS: ICD-10-CM

## 2022-06-02 DIAGNOSIS — I10 ESSENTIAL HYPERTENSION: Primary | ICD-10-CM

## 2022-06-02 DIAGNOSIS — Z74.09 IMPAIRED MOBILITY AND ADLS: ICD-10-CM

## 2022-06-02 DIAGNOSIS — M25.562 LEFT KNEE PAIN: ICD-10-CM

## 2022-06-02 DIAGNOSIS — M62.82 RHABDOMYOLYSIS: ICD-10-CM

## 2022-06-02 DIAGNOSIS — G31.84 MILD COGNITIVE IMPAIRMENT: ICD-10-CM

## 2022-06-02 PROBLEM — Z91.89 AT RISK FOR VENOUS THROMBOEMBOLISM (VTE): Status: ACTIVE | Noted: 2022-06-02

## 2022-06-02 LAB
ALDOLASE SERPL-CCNC: 9.7 U/L (ref 3.3–10.3)
ENA JO1 AB SER-ACNC: 0.3 AI (ref 0–0.9)
RHEUMATOID FACT SER QL LA: NEGATIVE

## 2022-06-02 PROCEDURE — NC001 PR NO CHARGE

## 2022-06-02 PROCEDURE — 99239 HOSP IP/OBS DSCHRG MGMT >30: CPT | Performed by: STUDENT IN AN ORGANIZED HEALTH CARE EDUCATION/TRAINING PROGRAM

## 2022-06-02 PROCEDURE — 99222 1ST HOSP IP/OBS MODERATE 55: CPT | Performed by: INTERNAL MEDICINE

## 2022-06-02 PROCEDURE — 99223 1ST HOSP IP/OBS HIGH 75: CPT

## 2022-06-02 RX ORDER — ONDANSETRON 2 MG/ML
4 INJECTION INTRAMUSCULAR; INTRAVENOUS EVERY 6 HOURS PRN
Status: CANCELLED | OUTPATIENT
Start: 2022-06-02

## 2022-06-02 RX ORDER — HEPARIN SODIUM 5000 [USP'U]/ML
5000 INJECTION, SOLUTION INTRAVENOUS; SUBCUTANEOUS EVERY 8 HOURS SCHEDULED
Status: DISCONTINUED | OUTPATIENT
Start: 2022-06-02 | End: 2022-06-03

## 2022-06-02 RX ORDER — DILTIAZEM HYDROCHLORIDE 120 MG/1
240 CAPSULE, COATED, EXTENDED RELEASE ORAL DAILY
Status: DISCONTINUED | OUTPATIENT
Start: 2022-06-03 | End: 2022-06-15 | Stop reason: HOSPADM

## 2022-06-02 RX ORDER — CLOPIDOGREL BISULFATE 75 MG/1
75 TABLET ORAL
Status: DISCONTINUED | OUTPATIENT
Start: 2022-06-02 | End: 2022-06-15 | Stop reason: HOSPADM

## 2022-06-02 RX ORDER — HEPARIN SODIUM 5000 [USP'U]/ML
5000 INJECTION, SOLUTION INTRAVENOUS; SUBCUTANEOUS EVERY 8 HOURS SCHEDULED
Status: CANCELLED | OUTPATIENT
Start: 2022-06-02

## 2022-06-02 RX ORDER — MAGNESIUM HYDROXIDE/ALUMINUM HYDROXICE/SIMETHICONE 120; 1200; 1200 MG/30ML; MG/30ML; MG/30ML
30 SUSPENSION ORAL EVERY 6 HOURS PRN
Status: CANCELLED | OUTPATIENT
Start: 2022-06-02

## 2022-06-02 RX ORDER — DOCUSATE SODIUM 100 MG/1
100 CAPSULE, LIQUID FILLED ORAL 2 TIMES DAILY
Status: CANCELLED | OUTPATIENT
Start: 2022-06-02

## 2022-06-02 RX ORDER — BISACODYL 10 MG
10 SUPPOSITORY, RECTAL RECTAL DAILY PRN
Status: DISCONTINUED | OUTPATIENT
Start: 2022-06-02 | End: 2022-06-10

## 2022-06-02 RX ORDER — ACETAMINOPHEN 325 MG/1
650 TABLET ORAL EVERY 6 HOURS PRN
Status: DISCONTINUED | OUTPATIENT
Start: 2022-06-02 | End: 2022-06-15 | Stop reason: HOSPADM

## 2022-06-02 RX ORDER — ACETAMINOPHEN 325 MG/1
650 TABLET ORAL EVERY 6 HOURS PRN
Status: CANCELLED | OUTPATIENT
Start: 2022-06-02

## 2022-06-02 RX ORDER — DOCUSATE SODIUM 100 MG/1
100 CAPSULE, LIQUID FILLED ORAL 2 TIMES DAILY
Status: DISCONTINUED | OUTPATIENT
Start: 2022-06-02 | End: 2022-06-15 | Stop reason: HOSPADM

## 2022-06-02 RX ORDER — FOLIC ACID 1 MG/1
1 TABLET ORAL 3 TIMES DAILY
Status: CANCELLED | OUTPATIENT
Start: 2022-06-02

## 2022-06-02 RX ORDER — CALCIUM CARBONATE 500(1250)
2 TABLET ORAL
Status: DISCONTINUED | OUTPATIENT
Start: 2022-06-03 | End: 2022-06-15 | Stop reason: HOSPADM

## 2022-06-02 RX ORDER — DILTIAZEM HYDROCHLORIDE 240 MG/1
240 CAPSULE, COATED, EXTENDED RELEASE ORAL DAILY
Status: CANCELLED | OUTPATIENT
Start: 2022-06-03

## 2022-06-02 RX ORDER — MELATONIN
2000 DAILY
Status: DISCONTINUED | OUTPATIENT
Start: 2022-06-02 | End: 2022-06-15 | Stop reason: HOSPADM

## 2022-06-02 RX ORDER — LATANOPROST 50 UG/ML
1 SOLUTION/ DROPS OPHTHALMIC
Status: CANCELLED | OUTPATIENT
Start: 2022-06-02

## 2022-06-02 RX ORDER — MAGNESIUM HYDROXIDE/ALUMINUM HYDROXICE/SIMETHICONE 120; 1200; 1200 MG/30ML; MG/30ML; MG/30ML
30 SUSPENSION ORAL EVERY 6 HOURS PRN
Status: DISCONTINUED | OUTPATIENT
Start: 2022-06-02 | End: 2022-06-15 | Stop reason: HOSPADM

## 2022-06-02 RX ORDER — POLYETHYLENE GLYCOL 3350 17 G/17G
17 POWDER, FOR SOLUTION ORAL DAILY PRN
Status: DISCONTINUED | OUTPATIENT
Start: 2022-06-02 | End: 2022-06-15 | Stop reason: HOSPADM

## 2022-06-02 RX ORDER — LATANOPROST 50 UG/ML
1 SOLUTION/ DROPS OPHTHALMIC
Status: DISCONTINUED | OUTPATIENT
Start: 2022-06-02 | End: 2022-06-15 | Stop reason: HOSPADM

## 2022-06-02 RX ORDER — CLOPIDOGREL BISULFATE 75 MG/1
75 TABLET ORAL
Status: CANCELLED | OUTPATIENT
Start: 2022-06-02

## 2022-06-02 RX ORDER — FOLIC ACID 1 MG/1
1 TABLET ORAL 3 TIMES DAILY
Status: DISCONTINUED | OUTPATIENT
Start: 2022-06-02 | End: 2022-06-15 | Stop reason: HOSPADM

## 2022-06-02 RX ADMIN — Medication 2000 UNITS: at 17:48

## 2022-06-02 RX ADMIN — DILTIAZEM HYDROCHLORIDE 240 MG: 240 CAPSULE, COATED, EXTENDED RELEASE ORAL at 08:44

## 2022-06-02 RX ADMIN — DOCUSATE SODIUM 100 MG: 100 CAPSULE, LIQUID FILLED ORAL at 17:48

## 2022-06-02 RX ADMIN — CLOPIDOGREL BISULFATE 75 MG: 75 TABLET ORAL at 21:08

## 2022-06-02 RX ADMIN — LATANOPROST 1 DROP: 50 SOLUTION OPHTHALMIC at 21:09

## 2022-06-02 RX ADMIN — FOLIC ACID 1 MG: 1 TABLET ORAL at 21:08

## 2022-06-02 RX ADMIN — FOLIC ACID 1 MG: 1 TABLET ORAL at 17:48

## 2022-06-02 RX ADMIN — HEPARIN SODIUM 5000 UNITS: 5000 INJECTION INTRAVENOUS; SUBCUTANEOUS at 21:08

## 2022-06-02 RX ADMIN — FOLIC ACID 1 MG: 1 TABLET ORAL at 08:45

## 2022-06-02 RX ADMIN — HEPARIN SODIUM 5000 UNITS: 5000 INJECTION INTRAVENOUS; SUBCUTANEOUS at 05:50

## 2022-06-02 NOTE — ASSESSMENT & PLAN NOTE
Lower extremity weakness possibly due to rhabdomyolysis   Possibly due to statin related myopathy    - See plan above

## 2022-06-02 NOTE — PLAN OF CARE
Problem: NEUROSENSORY - ADULT  Goal: Achieves maximal functionality and self care  Description: INTERVENTIONS  - Monitor swallowing and airway patency with patient fatigue and changes in neurological status  - Encourage and assist patient to increase activity and self care     - Encourage visually impaired, hearing impaired and aphasic patients to use assistive/communication devices  Outcome: Progressing     Problem: SAFETY ADULT  Goal: Patient will remain free of falls  Description: INTERVENTIONS:  - Educate patient/family on patient safety including physical limitations  - Instruct patient to call for assistance with activity   - Consult OT/PT to assist with strengthening/mobility   - Keep Call bell within reach  - Keep bed low and locked with side rails adjusted as appropriate  - Keep care items and personal belongings within reach  - Initiate and maintain comfort rounds  - Make Fall Risk Sign visible to staff  - Offer Toileting every  Hours, in advance of need    - Obtain necessary fall risk management equipment: RW  - Apply yellow socks and bracelet for high fall risk patients  - Consider moving patient to room near nurses station  Outcome: Progressing

## 2022-06-02 NOTE — ASSESSMENT & PLAN NOTE
L foot XR: acute appearing minimally displaced oblique fracture involving medial base of the proximal phalanx of the 1st digit  Podiatry consulted in acute setting - recommended WBAT and surgical shoes to both feet with ambulation  Follow-up with Podiatry as outpatient

## 2022-06-02 NOTE — ASSESSMENT & PLAN NOTE
Home regimen: methotrexate 17 5mg on Mondays  Per Rheumatology - continue to hold until outpatient follow-up with Rheumatologist (Dr Ac Mcintyre)

## 2022-06-02 NOTE — ASSESSMENT & PLAN NOTE
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

## 2022-06-02 NOTE — ASSESSMENT & PLAN NOTE
Likely due to statin  Simvastatin currently on hold  CK 6976 on admission - currently 731  Improved with IV fluids in acute setting  Continue to trend with routine labs  Per Rheumatology - presentation not consistent with inflammatory myositis  Rheumatology recommended holding methotrexate until further follow-up with outpatient Rheumatologist (Dr Josselyn Izaguirre)  Ensure adequate hydration  Continue PT/OT

## 2022-06-02 NOTE — ASSESSMENT & PLAN NOTE
Likely due to recent rhabdomyolysis  AST 63 and  (from 157 and 177 respectively)  Continue to trend with routine CMP

## 2022-06-02 NOTE — PLAN OF CARE
Problem: Potential for Falls  Goal: Patient will remain free of falls  Description: INTERVENTIONS:  - Educate patient/family on patient safety including physical limitations  - Instruct patient to call for assistance with activity   - Consult OT/PT to assist with strengthening/mobility   - Keep Call bell within reach  - Keep bed low and locked with side rails adjusted as appropriate  - Keep care items and personal belongings within reach  - Initiate and maintain comfort rounds  - Make Fall Risk Sign visible to staff  - Offer Toileting every 2 Hours, in advance of need  - Initiate/Maintain bed alarm  - Obtain necessary fall risk management equipment: walker  - Apply yellow socks and bracelet for high fall risk patients  - Consider moving patient to room near nurses station  6/2/2022 1428 by Irena Morales RN  Outcome: Adequate for Discharge  6/2/2022 1147 by Irena Morales RN  Outcome: Progressing     Problem: MOBILITY - ADULT  Goal: Maintain or return to baseline ADL function  Description: INTERVENTIONS:  -  Assess patient's ability to carry out ADLs; assess patient's baseline for ADL function and identify physical deficits which impact ability to perform ADLs (bathing, care of mouth/teeth, toileting, grooming, dressing, etc )  - Assess/evaluate cause of self-care deficits   - Assess range of motion  - Assess patient's mobility; develop plan if impaired  - Assess patient's need for assistive devices and provide as appropriate  - Encourage maximum independence but intervene and supervise when necessary  - Involve family in performance of ADLs  - Assess for home care needs following discharge   - Consider OT consult to assist with ADL evaluation and planning for discharge  - Provide patient education as appropriate  6/2/2022 1428 by Irena Morales RN  Outcome: Adequate for Discharge  6/2/2022 1147 by Irena Morales RN  Outcome: Progressing  Goal: Maintains/Returns to pre admission functional level  Description: INTERVENTIONS:  - Perform BMAT or MOVE assessment daily    - Set and communicate daily mobility goal to care team and patient/family/caregiver  - Collaborate with rehabilitation services on mobility goals if consulted  - Perform Range of Motion 3 times a day  - Reposition patient every 3 hours    - Dangle patient 3 times a day  - Stand patient 3 times a day  - Ambulate patient 3 times a day  - Out of bed to chair 3 times a day   - Out of bed for meals 3 times a day  - Out of bed for toileting  - Record patient progress and toleration of activity level   6/2/2022 1428 by John Marquez RN  Outcome: Adequate for Discharge  6/2/2022 1147 by John Marquez RN  Outcome: Progressing     Problem: Prexisting or High Potential for Compromised Skin Integrity  Goal: Skin integrity is maintained or improved  Description: INTERVENTIONS:  - Identify patients at risk for skin breakdown  - Assess and monitor skin integrity  - Assess and monitor nutrition and hydration status  - Monitor labs   - Assess for incontinence   - Turn and reposition patient  - Assist with mobility/ambulation  - Relieve pressure over bony prominences  - Avoid friction and shearing  - Provide appropriate hygiene as needed including keeping skin clean and dry  - Evaluate need for skin moisturizer/barrier cream  - Collaborate with interdisciplinary team   - Patient/family teaching  - Consider wound care consult   6/2/2022 1428 by John Marquez RN  Outcome: Adequate for Discharge  6/2/2022 1147 by John Marquez RN  Outcome: Progressing     Problem: NEUROSENSORY - ADULT  Goal: Achieves stable or improved neurological status  Description: INTERVENTIONS  - Monitor and report changes in neurological status  - Monitor vital signs such as temperature, blood pressure, glucose, and any other labs ordered   - Initiate measures to prevent increased intracranial pressure  - Monitor for seizure activity and implement precautions if appropriate      6/2/2022 1428 by Michoacano Garcia RN  Outcome: Adequate for Discharge  6/2/2022 1147 by Michoacano Garcia RN  Outcome: Progressing  Goal: Achieves maximal functionality and self care  Description: INTERVENTIONS  - Monitor swallowing and airway patency with patient fatigue and changes in neurological status  - Encourage and assist patient to increase activity and self care     - Encourage visually impaired, hearing impaired and aphasic patients to use assistive/communication devices  6/2/2022 1428 by Michoacano Garcia RN  Outcome: Adequate for Discharge  6/2/2022 1147 by Michoacano Garcia RN  Outcome: Progressing     Problem: MUSCULOSKELETAL - ADULT  Goal: Maintain or return mobility to safest level of function  Description: INTERVENTIONS:  - Assess patient's ability to carry out ADLs; assess patient's baseline for ADL function and identify physical deficits which impact ability to perform ADLs (bathing, care of mouth/teeth, toileting, grooming, dressing, etc )  - Assess/evaluate cause of self-care deficits   - Assess range of motion  - Assess patient's mobility  - Assess patient's need for assistive devices and provide as appropriate  - Encourage maximum independence but intervene and supervise when necessary  - Involve family in performance of ADLs  - Assess for home care needs following discharge   - Consider OT consult to assist with ADL evaluation and planning for discharge  - Provide patient education as appropriate  6/2/2022 1428 by Michoacano Garcia RN  Outcome: Adequate for Discharge  6/2/2022 1147 by Michoacano Garcia RN  Outcome: Progressing  Goal: Maintain proper alignment of affected body part  Description: INTERVENTIONS:  - Support, maintain and protect limb and body alignment  - Provide patient/ family with appropriate education  6/2/2022 1428 by Michoacano Garcia RN  Outcome: Adequate for Discharge  6/2/2022 1147 by Michoacano Garcia RN  Outcome: Progressing

## 2022-06-02 NOTE — PLAN OF CARE
Problem: Potential for Falls  Goal: Patient will remain free of falls  Description: INTERVENTIONS:  - Educate patient/family on patient safety including physical limitations  - Instruct patient to call for assistance with activity   - Consult OT/PT to assist with strengthening/mobility   - Keep Call bell within reach  - Keep bed low and locked with side rails adjusted as appropriate  - Keep care items and personal belongings within reach  - Initiate and maintain comfort rounds  - Make Fall Risk Sign visible to staff  - Offer Toileting every 2 Hours, in advance of need  - Initiate/Maintain bed alarm  - Obtain necessary fall risk management equipment: walker  - Apply yellow socks and bracelet for high fall risk patients  - Consider moving patient to room near nurses station  Outcome: Progressing     Problem: MOBILITY - ADULT  Goal: Maintain or return to baseline ADL function  Description: INTERVENTIONS:  -  Assess patient's ability to carry out ADLs; assess patient's baseline for ADL function and identify physical deficits which impact ability to perform ADLs (bathing, care of mouth/teeth, toileting, grooming, dressing, etc )  - Assess/evaluate cause of self-care deficits   - Assess range of motion  - Assess patient's mobility; develop plan if impaired  - Assess patient's need for assistive devices and provide as appropriate  - Encourage maximum independence but intervene and supervise when necessary  - Involve family in performance of ADLs  - Assess for home care needs following discharge   - Consider OT consult to assist with ADL evaluation and planning for discharge  - Provide patient education as appropriate  Outcome: Progressing  Goal: Maintains/Returns to pre admission functional level  Description: INTERVENTIONS:  - Perform BMAT or MOVE assessment daily    - Set and communicate daily mobility goal to care team and patient/family/caregiver     - Collaborate with rehabilitation services on mobility goals if consulted  - Perform Range of Motion 3 times a day  - Reposition patient every 3 hours  - Dangle patient 3 times a day  - Stand patient 3 times a day  - Ambulate patient 3 times a day  - Out of bed to chair 3 times a day   - Out of bed for meals 3 times a day  - Out of bed for toileting  - Record patient progress and toleration of activity level   Outcome: Progressing     Problem: NEUROSENSORY - ADULT  Goal: Achieves stable or improved neurological status  Description: INTERVENTIONS  - Monitor and report changes in neurological status  - Monitor vital signs such as temperature, blood pressure, glucose, and any other labs ordered   - Initiate measures to prevent increased intracranial pressure  - Monitor for seizure activity and implement precautions if appropriate      Outcome: Progressing  Goal: Achieves maximal functionality and self care  Description: INTERVENTIONS  - Monitor swallowing and airway patency with patient fatigue and changes in neurological status  - Encourage and assist patient to increase activity and self care     - Encourage visually impaired, hearing impaired and aphasic patients to use assistive/communication devices  Outcome: Progressing     Problem: MUSCULOSKELETAL - ADULT  Goal: Maintain or return mobility to safest level of function  Description: INTERVENTIONS:  - Assess patient's ability to carry out ADLs; assess patient's baseline for ADL function and identify physical deficits which impact ability to perform ADLs (bathing, care of mouth/teeth, toileting, grooming, dressing, etc )  - Assess/evaluate cause of self-care deficits   - Assess range of motion  - Assess patient's mobility  - Assess patient's need for assistive devices and provide as appropriate  - Encourage maximum independence but intervene and supervise when necessary  - Involve family in performance of ADLs  - Assess for home care needs following discharge   - Consider OT consult to assist with ADL evaluation and planning for discharge  - Provide patient education as appropriate  Outcome: Progressing  Goal: Maintain proper alignment of affected body part  Description: INTERVENTIONS:  - Support, maintain and protect limb and body alignment  - Provide patient/ family with appropriate education  Outcome: Progressing

## 2022-06-02 NOTE — ASSESSMENT & PLAN NOTE
DXA scan on 5/13/22 showed osteoporosis  Start on Vitamin D 2000u daily and calcium 1000mg daily  Repeat Vitamin D level with next set of labs - last level was 31 in 02/2020  Follow-up with PCP or Rheumatology at discharge

## 2022-06-02 NOTE — DISCHARGE INSTRUCTIONS
Discharge Instructions - Podiatry    Weight Bearing Status: Weight bearing as tolerated in surgical shoe at all times for the next two weeks  Pain: Continue analgesics as directed    Follow-up appointment instructions: Please make an appointment as needed with podiatry if symptoms do not resolve or worsen

## 2022-06-02 NOTE — ASSESSMENT & PLAN NOTE
Last lipid panel on 3/31/22: Cholesterol 169, Triglycerides 69, HDL 87, and LDL 68  Hold statin at this time due to recent rhabdomyolysis

## 2022-06-02 NOTE — ASSESSMENT & PLAN NOTE
Likely secondary to statin induced rhabdomyolysis      Recent Labs     06/01/22  1716   AST 63*   *   TBILI 0 47   ALKPHOS 55

## 2022-06-02 NOTE — CONSULTS
[address] [facility] 1945, 64 y o  female MRN: [id number]  Unit/Bed#: -49 Encounter: [id number]  Primary Care Provider: Bessy Mckenzie DO   Date and time admitted to hospital: 6/2/2022  2:47 PM    Inpatient consult to Internal Medicine  Consult performed by: ANNE Hilton  Consult ordered by: Shira Richard MD          Osteoporosis  Assessment & Plan  DXA scan on 5/13/22 showed osteoporosis  Start on Vitamin D 2000u daily and calcium 1000mg daily  Repeat Vitamin D level with next set of labs - last level was 31 in 02/2020  Follow-up with PCP or Rheumatology at discharge  Fracture, toe  Assessment & Plan  L foot XR: acute appearing minimally displaced oblique fracture involving medial base of the proximal phalanx of the 1st digit  Podiatry consulted in acute setting - recommended WBAT and surgical shoes to both feet with ambulation  Follow-up with Podiatry as outpatient  Weakness of both lower extremities  Assessment & Plan  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  CK 6976 on admission - currently 731  Simvastatin was stopped and currently on hold  Per Rheumatology - presentation not consistent with inflammatory myositis  MRI L spine showed severe facet degenerative change at L4-L5 resulting in moderate central stenosis, moderate R foramina narrowing, and moderate to severe R lateral recess stenosis  JONATHON on 5/31 was WNL  Venous duplex on 5/26 negative for DVTs  Myositis panel, adolase, cyclic citrul peptide antibody IgG, and HMGCR pending  Primary team following  PT/OT  Elevated liver enzymes  Assessment & Plan  Likely due to recent rhabdomyolysis  AST 63 and  (from 157 and 177 respectively)  Continue to trend with routine CMP  HLD (hyperlipidemia)  Assessment & Plan  Last lipid panel on 3/31/22: Cholesterol 169, Triglycerides 69, HDL 87, and LDL 68  Hold statin at this time due to recent rhabdomyolysis      Essential hypertension  Assessment & Plan  Continue home regimen: Cardizem CD 240mg daily  Monitor BP with routine VS   Follow-up with PCP as outpatient  Psoriatic arthritis ([facility] [address] )  Assessment & Plan  Home regimen: methotrexate 17 5mg on Mondays  Per Rheumatology - continue to hold until outpatient follow-up with Rheumatologist (Dr Nery Branch)  History of TIA (transient ischemic attack)  Assessment & Plan  Hx of TIA without any deficits  Continue Plavix 75mg daily  Statin on hold  * Rhabdomyolysis  Assessment & Plan  Likely due to statin  Simvastatin currently on hold  CK 6976 on admission - currently 731  Improved with IV fluids in acute setting  Continue to trend with routine labs  Per Rheumatology - presentation not consistent with inflammatory myositis  Rheumatology recommended holding methotrexate until further follow-up with outpatient Rheumatologist (Dr Nery Branch)  Ensure adequate hydration  Continue PT/OT  History of Present Illness: Jimena Velazco is a 64 y o  female with a PMH of HLD, HTN, psoriatic arthritis, hx of TIA, and osteoporosis who originally presented to [address] [address] on 5/26/22022 with progressive weakness of B/L lower extremities for the past 1 5 weeks along with pain/injury to the L great toes  Patient's  had recently passed on 5/10 and patient became progressively weaker  CK level was 6976 on admission  Statin was held and patient was given IV fluids for possible rhabdomyolysis  Neurology was consulted and felt that there were concerns for myopathy due to proximal greater than distal weakness and elevated CK  Recommended consulting Rheumatology and obtaining myositis panel  MRI L spine was obtained which showed severe facet degenerative changes in L4-L5 resulting in anterolisthesis resulting in moderate central stenosis, moderate R foraminal narrowing, and moderate to severe R lateral recess stenosis    Rheumatology felt that the presentation was not consistent with inflammatory myositis but recommended holding methotrexate until further follow-up with outpatient Rheumatologist   Podiatry was consulted due to acute appearing minimally displaced oblique fracture involving medial base of the proximal phalanx of the 1st digit  Recommended WBAT and to use B/L surgical shoes with ambulation  JONATHON was completed which was WNL and venous duplex was also completed which showed no DVTs  Admitted to [address] [place] [address] on 6/2/2022; we are consulted for medical clearance  Pt examined while pt lying in bed in pt room  Currently has no complaints of pain  Occasionally L great toe hurts with palpation  Denies any numbness or tingling  States that at home her legs felt weak and very "tight "  States that they still feel "heavy" at times  Denies any lightheadedness, dizziness, SOB, palpitations, or CP  Has been drinking adequately  Had a BM yesterday and states that she has gone every day for the past 5 days and feels that her stool softeners are too much  Denies any urinary complaints  Lives at home, alone  States that her neighbor may be able to help her at times  States that her  just recently passed away  Denies any depression or mood changes  States that she has still been getting up and moving at home even after his passing  Denies any changes in appetite/hydration since his passing  Has no other concerns or complaints at this time  Review of Systems:    Review of Systems   Constitutional: Negative for appetite change, chills, fatigue, fever and unexpected weight change  HENT: Negative for trouble swallowing  Eyes: Negative for visual disturbance  Respiratory: Negative for cough, shortness of breath, wheezing and stridor  Cardiovascular: Negative for chest pain, palpitations and leg swelling  Gastrointestinal: Negative for abdominal distention, abdominal pain, constipation, diarrhea, nausea and vomiting          LBM 6/1 - feels that she is going too often, asking to hold stool softeners   Genitourinary: Negative for difficulty urinating, dysuria, frequency, hematuria and urgency  Musculoskeletal: Positive for arthralgias (occasional tenderness to L great toe, denies any pain currently)  Negative for back pain  Neurological: Positive for weakness (legs feel "heavy")  Negative for dizziness, light-headedness, numbness and headaches  Psychiatric/Behavioral: Negative for behavioral problems, dysphoric mood, self-injury, sleep disturbance and suicidal ideas  The patient is not nervous/anxious  Past Medical and Surgical History:     Past Medical History:   Diagnosis Date    Abnormal liver function test     last assessed: 3/25/2014    KARIME (acute kidney injury) ([facility] [address] ) 1/11/2020    Anemia     Chronic constipation     last assessed: 12/2/2014    Closed fracture of 4th metacarpal 3/3/2018    Herpes zoster     last assessed: 3/16/2016    Hyperlipidemia     Hypertension     Open nondisplaced fracture of first cervical vertebra with routine healing 3/3/2018    Psoriatic arthritis ([facility] [address] )     last assessed;3/25/2014    Recent weight loss     last assessed: 7/13/2016    [facility]     Stroke ([facility] )     Syncope 1/11/2020    Trigeminal neuritis        Past Surgical History:   Procedure Laterality Date    APPENDECTOMY      FOOT SURGERY      HYSTERECTOMY      KIDNEY SURGERY         Meds/Allergies:    all medications and allergies reviewed    Allergies:    Allergies   Allergen Reactions    Amoxicillin Edema    Penicillins Edema       Social History:     Marital Status: /Civil Union    Substance Use History:   Social History     Substance and Sexual Activity   Alcohol Use Not Currently     Social History     Tobacco Use   Smoking Status Never Smoker   Smokeless Tobacco Never Used     Social History     Substance and Sexual Activity   Drug Use No       Family History:  Reviewed    Physical Exam: Vitals:   Blood Pressure: 152/68 (06/02/22 1430)  Pulse: 63 (06/02/22 1430)  Temperature: 98 7 °F (37 1 °C) (06/02/22 1430)  Temp Source: Tympanic (06/02/22 1430)  Respirations: 18 (06/02/22 1430)  Height: 5' 5" (165 1 cm) (06/02/22 1430)  Weight - Scale: 50 5 kg (111 lb 5 3 oz) (06/02/22 1430)  SpO2: 98 % (06/02/22 1430)    Physical Exam  Vitals and nursing note reviewed  Constitutional:       Appearance: Normal appearance  HENT:      Head: Normocephalic and atraumatic  Neck:      Vascular: No carotid bruit  Cardiovascular:      Rate and Rhythm: Normal rate and regular rhythm  Pulses: Normal pulses  Heart sounds: Normal heart sounds  No murmur heard  No friction rub  Pulmonary:      Effort: Pulmonary effort is normal  No respiratory distress  Breath sounds: Normal breath sounds  No wheezing or rhonchi  Abdominal:      General: Abdomen is flat  Bowel sounds are normal  There is no distension  Palpations: Abdomen is soft  There is no mass  Tenderness: There is no abdominal tenderness  There is no guarding or rebound  Hernia: No hernia is present  Musculoskeletal:      Cervical back: Normal range of motion and neck supple  Right lower leg: No edema  Left lower leg: No edema  Skin:     General: Skin is warm and dry  Capillary Refill: Capillary refill takes less than 2 seconds  Findings: Bruising (L great toe ecchymosis) present  Neurological:      Mental Status: She is alert and oriented to person, place, and time  Psychiatric:         Mood and Affect: Mood normal          Behavior: Behavior normal            Additional Data:     Labs and imaging reviewed  EKG Reviewed - last EKG on 5/26 showed sinus bradycardia with PACs, 55BPM, and QTc of 415  M*Modal software was used to dictate this note  It may contain errors with dictating incorrect words or incorrect spelling  Please contact the provider directly with any questions

## 2022-06-02 NOTE — ASSESSMENT & PLAN NOTE
Acute appearing minimally displaced oblique fracture involving the medial base of the proximal phalanx of the 1st digit  - Podiatry recommendations appreciated

## 2022-06-02 NOTE — PROGRESS NOTES
PHYSICAL MEDICINE AND REHABILITATION   PREADMISSION ASSESSMENT     Projected [facility] Diagnoses:  Impairment of mobility, safety and Activities of Daily Living (ADLs) due to Neurologic Conditions:  03 8  Neuromuscular Disorders  Etiologic: Rhabdomyolysis, Myopathy  (statin induced)   Date of Onset: 5/26/22     Date of surgery: N/A    PATIENT INFORMATION  Name: Samantha Luciano Phone #: [phone] (home)   Address: [address] [place], [place] [id number]  YOB: 1945 Age: 64 y o  SS#   Marital Status: /Civil Union  Ethnicity:    Employment Status: retired  Extended Emergency Contact Information  Primary Emergency Contact: [address]  Mobile Phone: [phone]  Relation: Friend   needed? No  Advance Directive: Full Code - no ACP docs     INSURANCE/COVERAGE:     Primary Payor: MEDICARE / Plan: MEDICARE A AND B / Product Type: Medicare A & B Fee for Service /   Secondary Payer:<NONE>   Payer Contact:  Payer Contact:   Contact Phone:  Contact Phone:     Authorization #:   Coverage Dates:  LCD:   MEDICARE #: [id number]  Medicare Days: 60/30/60  Medical Record #: [id number]    REFERRAL SOURCE:   Referring provider: Roque Dorado MD  Referring facility: [address] [place] [address]   Room: [place] [address]/[facility] 2 *  PCP: Bessy Mckenzie DO PCP phone number: [phone]    MEDICAL INFORMATION  HPI: Patient is a 64 year female [facility] of  bilateral lower extremity weakness for at least 2 weeks  Patient reports that she fully independent and ambulatory prior to admission however reported a fall at home approximately 10 days prior to current admission due progressive lower extremity weakness  Patient has a history of rheumatoid arthritis for which she takes methotrexate and folic acid  Patient also with a history of previous stroke, hyperlipidemia and hypertension    Upon admission patient was found to have rhabdomyolysis with elevated CKs  An x-ray of the right foot found a displaced oblique fracture involving the medical base of the proximal phalanx of the 1st digit  Patient was evaluated by Podiatry who recommended a surgical shoe with WBAT to the right lower extremity  Patient was evaluated by Neurology due to concern for possible statin induced myopathy due to proximal more than distal weakness and elevated CKs, therefore patient's statin was held  Neurology consulted with Rheumatology who felt that patent's symptoms were inconsistent with any kind of inflammatory myositis or autoimmune process but recommended to hold methotrexate for the time-being and felt patient should follow-up as soon as possible with her outside rheumatologist upon discharge  MRI of L-Spine found moderate central canal stenosis L4-5, Moderate right foraminal narrowing and moderate to sever right lateral recess stenosis  Patient has been evaluated by PT and OT during the course of hospitalization  Patient currently demonstrates a decline in functional in the areas of ambulation, transfers, functional mobility, ADLs and IADLs due deficits in areas of strength, balance and endurance  Patient's [facility], patient is appropriate for acute rehabilitation program as patient requires 24 hour physician oversight during rehabilitation program and has demonstrated the ability to tolerate 3-4 hours of therapy per day consisting of two or more rehabilitation disciplines, one of which is physical therapy  Patient is medically stable and ready for discharge to [facility] today  COVID-19: Vaccinated 3/30/21, 4/29/21 11/10/21, COVID negative 6/1    Past Medical History:   Past Surgical History:    Allergies:     Past Medical History:   Diagnosis Date    Abnormal liver function test     last assessed: 3/25/2014    KARIME (acute kidney injury) ([facility] [address] ) 1/11/2020    Anemia     Chronic constipation last assessed: 12/2/2014    Closed fracture of 4th metacarpal 3/3/2018    Herpes zoster     last assessed: 3/16/2016    Hyperlipidemia     Hypertension     Open nondisplaced fracture of first cervical vertebra with routine healing 3/3/2018    Psoriatic arthritis ([facility] [address] )     last assessed;3/25/2014    Recent weight loss     last assessed: 7/13/2016    Rheumatoid arthritis ([facility] [address] )     Stroke ([facility] )     Syncope 1/11/2020    Trigeminal neuritis     Past Surgical History:   Procedure Laterality Date    APPENDECTOMY      FOOT SURGERY      HYSTERECTOMY      KIDNEY SURGERY       Allergies   Allergen Reactions    Amoxicillin Edema    Penicillins Edema         Medical/functional conditions requiring inpatient rehabilitation: Rhabdomyolysis, toe fracture - gait dysfunction, lower extremity weakness, hypertension, psoriatic arthritis, ADL decline, iADL decline  Risk for medical/clinical complications:  Fall risk, DVT/PE, loss of ROM/Strength, loss of ADL ability      Comorbidities/Surgeries in the last 100 days: Rhabdomyolysis, L4-5 severe facet degenerative changes with moderate central stenosis, right foraminal narrowing and moderate to severe right lateral recess stenosis    Toe fracture - medial base of proximal phalanx of right 1st digit, elevated liver enzymes, ambulatory dysfunction, hyperlipemia, essential hypertension, psoriatic arthritis, history of TIA     CURRENT VITAL SIGNS:   Temp:  [97 6 °F (36 4 °C)-98 5 °F (36 9 °C)] 97 6 °F (36 4 °C)  HR:  [54-59] 56  Resp:  [15-18] 18  BP: (133-146)/(55-60) 133/55   Intake/Output Summary (Last 24 hours) at 6/2/2022 1141  Last data filed at 6/2/2022 0600  Gross per 24 hour   Intake --   Output 450 ml   Net -450 ml        LABORATORY RESULTS:      Lab Results   Component Value Date    HGB 13 7 05/27/2022    HGB 10 6 (L) 05/24/2016    HGB 10 6 (L) 05/24/2016    HGB 10 6 (L) 05/24/2016    HCT 41 7 05/27/2022    HCT 33 0 (L) 05/24/2016    HCT 33 0 (L) 05/24/2016 HCT 33 0 (L) 05/24/2016    WBC 9 13 05/27/2022    WBC 7 1 05/24/2016    WBC 7 1 05/24/2016    WBC 7 1 05/24/2016     Lab Results   Component Value Date    BUN 11 06/01/2022    BUN 15 06/18/2020     12/19/2017    K 3 6 06/01/2022    K 4 0 06/18/2020     06/01/2022     06/18/2020    GLUCOSE 95 04/14/2015    CREATININE 0 52 (L) 06/01/2022    CREATININE 1 11 (H) 12/19/2017     Lab Results   Component Value Date    PROTIME 13 2 01/11/2020    PROTIME 10 1 05/24/2016    PROTIME 10 1 05/24/2016    INR 0 99 01/11/2020    INR 1 0 05/24/2016    INR 1 0 05/24/2016        DIAGNOSTIC STUDIES:  XR chest 1 view portable    Result Date: 5/26/2022  Impression: No acute cardiopulmonary disease  Workstation performed: [id number]     XR ankle 3+ views LEFT    Result Date: 5/26/2022  Impression: 1  Acute appearing minimally displaced oblique fracture involving the medial base of the proximal phalanx of the 1st digit  2   Degenerative and postoperative change as noted  Workstation performed: [id number]     XR foot 3+ views LEFT    Result Date: 5/26/2022  Impression: 1  Acute appearing minimally displaced oblique fracture involving the medial base of the proximal phalanx of the 1st digit  2   Degenerative and postoperative change as noted  Workstation performed: [id number]     MRI lumbar spine wo contrast    Result Date: 5/31/2022  Impression: Severe facet degenerative change L4-5 results in anterolisthesis resulting in moderate central stenosis, moderate right foraminal narrowing, and moderate to severe right lateral recess stenosis  Mild degenerative changes elsewhere as described  Workstation performed: [id number]     CT spine thoracic & lumbar wo contrast    Result Date: 5/26/2022  Impression: No acute osseous abnormality  Multilevel degenerative changes, as described, most significant at L4-L5, as described    Workstation performed: [id number]       PRECAUTIONS/SPECIAL NEEDS:  Weight Bearing Precautions: weight bearing as tolerated, Splints/Braces: surgical shoe and Anticoagulation:  heparin, Fall Risk    MEDICATIONS:     Current Facility-Administered Medications:     acetaminophen (TYLENOL) tablet 650 mg, 650 mg, Oral, Q6H PRN, Chas Flores DO    aluminum-magnesium hydroxide-simethicone (MYLANTA) oral suspension 30 mL, 30 mL, Oral, Q6H PRN, Paulino Prescott DO    clopidogrel (PLAVIX) tablet 75 mg, 75 mg, Oral, HS, Chas Flores DO, 75 mg at 06/01/22 2207    diltiazem (CARDIZEM CD) 24 hr capsule 240 mg, 240 mg, Oral, Daily, Chas Flores DO, 240 mg at 06/02/22 0844    docusate sodium (COLACE) capsule 100 mg, 100 mg, Oral, BID, Chas Flores DO, 100 mg at 58/15/43 6800    folic acid (FOLVITE) tablet 1 mg, 1 mg, Oral, TID, Chas Flores DO, 1 mg at 06/02/22 0845    heparin (porcine) subcutaneous injection 5,000 Units, 5,000 Units, Subcutaneous, Q8H [address], 5,000 Units at 06/02/22 0550 **AND** [COMPLETED] Platelet count, , , Once, Chas Flores DO    latanoprost (XALATAN) 0 005 % ophthalmic solution 1 drop, 1 drop, Both Eyes, HS, Chas Flores DO, 1 drop at 06/01/22 2239    ondansetron (ZOFRAN) injection 4 mg, 4 mg, Intravenous, Q6H PRN, Chas Flores DO    SKIN INTEGRITY:   Bruising leg and toe - Left   Healed Pressure Injury - Buttocks     PRIOR LEVEL OF FUNCTION:  She lives in a(n) single family home  Ella Daniels is recently  (2 weeks ago) and now lives alone  Reports  recently passed away from Cancer at home, he was receiving hospice services  Patient reports functional decline since  passed away  Self Care: Independent, Indoor Mobility: Independent, Stairs (in/outdoor): Independent and Cognition: Independent    FALLS IN THE LAST 6 MONTHS: 1    HOME ENVIRONMENT:  The living area: 1st floor set up, main living area has bed/bath  There is 1 step  to enter the home  The patient will not have 24 hour supervision/physical assistance available upon discharge    Patient has very supportive neighbor couple who assist patient as needed  Patient's neighbor "Donato Wood" checks on patient multiple times per day  PREVIOUS DME:  Equipment in home (previous DME): Rolling Walker and built-in shower seat, raised toilet seat    FUNCTIONAL STATUS: Per PT and OT   Physical Therapy Occupational Therapy Speech Therapy   05/31/22 1440    PT Last Visit   PT Visit Date 05/31/22   Note Type   Note Type Treatment   Pain Assessment   Pain Assessment Tool 0-10   Pain Score No Pain   Restrictions/Precautions   Weight Bearing Precautions Per Order No   Other Precautions Bed Alarm; Fall Risk;Telemetry   General   Chart Reviewed Yes   Subjective   Subjective pt  agreeable to PT  Pt  initially reported she is not in a good mood reporting everything her is wrong  However patient was cooperative and pleasant t/o session  Bed Mobility   Supine to Sit 3  Moderate assistance   Additional items Assist x 1; Increased time required;HOB elevated; Bedrails;LE management;Verbal cues   Transfers   Sit to Stand    (Min - Mod A)   Additional items Assist x 1; Armrests; Increased time required;Verbal cues; Bedrails   Stand to Sit 4  Minimal assistance   Additional items Assist x 1; Increased time required;Verbal cues;Armrests   Stand pivot 4  Minimal assistance   Additional items Assist x 1; Increased time required;Verbal cues   Ambulation/Elevation   Gait pattern Decreased foot clearance; Foward flexed; Short stride; Excessively slow;Decreased heel strike;Decreased toe off   Gait Assistance 4  Minimal assist   Additional items Assist x 1;Verbal cues  (2nd A for chair follow)   Assistive Device Rolling walker   Distance 72 ft x 2   Balance   Static Sitting Fair +   Dynamic Sitting Fair   Static Standing Fair   Dynamic Standing Fair -   Ambulatory Fair -   Endurance Deficit   Endurance Deficit Yes   Endurance Deficit Description Fatigue   Activity Tolerance   Activity Tolerance Patient tolerated treatment well   Nurse Made Aware Yes Exercises   THR Supine;Sitting;Bilateral;AROM;AAROM;20 reps  (SLR, HS, quad sets, AP, LAQ, hip abd)   Assessment   Prognosis Good   Problem List Decreased strength;Decreased range of motion;Decreased endurance;Decreased mobility; Impaired judgement   Assessment Pt  noted with decreased strength and ROM in LLE comapre to RLE  Pt  needed icnreased assist for LLE ROM  Seated rest between ambualtion due to fatigue  Overall no LOB balance noted  Cues for increasing the heel sttrike and reduce the excess WBing of UEs onto the RW during ambulation given  Assistance for RW negotiation during ambulation was also given  Increased A for STS transfer from lower surface chair noted and cues for hand placement given as well  Pt  reported no icnreased discomfort post session  Improved gait as session progressed with increased step length and continuous steps  However overall slow gait noted  pt  noted with great improvement in her mobility and needed decreased assist  However patient continue to be below her baseline and needs assist for all mobility and safety  Pt  positioned on toilet with OT present at the end of session  Will continue to follow per PT POC as she will continue to benefit from skilled PT to address her strength, mobility and endurance deficits  05/31/22 5112    OT Last Visit   OT Visit Date 05/31/22   Note Type   Note Type Treatment   Restrictions/Precautions   Weight Bearing Precautions Per Order No   Other Precautions Chair Alarm; Bed Alarm;Multiple lines;Pain   General   Response to Previous Treatment Patient with no complaints from previous session   Pain Assessment   Pain Assessment Tool 0-10   Pain Score No Pain   ADL   Where Assessed Chair   Grooming Assistance 5  Supervision/Setup   Grooming Deficit Setup;Supervision/safety; Increased time to complete;Wash/dry hands;Brushing hair   Grooming Comments Standing at sink   LB Dressing Assistance 3  Moderate Assistance   LB Dressing Deficit Setup;Verbal cueing;Supervision/safety; Increased time to complete; Don/doff R sock; Don/doff L sock   Toileting Assistance  3  Moderate Assistance   Toileting Deficit Setup;Steadying;Verbal cueing;Supervison/safety; Increased time to complete;Clothing management down;Clothing management up   Functional Standing Tolerance   Time 3-4 mins   Activity Dynamic standing balance activity   Comments Min A for balance/steadying w/ use of RW   Bed Mobility   Supine to Sit Unable to assess   Sit to Supine Unable to assess   Additional Comments Pt seated OOB in chair with chair alarm activated at end of session  Call bell and phone within reach  All needs met and pt reports no further questions for OT at this time  Transfers   Sit to Stand 3  Moderate assistance   Additional items Assist x 1;Bedrails;Armrests; Increased time required;Verbal cues   Stand to Sit 4  Minimal assistance   Additional items Assist x 1; Armrests; Increased time required;Verbal cues   Toilet transfer 3  Moderate assistance   Additional items Assist x 1; Increased time required;Verbal cues; Commode   Additional Comments Cues for safe technique and hand placement   Functional Mobility   Functional Mobility 4  Minimal assistance   Additional Comments Assist x1   Additional items Rolling walker   Cognition   Overall Cognitive Status WFL   Arousal/Participation Alert; Cooperative   Attention Attends with cues to redirect   Orientation Level Oriented X4   Memory Decreased recall of precautions   Following Commands Follows one step commands without difficulty   Comments Pleasant and cooperative   Activity Tolerance   Activity Tolerance Patient limited by fatigue;Patient tolerated treatment well   Medical Staff Made Aware HEMAL Vega; Abigail Tang PTA   Assessment   Assessment Pt seen for OT treatment session focusing on functional activity tolerance, bed mobility, ADLs, UE ROM/strengthening, and functional transfers/mobility  Pt alert and cooperative throughout session   Pt seated EOB w/ PT at start of session  Transfers (sit<>stand) completed w/ Mod-Min A with verbal cues for safe technique and hand placement  Pt required Mod A to initiate forward weight shift from lower surface levels (ie: bedside chair, standard toilet) for sit>stand  Min A required for functional mobility with assist for balance/steadying w/ use of RW  Pt required 1 seated rest break 2* increased fatigue demonstrated  Toileting tasks completed w/ Mod A with assist for steadying during clothing management up/down 2* decreased dynamic standing balance demonstrated  Pt able to complete perineal hygiene tasks w/ Supervision while seated  Grooming tasks completed w/ Supervision while standing at sink to wash/dry hands and brush hair  LB dressing completed w/ Mod A  Pt able to doff B/L socks w/ increased time/effort and required assist to don B/L socks 2* limited functional reach  UE exercises completed while seated OOB in chair to increase UE ROM/strength needed for ADLs/transfers  Pt tolerated well, no c/o pain or fatigue  Pt seated OOB in chair with chair alarm activated at end of session  Call bell and phone within reach  All needs met and pt reports no further questions for OT at this time  Continue to recommend STR when medically cleared  OT to follow pt on caseload             CARE SCORES:  Self Care:  Eatin: Setup or clean-up assistance  Oral hygiene: 04: Supervision or touching  assistance  Toilet hygiene: 03: Partial/moderate assistance  Shower/bathing self: 03: Partial/moderate assistance  Upper body dressin: Supervision or touching  assistance  Lower body dressin: Partial/moderate assistance  Putting on/taking off footwear: 03: Partial/moderate assistance  Transfers:  Roll left and right: 03: Partial/moderate assistance  Sit to lyin: Supervision or touching  assistance  Lying to sitting on side of bed: 03: Partial/moderate assistance  Sit to stand: 03: Partial/moderate assistance  Chair/bed to chair transfer: 03: Partial/moderate assistance  Toilet transfer: 03: Partial/moderate assistance  Mobility:  Walk 10 ft: 03: Partial/moderate assistance  Walk 50 ft with two turns: 03: Partial/moderate assistance  Walk 150ft: 88: Not attempted due to medical conditions or safety concerns    CURRENT GAP IN FUNCTION  Prior to Admission: Functional Status: Patient was independent with mobility/ambulation, transfers, ADL's, IADL's  Estimated length of stay: 7 to 10 days    Anticipated Post-Discharge Disposition/Treatment  Disposition: Return to previous home/apartment  Outpatient Services: Physical Therapy (PT) and Occupational Therapy (OT)    BARRIERS TO DISCHARGE  Weakness, Pain, Diminished cognition/Mentation change, Balance Difficulty, Fatigue, Home Accessibility, Caregiver Accessibility, Equipment Needs and Lives Alone    INTERVENTIONS FOR DISCHARGE  Adaptive equipment, Patient/Family/Caregiver Education, Arrange DME needs, Home Modifcations, Therapy exercises, Energy conservation education  and Fall Prevention, Home Safety     REQUIRED THERAPY:  Patient will require PT and OT 90 minutes each per day, five days per week to achieve rehab goals  REQUIRED FUNCTIONAL AND MEDICAL MANAGEMENT FOR INPATIENT REHABILITATION:  Skin:  There are no pressure sores currently, continue with pressure reduction strategies  , Pain Management: Overall pain is well controlled, Fluid/Electrolytes/Nutrition:  Current Diet: general, Deep Vein Thrombosis (DVT) Prophylaxis:  heparin and SCD's while in bed  Nursing to provide education/training for medication and disease management, provide bowel/bladder management training  Internal Medicine to monitor and manage medical conditions, PM&R to maximize function and provide medical oversight of rehabilitation program   PT/OT interventions, Speech Therapy evaluation and treatment as clinically indicated    Patient/family education and training and additional consults will be provided as coleman Jacobsen RECOMMENDED LEVEL OF CARE: Patient is a 75-year-old male admitted to [facility] with complaints of progressive bilateral lower extremity weakness for last 3 weeks with fall at home 2 weeks ago  Patient diagnosed with rhabdomyolysis and found to have a right toe fracture  Prior to admission patient was fully independent with functional mobility, self-care and IADL tasks  Patient currently requires Min A with transfers and ambulation up to 20 feet with use of a RW  Patient also requires Min A with UB bathing and dressing and Mod A with LB bathing and dressing  Patient will benefit from continued PT and OT services to progress functional mobility and self-care skills to highest level of independence  Nursing is recommended to provide continued patient/family education and training in areas of medication management, chronic disease management and bowel/bladder training  Internal Medicine will continue to monitor medical conditions, PM&R to maximize function and provide medical oversight of rehabilitation program    In-patient rehabilitation is required to maximize self-care, mobility, strength, and endurance for discharge home with family

## 2022-06-02 NOTE — NURSING NOTE
Pt admitted from [address] [place] [address] GROUP received from [email or web address]  Pt is alert and oriented, calm and cooperative, she denies pain  Pt was reported to be incontinent-she does have urgency and has been continent so far today since 1430  Commode is bedside due to urgency  Pt stated her legs do feel heavy, she is able to stand and pivot with rolling walker, needs verbal cues for foot placement  She takes pills whole, one at a time, larger pills need to be crushed or cut in half  This RN did contact supervisor for surgical shoe for each foot  Pt has call bell in reach, will continue to monitor

## 2022-06-02 NOTE — ASSESSMENT & PLAN NOTE
64year old female presenting with leg weakness concerning for drug-induced rhabdomyolysis  - Statin held  - Neurology recommendations appreciated  - Discussed her case with Rheumatology  Dr Yohan Sanches does not think it is an inflammatory myopathy, she does recommend holding methotrexate for the time being with close outpatient follow-up with her outpatient rheumatologist   - Myositis panel pending   CRP / ESR normal

## 2022-06-02 NOTE — ASSESSMENT & PLAN NOTE
Continue home regimen: Cardizem CD 240mg daily  Monitor BP with routine VS   Follow-up with PCP as outpatient

## 2022-06-02 NOTE — DISCHARGE SUMMARY
[facility]  Discharge- [facility] 1945, 64 y o  female MRN: [id number]  Unit/Bed#: [facility] 87 220-01 Encounter: [id number]  Primary Care Provider: Anish Dubon DO   Date and time admitted to hospital: 5/26/2022  1:41 PM    * Rhabdomyolysis  Assessment & Plan  64year old female presenting with leg weakness concerning for drug-induced rhabdomyolysis  - Statin held  - Neurology recommendations appreciated  - Discussed her case with Rheumatology  Dr Oconnor Left does not think it is an inflammatory myopathy, she does recommend holding methotrexate for the time being with close outpatient follow-up with her outpatient rheumatologist   - Myositis panel pending  CRP / ESR normal       Fracture, toe  Assessment & Plan  Acute appearing minimally displaced oblique fracture involving the medial base of the proximal phalanx of the 1st digit  - Podiatry recommendations appreciated  Weakness of both lower extremities  Assessment & Plan  Due to statin induced myopathy  For ARC  Elevated liver enzymes  Assessment & Plan  Likely secondary to statin induced rhabdomyolysis      Recent Labs     06/01/22  1716   AST 63*   *   TBILI 0 47   ALKPHOS 55         Ambulatory dysfunction  Assessment & Plan  Lower extremity weakness possibly due to rhabdomyolysis  Possibly due to statin related myopathy    - See plan above    HLD (hyperlipidemia)  Assessment & Plan  Statin held due to rhabdomyolysis    Essential hypertension  Assessment & Plan  Continue Cardizem    Psoriatic arthritis [facility])  Assessment & Plan  Takes methotrexate 17 5 mg on Mondays   Hold for now per rheumatology  - Outpatient followup with rheumatology    History of TIA (transient ischemic attack)  Assessment & Plan  History of TIA, maintained on Plavix      Discharging Physician / Practitioner: Shante Bowie MD  PCP: Anish Dubon DO  Admission Date:   Admission Orders (From admission, onward)     Ordered        05/26/22 3985 Inpatient Admission  Once                      Discharge Date: 06/02/22    Medical Problems             Resolved Problems  Date Reviewed: 6/2/2022   None                 Consultations During Hospital Stay:  · Podiatry, neurology    Procedures Performed:   · none    Significant Findings / Test Results:   · Imaging  · XR Chest:    No acute cardiopulmonary disease  · XR foot left / XR ankle left:    1  Acute appearing minimally displaced oblique fracture involving the medial base of the proximal phalanx of the 1st digit  2   Degenerative and postoperative change as noted  · CT spine thoracic & Lumbar:    No acute osseous abnormality      Multilevel degenerative changes, as described, most significant at L4-L5, as described          · VAS lower limb:    RIGHT LOWER LIMB:  No evidence of acute or chronic deep vein thrombosis  No evidence of superficial thrombophlebitis noted  Doppler evaluation shows a normal response to augmentation maneuvers  Popliteal, posterior tibial and anterior tibial arterial Doppler waveforms are  biphasic  LEFT LOWER LIMB:  No evidence of acute or chronic deep vein thrombosis  No evidence of superficial thrombophlebitis noted  Doppler evaluation shows a normal response to augmentation maneuvers  Popliteal, posterior tibial and anterior tibial arterial Doppler waveforms are  biphasic  · MRI lumbar spine:  Severe facet degenerative change L4-5 results in anterolisthesis resulting in moderate central stenosis, moderate right foraminal narrowing, and moderate to severe right lateral recess stenosis      Mild degenerative changes elsewhere as described  · VAS JONATHON:    RIGHT LOWER LIMB  Ankle/Brachial Index: 1 29 which is in the normal range  PPG/PVR Tracings are normal   Biphasic/Triphasic waveforms at the ankle  Metatarsal Pressure Non-comp  Great Toe Pressure: 70 mmHg, within the healing range    LEFT LOWER LIMB  Ankle/Brachial Index: 1 26 which is in the normal range  PPG/PVR Tracings are normal  The toe PPG is slightly dampened  Biphasic/Triphasic waveforms at the ankle  Metatarsal Pressure 113 mmHg  Great Toe Pressure: 63 mmHg, within the healing range  Incidental Findings:   · As written above  · Acute appearing minimally displaced oblique fracture involving the medial base of the proximal phalanx of the 1st digit  Test Results Pending at Discharge (will require follow up):   · CCP  · Aldolase  · CARLIE  · Anti-Jo1  · HMG-COA reductase antibody  · Myomarker 3 plus profile     Outpatient Tests Requested:  · PCP, rheumatology  · CBC, Comprehensive metabolic panel    Complications:  none    Reason for Admission: Rhabdomyolysis    Hospital Course: Jean Carlos Cordon is a 64 y o  female patient who originally presented to the hospital on 5/26/2022 due to weakness  Patient is a 64year old female with a history of TIA, hyperlipidemia, and psoriatic arthritis  She presented to our institution due to weakness  She was found to have rhabdomyolysis  She was given IV hydration  We currently suspect that this is statin induced  Her statin was held  CK levels improved  Neurology and rheumatology were both consulted  Myositis panel sent by neurology  They recommend consideration for muscle biopsy if she has no clinical improvement  Spoke to rheumatology yesterday  Dr Kavita Duong is not convinced that this is not an inflammatory myopathy  At this time, ESR / CRP are within normal limits  Current recommendation is to hold methotrexate for now until she is seen by her outpatient rheumatologist     Of note, patient had an acute appearing minimally displaced oblique fracture involving the medial base of the proximal phalanx of the 1st digit  Podiatry recommends surgical shoe and follow-up with them as needed  Patient agrees to follow-up with her providers as scheduled and to take her medications as prescribed  All questions were addressed      she understood the need to seek immediate medical attention should she develop any chest pain, shortness of breath, severe pain, fever, chills, or any other concerning symptoms  Please see above list of diagnoses and related plan for additional information  Condition at Discharge: fair     Discharge Day Visit / Exam:     Subjective:  Patient seen and examined at bedside  Comfortable  No new complaints  Vitals: Blood Pressure: 133/55 (06/02/22 0741)  Pulse: 56 (06/02/22 0741)  Temperature: 97 6 °F (36 4 °C) (06/02/22 0741)  Temp Source: Oral (06/02/22 0741)  Respirations: 18 (06/02/22 0741)  Height: 5' 5" (165 1 cm) (05/26/22 1846)  Weight - Scale: 54 kg (119 lb 0 8 oz) (06/02/22 0600)  SpO2: 97 % (06/02/22 0741)  Exam:   Physical Exam  Vitals reviewed  Constitutional:       General: She is not in acute distress  HENT:      Head: Normocephalic  Nose: Nose normal       Mouth/Throat:      Mouth: Mucous membranes are moist    Eyes:      General: No scleral icterus  Cardiovascular:      Rate and Rhythm: Normal rate  Pulmonary:      Effort: Pulmonary effort is normal  No respiratory distress  Abdominal:      General: There is no distension  Palpations: Abdomen is soft  Tenderness: There is no abdominal tenderness  Skin:     General: Skin is warm  Neurological:      Mental Status: She is alert and oriented to person, place, and time  Motor: Weakness (weakness 3/5 BLLE on leg raise) present  Psychiatric:         Mood and Affect: Mood normal          Behavior: Behavior normal        Discharge instructions/Information to patient and family:   See after visit summary for information provided to patient and family  Provisions for Follow-Up Care:  See after visit summary for information related to follow-up care and any pertinent home health orders  Disposition:     Acute Rehab at [address] [place] [address]    Planned Readmission: No     Discharge Statement:  I spent 37 minutes discharging the patient   This time was spent on the day of discharge  I had direct contact with the patient on the day of discharge  Greater than 50% of the total time was spent examining patient, answering all patient questions, arranging and discussing plan of care with patient as well as directly providing post-discharge instructions  Additional time then spent on discharge activities  Discharge Medications:  See after visit summary for reconciled discharge medications provided to patient and family        ** Please Note: This note has been constructed using a voice recognition system **

## 2022-06-03 PROBLEM — Z78.9 IMPAIRED MOBILITY AND ADLS: Status: ACTIVE | Noted: 2022-05-26

## 2022-06-03 PROBLEM — Z74.09 IMPAIRED MOBILITY AND ADLS: Status: ACTIVE | Noted: 2022-05-26

## 2022-06-03 LAB
CCP AB SER IA-ACNC: 1.5
RYE IGE QN: NEGATIVE

## 2022-06-03 PROCEDURE — 99232 SBSQ HOSP IP/OBS MODERATE 35: CPT | Performed by: INTERNAL MEDICINE

## 2022-06-03 PROCEDURE — 97163 PT EVAL HIGH COMPLEX 45 MIN: CPT

## 2022-06-03 PROCEDURE — 97166 OT EVAL MOD COMPLEX 45 MIN: CPT

## 2022-06-03 PROCEDURE — 97116 GAIT TRAINING THERAPY: CPT

## 2022-06-03 PROCEDURE — 99232 SBSQ HOSP IP/OBS MODERATE 35: CPT

## 2022-06-03 PROCEDURE — 97535 SELF CARE MNGMENT TRAINING: CPT

## 2022-06-03 PROCEDURE — 97530 THERAPEUTIC ACTIVITIES: CPT

## 2022-06-03 RX ORDER — ENOXAPARIN SODIUM 100 MG/ML
40 INJECTION SUBCUTANEOUS
Status: DISCONTINUED | OUTPATIENT
Start: 2022-06-04 | End: 2022-06-15 | Stop reason: HOSPADM

## 2022-06-03 RX ORDER — HEPARIN SODIUM 5000 [USP'U]/ML
5000 INJECTION, SOLUTION INTRAVENOUS; SUBCUTANEOUS EVERY 8 HOURS SCHEDULED
Status: COMPLETED | OUTPATIENT
Start: 2022-06-03 | End: 2022-06-03

## 2022-06-03 RX ADMIN — CALCIUM 2 TABLET: 500 TABLET ORAL at 08:16

## 2022-06-03 RX ADMIN — HEPARIN SODIUM 5000 UNITS: 5000 INJECTION INTRAVENOUS; SUBCUTANEOUS at 17:24

## 2022-06-03 RX ADMIN — FOLIC ACID 1 MG: 1 TABLET ORAL at 22:00

## 2022-06-03 RX ADMIN — DILTIAZEM HYDROCHLORIDE 240 MG: 120 CAPSULE, COATED, EXTENDED RELEASE ORAL at 08:17

## 2022-06-03 RX ADMIN — FOLIC ACID 1 MG: 1 TABLET ORAL at 17:24

## 2022-06-03 RX ADMIN — FOLIC ACID 1 MG: 1 TABLET ORAL at 08:16

## 2022-06-03 RX ADMIN — Medication 2000 UNITS: at 08:17

## 2022-06-03 RX ADMIN — DOCUSATE SODIUM 100 MG: 100 CAPSULE, LIQUID FILLED ORAL at 17:24

## 2022-06-03 RX ADMIN — CLOPIDOGREL BISULFATE 75 MG: 75 TABLET ORAL at 21:59

## 2022-06-03 RX ADMIN — DOCUSATE SODIUM 100 MG: 100 CAPSULE, LIQUID FILLED ORAL at 08:16

## 2022-06-03 RX ADMIN — HEPARIN SODIUM 5000 UNITS: 5000 INJECTION INTRAVENOUS; SUBCUTANEOUS at 06:53

## 2022-06-03 RX ADMIN — LATANOPROST 1 DROP: 50 SOLUTION OPHTHALMIC at 21:59

## 2022-06-03 NOTE — PROGRESS NOTES
06/03/22 0830   PT Transfer Goal   Transfer Type All Transfer   Transfer Assist Level Modified [place]   Assistive Device Roller Walker  (LRAD)   Safety Precautions WBAT  (LLE)   Environment Level Surface   Status Ongoing; Target goal - two weeks; Discharge Home  (LOS 10-14 days)   Toileting Transfer Goal   Toileting Transfer Assist Level Moderate [place]   Assistive Device Roller Walker   Status Ongoing; Target goal - two weeks; Discharge Home  (anticipate LOS 10-14 days)   Intervention Balance Work;Assistive Device   Walking Goal   Walk Assist Level Moderate [place]   Gait Pattern Improvement Inconsistant [place]; Excess Slow; Antalgic; Forward Flexion   Assist Device Roller Walker   Distance Walked 200 ft   Safety Precautions WBAT  (LLE)   Status Ongoing; Target goal - two weeks; Discharge Home  (anticipate LOS 10-14 days)   Stairs Goal   Stair Assist Level Moderate [place]   Number of Stairs 4   Technique Non-reciprocal   Hand Rail Left   Status Ongoing; Target goal - two weeks; Discharge Home  (anticipate LOS 10-14 days)

## 2022-06-03 NOTE — ASSESSMENT & PLAN NOTE
L foot XR: acute appearing minimally displaced oblique fracture involving medial base of the proximal phalanx of the 1st digit  Podiatry consulted in acute setting - recommended WBAT and surgical shoes to both feet with ambulation  Follow-up with Podiatry as outpatient  Verified surgical shoes with Dr Helena Dennison - should continue for 3 weeks after XR on 5/26  Repeat XR in 3 weeks (6/16)

## 2022-06-03 NOTE — ASSESSMENT & PLAN NOTE
OP follow-up with rheum Dr Arielle Bianchi  - Weekly MTX can be resumed next Monday - IM discussed with OP rheum Dr Arielle Bianchi 6/14   - Monitor for flare

## 2022-06-03 NOTE — PROGRESS NOTES
[facility]  Progress Note - Mery Pichardo 1945, 64 y o  female MRN: [id number]  Unit/Bed#: [facility] [place] 329-86 Encounter: [id number]  Primary Care Provider: Rickey Sainz,    Date and time admitted to hospital: 6/2/2022  2:47 PM    Osteoporosis  Assessment & Plan  DXA scan on 5/13/22 showed osteoporosis  Start on Vitamin D 2000u daily and calcium 1000mg daily  Repeat Vitamin D level with next set of labs - last level was 31 in 02/2020  Follow-up with PCP or Rheumatology at discharge  Fracture, toe  Assessment & Plan  L foot XR: acute appearing minimally displaced oblique fracture involving medial base of the proximal phalanx of the 1st digit  Podiatry consulted in acute setting - recommended WBAT and surgical shoes to both feet with ambulation  Follow-up with Podiatry as outpatient  Verified surgical shoes with Dr Jay Patterson - should continue for 3 weeks after XR on 5/26  Repeat XR in 3 weeks (6/16)  Weakness of both lower extremities  Assessment & Plan  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  CK 6976 on admission - currently 731  Simvastatin was stopped and currently on hold  Per Rheumatology - presentation not consistent with inflammatory myositis  MRI L spine showed severe facet degenerative change at L4-L5 resulting in moderate central stenosis, moderate R foramina narrowing, and moderate to severe R lateral recess stenosis  JONATHON on 5/31 was WNL  Venous duplex on 5/26 negative for DVTs  Myositis panel, cyclic citrul peptide antibody IgG, and HMGCR pending  Primary team following  PT/OT  Elevated liver enzymes  Assessment & Plan  Likely due to recent rhabdomyolysis  AST 63 and  (from 157 and 177 respectively)  Continue to trend with routine CMP  HLD (hyperlipidemia)  Assessment & Plan  Last lipid panel on 3/31/22: Cholesterol 169, Triglycerides 69, HDL 87, and LDL 68  Hold statin at this time due to recent rhabdomyolysis      Essential hypertension  Assessment & Plan  Continue home regimen: Cardizem CD 240mg daily  Monitor BP with routine VS   Follow-up with PCP as outpatient  Psoriatic arthritis ([facility] [address] )  Assessment & Plan  Home regimen: methotrexate 17 5mg on Mondays  Per Rheumatology - continue to hold until outpatient follow-up with Rheumatologist (Dr Sekou Garcia)  History of TIA (transient ischemic attack)  Assessment & Plan  Hx of TIA without any deficits  Continue Plavix 75mg daily  Statin on hold  * Rhabdomyolysis  Assessment & Plan  Likely due to statin  Simvastatin currently on hold  CK 6976 on admission - currently 731  Improved with IV fluids in acute setting  Continue to trend with routine labs  Per Rheumatology - presentation not consistent with inflammatory myositis  Rheumatology recommended holding methotrexate until further follow-up with outpatient Rheumatologist (Dr Sekou Garcia)  Ensure adequate hydration  Continue PT/OT  VTE Pharmacologic Prophylaxis:   Pharmacologic: Heparin  Mechanical VTE Prophylaxis in Place: Yes - sequential compression devices  Current Length of Stay: 1 day(s)    Current Patient Status: Inpatient Rehab     Discharge Plan: As per primary team     Code Status: Level 1 - Full Code    Subjective:   Pt examined while pt sitting in recliner in pt room  Currently complaining of B/L knee pain, aching, 3/10, improves with rest   States that the knee pain started after being admitted to the hospital   Denies any pain at home previously  Encouraged to use Tylenol and Voltaren gel at this time  If pain starts to worsen would recommend XR  Denies any difficulties in ambulation and states that there is no pain at rest, only with activity  Denies any pain to the L foot  Slept well last night  Feels that therapy was going well this morning  Denies any lightheadedness, dizziness, SOB, or palpitations  LBM was on 6/1    Has been receiving heparin injections in her arm and is refusing to have them given in the abdomen  Educated on how heparin is not safe to go in the arm  Willing to have injections in the thigh - will switch to Lovenox so that it is once a day injection  Objective:     Vitals:   Temp (24hrs), Av 5 °F (36 9 °C), Min:97 7 °F (36 5 °C), Max:99 °F (37 2 °C)    Temp:  [97 7 °F (36 5 °C)-99 °F (37 2 °C)] 97 7 °F (36 5 °C)  HR:  [55-63] 58  Resp:  [18] 18  BP: (133-152)/(63-68) 134/63  SpO2:  [95 %-98 %] 95 %  Body mass index is 18 53 kg/m²  Review of Systems   Constitutional: Negative for appetite change, chills, fatigue and fever  HENT: Negative for trouble swallowing  Eyes: Negative for visual disturbance  Respiratory: Negative for cough, shortness of breath, wheezing and stridor  Cardiovascular: Negative for chest pain, palpitations and leg swelling  Gastrointestinal: Negative for abdominal distention, abdominal pain, constipation, diarrhea, nausea and vomiting  LBM    Genitourinary: Negative for difficulty urinating  Musculoskeletal: Positive for arthralgias (B/L knee pain, aching, 3/10, has not tried any pain medication at this time, does not interfere with ambulation)  Negative for back pain  Neurological: Negative for dizziness, weakness, light-headedness, numbness and headaches  Psychiatric/Behavioral: Negative for sleep disturbance  All other systems reviewed and are negative  Input and Output Summary (last 24 hours): Intake/Output Summary (Last 24 hours) at 6/3/2022 0955  Last data filed at 6/3/2022 0900  Gross per 24 hour   Intake 460 ml   Output --   Net 460 ml       Physical Exam:     Physical Exam  Vitals and nursing note reviewed  Constitutional:       Appearance: Normal appearance  HENT:      Head: Normocephalic and atraumatic  Cardiovascular:      Rate and Rhythm: Normal rate and regular rhythm  Pulses: Normal pulses  Heart sounds: Normal heart sounds  No murmur heard  No friction rub     Pulmonary: Effort: Pulmonary effort is normal  No respiratory distress  Breath sounds: Normal breath sounds  No wheezing or rhonchi  Abdominal:      General: Abdomen is flat  Bowel sounds are normal  There is no distension  Palpations: Abdomen is soft  Tenderness: There is no abdominal tenderness  Musculoskeletal:      Cervical back: Normal range of motion and neck supple  Right lower leg: No edema  Left lower leg: No edema  Skin:     General: Skin is warm and dry  Capillary Refill: Capillary refill takes less than 2 seconds  Neurological:      Mental Status: She is alert and oriented to person, place, and time     Psychiatric:         Mood and Affect: Mood normal          Behavior: Behavior normal          Additional Data:     Labs:        Results from last 7 days   Lab Units 06/01/22  1716   SODIUM mmol/L 141   POTASSIUM mmol/L 3 6   CHLORIDE mmol/L 103   CO2 mmol/L 30   BUN mg/dL 11   CREATININE mg/dL 0 52*   ANION GAP mmol/L 8   CALCIUM mg/dL 8 4   ALBUMIN g/dL 2 5*   TOTAL BILIRUBIN mg/dL 0 47   ALK PHOS U/L 55   ALT U/L 110*   AST U/L 63*   GLUCOSE RANDOM mg/dL 86                       Labs reviewed    Imaging:    Imaging reviewed    Recent Cultures (last 7 days):           Last 24 Hours Medication List:   Current Facility-Administered Medications   Medication Dose Route Frequency Provider Last Rate    acetaminophen  650 mg Oral Q6H PRN Juan Ramon Hilton MD      aluminum-magnesium hydroxide-simethicone  30 mL Oral Q6H PRN Juan Ramon Hilton MD      bisacodyl  10 mg Rectal Daily PRN Juan Ramon Hilton MD      calcium carbonate  2 tablet Oral Daily With [address] ANNE Patel      cholecalciferol  2,000 Units Oral Daily ANNE Alvarez      clopidogrel  75 mg Oral HS Juan Ramon Hilton MD      diltiazem  240 mg Oral Daily Juan Ramon Hilton MD      docusate sodium  100 mg Oral BID Juan Ramon Hilton MD      folic acid  1 mg Oral TID Juan Ramon Hilton MD      heparin (porcine)  5,000 Units Subcutaneous [facility] Eladia Ibarra MD      latanoprost  1 drop Both Eyes HS Eladia Ibarra MD      polyethylene glycol  17 g Oral Daily PRN Eladia Ibarra MD          M*Modal software was used to dictate this note  It may contain errors with dictating incorrect words or incorrect spelling  Please contact the provider directly with any questions

## 2022-06-03 NOTE — ASSESSMENT & PLAN NOTE
Home regimen: methotrexate 17 5mg on Mondays  Per Rheumatology - continue to hold until outpatient follow-up with Rheumatologist (Dr Neftali Rivera)

## 2022-06-03 NOTE — ASSESSMENT & PLAN NOTE
Improving significantly   Multifactorial: Rhabdo/statin-induced myopathy, foot fracture, deconditioning   - Optimal management of each as listed -   - Completed acute comprehensive interdisciplinary inpatient rehabilitation to include intensive skilled therapies (PT, OT +/- ST) as outlined with oversight and management by rehabilitation physician as well as inpatient rehab level nursing, case management and weekly interdisciplinary team meetings     - Per tx - patient should be able to perform cooking, laundry, med mgmt; neighbor will assist with groceries and driving and CM give her info on [facility] [place]  - Recommend   PT, OT, RN, MSW

## 2022-06-03 NOTE — ASSESSMENT & PLAN NOTE
Secondary stroke prevention  - Antithrombotic: plavix  - Holding Statin with recent possible statin induced myopathy/rhabdo  - Optimal management of blood pressure

## 2022-06-03 NOTE — PROGRESS NOTES
06/03/22 0830   Patient Data   Rehab Impairment Impairment of mobility, safety and Activities of Daily Living (ADLs) due to Neurologic Conditions:  03 8  Neuromuscular Disorders   Etiologic Diagnosis Rhabdomyolysis, Myopathy  (statin induced)   Date of Onset 05/26/22   Support System   Name Bakari Raymond   Relationship Support Neighbor   Home Setup   Type of Home Single Level   Method of Entry Hand Rail Left   Number of Stairs   (1+1)   First Floor Setup Available Yes   Home Modification Comment pending progress   Available Equipment Standard Walker  (pt reports neighbor may have WC)   Baseline Information   Vocation   (retired)   Transportation    Prior Device(s) Used   (no AD)   Prior Level of Function   Self-Care 3  Independent - Patient completed the activities by him/herself, with or without an assistive device, with no assistance from a helper  Indoor-Mobility (Ambulation) 3  Independent - Patient completed the activities by him/herself, with or without an assistive device, with no assistance from a helper  Stairs 3  Independent - Patient completed the activities by him/herself, with or without an assistive device, with no assistance from a helper  Functional Cognition 2  Needed Some Help - Patient needed a partial assistance from another person to complete activities  Prior Assistance Needed for Meal Preparation;Household Chores/Cleaning   Prior Device Used Z  None of the above   Falls in the Last Year   Number of falls in the past 12 months 1  (reason for admission)   Type of Injury Associated with Fall Injury   Patient Preference   Nickname (Patient Preference) Angelique Castrejon   Psychosocial   Psychosocial (WDL) X   Patient Behaviors/Mood Flat affect;Calm; Cooperative   Restrictions/Precautions   Precautions Cognitive; Fall Risk;Pain;Supervision on toilet/commode   Weight Bearing Restrictions Yes   LLE Weight Bearing Per Order WBAT   Braces or Orthoses Other (Comment)  (B/L surgical shoe (3 weeks)) Pain Assessment   Pain Assessment Tool 0-10   Pain Score 3   Pain Location/Orientation Orientation: Left; Location: Knee   Pain Onset/Description Frequency: Intermittent; Descriptor: Aching  (increased pain w/ activity)   Effect of Pain on Daily Activities pain impacts functional mobility tasks and activity tolerance   Patient's Stated Pain Goal No pain   Hospital Pain Intervention(s) Repositioned; Ambulation/increased activity; Emotional support   Multiple Pain Sites No   Toileting Hygiene   Type of Assistance Needed Supervision   Physical Assistance Level No physical assistance   Comment Bladder only observed   Toileting Hygiene CARE Score 4   Toilet Transfer   Surface Assessed Standard Commode   Transfer Technique Standard  (w/ RW)   Limitations Noted In Balance; Endurance;LE Strength   Adaptive Equipment Walker  (arm rests)   Findings Perform toilet transfer X 2 w/ min A x1 w/ VC for hand placement  Mod A x 1 for dressing   Type of Assistance Needed Incidental touching   Physical Assistance Level No physical assistance   Comment min A x 1 w/ RW   Toilet Transfer CARE Score 4   Transfer Bed/Chair/Wheelchair   Limitations Noted In Balance; Endurance;Pain Management;UE Strength;LE Strength   Adaptive Equipment Roller Walker   Findings pt min A x 1 for bed <> WC transfer   Type of Assistance Needed Physical assistance   Physical Assistance Level 25% or less   Chair/Bed-to-Chair Transfer CARE Score 3   Roll Left and Right   Type of Assistance Needed Physical assistance   Physical Assistance Level 26%-50%   Comment Pt able to perform rolling w/ mod A x 1 w/o bed rail  Pt S w/ bedrail   Roll Left and Right CARE Score 3   Sit to Lying   Type of Assistance Needed Incidental touching   Physical Assistance Level No physical assistance   Comment CGA + increased time to complete task   Some difficulty lifting LLE   Sit to Lying CARE Score 4   Lying to Sitting on Side of Bed   Type of Assistance Needed Physical assistance Physical Assistance Level 26%-50%   Comment min-mod A x 1 to R w/ bedrail  Some difficulty lifting LLE   Lying to Sitting on Side of Bed CARE Score 3   Sit to Stand   Type of Assistance Needed Physical assistance   Physical Assistance Level 25% or less   Comment min A x 1 w/ VC for hand placement, sequencing, and to lean forward   Sit to Stand CARE Score 3   Picking Up Object   Type of Assistance Needed Incidental touching; Adaptive equipment   Physical Assistance Level No physical assistance   Comment CGA w/ grabber in RUE (marker) w/ RW   Picking Up Object CARE Score 4   Car Transfer   Reason if not Attempted Environmental limitations   Car Transfer CARE Score 10   Ambulation   Primary Mode of Locomotion Prior to Admission Walk   Distance Walked (feet) 150 ft  (100 ft, 10 ft x 2)   Assist Device Roller Walker   Gait Pattern Antalgic; Inconsistant Holly;Decreased foot clearance; Slow Holly; Forward Flexion; Step to; Improper weight shift   Limitations Noted In Balance; Coordination;Device Management; Endurance; Heel Strike;Posture; Safety;Speed;Strength;Swing   Provided Assistance with: Balance   Walk Assist Level Contact Guard   Findings Pt able to ambulate 150 ft RW and CGA  (-) LOB  Pt reports feeling tired and weaker in LLE compared to RLE     Walk 10 Feet   Type of Assistance Needed Incidental touching   Physical Assistance Level No physical assistance   Comment CGA w/ RW   Walk 10 Feet CARE Score 4   Walk 50 Feet with Two Turns   Type of Assistance Needed Incidental touching   Physical Assistance Level No physical assistance   Comment CGA w/ RW   Walk 50 Feet with Two Turns CARE Score 4   Walk 150 Feet   Type of Assistance Needed Incidental touching   Physical Assistance Level No physical assistance   Comment CGA w/ RW   Walk 150 Feet CARE Score 4   Walking 10 Feet on Uneven Surfaces   Comment (S)  Attempt next session   Reason if not Attempted Safety concerns   Walking 10 Feet on Uneven Surfaces CARE Score 88 Wheelchair mobility   Findings anticipate ambulatory at D/C   Wheel 50 Feet with Two Turns   Reason if not Attempted Activity not applicable   Wheel 50 Feet with Two Turns CARE Score 9   Wheel 150 Feet   Reason if not Attempted Activity not applicable   Wheel 481 Feet CARE Score 9   Curb or Single Stair   Style negotiated Single stair   Type of Assistance Needed Physical assistance   Physical Assistance Level 26%-50%   Comment mod A x 1 w/ B/L HR  for balance and safety Non-reciprocal pattern   1 Step (Curb) CARE Score 3   4 Steps   Type of Assistance Needed Physical assistance   Physical Assistance Level 26%-50%   Comment mod A x 1 w/ B/L HR for balance and safety + VC for hand placement and sequencing  Non-reciprocal pattern   4 Steps CARE Score 3   12 Steps   Reason if not Attempted Activity not applicable   12 Steps CARE Score 9   Stairs   Type Stairs   # of Steps 4   Weight Bearing Precautions WBAT;LLE   Assist Devices Bilateral Rail   RLE Assessment   RLE Assessment X   Strength RLE   RLE Overall Strength 4/5   LLE Assessment   LLE Assessment X   Strength LLE   LLE Overall Strength 3/5  (generally 3/5)   Sensation   Light Touch Partial deficits in the LLE   Additional Comments Diminished LLE sensation compared to RLE   Cognition   Overall Cognitive Status Impaired   Arousal/Participation Alert; Cooperative   Attention Attends with cues to redirect   Orientation Level Oriented to person;Oriented to situation;Oriented to time  (generally oriented to date and place w/ options)   Memory Decreased recall of recent events;Decreased short term memory   Following Commands Follows one step commands without difficulty   Comments Throughout session, pt repeated how her neighbor A w/ care of her cat  Pt also cont to mention recent passing of   Pt w/ decreased recall to recent events leading up to hospital admission   Pt unable to recall how long she was down after falling, and couldn't recall when she came to hospital   Vision   Vision Comments pt wears glasses at all times  Pt reports that her glasses are breaking due to fall   Objective Measure   PT Measure(s) Modified [place]: Seated together, unsupported  EO: 30 sec  Seated together, unsupported EC: 26 sec   Therapeutic Exercise   Therapeutic Exercise/Activity STS from various surfaces, ambulation of different distances, stair negotiation, functional mobility transfers  Discharge Information   Vocational Plan Retired/not working   Patient's Discharge Plan home w/ HHPT   Patient's Rehab Expectations to return home independently at baseline level   Barriers to Discharge Home No Family Support;Decreased Strength;Decreased Endurance;Pain; Safety Considerations; Unsafe Home Setup   Impressions Liza Cisse is a 64 y o  Female who originally presents to [address] [place] [address] on 5/26/2022 w/ cc of progressive B/L LE weakness for 1 5 weeks and L knee pain 3/10 w/ activity  Pt s/p unwitnessed mechanical fall in home, and is unable to recall length of time down  Recent death of spouse on 5/10/22  Pt dx of rhabdomyolysis likely due to statin medication, and L great toe fx  Transfers to [facility] w/ activity orders of: WBAT L LE and use of B/L surgical shoes  Pt presents w/ comorbidities of osteoporosis, HLD, HTN, hx of TIA, and psoriatic arthritis  At baseline, pt lives alone in [facility] w/ 1 step w/ L grab bar going up +1 BISI into front door  Pt denies use of AD at baseline, but has standard walker at home if needed  Pt reports 1 fall within the last year which lead to inpatient rehabilitation admission  Pt reports that she does drive, and receives help from neighbor "asya", who A w/ taking care of cat, getting mail, and occasionally brings meals  Upon entry, pt presents w/ low affect, but was more eager to participate throughout session  Pt denies pain at rest, however w/ mobility, pt reports L knee pain increased to 3/10 which she attributes to her arthritis   BP at start of PT eval:at rest 152/67 L arm in seated position  At end of session: BP: 134/70 L arm in seated position  Pt requires min-mod A x1 for bed mobility tasks w/ use of bed rail, min A x 1 for transfers w/ RW, CGA A for ambulation w/ RW, mod A x 1 w/ B/L HR for stair negotiation, need for inc time to complete functional mobility tasks due to slow paced mobility, and appropriate rest breaks due to B/L LE fatigue L>R  Pt would greatly benefit from aggressive skilled PT intervention in order to maximize functional strength, improve functional mobility, improve functional independence, and improve cardiovascular endurance in order to reach Mod I goals w/ LRAD as pt will be home alone in order to faciliate safe return to previous living environment  Anticipate LOS 10-14 days  Anticipate recommendation for home PT to follow DC   Pt w/ DME needs of: RW    PT Therapy Minutes   PT Time In 0830   PT Time Out 1015   PT Total Time (minutes) 105   PT Mode of treatment - Individual (minutes) 105   PT Mode of treatment - Concurrent (minutes) 0   PT Mode of treatment - Group (minutes) 0   PT Mode of treatment - Co-treat (minutes) 0   PT Mode of Treatment - Total time(minutes) 105 minutes   PT Cumulative Minutes 105   Cumulative Minutes   Cumulative therapy minutes 105

## 2022-06-03 NOTE — PROGRESS NOTES
[facility]  PT LTGs  ELOS 10-14 days       06/03/22 0830   Rehab Team Goals   Transfer Team Goal Patient will be independent with transfers with least restrictive device upon completion of rehab program   Locomotion Team Goal Patient will be independent with locomotion with least restrictive device upon completion of rehab program   Rehab Team Interventions   PT Interventions Gait Training; Therapeutic Exercise;Transfer Training;Bed Mobility; Wheelchair Mobility;Modalities; Patient/Family Education;Community Reintegration   PT Transfer Goal   Roll left and right Goal 06  Independent - Patient completes the activity by him/herself with no assistance from a helper  Sit to lying Goal 06  Independent - Patient completes the activity by him/herself with no assistance from a helper  Lying to sitting on side of bed Goal 06  Independent - Patient completes the activity by him/herself with no assistance from a helper  Sit to stand Goal 06  Independent - Patient completes the activity by him/herself with no assistance from a helper  Chair/bed-to-chair transfer Goal 06  Independent - Patient completes the activity by him/herself with no assistance from a helper  Car Transfer Goal 04  Supervision or touching assistance- [place] provides VERBAL CUES or supervision throughout activity  Assistive Device   (LRAD)   Safety Precautions WBAT  (LLE)   Environment Level Surface   Status Ongoing; Target goal - two weeks; Discharge Home  (LOS 10-14 days)   Locomotion Goal   Primary discharge mode of locomotion Walking   Target Walk Distance 200 ft   Assist Device   (LRAD)   Gait Pattern Improvement Inconsistant Holly; Excess Slow;Improper weight shift; Forward Flexion; Antalgic   Environment Level Surface   Walk 10 feet Goal 06  Independent - Patient completes the activity by him/herself with no assistance from a helper  Walk 50 feet with 2 turns Goal 06   Independent - Patient completes the activity by him/herself with no assistance from a helper  Walk 150 feet Goal 04  Supervision or touching assistance- [place] provides VERBAL CUES or supervision throughout activity  Walking 10 feet on uneven surface 04  Supervision or touching assistance- [place] provides VERBAL CUES or supervision throughout activity  Walking Goal Status Ongoing; Target goal - two weeks; Discharge Home  (LOS 10-14)   Wheel 50 feet with 2 turns Goal 09  Not applicable   Wheel 268 feet Goal 09  Not applicable   Stairs Goal   1 step or curb goal 04  Supervision or touching assistance- [place] provides VERBAL CUES or supervision throughout activity  4 steps Goal 04  Supervision or touching assistance- [place] provides VERBAL CUES or supervision throughout activity  12 steps Goal 09  Not applicable   Assist Level Contact Guard;Assist x 1   Number of Stairs 2   Technique Non-reciprocal   Hand Rail Left   Status Ongoing; Target goal - two weeks; Discharge Home  (LOS 10-14 days)   Object Retrieval Goal   Picking up object Goal 06  Independent - Patient completes the activity by him/herself with no assistance from a helper     Assistive Device  Reacher   Small Object Picked Up marker

## 2022-06-03 NOTE — H&P
PHYSICAL MEDICINE AND REHABILITATION H&P/ADMISSION NOTE  Kaylan Tipton 64 y o  female MRN: [id number]  Unit/Bed#: [facility] 921-24 Encounter: [id number]     Rehab Diagnosis: Neurologic Conditions:  03 8  Neuromuscular Disorders  Etiologic Diagnosis:  Statin induced myopathy/rhabdomyolysis     History of Present Illness: Kaylan Tipton is a 64 y o  female with PMH psoriatic arthritis, HTN, HL, TIA, who legs gave out from her causing foot pain and she was noted to have significant bilateral LE weakness and was evaluated at the hospital   CK was elevated to close to 7000 and she was initially with acute kidney injury and had elevated LFTs  IVF were provided and CK trended down  X-ray showed displaced oblique fracture involving the medical base of the proximal phalanx of the 1st digit with recommendation by podiatry for WBAT in sx shoes  Neuro had extensive work-up which included MRI L spine which showed moderate central canal stenosis L4-5, Moderate right foraminal narrowing and moderate to sever right lateral recess stenosis  Rheum reviewed case as well  Overall she was not felt to have inflammatory myositis and 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 and statin has been held  She was recommended to hold MTX for now as well  Strength has been improving some  Patient was evaluated by skilled therapies and was found to have significant decline in ADLs and ambulation and appears appropriate for admission to [facility]  Chief complaint:  Leg weakness    Subjective: On eval, patient reports L>R leg weakness and R leg getting quite a bit better and less so L  She denies significant foot pain  Patient notes feeling tired with little energy overall  She denies fever, chills, sweats, SOB, other pain, uncontrolled anxiety, depression, or other new complaints  Review of Systems: A 10-point review of systems was performed  Negative except as listed above      Plan:    Weakness of both lower extremities  Assessment & Plan  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  Skilled therapies as outlined  Optimal nutrition and medical mgmt   Fall precautions     Impaired mobility and ADLs  Assessment & Plan  Multifactorial: Rhabdo/statin-induced myopathy, foot fracture, deconditioning   - Optimal management of each as listed -   - Recommend acute comprehensive interdisciplinary inpatient rehabilitation to include intensive skilled therapies (PT, OT +/- ST) as outlined with oversight and management by rehabilitation physician as well as inpatient rehab level nursing, case management and weekly interdisciplinary team meetings         * Rhabdomyolysis  Assessment & Plan  CK max almost 7000 > trended down   Believed to be 2/2 statin induced - hold statin  Hold MTX per rheum  OP follow-up with PCP and rheum    Fracture, toe  Assessment & Plan  L 1st prox phalanx fx with minimal displacement  - WABT with sx shoes bilaterally   - Repeat XR 6/16/22  - OP podiatry follow-up     At risk for venous thromboembolism (VTE)  Assessment & Plan  SCDs, ambulation, and heparin       Osteoporosis  Assessment & Plan  D3 and calcium     Elevated liver enzymes  Assessment & Plan  Possibly 2/2 rhabdo  Trending down, monitor intermittently     HLD (hyperlipidemia)  Assessment & Plan  Statin on hold   Per IM "Last lipid panel on 3/31/22: Cholesterol 169, Triglycerides 69, HDL 87, and LDL 68"  OP PCP follow-up     Essential hypertension  Assessment & Plan  Internal medicine consulted and co-management with their service  Monitor vitals with and without activity; monitor for orthostasis  Monitor hemoglobin, electrolytes, kidney function, hydration status   Current meds: Cardizem       Psoriatic arthritis ([facility] [address] )  Assessment & Plan  OP follow-up with rheum Dr Vida Barton  - Weekly MTX on hold for now per prior recs  - Monitor for flare     History of TIA (transient ischemic attack)  Assessment & Plan  Secondary stroke prevention  - Antithrombotic: plavix  - Holding Statin with recent possible statin induced myopathy/rhabdo  - Optimal management of blood pressure           Disposition   Home with [facility] 2 weeks from admission     Follow-up providers and other issues to be followed up after discharge   PCP   Rheum    CODE: Level 1: Full Code    Restrictions include: Fall precautions    Social History and Prior Level of Function  [place] [facility] [place]  Starla Cortez is recently  (2 weeks ago) and now lives alone  Reports  recently passed away from Cancer at home, he was receiving hospice services  Patient reports functional decline since  passed away  Self Care: Independent, Indoor Mobility: Independent, Stairs (in/outdoor): Independent and Cognition: Independent     FALLS IN THE LAST 6 MONTHS: 1     HOME ENVIRONMENT:  The living area: 1st floor set up, main living area has bed/bath  There is 1 step  to enter the home  The patient will not have 24 hour supervision/physical assistance available upon discharge  Patient has very supportive neighbor couple who assist patient as needed  Patient's neighbor "Bobby Sotelo" checks on patient multiple times per day        Current Level of Function:    Transfers and ambulation 70 ft min assist  Mod assist LBD, toileting, transfers min-mod assist     Potential Barriers to Discharge:  Co-morbidities (see above), new functional deficits, weakness, deconditioning, lack of home support       Physical Exam:  06/02/22 1430 98 7 °F (37 1 °C) 63 18 152/68 98 98 % None (Room air)     Vitals above reviewed on date of encounter    GEN:  Lying in bed in NAD   HEENT/NECK: Normocephalic, atraumatic, moist mucous membranes   CARDIAC: Regular rate rhythm, no murmers, no rubs, no gallops  LUNGS:  clear to auscultation, no wheezes, rales, or rhonchi  ABDOMEN: Soft, non-tender, non-distended, normal active bowel sounds  EXTREMITIES/SKIN:  no calf edema, no calf tenderness to palpation and R foot with slight ecchmosis and edema around 1st digit  NEURO:   MENTAL STATUS: awake, oriented to person, place, time, and situation, MENTAL STATUS:  Appropriate wakefulness and interaction , CN II-XII: grossly intact  and Strength/MMT:  BUE 5/5, R prox 4-/5, R distal LE 5-/5, L prox 2-3/5, L distal LE 4/5  PSYCH:  Affect:  Euthymic     Laboratory:          Invalid input(s): PLTCT  Results from last 7 days   Lab Units 06/01/22  1716 06/01/22  0545 05/30/22  0618   BUN mg/dL 11 9 5   SODIUM mmol/L 141 143 142   POTASSIUM mmol/L 3 6 3 5 3 5   CHLORIDE mmol/L 103 105 106   CREATININE mg/dL 0 52* 0 56* 0 50*   AST U/L 63*  --   --    ALT U/L 110*  --   --             Wt Readings from Last 1 Encounters:   06/02/22 50 5 kg (111 lb 5 3 oz)     Estimated body mass index is 18 53 kg/m² as calculated from the following:    Height as of this encounter: 5' 5" (1 651 m)  Weight as of this encounter: 50 5 kg (111 lb 5 3 oz)  Imaging: reviewed    Tolerance for three hours of therapy per therapy day: adequate     Rehabilitation Prognosis: good       Rehabilitation Plan/Therapy Interventions: This patient will have close medical and rehabilitation oversight from a physical medicine and rehabilitation physician and if needed an internal medicine physician to manage the complexity of medical issues to optimize health, ability to participate in therapy, quality of life, and functional outcomes  This patient requires 24 hour rehabilitation nursing to address bowel and bladder management, (pain management if listed below), medication administration, positioning/skin monitoring, fall/injury prevention, and VTE prophylaxis  Physical and occupational therapy: Patient requires PT, OT to improve functional mobility, transfers, upper and lower body strengthening, conditioning, balance, and gait training with appropriate assistive device   PT and OT will be provided approximately 5 times per week for 90 minutes per day    Skilled therapists and nursing will also provide patient/family safety education and training  / will work to ensure proper communication between patient (+/- family) and staff regarding the overall rehabilitation and medical process while patient is in the acute rehabilitation center  / will work with patient (and if applicable family and community resources) to optimize safe discharge  Discharge Planning:    Estimated length of stay:   14 days  Family/Patient Goals:  Patient/family's goals: Mod indep    Mobility Goals: Mod indep    Activities of Daily Living (ADLs) Goals: Mod indep      Rehabilitation and discharge goals discussed with the patient and/or family  Case Managment and Social Work to review patient/family resources and to coordinate Discharge Planning  Patient and Family Education and Training:  Rehabilitation and discharge goals discussed with the patient and/or family  Patient/family education/training needs to be discussed in weekly team meeting  Other equipment:  To be determined    Drug regimen reviewed, all potential adverse effects identified and addressed:    Scheduled Meds:  Current Facility-Administered Medications   Medication Dose Route Frequency Provider Last Rate    acetaminophen  650 mg Oral Q6H PRN Pooja Polo MD      aluminum-magnesium hydroxide-simethicone  30 mL Oral Q6H PRN Pooja Polo MD      bisacodyl  10 mg Rectal Daily PRN Pooja Polo MD      calcium carbonate  2 tablet Oral Daily With Breakfast ANNE Church      cholecalciferol  2,000 Units Oral Daily ANNE Church      clopidogrel  75 mg Oral HS Pooja Polo MD      Diclofenac Sodium  2 g Topical 4x Daily PRN ANNE Church      diltiazem  240 mg Oral Daily Pooja Polo MD      docusate sodium  100 mg Oral BID Pooja Polo MD      [START ON 6/4/2022] enoxaparin  40 mg Subcutaneous Q24H [address] ANNE [address]f      folic acid  1 mg Oral TID Tami Roach MD      heparin (porcine)  5,000 Units Subcutaneous [facility] ANNE Camp      latanoprost  1 drop Both Eyes HS Tami Roach MD      polyethylene glycol  17 g Oral Daily PRN Tami Roach MD         Past Medical History:   Past Surgical History:   Family History:   Social history:   Past Medical History:   Diagnosis Date    Abnormal liver function test     last assessed: 3/25/2014    KARIME (acute kidney injury) ([facility] [address] ) 1/11/2020    Anemia     Chronic constipation     last assessed: 12/2/2014    Closed fracture of 4th metacarpal 3/3/2018    Herpes zoster     last assessed: 3/16/2016    Hyperlipidemia     Hypertension     Open nondisplaced fracture of first cervical vertebra with routine healing 3/3/2018    Psoriatic arthritis ([facility] [address] )     last assessed;3/25/2014    Recent weight loss     last assessed: 7/13/2016    Rheumatoid arthritis ([facility] [address] )     [facility]     Syncope 1/11/2020    Trigeminal neuritis     Past Surgical History:   Procedure Laterality Date    APPENDECTOMY      FOOT SURGERY      HYSTERECTOMY      KIDNEY SURGERY       Family History   Problem Relation Age of Onset    No Known Problems Father     No Known Problems Mother     No Known Problems Maternal Grandmother     No Known Problems Paternal Grandmother     No Known Problems Paternal Aunt     No Known Problems Paternal Aunt     No Known Problems Maternal Grandfather     No Known Problems Paternal Grandfather       Social History     Socioeconomic History    Marital status: /Civil Union     Spouse name: Not on file    Number of children: Not on file    Years of education: high school graduate     Highest education level: Not on file   Occupational History    Occupation: Retired   Tobacco Use    Smoking status: Never Smoker    Smokeless tobacco: Never Used   Vaping Use    Vaping Use: Never used   Substance and Sexual Activity    Alcohol use: Not Currently    Drug use: No    Sexual activity: Not on file   Other Topics Concern    Not on file   Social History Narrative    Not on file     Social Determinants of Health     Financial Resource Strain: Not on file   Food Insecurity: No Food Insecurity    Worried About Running Out of Food in the Last Year: Never true    Polly of Food in the Last Year: Never true   Transportation Needs: No Transportation Needs    Lack of Transportation (Medical): No    Lack of Transportation (Non-Medical): No   Physical Activity: Not on file   Stress: Not on file   Social Connections: Not on file   Intimate Partner Violence: Not on file   Housing Stability: Low Risk     Unable to Pay for Housing in the Last Year: No    Number of Places Lived in the Last Year: 1    Unstable Housing in the Last Year: No          Current Medical Diagnosis Allergies   Patient Active Problem List   Diagnosis    Trigeminal neuralgia    History of TIA (transient ischemic attack)    P[facility]iatic arthritis ([facility] [address] )    Arteriosclerotic heart disease    Essential hypertension    Fatty liver    HLD (hyperlipidemia)    Other constipation    Nerve sheath tumor    Transient cerebral ischemia    Mild protein-calorie malnutrition (HCC)    Laceration of forehead    Impaired mobility and ADLs    Rhabdomyolysis    Elevated liver enzymes    Weakness of both lower extremities    Fracture, toe    At risk for venous thromboembolism (VTE)    Osteoporosis    Allergies   Allergen Reactions    Amoxicillin Edema    Penicillins Edema           Medical Necessity Criteria for [facility] Admission: See below  In addition, the preadmission screen, post-admission physical evaluation, overall plan of care and admissions order demonstrate a reasonable expectation that the following criteria were met at the time of admission to the [place] [address]  The patient requires active and ongoing therapeutic intervention of multiple therapy disciplines (physical therapy, occupational therapy, speech-language pathology, or prosthetics/orthotics), one of which is physical or occupational therapy  2  Patient requires an intensive rehabilitation therapy program, as defined in Chapter 1, section 110 2 2 of the CMS Medicare Policy Manual  This intensive rehabilitation therapy program will consist of at least 3 hours of therapy per day at least 5 days per week or at least 15 hours of intensive rehabilitation therapy within a 7 consecutive day period, beginning with the date of admission to the [facility]  3  The patient is reasonably expected to actively participate in, and benefit significantly from, the intensive rehabilitation therapy program as defined in Chapter 1, section 110 2 2 of the CMS Medicare Policy Manual at this time of admission to the [facility]  She can reasonably be expected to make measurable improvement (that will be of practical value to improve the patients functional capacity or adaptation to impairments) as a result of the rehabilitation treatment, as defined in section 110 3, and such improvement can be expected to be made within the prescribed period of time  As noted in the CMS Medicare Policy Manual, the patient need not be expected to achieve complete independence in the domain of self-care nor be expected to return to his or her prior level of functioning in order to meet this standard  4  The patient must require physician supervision by a rehabilitation physician  As such, a rehabilitation physician will conduct face-to-face visits with the patient at least 3 days per week throughout the patients stay in the [facility] to assess the patient both medically and functionally, as well as to modify the course of treatment as needed to maximize the patients capacity to benefit from the rehabilitation process    5  The patient requires an intensive and coordinated interdisciplinary approach to providing rehabilitation, as defined in Chapter 1, section 110 2 5 of the CMS Medicare Policy Manual  This will be achieved through periodic team conferences, conducted at least once in a 7-day period, and comprising of an interdisciplinary team of medical professionals consisting of: a rehabilitation physician, registered nurse,  and/or , and a licensed/certified therapist from each therapy discipline involved in treating the patient  Changes Since Pre-admission Assessment: None -This patient's participation in rehab continues to be reasonable, necessary and appropriate  CMS Required Post-Admission Physician Evaluation Elements  History and Physical, including medical history, functional history and active comorbidities as in above text  Post-Admission Physician Evaluation:  The patient has the potential to make improvement and is in need of physical, occupational, and/or therapy services  The patient may also need nutritional services  Given the patient's complex medical condition and risk of further medical complications, rehabilitative services cannot be safely provided at a lower level of care, such as a skilled nursing facility  I have reviewed the patient's functional and medical status at the time of the preadmission screening and they are the same as on the day of this admission  I acknowledge that I have personally performed a full physical examination on this patient within 24 hours of admission  The patient and/or family demonstrated understanding the rehabilitation program and the discharge process after we discussed them  Agree in entirety: yes  Minor adaptions: none    Major changes: none    Specific areas of management and oversight in ARC setting:    Cardiopulmonary function: Ensure cardiopulmonary stability and optimize cardiopulmonary function not only at rest but with activity as patient's activity level significantly increases in acute rehab compared with prior to transfer in preparation for safe discharge from [facility]    Must closely and frequently monitor blood pressure and HR to ensure adequate cardiac output during ADLs and ambulation as patient is at increased risk for orthostatic hypotension/syncope and potential injury if not monitored for and managed adequately  Blood pressure management:    Frequent monitoring of blood pressure with appropriate adjustments in blood pressure medication management to optimize blood pressure control and prevent/limit renal complications  Monitoring impact of blood pressure and side-effects of blood pressure medications at rest and with activity  Neurologic Disorder: myopathy causing impaired mobility, ADLs, and gait:  intensive skilled therapies with physical therapy and occupational therapy with close oversight and management by rehab specialized physician in acute rehabilitation setting to most expeditiously and effectively improve functional mobility, transfers, upper and lower body strengthening, conditioning, balance, and gait training with appropriate assistive device  Patient will have optimal supervision and management of patient's underlying neurologic disorder with specialized rehabilitation physician during this period of recovery to ensure most expeditious and optimal recovery with decreased risks of fall/injury and other complications including acute worsening of neuro disorder, decrease risk of VTE, PNA, and skin ulceration  Orthopedic Disorder: foot fx causing impaired mobility, ADLs, and gait:  intensive skilled therapies with physical therapy and occupational therapy with close oversight and management by rehab specialized physician in acute rehabilitation setting to most expeditiously and effectively improve functional mobility, transfers, upper and lower body strengthening, conditioning, balance, and gait training with appropriate assistive device    Patient will have optimal supervision and management of patient's underlying orthopedic disorder with specialized rehabilitation physician during this period of recovery to ensure most expeditious and optimal recovery with decreased risks of fall/injury and other complications including acute worsening of ortho disorder, decrease risk of VTE, PNA, and skin ulceration  Inpatient rehabilitation education/teaching: To be provided to patient and typically family/caregiver (if able to be identified) by all skilled therapists, rehab nursing, case management, and rehab specialized physician to ensure optimal recovery and decrease risks of complications in both acute rehabilitation setting as well as after discharge  Anxiety (and/or Depression): Patient's mood and it's impact on therapy participation and functional recovery will improve during course with supportive counseling, relaxation/breathing techniques and if necessary medication management  Requires frequent re-assessment and close management to ensure anxiety/depression management during acute rehab course with planning for appropriate outpatient management to ensure optimal mental health and functional recovery  Genevive Hodgkins MD, [facility] Kiki Duong  Physical Medicine and Rehabilitation  Brain Injury Medicine    ** Please Note: Fluency Direct voice to text software may have been used in the creation of this document  **    Total time spent:  70 minutes with more than 50% spent counseling/coordinating care  Counseling includes extended discussion with patient (+/- family/relevant historian)  re: history, symptoms, medications, functional issues, mood, medical and rehabilitation management plan, and disposition  Additional time spent with thorough chart review in EMR, reviewing recent medications, labs, imaging, and management plan  This was followed by formulating a comprehensive inpatient medical/rehabilitation management plan and coordinating with pertinent specialists as indicated        I personally performed the required components and examined the patient myself in person on 6/2/22

## 2022-06-03 NOTE — PROGRESS NOTES
06/03/22 1230   Patient Data   Rehab Impairment Impairment of mobility, safety and Activities of Daily Living (ADLs) due to Neurologic Conditions:  03 8  Neuromuscular Disorders   Etiologic Diagnosis Rhabdomyolysis, Myopathy   Date of Onset 05/26/22   Support System   Name Lambert Wick   Relationship supportive neighbor  (comes 2x/day (AM and PM))   Home Setup   Type of Home Single Level   Method of Entry Hand Rail Left   Number of Stairs 1  (1+1 with GB on R drilled to wood)   Number of Stairs in Home   (stairglide to basement)   First Floor Bathroom Full; Shower;Curtain;Built-in shower seat   First Floor Bathroom Accessibility Raised toilet seat  (built in seat in shower)   Second Floor Bathroom   (does not use 2nd BR)   First Floor Setup Available Yes   Home Modifications Necessary?   (pending progress)   Home Modification Comment pending progress   Available Equipment Standard Walker  (raised toilet, built in seat in shower)   Baseline Information   Vocation Other  (retired)   Transportation    Prior Device(s) Used   (no AD)   Prior IADL Participation   Money Management Identify Money;Estimate Costs;Estimate Change;Combine Bills;Manage Checkbook   Meal Preparation Full Participation   Laundry Full Participation  (side to side washer/dryer)   Home Cleaning Full Participation   Prior Level of Function   Self-Care 3  Independent - Patient completed the activities by him/herself, with or without an assistive device, with no assistance from a helper  Indoor-Mobility (Ambulation) 3  Independent - Patient completed the activities by him/herself, with or without an assistive device, with no assistance from a helper  Stairs 3  Independent - Patient completed the activities by him/herself, with or without an assistive device, with no assistance from a helper  Functional Cognition 3   Independent - Patient completed the activities by him/herself, with or without an assistive device, with no assistance from a helper  Prior Assistance Needed for   (neighbor assist with cat)   Prior Device Used Z  None of the above   Falls in the Last Year   Number of falls in the past 12 months 1  (current admission)   Type of Injury Associated with Fall Injury   Patient Preference   Cecil (Patient Preference) Miguel Ángel Terrazas   Psychosocial   Psychosocial (WDL) WDL   Patient Behaviors/Mood Calm; Cooperative   Restrictions/Precautions   Precautions Cognitive; Fall Risk;Pain;Supervision on toilet/commode   Weight Bearing Restrictions Yes   LLE Weight Bearing Per Order WBAT   Braces or Orthoses Other (Comment)  (lillie surgical shoes)   Pain Assessment   Pain Assessment Tool 0-10   Pain Score 3   Pain Location/Orientation Orientation: Left; Location: Knee   Pain Onset/Description Frequency: Intermittent   Effect of Pain on Daily Activities inc with activity   Patient's Stated Pain Goal No pain   Hospital Pain Intervention(s) Rest;Ambulation/increased activity   Multiple Pain Sites No   Oral Hygiene   Type of Assistance Needed Incidental touching   Comment CGA in stance with RW for support, able to wash partial dentures   Oral Hygiene CARE Score 4   Grooming   Able To Comb/Brush Hair;Wash/Dry Face;Brush/Clean Teeth   Limitation Noted In Strength;Timeliness   Tub/Shower Transfer   Limitations Noted In Balance; Endurance;UE Strength;LE Strength   Adaptive Equipment Seat with Back   Assessed Shower   Findings Naomie for shower transfer with use of GB, reprots no GB at home   Shower/Bathe Self   Type of Assistance Needed Physical assistance   Physical Assistance Level 25% or less   Comment seated on shower chiar pt able to wash 10/10 parts  G fx reach to wash feet   Naomie/CGA in stance when washing ashley/buttock   Shower/Bathe Self CARE Score 3   Bathing   Assessed Bath Style Shower   Anticipated D/C Bath Style Shower   Able to [place] No   Able to [employer] Temperature No   Able to Wash/Rinse/Dry (body part) Left Arm;Right Arm;L Upper Leg;R Upper Leg;L Lower Leg/Foot;R Lower Leg/Foot;Chest;Abdomen;Perineal Area; Buttocks   Limitations Noted in Balance; Endurance;Strength;Timeliness   Positioning Seated;Standing   Adaptive Equipment Shower Bars; Shower Seat;Hand Assurant   Remove UB Clothes   (gown)   Don UB Clothes   (pullover night gown from home)   Type of Assistance Needed Physical assistance   Physical Assistance Level 25% or less   Comment completing in stance, req A 2* to mild +retropulsion   Upper Body Dressing CARE Score 3   Remove LB Clothes Undergarment   Don LB Clothes Undergarment;Socks  (sx shoes)   Type of Assistance Needed Physical assistance   Physical Assistance Level 25% or less   Comment A to thread undergarment thorugh LLE, CGA in stance for CM   Lower Body Dressing CARE Score 3   Limitations Noted In Balance; Endurance;Strength;Timeliness   Positioning Supported Sit;Standing   Putting On/Taking Off Footwear   Type of Assistance Needed Physical assistance   Physical Assistance Level 76% or more   Comment seated xleg to don socks over R toes only  TA for finsishing R socka nd don lillie sx shoes   Putting On/Taking Off Footwear CARE Score 2   Toileting Hygiene   Type of Assistance Needed Physical assistance   Physical Assistance Level 25% or less   Comment Naomie/CGA in stance for hygiene/CM   Toileting Hygiene CARE Score 3   Toilet Transfer   Surface Assessed Standard Commode   Transfer Technique Standard   Limitations Noted In Balance; Endurance;UE Strength;LE Strength   Adaptive Equipment Walker   Type of Assistance Needed Physical assistance   Physical Assistance Level 25% or less   Comment Naomie/CGA with RW   Toilet Transfer CARE Score 3   Toileting   Able to Pull Clothing down yes, up yes  Able to Manage Clothing Closures Yes   Limitations Noted In Balance;ROM;UE Strength;LE Strength   Transfer Bed/Chair/Wheelchair   Limitations Noted In Balance; Endurance;UE Strength;LE Strength   Adaptive Equipment Roller Walker   Type of Assistance Needed Physical assistance   Physical Assistance Level 25% or less   Comment Naomie/CGA with RW   Chair/Bed-to-Chair Transfer CARE Score 3   Sit to Lying   Type of Assistance Needed Incidental touching   Comment HOB flat and no use of BR, able to manage LE   Sit to Lying CARE Score 4   Sit to Stand   Type of Assistance Needed Physical assistance   Physical Assistance Level 25% or less   Comment when completing STS pt with mild retropulsion A to regain   Sit to Stand CARE Score 3   Walk 10 Feet   Type of Assistance Needed Incidental touching   Comment CGA with RW   Walk 10 Feet CARE Score 4   Comprehension   Assist Devices Glasses   Auditory Basic   Visual Basic   QI: Comprehension 3  Usually Understands: Understands most conversations, but misses some part/intent of message  Requires cues at times to understand  Comprehension (FIM) 5 - Understands basic directions and conversation   Expression   Verbal Basic   Non-Verbal Basic   Intelligibility Sentence   QI: Expression 3  Exhibits some difficulty with expressing needs and ideas (e g , some words or finishing thoughts) or speech is not clear   Expression (FIM) 5 - Needs staff member to set up communication device (trach valve, communication boards)   RUE Assessment   RUE Assessment WFL   LUE Assessment   LUE Assessment WFL   Sensation   Light Touch No apparent deficits  (reports no numbness/tingling)   Cognition   Overall Cognitive Status Impaired   Arousal/Participation Alert; Cooperative   Attention Attends with cues to redirect   Orientation Level Oriented X4   Memory Decreased short term memory   Following Commands Follows one step commands with increased time or repetition   Vision   Vision Comments wears glasses all the time   Perception   Inattention/Neglect Appears intact   Objective Measure   OT Measure(s) barthel index score   OT Findings see below   Therapeutic Exercise   Therapeutic Exercise/Activity /61 Discharge Information   Vocational Plan Retired/not working   Barriers to Return to [employer]; Endurance   Patient's Discharge Plan return home with OT services and having neighbor cont to check in as needed   Patient's Rehab Expectations "i want to be able to drive, bank and take care of myself"   Barriers to Discharge Home No Family Support   Impressions Patient is a 64 year female admitted to [facility] with complaints of  bilateral lower extremity weakness for at least 2 weeks  Patient reports that she fully independent and ambulatory prior to admission however reported a fall at home approximately 10 days prior to current admission due progressive lower extremity weakness  Patient has a history of rheumatoid arthritis  Patient also with a history of previous stroke, hyperlipidemia and hypertension  Upon admission patient was found to have rhabdomyolysis with elevated CKs  An x-ray of the right foot found a displaced oblique fracture  Patient was evaluated by Podiatry who recommended a surgical shoe with WBAT to the right lower extremity  Pt seated on recliner upon therapist arrival, reporting she lives in a single level home alone as pt recently passed away approx 2-4 weeks ago  Reports she has 1 BISI, living area and laundry are all on one floor  Reports she was +, indep with ADL, meals, meds, laundry, cleaning and finances  Reports neighbor Nina OROZCO with taking care of her cat (Zuleika)  Reports he checks in on her 2x/day (AM and PM)  Other neighbor A woth mowing the lawn  At this time pt req Naomie/CGA with all ADLs and FX mobility with RW, PTA pt reports she did not use AD for fx mobility  Pt seen for 90mins of skilled OT services with emphasis on self-care, toileting, bed moblity, as well as therapist educated pt on rehab process, goal setting and ELOS which pt verbalized understanding   Pt presents with the following performance component deficits impacting ADL/IADL skills: cog deficits (will cont to assess), weakness, impaired balance, decreased endurance, decreased coordination, increased fall risk, new onset of impairment of functional mobility, decreased ADLS, decreased IADLS, decreased activity tolerance, decreased safety awareness, SOB upon exertion, that result in activity limitations and/or participation restrictions  Pt to continue to benefit from continued acute rehab OT services during hospital stay to address defined deficits and to maximize level of functional independence in the following Occupational Performance areas: grooming, bathing/shower, toilet hygiene, dressing, medication management, functional mobility, community mobility, clothing management, cleaning, meal prep and household maintenance  Treatment approaches may include: OT eval, self-care, there ex, therapeutic activity, modalities  Pt presents with good rehab potential  This evaluation requires clinical decision making of moderate complexity, because the patient presents with comorbidites that affect occupational performance and required significant modification of tasks or assistance with consideration of multiple treatment options  Pt is unsafe to D/C home at this time, recommending ELOS 2 weeks (10-14 days) to achieve Cholo level goals with least restrictive device to address these areas and resume prior occupational roles to maximize independence to reduce risk of fall and decrease risk of readmission        OT Therapy Minutes   OT Time In 1230   OT Time Out 1400   OT Total Time (minutes) 90   OT Mode of treatment - Individual (minutes) 90   OT Mode of treatment - Concurrent (minutes) 0   OT Mode of treatment - Group (minutes) 0   OT Mode of treatment - Co-treat (minutes) 0   OT Mode of Treatment - Total time(minutes) 90 minutes   OT Cumulative Minutes 90   Cumulative Minutes   Cumulative therapy minutes 195     Barthel Index Score (IE)  Feeding- 10/10  Bathing- 0/5  Grooming- 0/5  Dressing- 5/10  Bowels- 10/10  Bladder- 10/10  Toilet use- 5/10  Transfers-10/15  Mobility- 10/15  Stairs-5/10    Total- 65/100  (0-20 total dependence) (21-60 severe dependence) (61-90 moderate dependence) (91-99 slight dependence) (100 independent)

## 2022-06-03 NOTE — ASSESSMENT & PLAN NOTE
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

## 2022-06-03 NOTE — CASE MANAGEMENT
Cm met with pt and reviewed rehab routine and cm role  Pt lives alone and has neighbors that help out  She believes that they will be willing to assist with transport, etc  She has left railing with 3 BISI and in the garage she has 1 BISI through the garage and has handrails  She has a walker and a high toilet  She has no hx of therapy  She uses [facility] on [address] for rx needs  Reivewed team meeting process, potential LOS, and IMM  Following to assist w/ d/c planning needs

## 2022-06-03 NOTE — PLAN OF CARE
Problem: MUSCULOSKELETAL - ADULT  Goal: Maintain or return mobility to safest level of function  Description: INTERVENTIONS:  - Assess patient's ability to carry out ADLs; assess patient's baseline for ADL function and identify physical deficits which impact ability to perform ADLs (bathing, care of mouth/teeth, toileting, grooming, dressing, etc )  - Assess/evaluate cause of self-care deficits   - Assess range of motion  - Assess patient's mobility  - Assess patient's need for assistive devices and provide as appropriate  - Encourage maximum independence but intervene and supervise when necessary  - Involve family in performance of ADLs  - Assess for home care needs following discharge   - Consider OT consult to assist with ADL evaluation and planning for discharge  - Provide patient education as appropriate  Outcome: Progressing     Problem: SAFETY ADULT  Goal: Patient will remain free of falls  Description: INTERVENTIONS:  - Educate patient/family on patient safety including physical limitations  - Instruct patient to call for assistance with activity   - Consult OT/PT to assist with strengthening/mobility   - Keep Call bell within reach  - Keep bed low and locked with side rails adjusted as appropriate  - Keep care items and personal belongings within reach  - Initiate and maintain comfort rounds  - Make Fall Risk Sign visible to staff  - Apply yellow socks and bracelet for high fall risk patients  - Consider moving patient to room near nurses station  Outcome: Progressing

## 2022-06-03 NOTE — PROGRESS NOTES
06/03/22 1230   Rehab Team Goals   ADL Team Goal Patient will be independent with ADLs with least restrictive device upon completion of rehab program   Rehab Team Interventions   OT Interventions Self Care; Therapeutic Exercise; Energy Conservation;Patient/Family Education;Group Therapy   Eating Goal   Eating Goal 06  Independent - Patient completes the activity by him/herself with no assistance from a helper  Assist Device Lower Dentures;Partial Dentures   Meal Complete All meals   Status Ongoing; Target goal - two weeks   Interventions Optimal Position   Tub/Shower Transfer Goal   Method Shower Stall  (Cholo)   Assist Device Seat with Back;Grab Bar;Hand Held Shower  (has built in seat)   Status Ongoing; Target goal - two weeks   Interventions ADL Training   Bathing Goal   Shower/bathe self Goal 06  Independent - Patient completes the activity by him/herself with no assistance from a helper  Environment Seated;Standing; Shower;Sponge Bath   Adaptive Equipment Seat with back;Hand Held [employer]  (has built in seat)   Safety Precautions Safety Precaution   Status Ongoing; Target goal - two weeks   Intervention ADL Training; Therapeutic Exercise;Assistive Device   Upper Body Dressing Goal   Upper body dressing Goal 06  Independent - Patient completes the activity by him/herself with no assistance from a helper  Task Upper Body;Arms in/out; Over Head   Environment Seated   Safety Precautions Safety Precaution   Status Ongoing; Target goal - two weeks   Intervention Balance Work; Therapeutic Exercise; Tolerance Work   Lower Body Dressing Goal   Lower body dressing Goal 06  Independent - Patient completes the activity by him/herself with no assistance from a helper  Putting on/taking off footwear Goal 06  Independent - Patient completes the activity by him/herself with no assistance from a helper  Task Lower Body;Shoe/Slipper;Socks;Pants; Undergarment   Adaptive Equipment Reacher;Sock Aide; Shoe Horn;Dressing Stick; Elastic Laces  (as needed)   Environment Seated;Standing   Safety Precautions Safety Precaution;WBAT   Status Ongoing; Target goal - two weeks   Intervention Balance Work; Therapeutic Exercise; Tolerance Work   Toileting Transfer Goal   Toilet transfer Goal 06  Independent - Patient completes the activity by him/herself with no assistance from a helper  Assistive Device Raised Toilet Seat;Roller Walker   Safety Safety Precaution   Status Ongoing; Target goal - two weeks   Intervention ADL Training;Balance Work;Assistive Device   Toileting Goal   Toileting hygiene Goal 06  Independent - Patient completes the activity by him/herself with no assistance from a helper  Task Pants Up;Pants Down;Hygiene   Safety Balance   Status Ongoing; Target goal - two weeks   Intervention ADL Training;Balance Work;Assistive Device   Comprehension Goal   Comprehension Assist Level Moderate Henry   Assist Device Glasses   Status Ongoing; Target goal - two weeks   Expression Goal   Expression Assist Level Moderate Henry   Assist Device Gestures yes; Gestures no   Status Ongoing; Target goal - two weeks   Meal Prep and Kitchen Mobility   Assist Level Independent   Status Ongoing; Target goal - two weeks   Medication Management   Assist Level Independent  (located in kitchen cabinet, took from bottles)   Status Ongoing; Target goal - two weeks   Laundry   Assist Level Independent   Status Ongoing; Target goal - two weeks  (located 1st floor)   Additional Goal (other)   Goal Type (other) Pt will demonstrate inc independence in ADLS as evidenced by a barthel index score above 90/100 by the end of 2 weeks  (IE 65/100)   Status Ongoing; Target goal - two weeks

## 2022-06-03 NOTE — ASSESSMENT & PLAN NOTE
Possibly 2/2 rhabdo  Resolving - only ALT still elevated but now down to 51 on 6/6  Follow-up with PCP

## 2022-06-03 NOTE — ASSESSMENT & PLAN NOTE
Improved   CK back to wnl   Believed to be 2/2 statin induced - hold statin  OP follow-up with PCP and rheum

## 2022-06-03 NOTE — ASSESSMENT & PLAN NOTE
Denies pain   L 1st prox phalanx fx with minimal displacement  - WBAT   - Follow-up XR 6/14 Interval healing of first proximal phalangeal base fracture   IM discussed with pods prior to d/c - can transition back to normal shoes  - OP podiatry follow-up in 2-3 weeks - pt aware

## 2022-06-03 NOTE — ASSESSMENT & PLAN NOTE
Likely due to statin  Simvastatin currently on hold  CK 6976 on admission - currently 731  Improved with IV fluids in acute setting  Continue to trend with routine labs  Per Rheumatology - presentation not consistent with inflammatory myositis  Rheumatology recommended holding methotrexate until further follow-up with outpatient Rheumatologist (Dr Abby Villarreal)  Ensure adequate hydration  Continue PT/OT

## 2022-06-03 NOTE — ASSESSMENT & PLAN NOTE
Statin on hold   Per IM "Last lipid panel on 3/31/22: Cholesterol 169, Triglycerides 69, HDL 87, and LDL 68"  OP PCP follow-up

## 2022-06-03 NOTE — TREATMENT PLAN
Individualized Plan of [address] [place] [address] 64 y o  female MRN: [id number]  Unit/Bed#: -35 Encounter: [id number]     PATIENT INFORMATION  ADMISSION DATE: 6/2/2022  2:47 PM ANAY CATEGORY:Neurologic Conditions:  03 8  Neuromuscular Disorders   ADMISSION DIAGNOSIS: Statin induced myopathy/rhabdomyolysis  EXPECTED LOS: 14 days     MEDICAL/FUNCTIONAL PROGNOSIS  Pre-admit screens and post-admit evaluations reviewed and are consistent  Based on my assessment of the patient's medical conditions and current functional status, the prognosis for attaining medical and functional goals or the IRF stay is:  Good  Patient is appropriate for acute rehabilitation  Medical Goals:   Patient will be medically stable for discharge back to community setting upon completion or acute rehab program and patient/family will be able to manage medical conditions and comorbid conditions with medications and appropriate follow up upon completion of rehab program     Cardiopulmonary function: Ensure cardiopulmonary stability and optimize cardiopulmonary function not only at rest but with activity as patient's activity level significantly increases in acute rehab compared with prior to transfer in preparation for safe discharge from [facility]  Must closely and frequently monitor blood pressure and HR to ensure adequate cardiac output during ADLs and ambulation as patient is at increased risk for orthostatic hypotension/syncope and potential injury if not monitored for and managed adequately  Blood pressure management:    Frequent monitoring of blood pressure with appropriate adjustments in blood pressure medication management to optimize blood pressure control and prevent/limit renal complications  Monitoring impact of blood pressure and side-effects of blood pressure medications at rest and with activity    Neurologic Disorder: myopathy causing impaired mobility, ADLs, and gait:  intensive skilled therapies with physical therapy and occupational therapy with close oversight and management by rehab specialized physician in acute rehabilitation setting to most expeditiously and effectively improve functional mobility, transfers, upper and lower body strengthening, conditioning, balance, and gait training with appropriate assistive device  Patient will have optimal supervision and management of patient's underlying neurologic disorder with specialized rehabilitation physician during this period of recovery to ensure most expeditious and optimal recovery with decreased risks of fall/injury and other complications including acute worsening of neuro disorder, decrease risk of VTE, PNA, and skin ulceration  Orthopedic Disorder: foot fx causing impaired mobility, ADLs, and gait:  intensive skilled therapies with physical therapy and occupational therapy with close oversight and management by rehab specialized physician in acute rehabilitation setting to most expeditiously and effectively improve functional mobility, transfers, upper and lower body strengthening, conditioning, balance, and gait training with appropriate assistive device  Patient will have optimal supervision and management of patient's underlying orthopedic disorder with specialized rehabilitation physician during this period of recovery to ensure most expeditious and optimal recovery with decreased risks of fall/injury and other complications including acute worsening of ortho disorder, decrease risk of VTE, PNA, and skin ulceration  Inpatient rehabilitation education/teaching: To be provided to patient and typically family/caregiver (if able to be identified) by all skilled therapists, rehab nursing, case management, and rehab specialized physician to ensure optimal recovery and decrease risks of complications in both acute rehabilitation setting as well as after discharge     Anxiety (and/or Depression): Patient's mood and it's impact on therapy participation and functional recovery will improve during course with supportive counseling, relaxation/breathing techniques and if necessary medication management  Requires frequent re-assessment and close management to ensure anxiety/depression management during acute rehab course with planning for appropriate outpatient management to ensure optimal mental health and functional recovery        ANTICIPATED DISCHARGE DISPOSITION AND SERVICES  COMMUNITY SETTING:    Previous community setting  ANTICIPATED FOLLOW-UP SERVICE:   Outpatient Therapy PT, OT v Home Health Services: PT, OT     DISCIPLINE SPECIFIC PLANS:  Required Disciplines & Services: PT, OT, Rehabilitation Physician, Rehabillitation Nursing, Case Management, Dietary    REQUIRED THERAPY (expected): Therapy Hours per Day Days per Week Tx Days (Days in [facility])   Physical Therapy 1 5 5 14   Occupational Therapy 1 5 5 14   NOTE: Additional therapy time(s) may be added as appropriate to meet patient needs and to achieve functional goals  ANTICIPATED FUNCTIONAL OUTCOMES:  ADL: Patient will be largely modified independent with ADLs upon completion of rehab program    Bladder/Bowel: Patient will be modified independent with bladder/bowel management upon completion of rehab program   Transfers: Patient will be modified independent with transfers upon completion of rehab program   Locomotion: Patient will be at or near modified independent with locomotion (wheelchair or ambulation) upon completion of rehab program     DISCHARGE PLANNING NEEDS  Equipment needs: To be determined at team conference  REHAB ANTICIPATED PARTICIPATION RESTRICTIONS:  Uncertain at this time  To be determined closer to discharge

## 2022-06-03 NOTE — ASSESSMENT & PLAN NOTE
Bilateral LE weakness resolved - strength 5/5 on d/c   - HMG-CoA reductase negative   - MyoMarker 3 still pending - OP follow-up rheum and PCP - patient instructed to follow-up as this lab is still pending at d/c from [facility] to be 2/2 statin induced myopathy - continue to hold statin   Skilled therapies   Optimal nutrition and medical mgmt   Fall precautions

## 2022-06-03 NOTE — PLAN OF CARE
Problem: SAFETY ADULT  Goal: Patient will remain free of falls  Description: INTERVENTIONS:  - Educate patient/family on patient safety including physical limitations  - Instruct patient to call for assistance with activity   - Consult OT/PT to assist with strengthening/mobility   - Keep Call bell within reach  - Keep bed low and locked with side rails adjusted as appropriate  - Keep care items and personal belongings within reach  - Initiate and maintain comfort rounds  - Make Fall Risk Sign visible to staff  - Obtain necessary fall risk management equipment: RW  - Apply yellow socks and bracelet for high fall risk patients  - Consider moving patient to room near nurses station  Outcome: Progressing     Problem: SKIN/TISSUE INTEGRITY - ADULT  Goal: Skin Integrity remains intact(Skin Breakdown Prevention)  Description: Assess:  -Perform Noam assessment every shift  -Clean and moisturize skin every day  -Inspect skin when repositioning, toileting, and assisting with ADLS  -Assess under medical devices such as surgical shoes  -Assess extremities for adequate circulation and sensation     Bed Management:  -Have minimal linens on bed & keep smooth, unwrinkled  -Change linens as needed when moist or perspiring  -Avoid sitting or lying in one position for more than 2 hours while in bed  -Keep HOB at 30 degrees     Toileting:  -Offer bedside commode    Activity:  -Encourage activity and walks on unit  -Encourage or provide ROM exercises   -Turn and reposition patient every 2 Hours  -Use appropriate equipment to lift or move patient in bed  -Instruct/ Assist with weight shifting every 10-15 mins when out of bed in chair    Skin Care:  -Avoid use of baby powder, tape, friction and shearing, hot water or constrictive clothing  -Relieve pressure over bony prominences using cushion  -Do not massage red bony areas    Outcome: Progressing

## 2022-06-04 LAB
PLATELET # BLD AUTO: 219 THOUSANDS/UL (ref 149–390)
PMV BLD AUTO: 9.9 FL (ref 8.9–12.7)

## 2022-06-04 PROCEDURE — 97110 THERAPEUTIC EXERCISES: CPT

## 2022-06-04 PROCEDURE — 97530 THERAPEUTIC ACTIVITIES: CPT

## 2022-06-04 PROCEDURE — 97535 SELF CARE MNGMENT TRAINING: CPT

## 2022-06-04 PROCEDURE — 85049 AUTOMATED PLATELET COUNT: CPT | Performed by: NURSE PRACTITIONER

## 2022-06-04 RX ORDER — POLYETHYLENE GLYCOL 3350 17 G/17G
17 POWDER, FOR SOLUTION ORAL DAILY
Status: DISCONTINUED | OUTPATIENT
Start: 2022-06-04 | End: 2022-06-15 | Stop reason: HOSPADM

## 2022-06-04 RX ORDER — SENNOSIDES 8.6 MG
1 TABLET ORAL 2 TIMES DAILY
Status: DISCONTINUED | OUTPATIENT
Start: 2022-06-04 | End: 2022-06-15 | Stop reason: HOSPADM

## 2022-06-04 RX ADMIN — DOCUSATE SODIUM 100 MG: 100 CAPSULE, LIQUID FILLED ORAL at 17:48

## 2022-06-04 RX ADMIN — FOLIC ACID 1 MG: 1 TABLET ORAL at 21:17

## 2022-06-04 RX ADMIN — FOLIC ACID 1 MG: 1 TABLET ORAL at 17:48

## 2022-06-04 RX ADMIN — CALCIUM 2 TABLET: 500 TABLET ORAL at 09:27

## 2022-06-04 RX ADMIN — DILTIAZEM HYDROCHLORIDE 240 MG: 120 CAPSULE, COATED, EXTENDED RELEASE ORAL at 09:26

## 2022-06-04 RX ADMIN — ENOXAPARIN SODIUM 40 MG: 100 INJECTION SUBCUTANEOUS at 09:25

## 2022-06-04 RX ADMIN — POLYETHYLENE GLYCOL 3350 17 G: 17 POWDER, FOR SOLUTION ORAL at 09:25

## 2022-06-04 RX ADMIN — CLOPIDOGREL BISULFATE 75 MG: 75 TABLET ORAL at 21:17

## 2022-06-04 RX ADMIN — DOCUSATE SODIUM 100 MG: 100 CAPSULE, LIQUID FILLED ORAL at 09:26

## 2022-06-04 RX ADMIN — FOLIC ACID 1 MG: 1 TABLET ORAL at 09:26

## 2022-06-04 RX ADMIN — SENNOSIDES 8.6 MG: 8.6 TABLET, FILM COATED ORAL at 17:48

## 2022-06-04 RX ADMIN — Medication 2000 UNITS: at 09:26

## 2022-06-04 RX ADMIN — LATANOPROST 1 DROP: 50 SOLUTION OPHTHALMIC at 21:17

## 2022-06-04 RX ADMIN — SENNOSIDES 8.6 MG: 8.6 TABLET, FILM COATED ORAL at 09:26

## 2022-06-04 NOTE — PROGRESS NOTES
[facility] OT TREATMENT   06/04/22 0700   Pain Assessment   Pain Assessment Tool 0-10   Pain Score No Pain   Restrictions/Precautions   Precautions Cognitive; Fall Risk;Supervision on toilet/commode  (bilateral surgical shoes)   LLE Weight Bearing Per Order WBAT   Braces or Orthoses (bilateral sugical shoes)   Lifestyle   Autonomy "Since this happened, I've been tired "   Eating   Type of Assistance Needed Set-up / clean-up   Physical Assistance Level No physical assistance   Comment able to open containers without assist   Eating CARE Score 5   Putting On/Taking Off Footwear   Type of Assistance Needed Physical assistance   Physical Assistance Level Total assistance   Comment for surgical shoes; unable to do cross leg technique while seated at EOB   Putting On/Taking Off Footwear CARE Score 1   Roll Left and Right   Type of Assistance Needed Supervision   Physical Assistance Level No physical assistance   Comment use of bed rails; HOB slightly elevated   Roll Left and Right CARE Score 4   Lying to Sitting on Side of Bed   Type of Assistance Needed Incidental touching   Physical Assistance Level No physical assistance   Comment sidelying to sitting EOB on R side of bed   Lying to Sitting on Side of Bed CARE Score 4   Sit to Stand   Type of Assistance Needed Physical assistance   Physical Assistance Level 51%-75%   Comment for inital sit-stand from bed; progressed to min A level during session   Sit to Stand CARE Score 2   Bed-Chair Transfer   Type of Assistance Needed Physical assistance   Physical Assistance Level 25% or less   Comment stand pivot with RW, bed-BSC   Chair/Bed-to-Chair Transfer CARE Score 3   Toileting Hygiene   Type of Assistance Needed Physical assistance   Physical Assistance Level 25% or less   Comment A for steadying only while in stance for CM, performs anterior pericare from seated position   Raj Meza 83 Score 3   Toileting   Able to [address] down yes, up yes     Toilet Transfer   Type of Assistance Needed Physical assistance   Physical Assistance Level 25% or less   Comment BSC, CGA with RW   Toilet Transfer CARE Score 3   Functional Standing Tolerance   Time 5:30   Activity Modified [employer]   Comments To challenge patient's standing tolerance, static standing balance, and general endurance to faciliate increased safety with IADL tasks upon discharge, patient stood for 5 min 30 secs with RW while creating words from letter tiles scattered around the table  This activity challenged patient to reach anteriorly out of her SULAIMAN to obtain tiles and presented an added cognitive challenge for her  She notes fatigue with standing, but had no LOB and required no physical assistance  Transfers   Additional Comments Patient completed 5 repetitive sit-stand tranfers from 18 inch height mat table with cues for hand placement and body mechanics  She demonstrated improved performance with each transfer  Therapeutic Excerise-Strength   UE Strength Yes   Right Upper Extremity- Strength   R Shoulder Flexion;ABduction;Horizontal ABduction   R Elbow Elbow flexion;Elbow extension   R Position Seated   Equipment Dumbbell  (1#; trialed 2# but was unable to use)   R Weight/Reps/Sets 3x10   RUE Strength Comment To faciliate increased cardiovascular endurance, UE strength, and general activity tolerance to increase ease and safety with functional transfers, patient completed 3x10 reps of bicep curls, chest presses, shoulder flexion, IR, and horizontal abduction with 1# dumbbell  She fatigued quickly during these exercises and rest breaks were implemented throughout  Left Upper Extremity-Strength   L Shoulder Flexion;ABduction;Horizontal ABduction   L Elbow Elbow flexion;Elbow extension   L Position Seated   Equipment Dumbell  (1#, trialed 2# but was unable)   L Weights/Reps/Sets 3x10   LUE Strength Comment see above   Cognition   Overall Cognitive Status Impaired   Arousal/Participation Alert; Cooperative Attention Attends with cues to redirect   Orientation Level Oriented X4   Memory Decreased short term memory   Following Commands Follows one step commands with increased time or repetition  (demonstrated needed at times)   Comments decreased insight into deficits and abilities   Additional Activities   Additional Activities Comments Patient engaged in dynamic standing balance activity during which she reached outside of her SULAIMAN for cones located anteriorly to the right and left of her  She stood with RW during activity and required at least one hand on the AE at all times for steadying support  Activity Tolerance   Activity Tolerance Patient limited by fatigue   Assessment   Treatment Assessment Patient engaged in 90 minute OT session today with focus on UE strengthening, functional transfers, toileting, balance, and endurance  She tolerated session fairly well overall but was limited by fatigue and generalized weakness, requiring rest breaks  She showed a need for increased assistance with sit-stand transfer this AM, with notable retropulsion and posterior lean; however, as session progressed, she showed improved performance  Patient remains with generalized weakness, decreased safety, impaired balance, impaired standing tolerance, and decreased awareness of deficits which limits her safety and independence with ADLs  Recommend continued OT POC and recommend addressing impairments to LB ADL performance, balance, and endurance in next session  Prognosis Good   Problem List Decreased strength;Decreased range of motion;Decreased endurance;Decreased mobility; Impaired judgement   Barriers to Discharge Decreased caregiver support; Inaccessible home environment   Plan   Treatment/Interventions ADL retraining;Functional transfer training; Therapeutic exercise; Endurance training;Cognitive reorientation;Patient/family training;Equipment eval/education; Bed mobility; Compensatory technique education   Progress Progressing toward goals   Recommendation   OT Discharge Recommendation   (pending progress)   OT Therapy Minutes   OT Time In 0700   OT Time Out 0830   OT Total Time (minutes) 90   OT Mode of treatment - Individual (minutes) 90   OT Mode of treatment - Concurrent (minutes) 0   OT Mode of treatment - Group (minutes) 0   OT Mode of treatment - Co-treat (minutes) 0   OT Mode of Treatment - Total time(minutes) 90 minutes   OT Cumulative Minutes 180

## 2022-06-04 NOTE — PROGRESS NOTES
06/04/22 1200   Pain Assessment   Pain Assessment Tool 0-10   Pain Score No Pain   Pain Location/Orientation Other (Comment)  (Ache in L knee , not true pain)   Pain Onset/Description Frequency: Intermittent   Effect of Pain on Daily Activities Can increase with > distance walking   Patient's Stated Pain Goal No pain   Hospital Pain Intervention(s) Rest;Ambulation/increased activity   Multiple Pain Sites No   Restrictions/Precautions   Precautions Cognitive; Fall Risk;Supervision on toilet/commode   Weight Bearing Restrictions Yes   LLE Weight Bearing Per Order WBAT   Braces or Orthoses Other (Comment)  (B/L surgical shoes)   Cognition   Overall Cognitive Status Impaired   Arousal/Participation Alert; Cooperative   Attention Attends with cues to redirect   Orientation Level Oriented X4   Memory Decreased short term memory   Following Commands Follows one step commands with increased time or repetition   Subjective   Subjective Pt reports "feeling frustrated by having to get her heart and lungs checked so frequently"   Sit to Stand   Type of Assistance Needed Physical assistance   Physical Assistance Level 26%-50%   Comment ModA for first rep, Ruth throughout the remainder of the session with use of RW   Sit to Stand CARE Score 3   Bed-Chair Transfer   Type of Assistance Needed Physical assistance   Physical Assistance Level 25% or less   Comment STS with use of RW   Chair/Bed-to-Chair Transfer CARE Score 3   Car Transfer   Reason if not Attempted Environmental limitations   Car Transfer CARE Score 10   Walk 10 Feet   Type of Assistance Needed Physical assistance   Physical Assistance Level 25% or less   Comment Ruth with HHA, CGA with RW   Walk 10 Feet CARE Score 3   Walk 50 Feet with Two Turns   Type of Assistance Needed Physical assistance   Physical Assistance Level 25% or less   Comment Ruth with HHA, CGA with RW   Walk 50 Feet with Two Turns CARE Score 3   Walk 150 Feet   Type of Assistance Needed Physical assistance   Physical Assistance Level 25% or less   Comment Ruth with HHA, CGA with RW   Walk 150 Feet CARE Score 3   Walking 10 Feet on Uneven Surfaces   Type of Assistance Needed Physical assistance   Physical Assistance Level 25% or less   Comment Ruth with RW   Walking 10 Feet on Uneven Surfaces CARE Score 3   Ambulation   Does the patient walk? 2  Yes   Primary Mode of Locomotion Prior to Admission Walk   Distance Walked (feet) 150 ft  (x2 (1 HHA, 1 RW), 100 ft w/ HHA at the end, performed with B/L 2 lb ankle weights for HHA)   Assist Device Roller Walker   Gait Pattern Antalgic; Slow Jannie;Decreased foot clearance; Step to   Limitations Noted In Balance; Coordination   Provided Assistance with: Balance   Walk Assist Level Contact Guard   Findings LLE knee reports aching feeling   Therapeutic Interventions   Strengthening Seated LAQ: 10 reps ea side, B/L 2 lb ankle weights, Seated hip marches 10 reps x1 ea side B/L 2 lb ankle weights   Balance Obstacle negotiation with cones + STS at the end: 2 cycles up/down (VC's for eccentric control when sitting), Required seated rest break b/w ea bout +  B/L 2 lb ankle weights   Neuromuscular Re-Education Repeated STS: 5 reps x2 sets (occasional retropulsion, VC's for anterior weight shift)   Other Focus on eccentric control with STS- VC's throughout session for proper technique   Other Comments   Comments 126/62 mmHR R UE   Assessment   Treatment Assessment Patient illustrated good tolerance of therapy session focused on functional mobility and LE strengthening  Patient was able to perform all of the exercises and walking with B/L 2 lb ankle weights donned for added strengthening of LE's  Patient illustrating overall fear avoidance as noted by gait pattern of small steps and decreased jannie  Patient educated accordingly on how to correct  Patient with good tolerance of seated exercises and might benefit from standing based there-ex next session   Patient with good overall motivation throughout the session and would continue to progress  Would continue to trial various AD as patient was independent without AD prior to admission, she required Ruth this session  Problem List Decreased strength;Decreased range of motion;Decreased endurance;Decreased mobility; Impaired judgement   Barriers to Discharge Decreased caregiver support; Inaccessible home environment   Barriers to Discharge Comments Lives alone, limited family support   Plan   Treatment/Interventions Functional transfer training;LE strengthening/ROM; Elevations; Therapeutic exercise; Endurance training;Cognitive reorientation;Patient/family training;Bed mobility; Equipment eval/education;Gait training;Spoke to nursing   Progress Progressing toward goals   PT Therapy Minutes   PT Time In 1200   PT Time Out 1330   PT Total Time (minutes) 90   PT Mode of treatment - Individual (minutes) 90   PT Mode of treatment - Concurrent (minutes) 0   PT Mode of treatment - Group (minutes) 0   PT Mode of treatment - Co-treat (minutes) 0   PT Mode of Treatment - Total time(minutes) 90 minutes   PT Cumulative Minutes 195

## 2022-06-05 RX ADMIN — SENNOSIDES 8.6 MG: 8.6 TABLET, FILM COATED ORAL at 10:16

## 2022-06-05 RX ADMIN — LATANOPROST 1 DROP: 50 SOLUTION OPHTHALMIC at 21:16

## 2022-06-05 RX ADMIN — POLYETHYLENE GLYCOL 3350 17 G: 17 POWDER, FOR SOLUTION ORAL at 10:16

## 2022-06-05 RX ADMIN — FOLIC ACID 1 MG: 1 TABLET ORAL at 21:16

## 2022-06-05 RX ADMIN — DOCUSATE SODIUM 100 MG: 100 CAPSULE, LIQUID FILLED ORAL at 17:50

## 2022-06-05 RX ADMIN — BISACODYL 10 MG: 5 TABLET, COATED ORAL at 13:09

## 2022-06-05 RX ADMIN — DOCUSATE SODIUM 100 MG: 100 CAPSULE, LIQUID FILLED ORAL at 10:16

## 2022-06-05 RX ADMIN — SENNOSIDES 8.6 MG: 8.6 TABLET, FILM COATED ORAL at 17:50

## 2022-06-05 RX ADMIN — CALCIUM 2 TABLET: 500 TABLET ORAL at 10:13

## 2022-06-05 RX ADMIN — FOLIC ACID 1 MG: 1 TABLET ORAL at 17:50

## 2022-06-05 RX ADMIN — FOLIC ACID 1 MG: 1 TABLET ORAL at 10:16

## 2022-06-05 RX ADMIN — CLOPIDOGREL BISULFATE 75 MG: 75 TABLET ORAL at 21:16

## 2022-06-05 RX ADMIN — Medication 2000 UNITS: at 10:16

## 2022-06-05 RX ADMIN — ENOXAPARIN SODIUM 40 MG: 100 INJECTION SUBCUTANEOUS at 10:13

## 2022-06-05 RX ADMIN — DILTIAZEM HYDROCHLORIDE 240 MG: 120 CAPSULE, COATED, EXTENDED RELEASE ORAL at 10:13

## 2022-06-05 NOTE — PLAN OF CARE
Problem: SAFETY ADULT  Goal: Patient will remain free of falls  Description: INTERVENTIONS:  - Educate patient/family on patient safety including physical limitations  - Instruct patient to call for assistance with activity   - Consult OT/PT to assist with strengthening/mobility   - Keep Call bell within reach  - Keep bed low and locked with side rails adjusted as appropriate  - Keep care items and personal belongings within reach  - Initiate and maintain comfort rounds  - Make Fall Risk Sign visible to staff  - Offer Toileting every Hours, in advance of need  - Initiate/Maintain alarm  - Obtain necessary fall risk management equipment: RW  - Apply yellow socks and bracelet for high fall risk patients  - Consider moving patient to room near nurses station  Outcome: Progressing     Problem: Prexisting or High Potential for Compromised Skin Integrity  Goal: Skin integrity is maintained or improved  Description: INTERVENTIONS:  - Identify patients at risk for skin breakdown  - Assess and monitor skin integrity  - Assess and monitor nutrition and hydration status  - Monitor labs   - Assess for incontinence   - Turn and reposition patient  - Assist with mobility/ambulation  - Relieve pressure over bony prominences  - Avoid friction and shearing  - Provide appropriate hygiene as needed including keeping skin clean and dry  - Evaluate need for skin moisturizer/barrier cream  - Collaborate with interdisciplinary team   - Patient/family teaching  - Consider wound care consult   Outcome: Progressing

## 2022-06-05 NOTE — PROGRESS NOTES
Physical Medicine and Rehabilitation Progress Note  [place] 64 y o  female MRN: [id number]  Unit/Bed#: [facility] 266-01 Encounter: [id number]      Assessment & Plan:     Decline in ADLs and mobility: Functional assessment        FIM  Care Score  Admit Score Recent Score    Total assist  1-100% or 2p    Tot     (Former TA) 2-76-99%  Sub     Max assist 2-51-75%  Sub     Mod assist 3- 26-50%  Par     Min assist 3- 25% or < Par To hygiene, bathing, dressing     CG assist 4  TA     Supervision 4 Sup     Set-up  5 NORIEGA     Mod-I/Indep 6 MI      Transfers  Min assist      Ambulation   150 ft CGA     Stairs   Mod assist 4 steps       Goal: Mod indep  Major barriers:  Weakness, lack of support at home  Dispo & ELOS: Home with ELOS 10-12 days from admission      Weakness of both lower extremities  Assessment & Plan  Improving early on   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  Skilled therapies as outlined  Optimal nutrition and medical mgmt   Fall precautions     Impaired mobility and ADLs  Assessment & Plan  Function improving earlier in course   Multifactorial: Rhabdo/statin-induced myopathy, foot fracture, deconditioning   - Optimal management of each as listed -   - Continue acute comprehensive interdisciplinary inpatient rehabilitation to include intensive skilled therapies (PT, OT +/- ST) as outlined with oversight and management by rehabilitation physician as well as inpatient rehab level nursing, case management and weekly interdisciplinary team meetings         * Rhabdomyolysis  Assessment & Plan  Strength and function improving   CK max almost 7000 > trended down   Believed to be 2/2 statin induced - hold statin  Hold MTX per rheum  OP follow-up with PCP and rheum    Fracture, toe  Assessment & Plan  Denies pain   L 1st prox phalanx fx with minimal displacement  - WABT with sx shoes bilaterally   - Repeat XR 6/16/22  - OP podiatry follow-up     At risk for venous thromboembolism (VTE)  Assessment & Plan  SCDs, ambulation, and changed to lovenox per IM       Osteoporosis  Assessment & Plan  D3 and calcium     Elevated liver enzymes  Assessment & Plan  Possibly 2/2 rhabdo  Trending down, monitor intermittently > LFTs Monday     HLD (hyperlipidemia)  Assessment & Plan  Statin on hold   Per IM "Last lipid panel on 3/31/22: Cholesterol 169, Triglycerides 69, HDL 87, and LDL 68"  OP PCP follow-up     Essential hypertension  Assessment & Plan  Internal medicine consulted and co-management with their service  Monitor vitals with and without activity; monitor for orthostasis  Monitor hemoglobin, electrolytes, kidney function, hydration status   Current meds: Cardizem       Psoriatic arthritis ([facility] [address] )  Assessment & Plan  OP follow-up with rheum Dr Bob Kang  - Weekly MTX on hold for now per prior recs  - Monitor for flare     History of TIA (transient ischemic attack)  Assessment & Plan  Secondary stroke prevention  - Antithrombotic: plavix  - Holding Statin with recent possible statin induced myopathy/rhabdo  - Optimal management of blood pressure         Other Medical Issues:   None    Follow-up providers and other issues to be followed up after discharge  · PCP  · Rheum    CODE: Level 1: Full Code    Restrictions include: Fall precautions    Objective:     Allergies per EMR  Diagnostic Studies: Reviewed, no new imaging  No orders to display     See above as well    Laboratory: Labs reviewed        Invalid input(s): PLTCT  Results from last 7 days   Lab Units 06/01/22  1716 06/01/22  0545 05/30/22  0618   BUN mg/dL 11 9 5   SODIUM mmol/L 141 143 142   POTASSIUM mmol/L 3 6 3 5 3 5   CHLORIDE mmol/L 103 105 106   CREATININE mg/dL 0 52* 0 56* 0 50*   AST U/L 63*  --   --    ALT U/L 110*  --   --             Drug regimen reviewed, all potential adverse effects identified and addressed:    Scheduled Meds:  Current Facility-Administered Medications   Medication Dose Route Frequency Provider Last Rate    acetaminophen  650 mg Oral Q6H PRN Reymundo Thompson MD      aluminum-magnesium hydroxide-simethicone  30 mL Oral Q6H PRN Reymundo Thompson MD      bisacodyl  10 mg Oral Once Reymundo Thompson MD      bisacodyl  10 mg Rectal Daily PRN Reymundo Thompson MD      calcium carbonate  2 tablet Oral Daily With Breakfast Meriam Blotter, CRNP      cholecalciferol  2,000 Units Oral Daily Meriam Blotter, CRNP      clopidogrel  75 mg Oral HS Reymundo Thompson MD      Diclofenac Sodium  2 g Topical 4x Daily PRN Meriam Blotter, CRNP      diltiazem  240 mg Oral Daily Reymundo Thompson MD      docusate sodium  100 mg Oral BID Reymundo Thompson MD      enoxaparin  40 mg Subcutaneous Q24H [address], ANNE      folic acid  1 mg Oral TID Reymundo Thompson MD      latanoprost  1 drop Both Eyes HS Reymundo Thompson MD      polyethylene glycol  17 g Oral Daily PRN Reymundo Thompson MD      polyethylene glycol  17 g Oral Daily Reymundo Thompson MD      senna  1 tablet Oral BID Reymundo Thompson MD         Chief Complaints:  Rehab follow-up     Subjective:     Patient reports that she feels legs getting stronger and she walked more than she was expecting in therapy  She denies lightheadedness, SOB, worsening strength, or other new complaints  ROS: A 10 point ROS was performed; negative except as noted above         Physical Exam:  06/03/22 0653 97 7 °F (36 5 °C) 55 18 150/67 -- 95 % None (Room air)      Vitals above reviewed on date of encounter    GEN:  Sitting in NAD   HEENT/NECK: Normocephalic, atraumatic, moist mucous membranes   CARDIAC: Regular rate rhythm, no murmers, no rubs, no gallops  LUNGS:  clear to auscultation, no wheezes, rales, or rhonchi  ABDOMEN: Soft, non-tender, non-distended, normal active bowel sounds  EXTREMITIES/SKIN:  no calf edema, no calf tenderness to palpation  NEURO:   MENTAL STATUS: awake, oriented to person, place, time, and situation, MENTAL STATUS:  Appropriate wakefulness and interaction  and Strength/MMT:  Prox RLE 4/5, distal RLE 5-/5, Prox LLE 3+/5, distal 4+/5  PSYCH:  Affect:  Euthymic     HPI:  Mariaa Gimenez is a 64 y o  female with PMH psoriatic arthritis, HTN, HL, TIA, who legs gave out from her causing foot pain and she was noted to have significant bilateral LE weakness and was evaluated at the hospital   CK was elevated to close to 7000 and she was initially with acute kidney injury and had elevated LFTs  IVF were provided and CK trended down  X-ray showed displaced oblique fracture involving the medical base of the proximal phalanx of the 1st digit with recommendation by podiatry for WBAT in sx shoes  Neuro had extensive work-up which included MRI L spine which showed moderate central canal stenosis L4-5, Moderate right foraminal narrowing and moderate to sever right lateral recess stenosis  Rheum reviewed case as well  Overall she was not felt to have inflammatory myositis and 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 and statin has been held  She was recommended to hold MTX for now as well  Strength has been improving some  Patient was evaluated by skilled therapies and was found to have significant decline in ADLs and ambulation and appears appropriate for admission to [place] [address]      Chief complaint:  Leg weakness     Subjective: On eval, patient reports L>R leg weakness and R leg getting quite a bit better and less so L  She denies significant foot pain  Patient notes feeling tired with little energy overall  She denies fever, chills, sweats, SOB, other pain, uncontrolled anxiety, depression, or other new complaints       Review of Systems: A 10-point review of systems was performed  Negative except as listed above        ** Please Note: Fluency Direct voice to text software may have been used in the creation of this document  **    I personally performed the required components and examined the patient myself in person on 6/3/22

## 2022-06-05 NOTE — PLAN OF CARE
Problem: MUSCULOSKELETAL - ADULT  Goal: Maintain proper alignment of affected body part  Description: INTERVENTIONS:  - Support, maintain and protect limb and body alignment  - Provide patient/ family with appropriate education  Outcome: Progressing

## 2022-06-06 PROBLEM — K59.00 CONSTIPATION: Status: ACTIVE | Noted: 2019-05-29

## 2022-06-06 LAB
25(OH)D3 SERPL-MCNC: 31.3 NG/ML (ref 30–100)
ALBUMIN SERPL BCP-MCNC: 3.1 G/DL (ref 3–5.2)
ALP SERPL-CCNC: 53 U/L (ref 43–122)
ALT SERPL W P-5'-P-CCNC: 51 U/L
ANION GAP SERPL CALCULATED.3IONS-SCNC: 5 MMOL/L (ref 5–14)
AST SERPL W P-5'-P-CCNC: 32 U/L (ref 14–36)
BASOPHILS # BLD AUTO: 0.03 THOUSANDS/ΜL (ref 0–0.1)
BASOPHILS NFR BLD AUTO: 0 % (ref 0–1)
BILIRUB SERPL-MCNC: 0.6 MG/DL
BUN SERPL-MCNC: 9 MG/DL (ref 5–25)
CALCIUM ALBUM COR SERPL-MCNC: 9.3 MG/DL (ref 8.3–10.1)
CALCIUM SERPL-MCNC: 8.6 MG/DL (ref 8.4–10.2)
CHLORIDE SERPL-SCNC: 101 MMOL/L (ref 97–108)
CK SERPL-CCNC: 39 U/L (ref 30–135)
CO2 SERPL-SCNC: 31 MMOL/L (ref 22–30)
CREAT SERPL-MCNC: 0.55 MG/DL (ref 0.6–1.2)
EOSINOPHIL # BLD AUTO: 0.17 THOUSAND/ΜL (ref 0–0.61)
EOSINOPHIL NFR BLD AUTO: 2 % (ref 0–6)
ERYTHROCYTE [DISTWIDTH] IN BLOOD BY AUTOMATED COUNT: 15.4 % (ref 11.6–15.1)
GFR SERPL CREATININE-BSD FRML MDRD: 90 ML/MIN/1.73SQ M
GLUCOSE P FAST SERPL-MCNC: 91 MG/DL (ref 70–99)
GLUCOSE SERPL-MCNC: 91 MG/DL (ref 70–99)
HCT VFR BLD AUTO: 39.6 % (ref 34.8–46.1)
HGB BLD-MCNC: 13 G/DL (ref 11.5–15.4)
IMM GRANULOCYTES # BLD AUTO: 0.02 THOUSAND/UL (ref 0–0.2)
IMM GRANULOCYTES NFR BLD AUTO: 0 % (ref 0–2)
LYMPHOCYTES # BLD AUTO: 1.87 THOUSANDS/ΜL (ref 0.6–4.47)
LYMPHOCYTES NFR BLD AUTO: 23 % (ref 14–44)
MCH RBC QN AUTO: 33.1 PG (ref 26.8–34.3)
MCHC RBC AUTO-ENTMCNC: 32.8 G/DL (ref 31.4–37.4)
MCV RBC AUTO: 101 FL (ref 82–98)
MISCELLANEOUS LAB TEST RESULT: NORMAL
MONOCYTES # BLD AUTO: 1.09 THOUSAND/ΜL (ref 0.17–1.22)
MONOCYTES NFR BLD AUTO: 13 % (ref 4–12)
NEUTROPHILS # BLD AUTO: 4.95 THOUSANDS/ΜL (ref 1.85–7.62)
NEUTS SEG NFR BLD AUTO: 62 % (ref 43–75)
NRBC BLD AUTO-RTO: 0 /100 WBCS
PLATELET # BLD AUTO: 221 THOUSANDS/UL (ref 149–390)
PMV BLD AUTO: 10.7 FL (ref 8.9–12.7)
POTASSIUM SERPL-SCNC: 3.7 MMOL/L (ref 3.6–5)
PROT SERPL-MCNC: 5.8 G/DL (ref 5.9–8.4)
RBC # BLD AUTO: 3.93 MILLION/UL (ref 3.81–5.12)
SODIUM SERPL-SCNC: 137 MMOL/L (ref 137–147)
WBC # BLD AUTO: 8.13 THOUSAND/UL (ref 4.31–10.16)

## 2022-06-06 PROCEDURE — 82306 VITAMIN D 25 HYDROXY: CPT

## 2022-06-06 PROCEDURE — 99232 SBSQ HOSP IP/OBS MODERATE 35: CPT

## 2022-06-06 PROCEDURE — 80053 COMPREHEN METABOLIC PANEL: CPT

## 2022-06-06 PROCEDURE — 99232 SBSQ HOSP IP/OBS MODERATE 35: CPT | Performed by: INTERNAL MEDICINE

## 2022-06-06 PROCEDURE — 97530 THERAPEUTIC ACTIVITIES: CPT

## 2022-06-06 PROCEDURE — 97112 NEUROMUSCULAR REEDUCATION: CPT

## 2022-06-06 PROCEDURE — 97116 GAIT TRAINING THERAPY: CPT

## 2022-06-06 PROCEDURE — 82550 ASSAY OF CK (CPK): CPT

## 2022-06-06 PROCEDURE — 97535 SELF CARE MNGMENT TRAINING: CPT

## 2022-06-06 PROCEDURE — 85025 COMPLETE CBC W/AUTO DIFF WBC: CPT | Performed by: NURSE PRACTITIONER

## 2022-06-06 PROCEDURE — 97110 THERAPEUTIC EXERCISES: CPT

## 2022-06-06 RX ADMIN — DOCUSATE SODIUM 100 MG: 100 CAPSULE, LIQUID FILLED ORAL at 08:23

## 2022-06-06 RX ADMIN — Medication 2000 UNITS: at 08:23

## 2022-06-06 RX ADMIN — CLOPIDOGREL BISULFATE 75 MG: 75 TABLET ORAL at 21:22

## 2022-06-06 RX ADMIN — FOLIC ACID 1 MG: 1 TABLET ORAL at 17:13

## 2022-06-06 RX ADMIN — CALCIUM 2 TABLET: 500 TABLET ORAL at 08:23

## 2022-06-06 RX ADMIN — SENNOSIDES 8.6 MG: 8.6 TABLET, FILM COATED ORAL at 17:13

## 2022-06-06 RX ADMIN — POLYETHYLENE GLYCOL 3350 17 G: 17 POWDER, FOR SOLUTION ORAL at 08:28

## 2022-06-06 RX ADMIN — ENOXAPARIN SODIUM 40 MG: 100 INJECTION SUBCUTANEOUS at 08:27

## 2022-06-06 RX ADMIN — DILTIAZEM HYDROCHLORIDE 240 MG: 120 CAPSULE, COATED, EXTENDED RELEASE ORAL at 08:23

## 2022-06-06 RX ADMIN — DOCUSATE SODIUM 100 MG: 100 CAPSULE, LIQUID FILLED ORAL at 17:13

## 2022-06-06 RX ADMIN — FOLIC ACID 1 MG: 1 TABLET ORAL at 21:22

## 2022-06-06 RX ADMIN — SENNOSIDES 8.6 MG: 8.6 TABLET, FILM COATED ORAL at 08:23

## 2022-06-06 RX ADMIN — LATANOPROST 1 DROP: 50 SOLUTION OPHTHALMIC at 21:22

## 2022-06-06 RX ADMIN — FOLIC ACID 1 MG: 1 TABLET ORAL at 08:22

## 2022-06-06 NOTE — ASSESSMENT & PLAN NOTE
Likely due to statin  Simvastatin currently on hold  CK 6976 on admission - currently 39  Improved with IV fluids in acute setting  Continue to trend with routine labs  Per Rheumatology - presentation not consistent with inflammatory myositis  Rheumatology recommended holding methotrexate until further follow-up with outpatient Rheumatologist (Dr Milan Higgins)  Ensure adequate hydration  Continue PT/OT

## 2022-06-06 NOTE — ASSESSMENT & PLAN NOTE
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  CK 6976 on admission - currently 39  Simvastatin was stopped and currently on hold  Per Rheumatology - presentation not consistent with inflammatory myositis  MRI L spine showed severe facet degenerative change at L4-L5 resulting in moderate central stenosis, moderate R foramina narrowing, and moderate to severe R lateral recess stenosis  JONATHON on 5/31 was WNL  Venous duplex on 5/26 negative for DVTs  Myositis panel and HMGCR pending  Primary team following  PT/OT

## 2022-06-06 NOTE — PROGRESS NOTES
Physical Medicine and Rehabilitation Progress Note  Chirs Canseco 64 y o  female MRN: [id number]  Unit/Bed#: [facility] 266-01 Encounter: [id number]      Assessment & Plan:     Decline in ADLs and mobility: Functional assessment - stable         FIM  Care Score  Admit Score Recent Score    Total assist  1-100% or 2p    Tot     (Former TA) 2-76-99%  Sub     Max assist 2-51-75%  Sub     Mod assist 3- 26-50%  Par     Min assist 3- 25% or < Par To hygiene, bathing, dressing  To hygiene, bathing, UBD, LBD   CG assist 4  TA     Supervision 4 Sup     Set-up  5 NORIEGA     Mod-I/Indep 6 MI      Transfers  Min assist  Min assist     Ambulation   150 ft  ft min assist     Stairs   Mod assist 4 steps       Goal: Mod indep  Major barriers:  Weakness, lack of support at home  Dispo & ELOS: Home with ELOS 12 days from admission      Weakness of both lower extremities  Assessment & Plan  Continues to improve   - HMG-CoA reductase and MyoMarker 3 still pending - OP follow-up rheum   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  Skilled therapies as outlined  Optimal nutrition and medical mgmt   Fall precautions     Impaired mobility and ADLs  Assessment & Plan  Multifactorial: Rhabdo/statin-induced myopathy, foot fracture, deconditioning   - Optimal management of each as listed -   - Continue acute comprehensive interdisciplinary inpatient rehabilitation to include intensive skilled therapies (PT, OT +/- ST) as outlined with oversight and management by rehabilitation physician as well as inpatient rehab level nursing, case management and weekly interdisciplinary team meetings         * Rhabdomyolysis  Assessment & Plan  Strength improving; function stable   CK max almost 7000 > trended down and now wnl 6/5   Believed to be 2/2 statin induced - hold statin  Hold MTX per rheum  OP follow-up with PCP and rheum    Fracture, toe  Assessment & Plan  Denies pain   L 1st prox phalanx fx with minimal displacement  - WABT with sx shoes bilaterally   - Repeat XR 6/16/22  - OP podiatry follow-up     At risk for venous thromboembolism (VTE)  Assessment & Plan  SCDs, ambulation, and lovenox     Constipation  Assessment & Plan  Improving - but recent stools hard s/p dulcolax supp 6/5  - Daily miralax for now  - Colace/Senna BID  - PRN bowel regimen (Miralax PRN/Dulcolax supp PRN)  - monitor for signs and symptoms of obstruction/ileus > not currently; hold stimulant lax if occurs  - Limiting constipating medications if possible  - Ensure adequate hydration       Osteoporosis  Assessment & Plan  D3 and calcium   Obtain Vit D level     Elevated liver enzymes  Assessment & Plan  Possibly 2/2 rhabdo  Resolving - only ALT still elevated but now down to 51 on 6/6    HLD (hyperlipidemia)  Assessment & Plan  Statin on hold   Per IM "Last lipid panel on 3/31/22: Cholesterol 169, Triglycerides 69, HDL 87, and LDL 68"  OP PCP follow-up     Essential hypertension  Assessment & Plan  Internal medicine consulted and co-management with their service  Monitor vitals with and without activity; monitor for orthostasis  Monitor hemoglobin, electrolytes, kidney function, hydration status   Current meds: Cardizem       Psoriatic arthritis ([facility] [address] )  Assessment & Plan  OP follow-up with rheum Dr Ramandeep Grajeda  - Weekly MTX on hold for now per prior recs  - Monitor for flare     History of TIA (transient ischemic attack)  Assessment & Plan  Secondary stroke prevention  - Antithrombotic: plavix  - Holding Statin with recent possible statin induced myopathy/rhabdo  - Optimal management of blood pressure       Other Medical Issues:   None    Follow-up providers and other issues to be followed up after discharge  · PCP  · Rheum    CODE: Level 1: Full Code    Restrictions include: Fall precautions    Objective:     Allergies per EMR  Diagnostic Studies: Reviewed, no new imaging  No orders to display     See above as well    Laboratory: Labs reviewed  Results from last 7 days   Lab Units 06/06/22  0602   HEMOGLOBIN g/dL 13 0   HEMATOCRIT % 39 6   WBC Thousand/uL 8 13     Results from last 7 days   Lab Units 06/06/22  0601 06/01/22  1716 06/01/22  0545   BUN mg/dL 9 11 9   SODIUM mmol/L 137 141 143   POTASSIUM mmol/L 3 7 3 6 3 5   CHLORIDE mmol/L 101 103 105   CREATININE mg/dL 0 55* 0 52* 0 56*   AST U/L 32 63*  --    ALT U/L 51* 110*  --             Drug regimen reviewed, all potential adverse effects identified and addressed:    Scheduled Meds:  Current Facility-Administered Medications   Medication Dose Route Frequency Provider Last Rate    acetaminophen  650 mg Oral Q6H PRN Kaylyn Venegas MD      aluminum-magnesium hydroxide-simethicone  30 mL Oral Q6H PRN Kaylyn Venegas MD      bisacodyl  10 mg Rectal Daily PRN Kaylyn Venegas MD      calcium carbonate  2 tablet Oral Daily With Breakfast ANNE Valdivia      cholecalciferol  2,000 Units Oral Daily ANNE Valdivia      clopidogrel  75 mg Oral HS Kaylyn Venegas MD      Diclofenac Sodium  2 g Topical 4x Daily PRN ANNE Valdivia      diltiazem  240 mg Oral Daily Kaylyn Venegas MD      docusate sodium  100 mg Oral BID Kaylyn Venegas MD      enoxaparin  40 mg Subcutaneous Q24H [address], CRNP      folic acid  1 mg Oral TID Kaylyn Venegas MD      latanoprost  1 drop Both Eyes HS Kaylyn Venegas MD      polyethylene glycol  17 g Oral Daily PRN Kaylyn Venegas MD      polyethylene glycol  17 g Oral Daily Kaylyn Venegas MD      senna  1 tablet Oral BID Kaylyn Venegas MD         Chief Complaints:  Rehab follow-up     Subjective:     Patient reports that the weekend went fairly well but she is somewhat tired  She denies lightheadedness, SOB, fever, chills, constipation, urinary issues  She feels like strength is slowly coming back  ROS: A 10 point ROS was performed; negative except as noted above         Physical Exam:  Vitals:    06/06/22 0857   BP: 134/60   Pulse: 66   Resp:    Temp:    SpO2:      GEN:  Lying in bed in NAD HEENT/NECK: MMM  CARDIAC: Regular rate rhythm, no murmers, no rubs, no gallops  LUNGS:  clear to auscultation, no wheezes, rales, or rhonchi  ABDOMEN: Soft, non-tender, non-distended, normal active bowel sounds  EXTREMITIES/SKIN:  no calf edema, no calf tenderness to palpation  NEURO:   MENTAL STATUS:  Appropriate wakefulness and interaction  and Strength/MMT:  Prox RLE 4+/5, distal RLE 5-/5, Prox LLE 3+/5, distal 5-/5  PSYCH:  Affect:  Euthymic     HPI:  Liza Cisse is a 64 y o  female with PMH psoriatic arthritis, HTN, HL, TIA, who legs gave out from her causing foot pain and she was noted to have significant bilateral LE weakness and was evaluated at the hospital   CK was elevated to close to 7000 and she was initially with acute kidney injury and had elevated LFTs  IVF were provided and CK trended down  X-ray showed displaced oblique fracture involving the medical base of the proximal phalanx of the 1st digit with recommendation by podiatry for WBAT in sx shoes  Neuro had extensive work-up which included MRI L spine which showed moderate central canal stenosis L4-5, Moderate right foraminal narrowing and moderate to sever right lateral recess stenosis  Rheum reviewed case as well  Overall she was not felt to have inflammatory myositis and 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 and statin has been held  She was recommended to hold MTX for now as well  Strength has been improving some  Patient was evaluated by skilled therapies and was found to have significant decline in ADLs and ambulation and appears appropriate for admission to [facility]      Chief complaint:  Leg weakness     Subjective: On eval, patient reports L>R leg weakness and R leg getting quite a bit better and less so L  She denies significant foot pain  Patient notes feeling tired with little energy overall    She denies fever, chills, sweats, SOB, other pain, uncontrolled anxiety, depression, or other new complaints       Review of Systems: A 10-point review of systems was performed  Negative except as listed above        ** Please Note: Fluency Direct voice to text software may have been used in the creation of this document   **

## 2022-06-06 NOTE — ASSESSMENT & PLAN NOTE
Home regimen: methotrexate 17 5mg on Mondays  Per Rheumatology - continue to hold until outpatient follow-up with Rheumatologist (Dr Tia Greer)

## 2022-06-06 NOTE — PLAN OF CARE
Problem: GASTROINTESTINAL - ADULT  Goal: Maintains adequate nutritional intake  Description: INTERVENTIONS:  - Monitor percentage of each meal consumed  - Identify factors contributing to decreased intake, treat as appropriate  - Assist with meals as needed  - Monitor I&O, weight, and lab values if indicated  - Obtain nutrition services referral as needed  Outcome: Progressing

## 2022-06-06 NOTE — NURSING NOTE
Patient LBM 6/1, patient reports decreased appetite and normal bowel pattern every 2-3 days  Patient denies abdominal cramping/pain and feelings of constipation  Abdomen flat and soft, nontender  Patient taking colace, senokot, and Miralax  Discussed Doculax suppository this evening  Patient refused

## 2022-06-06 NOTE — PROGRESS NOTES
06/06/22 0830   Pain Assessment   Pain Score No Pain   Restrictions/Precautions   Precautions Cognitive; Fall Risk;Supervision on toilet/commode   Weight Bearing Restrictions Yes   LLE Weight Bearing Per Order WBAT   Braces or Orthoses Other (Comment)  (B/L surgical shoes)   Lifestyle   Autonomy "This took a lot out of me"   Oral Hygiene   Type of Assistance Needed Incidental touching   Comment CGA in stance w/ RW, pt able to perform oral hygiene  Oral Hygiene CARE Score 4   Grooming   Able To Comb/Brush Hair;Brush/Clean Teeth   Limitation Noted In Strength;Timeliness   Findings CGA in stance w/ RW for brushing teeth; seated able to brush hair  Due to fatigue, req to sit to brush her hair  Shower/Bathe Self   Type of Assistance Needed Physical assistance   Physical Assistance Level 25% or less   Comment Seated on shower chair, pt able to wash 10/10 body parts  Utilized x-legged method to wash feet  Min A/CGA in stance with GB to wash ashley/buttock areas  Cont to assess if pt has GB in shower or not   Shower/Bathe Self CARE Score 3   Bathing   Assessed Bath Style Shower   Anticipated D/C Bath Style Shower   Able to [place] No   Able to [employer] Temperature No   Able to Wash/Rinse/Dry (body part) Left Arm;Right Arm;L Upper Leg;R Upper Leg;L Lower Leg/Foot;R Lower Leg/Foot;Chest;Abdomen;Perineal Area; Buttocks   Limitations Noted in Balance; Endurance;Strength;Timeliness   Positioning Seated;Standing   Adaptive Equipment Shower Seat;Shower Bars;Hand Held Shower   Tub/Shower Transfer   Limitations Noted In Balance; Endurance;UE Strength;LE Strength   Adaptive Equipment Seat with Back   Assessed Shower   Findings (S)  min A/CGA w/ GB to shower chair  Complete shower transfer with simulated home setup   Upper Body Dressing   Type of Assistance Needed Supervision   Comment S seated pt able to doff/don pullover shirt     Upper Body Dressing CARE Score 4   Lower Body Dressing   Type of Assistance Needed Physical assistance; Adaptive equipment   Physical Assistance Level 25% or less   Comment Seated, pt demonstrated good fxl reach to thread BLE into undergarment and pants  Pt reporting she has more difficult threading her LLE so therapist educ pt on threading that LLE first and then RLE  Pt reporting this was easier for dressing  Min A/CGA in stance w/ RW to perform CM  Lower Body Dressing CARE Score 3   Putting On/Taking Off Footwear   Type of Assistance Needed Incidental touching; Adaptive equipment   Comment Educ on sock aid and demonstrated how to use it  Pt able to don socks using sock aid after she reported sometimes it is difficult to don her socks with x-legged method  Seated pt able to don surgical shoes utilizing x-legged method  Once surgical shoes are donned, pt demonstrating good fxl reach forward to do velcro straps  Cont to work on cross leg method for [employer] ADLs   Putting On/Taking Off Footwear CARE Score 4   Dressing/Undressing Clothing   Remove UB Clothes   (gown)   Don UB Clothes Pullover Shirt   Remove LB Clothes Undergarment;Socks; Shoes   Don LB [employer]; Undergarment;Socks; Shoes   Limitations Noted In Balance; Endurance;Strength;Timeliness   Adaptive Equipment Sock Aide   Positioning Supported Sit;Standing   Sit to Lying   Type of Assistance Needed Supervision   Comment HOB flat, pt able to manage LE and lay down in supine position   Sit to Lying CARE Score 4   Lying to Sitting on Side of Bed   Type of Assistance Needed Incidental touching   Comment CGA supine to sitting EOB with use of bedrails   Lying to Sitting on Side of Bed CARE Score 4   Sit to Stand   Type of Assistance Needed Physical assistance   Physical Assistance Level 25% or less   Comment min A w/ RW STS   Sit to Stand CARE Score 3   Bed-Chair Transfer   Type of Assistance Needed Incidental touching   Comment CGA w/ RW SPT from w/c to bed   Chair/Bed-to-Chair Transfer CARE Score 4   Transfer Bed/Chair/Wheelchair   Limitations Noted In Balance; Endurance;UE Strength;LE Strength   Adaptive Equipment Roller Walker   Toileting Hygiene   Type of Assistance Needed Incidental touching   Comment CGA in stance w/ RW for steadying while pt performed toileting hygiene and CM  Toileting Hygiene CARE Score 4   Toileting   Able to [address] down yes, up yes  Manage Hygiene Bladder   Limitations Noted In Balance;UE Strength;LE Strength   Toilet Transfer   Type of Assistance Needed Physical assistance   Physical Assistance Level 25% or less   Comment min A w/ RW   Toilet Transfer CARE Score 3   Toilet Transfer   Surface Assessed Standard Toilet   Transfer Technique Standard   Limitations Noted In Balance; Endurance;UE Strength;LE Strength   Adaptive Equipment Walker   Meal Prep   Meal Preparation (S)  COMPLETE NEXT SESSION   Kitchen Mobility   Kitchen Mobility Comments (S)  COMPLETE NEXT SESSION   Health Management   Health Management (S)  COMPLETE NEXT SESSION   Therapeutic Excerise-Strength   UE Strength Yes   Right Upper Extremity- Strength   R Elbow Elbow flexion;Elbow extension   R Position Supine   Equipment Dumbbell  (1#)   R Weight/Reps/Sets 3 x 10   RUE Strength Comment Pt supine in bed end of session 2* to fatigue performed UE strengthening exercises to inc fxl performance in ADLs, IADLs, and fxl transfers  Due to time, pt only completed elbow flexion and extension 3 x 10 with 1# dumbbell  Pt tolerated exercises well with rest breaks  Left Upper Extremity-Strength   LUE Strength Comment see above   Cognition   Overall Cognitive Status Impaired; COMPLETE COGNITIVE ASSESSMENT   Arousal/Participation Alert; Cooperative   Attention Attends with cues to redirect   Orientation Level Oriented X4   Memory Decreased short term memory   Following Commands Follows one step commands with increased time or repetition   Activity Tolerance   Activity Tolerance Patient tolerated treatment well;Patient limited by fatigue   Assessment   Treatment Assessment Pt engaged in 90 min skilled OT services with focus on ADLs, including bathing, dressing, oral hygiene, fxl transfers, and UE strengthening  Pt tolerated session well, however, becomes easily fatigued requiring rest breaks in between tasks  Pt req min A/CGA during transfers 2* to weakness and deconditioning  This weakness and deconditioning impacts her fxl performance in ADLs  Pt educ on LHAE, including the reacher and sock aid and provided handout for hip kit as well as just the sock aid  Pt reporting she wants to build up her tolerance so she will not need to use LHAE  Cont skilled OT services with focus on balance, standing/activity tolerance, IADLs, and energy conservation to inc fxl performance and independence and decrease risk of admission  Prognosis Good   Problem List Decreased strength;Decreased range of motion;Decreased endurance;Decreased mobility; Impaired judgement   Barriers to Discharge Decreased caregiver support   Barriers to Discharge Comments lives alone   Plan   Treatment/Interventions ADL retraining;Functional transfer training;LE strengthening/ROM; Therapeutic exercise; Endurance training;Cognitive reorientation;Patient/family training;Equipment eval/education; Bed mobility; Compensatory technique education   Progress Progressing toward goals   Recommendation   OT Discharge Recommendation   (pending progress)   Equipment Recommended Hip Kit ($);Reacher ($);Sock aid ($)   OT Equipment ordered handouts provided   OT - OK to Discharge No   OT Therapy Minutes   OT Time In 0830   OT Time Out 1000   OT Total Time (minutes) 90   OT Mode of treatment - Individual (minutes) 90   OT Mode of treatment - Concurrent (minutes) 0   OT Mode of treatment - Group (minutes) 0   OT Mode of treatment - Co-treat (minutes) 0   OT Mode of Treatment - Total time(minutes) 90 minutes   OT Cumulative Minutes 270   Therapy Time missed   Time missed?  No

## 2022-06-06 NOTE — ASSESSMENT & PLAN NOTE
L foot XR: acute appearing minimally displaced oblique fracture involving medial base of the proximal phalanx of the 1st digit  Podiatry consulted in acute setting - recommended WBAT and surgical shoes to both feet with ambulation  Follow-up with Podiatry as outpatient  Verified surgical shoes with Dr Griselda Paris - should continue for 3 weeks after XR on 5/26  Repeat XR in 3 weeks (6/16)

## 2022-06-06 NOTE — ASSESSMENT & PLAN NOTE
Likely due to recent rhabdomyolysis  AST 32 and ALT 51 (from 63 and 110 respectively)  Continue to trend with routine CMP

## 2022-06-06 NOTE — PROGRESS NOTES
06/06/22 1230   Pain Assessment   Pain Assessment Tool 0-10   Pain Score No Pain   Restrictions/Precautions   Precautions Cognitive; Fall Risk;Supervision on toilet/commode   LLE Weight Bearing Per Order WBAT   Braces or Orthoses Other (Comment)  (surgical shoes )   Cognition   Overall Cognitive Status Impaired   Arousal/Participation Alert; Cooperative   Attention Attends with cues to redirect   Orientation Level Oriented X4   Memory Decreased short term memory   Following Commands Follows one step commands with increased time or repetition   Comments dec insight to status and needs  Subjective   Subjective "Im tired but lets get it over with"   Sit to Stand   Type of Assistance Needed Physical assistance;Verbal cues   Physical Assistance Level 26%-50%   Comment generally CGA from chairs with arm rest, min A for chair without UE support  Sit to Stand CARE Score 3   Bed-Chair Transfer   Type of Assistance Needed Physical assistance   Physical Assistance Level 26%-50%   Comment CGA with RW and min A without AD due to dec balance/retropulsion, poor controlled lowering to sit   Chair/Bed-to-Chair Transfer CARE Score 3   Car Transfer   Reason if not Attempted Environmental limitations   Car Transfer CARE Score 10   Walk 10 Feet   Type of Assistance Needed Physical assistance   Physical Assistance Level 25% or less   Comment min A for HHA  without AD  Walk 10 Feet CARE Score 3   Walk 50 Feet with Two Turns   Type of Assistance Needed Physical assistance   Physical Assistance Level 26%-50%   Comment min A without AD, HHA   Walk 50 Feet with Two Turns CARE Score 3   Walk 150 Feet   Type of Assistance Needed Physical assistance   Physical Assistance Level 26%-50%   Comment min A with RW, HHA  Walk 150 Feet CARE Score 3   Walking 10 Feet on Uneven Surfaces   Type of Assistance Needed Physical assistance   Physical Assistance Level 26%-50%   Comment min A on indoor ramp without AD  HHA     Walking 10 Feet on Uneven Surfaces CARE Score 3   Ambulation   Does the patient walk? 2  Yes   Primary Mode of Locomotion Prior to Admission Walk   Distance Walked (feet) 175 ft  (x3)   Assist Device Roller Walker;Hand Hold   Gait Pattern Antalgic; Ataxic; Inconsistant Holly; Slow Holly;Decreased foot clearance;Narrow SULAIMAN;Shuffle;Improper weight shift   Limitations Noted In Balance; Coordination; Endurance; Heel Strike; Safety;Speed;Strength;Swing   Provided Assistance with: Direction   Walk Assist Level Contact Guard;Minimum Assist   Findings apprehensive gait without AD, pt fearful however reports she would not like to use the walker when she goes home  varied balance and step length t/o, [id number] with HHA/CGA no AD  Wheelchair mobility   Does the patient use a wheelchair? 0  No   Picking Up Object   Type of Assistance Needed Incidental touching   Physical Assistance Level No physical assistance   Comment CGA without AD to pickup cone from floor  Picking Up Object CARE Score 4   Toilet Transfer   Type of Assistance Needed Incidental touching   Physical Assistance Level No physical assistance   Comment CGA with RW for toileting  Toilet Transfer CARE Score 4   Toilet Transfer   Surface Assessed Standard Toilet   Limitations Noted In Balance; Endurance;UE Strength;LE Strength   Therapeutic Interventions   Strengthening Seated B LE TE: 2# LAQ and hip flexion 2x10 each; red tband hip abd and HS curls x20 each  Ball squeee x20   Flexibility manual passive stretch seated B DF and knee ext 5x20 sec each  Balance cone seek and find around gym without AD, min A  Trampoline ball toss unsupported 2x10; cone transfer high<>low; Left/right> high/low  x6min  Amb over mat on floor then stepping over x2 canes 2x 25ft, no AD  Assessment   Treatment Assessment Pt participated well in 90 min skilled PT intervention despite c/o being tired at start of session  Pt verbalizes wanting to be herself again and hopeful to not have to use RW at SD   Education on balance and safety provided  Trials with HHA/CGA no AD up to 175ft  Limited by ftg  Repeated notable retropulsion without protective/correction reactions t/o session when unsupported  Education to pt on balance deficits and being more mindful when loosing balance to lean fwd or adjust stance as able  Suggest review steps/trial step ups as pt has 1+1 BISI where only one step has L grab bar/handle  R>L LE weakness persists  Pt recognizes she could benefit from ongoing skilled intervention to maximize her functional I  Continue trials without AD to assess need at DC  Pt in agreement to use RW in room and until appropriate without AD  Pt requested BR use at end of session, setup in BR, reports will use call bell when finished  PCA Natalie aware  Family/Caregiver Present no   Problem List Decreased strength;Decreased range of motion;Decreased endurance;Decreased mobility; Impaired judgement   Barriers to Discharge Decreased caregiver support; Inaccessible home environment   Barriers to Discharge Comments BISI, lives alone, dec endurance and insight   PT Barriers   Physical Impairment Decreased strength;Decreased range of motion; Impaired balance;Decreased cognition;Decreased mobility   Functional Limitation Ramp negotiation;Stair negotiation;Standing;Transfers; Walking   Plan   Treatment/Interventions Functional transfer training;LE strengthening/ROM; Therapeutic exercise; Endurance training;Patient/family training;Gait training   Progress Progressing toward goals   Recommendation   PT Discharge Recommendation Home with home health rehabilitation   Equipment Recommended   (TBD)   PT Therapy Minutes   PT Time In 1230   PT Time Out 1400   PT Total Time (minutes) 90   PT Mode of treatment - Individual (minutes) 90   PT Mode of treatment - Concurrent (minutes) 0   PT Mode of treatment - Group (minutes) 0   PT Mode of treatment - Co-treat (minutes) 0   PT Mode of Treatment - Total time(minutes) 90 minutes   PT Cumulative Minutes 285 Therapy Time missed   Time missed?  No

## 2022-06-06 NOTE — ASSESSMENT & PLAN NOTE
DXA scan on 5/13/22 showed osteoporosis  Start on Vitamin D 2000u daily and calcium 1000mg daily  Repeat Vitamin D level pending - last level was 31 in 02/2020  Follow-up with PCP or Rheumatology at discharge

## 2022-06-06 NOTE — PROGRESS NOTES
[facility]  Progress Note - Wesly Bowie 1945, 64 y o  female MRN: [id number]  Unit/Bed#: -13 Encounter: [id number]  Primary Care Provider: [place] Mode,    Date and time admitted to hospital: 6/2/2022  2:47 PM    Osteoporosis  Assessment & Plan  DXA scan on 5/13/22 showed osteoporosis  Start on Vitamin D 2000u daily and calcium 1000mg daily  Repeat Vitamin D level pending - last level was 31 in 02/2020  Follow-up with PCP or Rheumatology at discharge  Fracture, toe  Assessment & Plan  L foot XR: acute appearing minimally displaced oblique fracture involving medial base of the proximal phalanx of the 1st digit  Podiatry consulted in acute setting - recommended WBAT and surgical shoes to both feet with ambulation  Follow-up with Podiatry as outpatient  Verified surgical shoes with Dr Jenny Esposito - should continue for 3 weeks after XR on 5/26  Repeat XR in 3 weeks (6/16)  Weakness of both lower extremities  Assessment & Plan  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  CK 6976 on admission - currently 39  Simvastatin was stopped and currently on hold  Per Rheumatology - presentation not consistent with inflammatory myositis  MRI L spine showed severe facet degenerative change at L4-L5 resulting in moderate central stenosis, moderate R foramina narrowing, and moderate to severe R lateral recess stenosis  JONATHON on 5/31 was WNL  Venous duplex on 5/26 negative for DVTs  Myositis panel and HMGCR pending  Primary team following  PT/OT  Elevated liver enzymes  Assessment & Plan  Likely due to recent rhabdomyolysis  AST 32 and ALT 51 (from 63 and 110 respectively)  Continue to trend with routine CMP  HLD (hyperlipidemia)  Assessment & Plan  Last lipid panel on 3/31/22: Cholesterol 169, Triglycerides 69, HDL 87, and LDL 68  Hold statin at this time due to recent rhabdomyolysis      Essential hypertension  Assessment & Plan  Continue home regimen: Cardizem CD 240mg daily  Monitor BP with routine VS   Follow-up with PCP as outpatient  Psoriatic arthritis ([facility] [address] )  Assessment & Plan  Home regimen: methotrexate 17 5mg on Mondays  Per Rheumatology - continue to hold until outpatient follow-up with Rheumatologist (Dr Josselyn Izaguirre)  History of TIA (transient ischemic attack)  Assessment & Plan  Hx of TIA without any deficits  Continue Plavix 75mg daily  Statin on hold  * Rhabdomyolysis  Assessment & Plan  Likely due to statin  Simvastatin currently on hold  CK 6976 on admission - currently 39  Improved with IV fluids in acute setting  Continue to trend with routine labs  Per Rheumatology - presentation not consistent with inflammatory myositis  Rheumatology recommended holding methotrexate until further follow-up with outpatient Rheumatologist (Dr Josselyn Izaguirre)  Ensure adequate hydration  Continue PT/OT  VTE Pharmacologic Prophylaxis:   Pharmacologic: Enoxaparin (Lovenox)  Mechanical VTE Prophylaxis in Place: Yes - sequential compression devices  Current Length of Stay: 4 day(s)    Current Patient Status: Inpatient Rehab     Discharge Plan: As per primary team     Code Status: Level 1 - Full Code    Subjective:   Pt examined while pt lying in bed in pt room  Currently denies any pain  Feels that her lower extremities are getting stronger  Denies any dizziness, headaches, SOB, or palpitations  Occasionally has lightheadedness when getting OOB, but states this is baseline for her and she knows to take her time  Encouraged to drink more water but pt states that it makes her want to gag and it has been that way since she was younger  Mostly drinks soda and coffee  Encouraged to take small sips of water throughout the day  Feels that she is not eating as much here  Blames it on the taste of the food    States that she does not like the food here and had no problems eating over at [facility]   Encouraged to try different foods and consider snacks if she does not like what is on her tray  Did experience coughing after eating Macanese toast this morning, but denies coughing with any other meals or drinking fluids  States that there was only one other time where she was coughing after eating and feels that was also Macanese toast   Encouraged to let staff know if continues with other foods  Has no other concerns or complaints at this time  Objective:     Vitals:   Temp (24hrs), Av 2 °F (36 8 °C), Min:97 4 °F (36 3 °C), Max:98 7 °F (37 1 °C)    Temp:  [97 4 °F (36 3 °C)-98 7 °F (37 1 °C)] 98 6 °F (37 °C)  HR:  [56-67] 66  Resp:  [17-18] 17  BP: (123-134)/(59-60) 134/60  SpO2:  [96 %-99 %] 97 %  Body mass index is 18 53 kg/m²  Review of Systems   Constitutional: Positive for appetite change (decreased appetite, feels that it is due to how the food is prepared here)  Negative for chills, fatigue and fever  HENT: Positive for trouble swallowing (difficulty swallowing Macanese toast this morning, denies difficulties with any other foods or fluids)  Eyes: Negative for visual disturbance  Respiratory: Positive for cough (started to cough after eating Macanese toast, happen 1 other time, also with Macanese toast)  Negative for shortness of breath, wheezing and stridor  Cardiovascular: Negative for chest pain, palpitations and leg swelling  Gastrointestinal: Positive for constipation  Negative for abdominal distention, abdominal pain, diarrhea, nausea and vomiting  LBM / (x2, formed/hard)   Genitourinary: Negative for difficulty urinating, dysuria, frequency and urgency  Musculoskeletal: Negative for arthralgias and back pain  Neurological: Negative for dizziness, weakness, light-headedness, numbness and headaches  Psychiatric/Behavioral: Negative for sleep disturbance  All other systems reviewed and are negative  Input and Output Summary (last 24 hours):        Intake/Output Summary (Last 24 hours) at 2022 0972  Last data filed at 6/6/2022 0700  Gross per 24 hour   Intake 200 ml   Output 600 ml   Net -400 ml       Physical Exam:     Physical Exam  Vitals and nursing note reviewed  Constitutional:       Appearance: Normal appearance  HENT:      Head: Normocephalic and atraumatic  Neck:      Vascular: No carotid bruit  Cardiovascular:      Rate and Rhythm: Normal rate and regular rhythm  Pulses: Normal pulses  Heart sounds: Normal heart sounds  No murmur heard  No friction rub  Pulmonary:      Effort: Pulmonary effort is normal  No respiratory distress  Breath sounds: Normal breath sounds  No wheezing or rhonchi  Abdominal:      General: Abdomen is flat  Bowel sounds are normal  There is no distension  Palpations: Abdomen is soft  Tenderness: There is no abdominal tenderness  Musculoskeletal:      Cervical back: Normal range of motion and neck supple  Right lower leg: No edema  Left lower leg: No edema  Skin:     General: Skin is warm and dry  Neurological:      Mental Status: She is alert and oriented to person, place, and time     Psychiatric:         Mood and Affect: Mood normal          Behavior: Behavior normal          Additional Data:     Labs:    Results from last 7 days   Lab Units 06/06/22  0602   WBC Thousand/uL 8 13   HEMOGLOBIN g/dL 13 0   HEMATOCRIT % 39 6   PLATELETS Thousands/uL 221   NEUTROS PCT % 62   LYMPHS PCT % 23   MONOS PCT % 13*   EOS PCT % 2     Results from last 7 days   Lab Units 06/06/22  0601   SODIUM mmol/L 137   POTASSIUM mmol/L 3 7   CHLORIDE mmol/L 101   CO2 mmol/L 31*   BUN mg/dL 9   CREATININE mg/dL 0 55*   ANION GAP mmol/L 5   CALCIUM mg/dL 8 6   ALBUMIN g/dL 3 1   TOTAL BILIRUBIN mg/dL 0 60   ALK PHOS U/L 53   ALT U/L 51*   AST U/L 32   GLUCOSE RANDOM mg/dL 91                       Labs reviewed    Imaging:    Imaging reviewed    Recent Cultures (last 7 days):           Last 24 Hours Medication List:   Current Facility-Administered Medications   Medication Dose Route Frequency Provider Last Rate    acetaminophen  650 mg Oral Q6H PRN Cezar Mejia MD      aluminum-magnesium hydroxide-simethicone  30 mL Oral Q6H PRN Cezar Mejia MD      bisacodyl  10 mg Rectal Daily PRN Cezar Mejia MD      calcium carbonate  2 tablet Oral Daily With Breakfast ANNE Donaldson      cholecalciferol  2,000 Units Oral Daily ANNE Donaldson      clopidogrel  75 mg Oral HS Cezar Mejia MD      Diclofenac Sodium  2 g Topical 4x Daily PRN ANNE Donaldson      diltiazem  240 mg Oral Daily Cezar Mejia MD      docusate sodium  100 mg Oral BID Cezar Mejia MD      enoxaparin  40 mg Subcutaneous Q24H [address], CRNP      folic acid  1 mg Oral TID Cezar Mejia MD      latanoprost  1 drop Both Eyes HS Cezar Mejia MD      polyethylene glycol  17 g Oral Daily PRN Cezar Mejia MD      polyethylene glycol  17 g Oral Daily Cezar Mejia MD      senna  1 tablet Oral BID Cezar Mejia MD          M*Modal software was used to dictate this note  It may contain errors with dictating incorrect words or incorrect spelling  Please contact the provider directly with any questions

## 2022-06-07 PROCEDURE — 97530 THERAPEUTIC ACTIVITIES: CPT

## 2022-06-07 PROCEDURE — 97112 NEUROMUSCULAR REEDUCATION: CPT

## 2022-06-07 PROCEDURE — 99232 SBSQ HOSP IP/OBS MODERATE 35: CPT | Performed by: INTERNAL MEDICINE

## 2022-06-07 PROCEDURE — 97110 THERAPEUTIC EXERCISES: CPT

## 2022-06-07 PROCEDURE — 99232 SBSQ HOSP IP/OBS MODERATE 35: CPT

## 2022-06-07 PROCEDURE — 97116 GAIT TRAINING THERAPY: CPT

## 2022-06-07 PROCEDURE — 97535 SELF CARE MNGMENT TRAINING: CPT

## 2022-06-07 RX ADMIN — CALCIUM 2 TABLET: 500 TABLET ORAL at 08:06

## 2022-06-07 RX ADMIN — DOCUSATE SODIUM 100 MG: 100 CAPSULE, LIQUID FILLED ORAL at 17:14

## 2022-06-07 RX ADMIN — ENOXAPARIN SODIUM 40 MG: 100 INJECTION SUBCUTANEOUS at 08:06

## 2022-06-07 RX ADMIN — CLOPIDOGREL BISULFATE 75 MG: 75 TABLET ORAL at 21:12

## 2022-06-07 RX ADMIN — DICLOFENAC SODIUM 2 G: 10 GEL TOPICAL at 22:58

## 2022-06-07 RX ADMIN — DOCUSATE SODIUM 100 MG: 100 CAPSULE, LIQUID FILLED ORAL at 08:06

## 2022-06-07 RX ADMIN — FOLIC ACID 1 MG: 1 TABLET ORAL at 21:12

## 2022-06-07 RX ADMIN — DILTIAZEM HYDROCHLORIDE 240 MG: 120 CAPSULE, COATED, EXTENDED RELEASE ORAL at 08:06

## 2022-06-07 RX ADMIN — FOLIC ACID 1 MG: 1 TABLET ORAL at 17:14

## 2022-06-07 RX ADMIN — SENNOSIDES 8.6 MG: 8.6 TABLET, FILM COATED ORAL at 08:06

## 2022-06-07 RX ADMIN — Medication 2000 UNITS: at 08:06

## 2022-06-07 RX ADMIN — FOLIC ACID 1 MG: 1 TABLET ORAL at 08:06

## 2022-06-07 RX ADMIN — SENNOSIDES 8.6 MG: 8.6 TABLET, FILM COATED ORAL at 17:14

## 2022-06-07 RX ADMIN — LATANOPROST 1 DROP: 50 SOLUTION OPHTHALMIC at 21:12

## 2022-06-07 NOTE — PROGRESS NOTES
[facility]  Progress Note - Lolly Agosto 1945, 64 y o  female MRN: [id number]  Unit/Bed#: -40 Encounter: [id number]  Primary Care Provider: Frank Stein DO   Date and time admitted to hospital: 6/2/2022  2:47 PM    Osteoporosis  Assessment & Plan  DXA scan on 5/13/22 showed osteoporosis  Started on Vitamin D 2000u daily and calcium 1000mg daily  Vitamin D level 31 3 on 6/6  Follow-up with PCP or Rheumatology at discharge  Fracture, toe  Assessment & Plan  L foot XR: acute appearing minimally displaced oblique fracture involving medial base of the proximal phalanx of the 1st digit  Podiatry consulted in acute setting - recommended WBAT and surgical shoes to both feet with ambulation  Follow-up with Podiatry as outpatient  Verified surgical shoes with Dr Reji Forrester - should continue for 3 weeks after XR on 5/26  Repeat XR in 3 weeks (6/16)  Weakness of both lower extremities  Assessment & Plan  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  CK 6976 on admission - currently 39  Simvastatin was stopped and currently on hold  Per Rheumatology - presentation not consistent with inflammatory myositis  MRI L spine showed severe facet degenerative change at L4-L5 resulting in moderate central stenosis, moderate R foramina narrowing, and moderate to severe R lateral recess stenosis  JONATHON on 5/31 was WNL  Venous duplex on 5/26 negative for DVTs  Myositis panel pending  Primary team following  PT/OT  Elevated liver enzymes  Assessment & Plan  Likely due to recent rhabdomyolysis  AST 32 and ALT 51 (from 63 and 110 respectively)  Continue to trend with routine CMP  HLD (hyperlipidemia)  Assessment & Plan  Last lipid panel on 3/31/22: Cholesterol 169, Triglycerides 69, HDL 87, and LDL 68  Hold statin at this time due to recent rhabdomyolysis  Essential hypertension  Assessment & Plan  Continue home regimen: Cardizem CD 240mg daily    Monitor BP with routine VS   Follow-up with PCP as outpatient  Psoriatic arthritis ([facility] [address] )  Assessment & Plan  Home regimen: methotrexate 17 5mg on   Per Rheumatology - continue to hold until outpatient follow-up with Rheumatologist (Dr bAby Villarreal)  History of TIA (transient ischemic attack)  Assessment & Plan  Hx of TIA without any deficits  Continue Plavix 75mg daily  Statin on hold  * Rhabdomyolysis  Assessment & Plan  Likely due to statin  Simvastatin currently on hold  CK 6976 on admission - currently 39  Improved with IV fluids in acute setting  Continue to trend with routine labs  Per Rheumatology - presentation not consistent with inflammatory myositis  Rheumatology recommended holding methotrexate until further follow-up with outpatient Rheumatologist (Dr Abby Villarreal)  Ensure adequate hydration  Continue PT/OT  VTE Pharmacologic Prophylaxis:   Pharmacologic: Enoxaparin (Lovenox)  Mechanical VTE Prophylaxis in Place: Yes - sequential compression devices  Current Length of Stay: 5 day(s)    Current Patient Status: Inpatient Rehab     Discharge Plan: As per primary team     Code Status: Level 1 - Full Code    Subjective:   Pt examined while pt sitting in recliner in pt room  Currently denies any pain  Slept well last night  Feels that she is doing really well in therapy but feels tired afterwards  Denies any lightheadedness, dizziness, SOB, or palpitations  Had two small BMs yesterday morning, no further BMs today  Denies any abdominal pain  Has no other concerns or complaints at this time  Objective:     Vitals:   Temp (24hrs), Av 2 °F (36 8 °C), Min:97 8 °F (36 6 °C), Max:98 9 °F (37 2 °C)    Temp:  [97 8 °F (36 6 °C)-98 9 °F (37 2 °C)] 97 9 °F (36 6 °C)  HR:  [59-69] 69  Resp:  [16] 16  BP: (116-148)/(56-68) 116/56  SpO2:  [97 %-98 %] 98 %  Body mass index is 18 53 kg/m²  Review of Systems   Constitutional: Negative for appetite change, chills, fatigue and fever     HENT: Negative for trouble swallowing  Eyes: Negative for visual disturbance  Respiratory: Negative for cough, shortness of breath, wheezing and stridor  Cardiovascular: Negative for chest pain, palpitations and leg swelling  Gastrointestinal: Negative for abdominal distention, abdominal pain, constipation, diarrhea, nausea and vomiting  LBM 6/6   Genitourinary: Negative for difficulty urinating  Musculoskeletal: Negative for arthralgias and back pain  Neurological: Negative for dizziness, weakness, light-headedness, numbness and headaches  Psychiatric/Behavioral: Negative for sleep disturbance  All other systems reviewed and are negative  Input and Output Summary (last 24 hours): Intake/Output Summary (Last 24 hours) at 6/7/2022 0933  Last data filed at 6/7/2022 0900  Gross per 24 hour   Intake 380 ml   Output --   Net 380 ml       Physical Exam:     Physical Exam  Vitals and nursing note reviewed  Constitutional:       Appearance: Normal appearance  HENT:      Head: Normocephalic and atraumatic  Cardiovascular:      Rate and Rhythm: Normal rate and regular rhythm  Pulses: Normal pulses  Heart sounds: Normal heart sounds  No murmur heard  No friction rub  Pulmonary:      Effort: Pulmonary effort is normal  No respiratory distress  Breath sounds: Normal breath sounds  No wheezing or rhonchi  Abdominal:      General: Abdomen is flat  Bowel sounds are normal  There is no distension  Palpations: Abdomen is soft  Tenderness: There is no abdominal tenderness  Musculoskeletal:      Cervical back: Normal range of motion and neck supple  Right lower leg: No edema  Left lower leg: No edema  Skin:     General: Skin is warm and dry  Neurological:      Mental Status: She is alert and oriented to person, place, and time     Psychiatric:         Mood and Affect: Mood normal          Behavior: Behavior normal          Additional Data: Labs:    Results from last 7 days   Lab Units 06/06/22  0602   WBC Thousand/uL 8 13   HEMOGLOBIN g/dL 13 0   HEMATOCRIT % 39 6   PLATELETS Thousands/uL 221   NEUTROS PCT % 62   LYMPHS PCT % 23   MONOS PCT % 13*   EOS PCT % 2     Results from last 7 days   Lab Units 06/06/22  0601   SODIUM mmol/L 137   POTASSIUM mmol/L 3 7   CHLORIDE mmol/L 101   CO2 mmol/L 31*   BUN mg/dL 9   CREATININE mg/dL 0 55*   ANION GAP mmol/L 5   CALCIUM mg/dL 8 6   ALBUMIN g/dL 3 1   TOTAL BILIRUBIN mg/dL 0 60   ALK PHOS U/L 53   ALT U/L 51*   AST U/L 32   GLUCOSE RANDOM mg/dL 91                       Labs reviewed    Imaging:    Imaging reviewed    Recent Cultures (last 7 days):           Last 24 Hours Medication List:   Current Facility-Administered Medications   Medication Dose Route Frequency Provider Last Rate    acetaminophen  650 mg Oral Q6H PRN Willem Khan MD      aluminum-magnesium hydroxide-simethicone  30 mL Oral Q6H PRN Willem Khan MD      bisacodyl  10 mg Rectal Daily PRN Willem Khan MD      calcium carbonate  2 tablet Oral Daily With Breakfast Steven SoloANNE      cholecalciferol  2,000 Units Oral Daily Steven Solo, ANNE      clopidogrel  75 mg Oral HS Willem Khan MD      Diclofenac Sodium  2 g Topical 4x Daily PRN ANNE Lopez      diltiazem  240 mg Oral Daily Willem Khan MD      docusate sodium  100 mg Oral BID Willem Khan MD      enoxaparin  40 mg Subcutaneous Q24H [address], CRNP      folic acid  1 mg Oral TID Willem Khan MD      latanoprost  1 drop Both Eyes HS Willem Khan MD      polyethylene glycol  17 g Oral Daily PRN Willem Khan MD      polyethylene glycol  17 g Oral Daily Willem Khan MD      senna  1 tablet Oral BID Willem Khan MD          M*Modal software was used to dictate this note  It may contain errors with dictating incorrect words or incorrect spelling  Please contact the provider directly with any questions

## 2022-06-07 NOTE — ASSESSMENT & PLAN NOTE
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

## 2022-06-07 NOTE — PROGRESS NOTES
06/07/22 1100   Pain Assessment   Pain Assessment Tool 0-10   Pain Score No Pain   Restrictions/Precautions   Precautions Cognitive; Fall Risk;Supervision on toilet/commode   Braces or Orthoses Other (Comment)  (Bilat surgical shoes)   Cognition   Arousal/Participation Alert; Cooperative   Attention Attends with cues to redirect   Orientation Level Oriented X4   Memory Decreased short term memory   Following Commands Follows multistep commands with increased time or repetition   Subjective   Subjective "I'm so tired " Pt with c/o fatigue following a "long therapy session in the kitchen" (referencing OT)  Agreeable to PT  Requesting to use bathroom before session  Roll Left and Right   Type of Assistance Needed Supervision   Physical Assistance Level No physical assistance   Roll Left and Right CARE Score 4   Sit to Lying   Type of Assistance Needed Supervision   Physical Assistance Level No physical assistance   Sit to Lying CARE Score 4   Lying to Sitting on Side of Bed   Type of Assistance Needed Supervision   Physical Assistance Level No physical assistance   Lying to Sitting on Side of Bed CARE Score 4   Sit to Stand   Type of Assistance Needed Supervision; Incidental touching; Adaptive equipment   Physical Assistance Level No physical assistance   Comment Occasional slight post lean/mild LOB however no inc assist required and pt able to correct  Sit to Stand CARE Score 4   Bed-Chair Transfer   Type of Assistance Needed Incidental touching; Adaptive equipment   Physical Assistance Level No physical assistance   Comment RW   Chair/Bed-to-Chair Transfer CARE Score 4   Transfer Bed/Chair/Wheelchair   Limitations Noted In Balance;Confidence; Coordination; Endurance;UE Strength;LE Strength   Adaptive Equipment Roller Walker   Car Transfer   Reason if not Attempted Environmental limitations   Car Transfer CARE Score 10   Walk 10 Feet   Type of Assistance Needed Incidental touching; Adaptive equipment   Physical Assistance Level No physical assistance   Comment RW   Walk 10 Feet CARE Score 4   Walk 50 Feet with Two Turns   Type of Assistance Needed Incidental touching; Adaptive equipment   Physical Assistance Level No physical assistance   Comment RW   Walk 50 Feet with Two Turns CARE Score 4   Walk 150 Feet   Type of Assistance Needed Incidental touching; Adaptive equipment   Physical Assistance Level No physical assistance   Comment RW   Walk 150 Feet CARE Score 4   Walking 10 Feet on Uneven Surfaces   Type of Assistance Needed Incidental touching; Adaptive equipment   Physical Assistance Level No physical assistance   Comment RW   Walking 10 Feet on Uneven Surfaces CARE Score 4   Ambulation   Does the patient walk? 2  Yes   Primary Mode of Locomotion Prior to Admission Walk   Distance Walked (feet) 200 ft  (200ft x3)   Assist Device Roller Walker   Gait Pattern Antalgic; Inconsistant Holly; Slow Holly;Decreased foot clearance;Shuffle;Improper weight shift;Narrow SULAIMAN   Limitations Noted In Balance;Speed;Strength;Posture; Endurance   Walk Assist Level Contact Guard   Findings Slightly antalgic, discontinuous gait at times due to fatigue and/or shuffle with surgical shoes  Close sup provided for amb with RW and CGA for turn/approach to w/c due to occasional slight post lean  Wheel 50 Feet with Two Turns   Reason if not Attempted Activity not applicable   Wheel 50 Feet with Two Turns CARE Score 9   Wheel 150 Feet   Reason if not Attempted Activity not applicable   Wheel 236 Feet CARE Score 9   Wheelchair mobility   Does the patient use a wheelchair? 0  No   Curb or Single Stair   Style negotiated Curb   Type of Assistance Needed Physical assistance;Verbal cues; Adaptive equipment   Physical Assistance Level 25% or less   Comment RW, 8" curb step   1 Step (Curb) CARE Score 3   4 Steps   Type of Assistance Needed Incidental touching;Verbal cues; Adaptive equipment   Physical Assistance Level No physical assistance   Comment Bilat HRs, 8 steps   4 Steps CARE Score 4   12 Steps   Reason if not Attempted Activity not applicable   12 Steps CARE Score 9   Stairs   Type Stairs   # of Steps 8   Assist Devices Bilateral Rail   Findings Non-recip pattern   Picking Up Object   Type of Assistance Needed Incidental touching;Verbal cues; Adaptive equipment   Physical Assistance Level No physical assistance   Comment Picking up cones from floor; with RW, no reacher  Picking Up Object CARE Score 4   Toilet Transfer   Type of Assistance Needed Incidental touching; Adaptive equipment   Physical Assistance Level No physical assistance   Comment Grab bar, RW   Toilet Transfer CARE Score 4   Toilet Transfer   Surface Assessed Standard Toilet   Therapeutic Interventions   Strengthening Seated LE TE 30x each: LAQ, hip flex, hip abd red theraband, hamstring curl read theraband, hip add sets  Balance Amb through cones with RW, fwd/bkwd amb HHA, side stepping HHA, fwd amb with quick change in speed, stop/starts with HHA  Assessment   Treatment Assessment Pt participated in 90 minute PT session with focus on strengthening, gait and endurance training with RW, balance activities without RW/with HHA  Pt conts to be apprehensive with mobility without RW  Demo very tight  on therapist's hands for HHA during side step and fwd/bkwd gait activities  Edu and reinforcement on AD training, goals, progression to return to indep without AD as she was PTA  Pt receptive to edu stating she prefers RW at this time as she is fearful of falling  Strength and amb distance improved this session despite pt c/o fatigue  She conts with occasional posterior lean/very slight LOB during transfers or turning to approach w/c or bed  Remains at close sup/CGA level  She will cont to benefit from skilled PT to further improve functional strength, balance, endurance, indep and safety with all mobility prior to d/c   She was assisted back to her room post-session and setup with lunch tray  All needs in reach  Family/Caregiver Present No   Problem List Decreased strength;Decreased endurance;Decreased range of motion; Impaired balance;Decreased mobility; Decreased coordination;Decreased cognition   Barriers to Discharge Decreased caregiver support   PT Barriers   Physical Impairment Decreased strength;Decreased range of motion;Decreased endurance; Impaired balance;Decreased mobility; Decreased coordination;Decreased cognition   Functional Limitation Car transfers; Ramp negotiation;Stair negotiation;Standing;Transfers; Walking   Plan   Treatment/Interventions Functional transfer training;LE strengthening/ROM; Elevations; Therapeutic exercise; Endurance training;Bed mobility;Gait training; Compensatory technique education   Progress Progressing toward goals   Recommendation   PT Discharge Recommendation Home with home health rehabilitation   PT Therapy Minutes   PT Time In 1100   PT Time Out 1230   PT Total Time (minutes) 90   PT Mode of treatment - Individual (minutes) 90   PT Mode of treatment - Concurrent (minutes) 0   PT Mode of treatment - Group (minutes) 0   PT Mode of treatment - Co-treat (minutes) 0   PT Mode of Treatment - Total time(minutes) 90 minutes   PT Cumulative Minutes 375   Therapy Time missed   Time missed?  No

## 2022-06-07 NOTE — ASSESSMENT & PLAN NOTE
DXA scan on 5/13/22 showed osteoporosis  Started on Vitamin D 2000u daily and calcium 1000mg daily  Vitamin D level 31 3 on 6/6  Follow-up with PCP or Rheumatology at discharge

## 2022-06-07 NOTE — PROGRESS NOTES
Physical Medicine and Rehabilitation Progress Note  Magan Velarde 64 y o  female MRN: [id number]  Unit/Bed#: [facility] 266-01 Encounter: [id number]      Assessment & Plan:     Decline in ADLs and mobility: Functional assessment - improving         FIM  Care Score  Admit Score Recent Score    Total assist  1-100% or 2p    Tot     (Former TA) 2-76-99%  Sub     Max assist 2-51-75%  Sub     Mod assist 3- 26-50%  Par     Min assist 3- 25% or < Par To hygiene, bathing, dressing  Bathing, LBD    CG assist 4  TA  To hygiene    Supervision 4 Sup     Set-up  5 NORIEGA     Mod-I/Indep 6 MI      Transfers  Min assist  CGA assist     Ambulation   150 ft  ft CG assist     Stairs   Mod assist 4 steps  4 steps CGA      Goal: Mod indep  Major barriers:  Weakness, lack of support at home  Dispo & ELOS: Home with ELOS 12 days from admission      Weakness of both lower extremities  Assessment & Plan  Continues to improve   - HMG-CoA reductase and MyoMarker 3 still pending - OP follow-up rheum   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  Skilled therapies as outlined  Optimal nutrition and medical mgmt   Fall precautions     Impaired mobility and ADLs  Assessment & Plan  Multifactorial: Rhabdo/statin-induced myopathy, foot fracture, deconditioning   - Optimal management of each as listed -   - Continue acute comprehensive interdisciplinary inpatient rehabilitation to include intensive skilled therapies (PT, OT +/- ST) as outlined with oversight and management by rehabilitation physician as well as inpatient rehab level nursing, case management and weekly interdisciplinary team meetings         * Rhabdomyolysis  Assessment & Plan  Strength continues to improve, function improving   CK max almost 7000 > trended down and now wnl 6/5   Believed to be 2/2 statin induced - hold statin  Hold MTX per rheum  OP follow-up with PCP and rheum    Fracture, toe  Assessment & Plan  Denies pain   L 1st prox phalanx fx with minimal displacement  - WABT with sx shoes bilaterally   - Repeat XR 6/16/22  - OP podiatry follow-up     At risk for venous thromboembolism (VTE)  Assessment & Plan  SCDs, ambulation, and lovenox     Constipation  Assessment & Plan  Improved   - Daily miralax for now  - Colace/Senna BID  - PRN bowel regimen (Miralax PRN/Dulcolax supp PRN)  - monitor for signs and symptoms of obstruction/ileus > not currently; hold stimulant lax if occurs  - Limiting constipating medications if possible  - Ensure adequate hydration       Osteoporosis  Assessment & Plan  D3 2000 units qday and calcium   Vit D level 31    Elevated liver enzymes  Assessment & Plan  Possibly 2/2 rhabdo  Resolving - only ALT still elevated but now down to 51 on 6/6    HLD (hyperlipidemia)  Assessment & Plan  Statin on hold   Per IM "Last lipid panel on 3/31/22: Cholesterol 169, Triglycerides 69, HDL 87, and LDL 68"  OP PCP follow-up     Essential hypertension  Assessment & Plan  Internal medicine consulted and co-management with their service  Monitor vitals with and without activity; monitor for orthostasis  Monitor hemoglobin, electrolytes, kidney function, hydration status   Current meds: Cardizem       Psoriatic arthritis ([facility] [address] )  Assessment & Plan  OP follow-up with rheum Dr Jung Ranks  - Weekly MTX on hold for now per prior recs  - Monitor for flare     History of TIA (transient ischemic attack)  Assessment & Plan  Secondary stroke prevention  - Antithrombotic: plavix  - Holding Statin with recent possible statin induced myopathy/rhabdo  - Optimal management of blood pressure       Other Medical Issues:   None    Follow-up providers and other issues to be followed up after discharge  · PCP  · Rheum    CODE: Level 1: Full Code    Restrictions include: Fall precautions    Objective:     Allergies per EMR  Diagnostic Studies: Reviewed, no new imaging  No orders to display     See above as well    Laboratory: Labs reviewed  Results from last 7 days   Lab Units 06/06/22  0602 HEMOGLOBIN g/dL 13 0   HEMATOCRIT % 39 6   WBC Thousand/uL 8 13     Results from last 7 days   Lab Units 06/06/22  0601 06/01/22  1716 06/01/22  0545   BUN mg/dL 9 11 9   SODIUM mmol/L 137 141 143   POTASSIUM mmol/L 3 7 3 6 3 5   CHLORIDE mmol/L 101 103 105   CREATININE mg/dL 0 55* 0 52* 0 56*   AST U/L 32 63*  --    ALT U/L 51* 110*  --             Drug regimen reviewed, all potential adverse effects identified and addressed:    Scheduled Meds:  Current Facility-Administered Medications   Medication Dose Route Frequency Provider Last Rate    acetaminophen  650 mg Oral Q6H PRN Kenisha Contreras MD      aluminum-magnesium hydroxide-simethicone  30 mL Oral Q6H PRN Kenisha Contreras MD      bisacodyl  10 mg Rectal Daily PRN Kenisha Contreras MD      calcium carbonate  2 tablet Oral Daily With Breakfast Angelique Spurling, CRNP      cholecalciferol  2,000 Units Oral Daily Angelique Spurling, CRNP      clopidogrel  75 mg Oral HS Kenisha Contreras MD      Diclofenac Sodium  2 g Topical 4x Daily PRN Angelique Spurling, CRNP      diltiazem  240 mg Oral Daily Kenisha Contreras MD      docusate sodium  100 mg Oral BID Kenisha Contreras MD      enoxaparin  40 mg Subcutaneous Q24H [address], CRNP      folic acid  1 mg Oral TID Kenisha Contreras MD      latanoprost  1 drop Both Eyes HS Kenisha Contreras MD      polyethylene glycol  17 g Oral Daily PRN Kenisha Contreras MD      polyethylene glycol  17 g Oral Daily Kenisha Contreras MD      senna  1 tablet Oral BID Kenihsa Contreras MD         Chief Complaints:  Rehab follow-up     Subjective:     Patient reports strength improving slowly  She reports stooling better and denies lightheadedness, calf pain, abdominal pain, foot pain or other new complaints  ROS: A 10 point ROS was performed; negative except as noted above         Physical Exam:  Vitals:    06/07/22 1531   BP: 132/60   Pulse: 55   Resp: 16   Temp: 98 6 °F (37 °C)   SpO2: 96%     GEN:  Lying in bed in NAD   HEENT/NECK: MMM  CARDIAC: Regular rate rhythm, no murmers, no rubs, no gallops  LUNGS:  clear to auscultation, no wheezes, rales, or rhonchi  ABDOMEN: Soft, non-tender, non-distended, normal active bowel sounds  EXTREMITIES/SKIN:  no calf edema, no calf tenderness to palpation  NEURO:   MENTAL STATUS:  Appropriate wakefulness and interaction  and Strength/MMT:  Prox RLE 4+/5, distal RLE 5-/5, Prox LLE 4-/5, distal 5-/5  PSYCH:  Affect:  Euthymic     HPI:  Junie Iglesias is a 64 y o  female with PMH psoriatic arthritis, HTN, HL, TIA, who legs gave out from her causing foot pain and she was noted to have significant bilateral LE weakness and was evaluated at the hospital   CK was elevated to close to 7000 and she was initially with acute kidney injury and had elevated LFTs  IVF were provided and CK trended down  X-ray showed displaced oblique fracture involving the medical base of the proximal phalanx of the 1st digit with recommendation by podiatry for WBAT in sx shoes  Neuro had extensive work-up which included MRI L spine which showed moderate central canal stenosis L4-5, Moderate right foraminal narrowing and moderate to sever right lateral recess stenosis  Rheum reviewed case as well  Overall she was not felt to have inflammatory myositis and 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 and statin has been held  She was recommended to hold MTX for now as well  Strength has been improving some  Patient was evaluated by skilled therapies and was found to have significant decline in ADLs and ambulation and appears appropriate for admission to [facility]      ** Please Note: Fluency Direct voice to text software may have been used in the creation of this document   **

## 2022-06-07 NOTE — ASSESSMENT & PLAN NOTE
L foot XR: acute appearing minimally displaced oblique fracture involving medial base of the proximal phalanx of the 1st digit  Podiatry consulted in acute setting - recommended WBAT and surgical shoes to both feet with ambulation  Follow-up with Podiatry as outpatient  Verified surgical shoes with Dr Darío Lisa - should continue for 3 weeks after XR on 5/26  Repeat XR in 3 weeks (6/16)

## 2022-06-07 NOTE — PLAN OF CARE
Problem: Nutrition/Hydration-ADULT  Goal: Nutrient/Hydration intake appropriate for improving, restoring or maintaining nutritional needs  Description: Monitor and assess patient's nutrition/hydration status for malnutrition  Collaborate with interdisciplinary team and initiate plan and interventions as ordered  Monitor patient's weight and dietary intake as ordered or per policy  Utilize nutrition screening tool and intervene as necessary  Determine patient's food preferences and provide high-protein, high-caloric foods as appropriate  INTERVENTIONS:  - Monitor oral intake, urinary output, labs, and treatment plans  - Assess nutrition and hydration status and recommend course of action  - Evaluate amount of meals eaten  - Assist patient with eating if necessary   - Allow adequate time for meals  - Recommend/ encourage appropriate diets, oral nutritional supplements, and vitamin/mineral supplements  - Order, calculate, and assess calorie counts as needed  - Recommend, monitor, and adjust tube feedings and TPN/PPN based on assessed needs  - Assess need for intravenous fluids  - Provide specific nutrition/hydration education as appropriate  - Include patient/family/caregiver in decisions related to nutrition  Outcome: Progressing     Problem: NEUROSENSORY - ADULT  Goal: Achieves maximal functionality and self care  Description: INTERVENTIONS  - Monitor swallowing and airway patency with patient fatigue and changes in neurological status  - Encourage and assist patient to increase activity and self care     - Encourage visually impaired, hearing impaired and aphasic patients to use assistive/communication devices  Outcome: Progressing     Problem: CARDIOVASCULAR - ADULT  Goal: Maintains optimal cardiac output and hemodynamic stability  Description: INTERVENTIONS:  - Monitor I/O, vital signs and rhythm  - Monitor for S/S and trends of decreased cardiac output  - Administer and titrate ordered vasoactive medications to optimize hemodynamic stability  - Assess quality of pulses, skin color and temperature  - Assess for signs of decreased coronary artery perfusion  - Instruct patient to report change in severity of symptoms  Outcome: Progressing  Goal: Absence of cardiac dysrhythmias or at baseline rhythm  Description: INTERVENTIONS:  - Continuous cardiac monitoring, vital signs, obtain 12 lead EKG if ordered  - Administer antiarrhythmic and heart rate control medications as ordered  - Monitor electrolytes and administer replacement therapy as ordered  Outcome: Progressing     Problem: RESPIRATORY - ADULT  Goal: Achieves optimal ventilation and oxygenation  Description: INTERVENTIONS:  - Assess for changes in respiratory status  - Assess for changes in mentation and behavior  - Position to facilitate oxygenation and minimize respiratory effort  - Oxygen administered by appropriate delivery if ordered  - Initiate smoking cessation education as indicated  - Encourage broncho-pulmonary hygiene including cough, deep breathe, Incentive Spirometry  - Assess the need for suctioning and aspirate as needed  - Assess and instruct to report SOB or any respiratory difficulty  - Respiratory Therapy support as indicated  Outcome: Progressing     Problem: GASTROINTESTINAL - ADULT  Goal: Maintains or returns to baseline bowel function  Description: INTERVENTIONS:  - Assess bowel function  - Encourage oral fluids to ensure adequate hydration  - Administer IV fluids if ordered to ensure adequate hydration  - Administer ordered medications as needed  - Encourage mobilization and activity  - Consider nutritional services referral to assist patient with adequate nutrition and appropriate food choices  Outcome: Progressing  Goal: Maintains adequate nutritional intake  Description: INTERVENTIONS:  - Monitor percentage of each meal consumed  - Identify factors contributing to decreased intake, treat as appropriate  - Assist with meals as needed  - Monitor I&O, weight, and lab values if indicated  - Obtain nutrition services referral as needed  Outcome: Progressing  Goal: Oral mucous membranes remain intact  Description: INTERVENTIONS  - Assess oral mucosa and hygiene practices  - Implement preventative oral hygiene regimen  - Implement oral medicated treatments as ordered  - Initiate Nutrition services referral as needed  Outcome: Progressing     Problem: GENITOURINARY - ADULT  Goal: Maintains or returns to baseline urinary function  Description: INTERVENTIONS:  - Assess urinary function  - Encourage oral fluids to ensure adequate hydration if ordered  - Administer IV fluids as ordered to ensure adequate hydration  - Administer ordered medications as needed  - Offer frequent toileting  - Follow urinary retention protocol if ordered  Outcome: Progressing  Goal: Absence of urinary retention  Description: INTERVENTIONS:  - Assess patients ability to void and empty bladder  - Monitor I/O  - Bladder scan as needed  - Discuss with physician/AP medications to alleviate retention as needed  - Discuss catheterization for long term situations as appropriate  Outcome: Progressing     Problem: METABOLIC, FLUID AND ELECTROLYTES - ADULT  Goal: Electrolytes maintained within normal limits  Description: INTERVENTIONS:  - Monitor labs and assess patient for signs and symptoms of electrolyte imbalances  - Administer electrolyte replacement as ordered  - Monitor response to electrolyte replacements, including repeat lab results as appropriate  - Instruct patient on fluid and nutrition as appropriate  Outcome: Progressing  Goal: Fluid balance maintained  Description: INTERVENTIONS:  - Monitor labs   - Monitor I/O and WT  - Instruct patient on fluid and nutrition as appropriate  - Assess for signs & symptoms of volume excess or deficit  Outcome: Progressing     Problem: SKIN/TISSUE INTEGRITY - ADULT  Goal: Skin Integrity remains intact(Skin Breakdown Prevention)  Description: Assess:  -Perform [place] assessment every shift  -Clean and moisturize skin every shift  -Inspect skin when repositioning, toileting, and assisting with ADLS  -Assess extremities for adequate circulation and sensation     Bed Management:  -Have minimal linens on bed & keep smooth, unwrinkled  -Change linens as needed when moist or perspiring  -Avoid sitting or lying in one position for more than 2 hours while in bed  -Keep HOB at 30 degrees     Toileting:  -Offer bedside commode  -Assess for incontinence every 2 hours      Activity:  -Mobilize patient 3 times a day  -Encourage activity and walks on unit  -Encourage or provide ROM exercises   -Turn and reposition patient every 2 Hours  -Use appropriate equipment to lift or move patient in bed  -Instruct/ Assist with weight shifting every 2 when out of bed in chair  -Consider limitation of chair time 4 hour intervals    Skin Care:  -Avoid use of baby powder, tape, friction and shearing, hot water or constrictive clothing  -Relieve pressure over bony prominences using allyvn  -Do not massage red bony areas    Next Steps:  -Teach patient strategies to minimize risks such as repositioning   -Consider consults to  interdisciplinary teams such as PT/OT  Outcome: Progressing     Problem: HEMATOLOGIC - ADULT  Goal: Maintains hematologic stability  Description: INTERVENTIONS  - Assess for signs and symptoms of bleeding or hemorrhage  - Monitor labs  - Administer supportive blood products/factors as ordered and appropriate  Outcome: Progressing     Problem: MUSCULOSKELETAL - ADULT  Goal: Maintain or return mobility to safest level of function  Description: INTERVENTIONS:  - Assess patient's ability to carry out ADLs; assess patient's baseline for ADL function and identify physical deficits which impact ability to perform ADLs (bathing, care of mouth/teeth, toileting, grooming, dressing, etc )  - Assess/evaluate cause of self-care deficits   - Assess range of motion  - Assess patient's mobility  - Assess patient's need for assistive devices and provide as appropriate  - Encourage maximum independence but intervene and supervise when necessary  - Involve family in performance of ADLs  - Assess for home care needs following discharge   - Consider OT consult to assist with ADL evaluation and planning for discharge  - Provide patient education as appropriate  Outcome: Progressing  Goal: Maintain proper alignment of affected body part  Description: INTERVENTIONS:  - Support, maintain and protect limb and body alignment  - Provide patient/ family with appropriate education  Outcome: Progressing     Problem: PAIN - ADULT  Goal: Verbalizes/displays adequate comfort level or baseline comfort level  Description: Interventions:  - Encourage patient to monitor pain and request assistance  - Assess pain using appropriate pain scale  - Administer analgesics based on type and severity of pain and evaluate response  - Implement non-pharmacological measures as appropriate and evaluate response  - Consider cultural and social influences on pain and pain management  - Notify physician/advanced practitioner if interventions unsuccessful or patient reports new pain  Outcome: Progressing     Problem: INFECTION - ADULT  Goal: Absence or prevention of progression during hospitalization  Description: INTERVENTIONS:  - Assess and monitor for signs and symptoms of infection  - Monitor lab/diagnostic results  - Monitor all insertion sites, i e  indwelling lines, tubes, and drains  - Monitor endotracheal if appropriate and nasal secretions for changes in amount and color  - [place] appropriate cooling/warming therapies per order  - Administer medications as ordered  - Instruct and encourage patient and family to use good hand hygiene technique  - Identify and instruct in appropriate isolation precautions for identified infection/condition  Outcome: Progressing  Goal: Absence of fever/infection during neutropenic period  Description: INTERVENTIONS:  - Monitor WBC    Outcome: Progressing     Problem: SAFETY ADULT  Goal: Patient will remain free of falls  Description: INTERVENTIONS:  - Educate patient/family on patient safety including physical limitations  - Instruct patient to call for assistance with activity   - Consult OT/PT to assist with strengthening/mobility   - Keep Call bell within reach  - Keep bed low and locked with side rails adjusted as appropriate  - Keep care items and personal belongings within reach  - Initiate and maintain comfort rounds  - Make Fall Risk Sign visible to staff  - Offer Toileting every 2 Hours, in advance of need  - Apply yellow socks and bracelet for high fall risk patients  - Consider moving patient to room near nurses station  Outcome: Progressing  Goal: Maintain or return to baseline ADL function  Description: INTERVENTIONS:  -  Assess patient's ability to carry out ADLs; assess patient's baseline for ADL function and identify physical deficits which impact ability to perform ADLs (bathing, care of mouth/teeth, toileting, grooming, dressing, etc )  - Assess/evaluate cause of self-care deficits   - Assess range of motion  - Assess patient's mobility; develop plan if impaired  - Assess patient's need for assistive devices and provide as appropriate  - Encourage maximum independence but intervene and supervise when necessary  - Involve family in performance of ADLs  - Assess for home care needs following discharge   - Consider OT consult to assist with ADL evaluation and planning for discharge  - Provide patient education as appropriate  Outcome: Progressing  Goal: Maintains/Returns to pre admission functional level  Description: INTERVENTIONS:  - Perform BMAT or MOVE assessment daily    - Set and communicate daily mobility goal to care team and patient/family/caregiver     - Collaborate with rehabilitation services on mobility goals if consulted  - Perform Range of Motion 3 times a day  - Reposition patient every 2 hours    - Dangle patient 3 times a day  - Stand patient 3 times a day  - Ambulate patient 3 times a day  - Out of bed to chair 3 times a day   - Out of bed for meals 3 times a day  - Out of bed for toileting  - Record patient progress and toleration of activity level   Outcome: Progressing     Problem: DISCHARGE PLANNING  Goal: Discharge to home or other facility with appropriate resources  Description: INTERVENTIONS:  - Identify barriers to discharge w/patient and caregiver  - Arrange for needed discharge resources and transportation as appropriate  - Identify discharge learning needs (meds, wound care, etc )  - Arrange for interpretive services to assist at discharge as needed  - Refer to Case Management Department for coordinating discharge planning if the patient needs post-hospital services based on physician/advanced practitioner order or complex needs related to functional status, cognitive ability, or social support system  Outcome: Progressing     Problem: MOBILITY - ADULT  Goal: Maintain or return to baseline ADL function  Description: INTERVENTIONS:  -  Assess patient's ability to carry out ADLs; assess patient's baseline for ADL function and identify physical deficits which impact ability to perform ADLs (bathing, care of mouth/teeth, toileting, grooming, dressing, etc )  - Assess/evaluate cause of self-care deficits   - Assess range of motion  - Assess patient's mobility; develop plan if impaired  - Assess patient's need for assistive devices and provide as appropriate  - Encourage maximum independence but intervene and supervise when necessary  - Involve family in performance of ADLs  - Assess for home care needs following discharge   - Consider OT consult to assist with ADL evaluation and planning for discharge  - Provide patient education as appropriate  Outcome: Progressing  Goal: Maintains/Returns to pre admission functional level  Description: INTERVENTIONS:  - Perform BMAT or MOVE assessment daily    - Set and communicate daily mobility goal to care team and patient/family/caregiver  - Collaborate with rehabilitation services on mobility goals if consulted  - Perform Range of Motion 3 times a day  - Reposition patient every 2 hours  - Dangle patient 3 times a day  - Stand patient 3 times a day  - Ambulate patient 3 times a day  - Out of bed to chair 3 times a day   - Out of bed for meals 3 times a day  - Out of bed for toileting  - Record patient progress and toleration of activity level   Outcome: Progressing     Problem: Nutrition/Hydration-ADULT  Goal: Nutrient/Hydration intake appropriate for improving, restoring or maintaining nutritional needs  Description: Monitor and assess patient's nutrition/hydration status for malnutrition  Collaborate with interdisciplinary team and initiate plan and interventions as ordered  Monitor patient's weight and dietary intake as ordered or per policy  Utilize nutrition screening tool and intervene as necessary  Determine patient's food preferences and provide high-protein, high-caloric foods as appropriate       INTERVENTIONS:  - Monitor oral intake, urinary output, labs, and treatment plans  - Assess nutrition and hydration status and recommend course of action  - Evaluate amount of meals eaten  - Assist patient with eating if necessary   - Allow adequate time for meals  - Recommend/ encourage appropriate diets, oral nutritional supplements, and vitamin/mineral supplements  - Order, calculate, and assess calorie counts as needed  - Recommend, monitor, and adjust tube feedings and TPN/PPN based on assessed needs  - Assess need for intravenous fluids  - Provide specific nutrition/hydration education as appropriate  - Include patient/family/caregiver in decisions related to nutrition  Outcome: Progressing     Problem: Prexisting or High Potential for Compromised Skin Integrity  Goal: Skin integrity is maintained or improved  Description: INTERVENTIONS:  - Identify patients at risk for skin breakdown  - Assess and monitor skin integrity  - Assess and monitor nutrition and hydration status  - Monitor labs   - Assess for incontinence   - Turn and reposition patient  - Assist with mobility/ambulation  - Relieve pressure over bony prominences  - Avoid friction and shearing  - Provide appropriate hygiene as needed including keeping skin clean and dry  - Evaluate need for skin moisturizer/barrier cream  - Collaborate with interdisciplinary team   - Patient/family teaching  - Consider wound care consult   Outcome: Progressing     Problem: Potential for Falls  Goal: Patient will remain free of falls  Description: INTERVENTIONS:  - Educate patient/family on patient safety including physical limitations  - Instruct patient to call for assistance with activity   - Consult OT/PT to assist with strengthening/mobility   - Keep Call bell within reach  - Keep bed low and locked with side rails adjusted as appropriate  - Keep care items and personal belongings within reach  - Initiate and maintain comfort rounds  - Make Fall Risk Sign visible to staff  - Offer Toileting every 2 Hours, in advance of need  - Apply yellow socks and bracelet for high fall risk patients  - Consider moving patient to room near nurses station  Outcome: Progressing

## 2022-06-07 NOTE — PROGRESS NOTES
06/07/22 0830   Pain Assessment   Pain Assessment Tool 0-10   Pain Score No Pain   Patient's Stated Pain Goal No pain   Multiple Pain Sites No   Restrictions/Precautions   Precautions Cognitive; Fall Risk;Supervision on toilet/commode   Weight Bearing Restrictions Yes   LLE Weight Bearing Per Order WBAT   Braces or Orthoses Other (Comment)  (surgical shoes)   Lifestyle   Autonomy Pt reported feelinng fatigue entire session  Putting On/Taking Off Footwear   Type of Assistance Needed Incidental touching   Physical Assistance Level No physical assistance   Comment only to take off surgical shoes prior to laying down at the end of session   Putting On/Taking Off Footwear CARE Score 4   Sit to Lying   Type of Assistance Needed Supervision   Physical Assistance Level No physical assistance   Comment Pt able to complete Sit to lying in bed  Sit to Lying CARE Score 4   Sit to Stand   Type of Assistance Needed Physical assistance   Physical Assistance Level 25% or less   Comment min A to help Pt during STS with RW due to overall weaknesses   Sit to Stand CARE Score 3   Bed-Chair Transfer   Type of Assistance Needed Incidental touching   Physical Assistance Level No physical assistance   Comment Pt able to complete transfer from bathroom to bed with RW CGA  provided   Chair/Bed-to-Chair Transfer CARE Score 4   Toileting Hygiene   Type of Assistance Needed Incidental touching   Physical Assistance Level No physical assistance   Comment CGA when standing with RW to complete toileting hygiene/ CM, Pt able to manage clothing ups and down in stance with RW no LOB noted   Toileting Hygiene CARE Score 4   Toileting   Able to Pull Clothing down yes, up yes     Able to Manage Clothing Closures Yes   Manage Hygiene Bladder   Limitations Noted In UE Strength;LE Strength   Adaptive Equipment Other  (RW)   Toilet Transfer   Type of Assistance Needed Incidental touching   Physical Assistance Level No physical assistance   Comment Pt able to complete transfer to bathroom using RW with CGA provided, however Pt required more time to stand due to fatigue and overall weaknesses   Toilet Transfer CARE Score 4   Kitchen Mobility   Kitchen-Mobility Level Walker   Kitchen Activity Retrieve items;Transport items; Other (Comment)  (meal prep simulation)   Kitchen Mobility Comments Pt engaged in kitchen mobility gathering items to complete meal prep simulation in kitchen, using RW with CGA provided and VCs at time to increase awerness to items location and activity sequencing  overall Pt able to complete task with energy conservation technipue education provided during rest break with G understanding and No LOB noted when in stance for reaching over head  and lower cabinet  Therapeutic Excerise-Strength   UE Strength Yes   Right Upper Extremity- Strength   R Shoulder Horizontal ABduction; External rotation; Internal rotation   R Elbow Elbow flexion;Elbow extension   R Wrist Radial deviation;Ulnar deviation   R Position Seated   Equipment Dumbbell   R Weight/Reps/Sets 2 sets of 10 2lbs   RUE Strength Comment PT able to complete task seated in WC to increase UE strentgthening to increase I with ADL without  c/o pain with rest break in between reps   Left Upper Extremity-Strength   L Shoulder External rotation; Internal rotation;Horizontal ABduction   L Elbow Elbow flexion;Elbow extension   L Wrist Radial deviation;Ulnar deviation   L Position Seated   Equipment Dumbell   L Weights/Reps/Sets 2 sets of 10 2lbs   LUE Strength Comment PT able to complete task seated in WC to increase UE strentgthening to increase I with ADL without  c/o pain with rest break in between reps   Cognition   Overall Cognitive Status Impaired   Arousal/Participation Alert; Responsive; Cooperative   Attention Attends with cues to redirect   Orientation Level Oriented X4   Memory Decreased short term memory   Following Commands Follows one step commands with increased time or repetition Additional Activities   Additional Activities Other (Comment)   Additional Activities Comments after dry showering  transfer using shower chair and a cane  to simulate showering entrance threshold in Pt home with dificulty managing RW noted when getting in the  shower  Pt engaged in functional mobility in hallway using RW to cross over weigthed bars place on the floor in preparatory to increase strength and functional mobility when getting in and out shower  Pt able to manage RW over weigthed bars and steping over with no LOB noted with CGA And VCs to activity sequencing  Therapist discused the benefit of installing a grab bar in the shower to increase ADL independence   Activity Tolerance   Activity Tolerance Patient tolerated treatment well   Assessment   Treatment Assessment Pt engaged in 90 min skilled OT session focusing on Functional mobility, Meal prep simulation, toileting/CM and there EX  see above for more details  overall Pt able to maintain G participation to  treatment session despite reporting of fatigue, therapist provide rest break and educated Pt on energy conservation technique to increase [place] with ADL/IADLs  Pt could benefit from continued UE/LE strengthening, shower chair transfer with threshold simulation, and adl/Iadls retraining progressing toward plan of care  Prognosis Good   Problem List Decreased strength;Decreased endurance;Decreased mobility; Decreased cognition;Decreased safety awareness   Barriers to Discharge Decreased caregiver support   Plan   Treatment/Interventions ADL retraining;Functional transfer training;LE strengthening/ROM; Therapeutic exercise; Endurance training   Progress Progressing toward goals   Recommendation   Equipment Recommended Reacher ($);Hip Kit ($);Sock aid ($)  (grab bar)   OT - OK to Discharge No   OT Therapy Minutes   OT Time In 0830   OT Time Out 1000   OT Total Time (minutes) 90   OT Mode of treatment - Individual (minutes) 90   OT Mode of treatment - Concurrent (minutes) 0   OT Mode of treatment - Group (minutes) 0   OT Mode of treatment - Co-treat (minutes) 0   OT Mode of Treatment - Total time(minutes) 90 minutes   OT Cumulative Minutes 360   Therapy Time missed   Time missed?  No

## 2022-06-08 PROBLEM — M25.562 LEFT KNEE PAIN: Status: ACTIVE | Noted: 2022-06-08

## 2022-06-08 PROBLEM — M25.561 RIGHT KNEE PAIN: Status: ACTIVE | Noted: 2022-06-08

## 2022-06-08 PROCEDURE — 99232 SBSQ HOSP IP/OBS MODERATE 35: CPT

## 2022-06-08 PROCEDURE — 97116 GAIT TRAINING THERAPY: CPT

## 2022-06-08 PROCEDURE — 97530 THERAPEUTIC ACTIVITIES: CPT

## 2022-06-08 PROCEDURE — 97110 THERAPEUTIC EXERCISES: CPT

## 2022-06-08 PROCEDURE — 97535 SELF CARE MNGMENT TRAINING: CPT

## 2022-06-08 PROCEDURE — 99232 SBSQ HOSP IP/OBS MODERATE 35: CPT | Performed by: INTERNAL MEDICINE

## 2022-06-08 RX ADMIN — SENNOSIDES 8.6 MG: 8.6 TABLET, FILM COATED ORAL at 17:24

## 2022-06-08 RX ADMIN — SENNOSIDES 8.6 MG: 8.6 TABLET, FILM COATED ORAL at 08:12

## 2022-06-08 RX ADMIN — FOLIC ACID 1 MG: 1 TABLET ORAL at 08:12

## 2022-06-08 RX ADMIN — DOCUSATE SODIUM 100 MG: 100 CAPSULE, LIQUID FILLED ORAL at 08:12

## 2022-06-08 RX ADMIN — ENOXAPARIN SODIUM 40 MG: 100 INJECTION SUBCUTANEOUS at 08:12

## 2022-06-08 RX ADMIN — Medication 2000 UNITS: at 08:12

## 2022-06-08 RX ADMIN — FOLIC ACID 1 MG: 1 TABLET ORAL at 17:24

## 2022-06-08 RX ADMIN — LATANOPROST 1 DROP: 50 SOLUTION OPHTHALMIC at 21:23

## 2022-06-08 RX ADMIN — DOCUSATE SODIUM 100 MG: 100 CAPSULE, LIQUID FILLED ORAL at 17:24

## 2022-06-08 RX ADMIN — POLYETHYLENE GLYCOL 3350 17 G: 17 POWDER, FOR SOLUTION ORAL at 08:12

## 2022-06-08 RX ADMIN — DICLOFENAC SODIUM 2 G: 10 GEL TOPICAL at 17:24

## 2022-06-08 RX ADMIN — FOLIC ACID 1 MG: 1 TABLET ORAL at 21:23

## 2022-06-08 RX ADMIN — DICLOFENAC SODIUM 2 G: 10 GEL TOPICAL at 21:23

## 2022-06-08 RX ADMIN — DILTIAZEM HYDROCHLORIDE 240 MG: 120 CAPSULE, COATED, EXTENDED RELEASE ORAL at 08:12

## 2022-06-08 RX ADMIN — CALCIUM 2 TABLET: 500 TABLET ORAL at 08:12

## 2022-06-08 RX ADMIN — CLOPIDOGREL BISULFATE 75 MG: 75 TABLET ORAL at 21:23

## 2022-06-08 NOTE — ASSESSMENT & PLAN NOTE
Adequate control on d/c   Slightly increased in bilateral knees but no acute swelling, erythema, increased warmth earlier in course   - MTX had been held acutely but discussed with IM who spoke with OP rheum and patient should be able to resume prior MTx at this time  Patient reports chronic bilateral knee and multi-joint pain from RA - recent increased L knee pain since fall PTA  - Some TTP medial jt line otherwise exam largely unremarkable - likely OA/RA +/- meniscus  - APAP PRN  - OP Rheum follow-up

## 2022-06-08 NOTE — PLAN OF CARE
Problem: Nutrition/Hydration-ADULT  Goal: Nutrient/Hydration intake appropriate for improving, restoring or maintaining nutritional needs  Description: Monitor and assess patient's nutrition/hydration status for malnutrition  Collaborate with interdisciplinary team and initiate plan and interventions as ordered  Monitor patient's weight and dietary intake as ordered or per policy  Utilize nutrition screening tool and intervene as necessary  Determine patient's food preferences and provide high-protein, high-caloric foods as appropriate  INTERVENTIONS:  - Monitor oral intake, urinary output, labs, and treatment plans  - Assess nutrition and hydration status and recommend course of action  - Evaluate amount of meals eaten  - Assist patient with eating if necessary   - Allow adequate time for meals  - Recommend/ encourage appropriate diets, oral nutritional supplements, and vitamin/mineral supplements  - Order, calculate, and assess calorie counts as needed  - Recommend, monitor, and adjust tube feedings and TPN/PPN based on assessed needs  - Assess need for intravenous fluids  - Provide specific nutrition/hydration education as appropriate  - Include patient/family/caregiver in decisions related to nutrition  Outcome: Progressing     Problem: NEUROSENSORY - ADULT  Goal: Achieves maximal functionality and self care  Description: INTERVENTIONS  - Monitor swallowing and airway patency with patient fatigue and changes in neurological status  - Encourage and assist patient to increase activity and self care     - Encourage visually impaired, hearing impaired and aphasic patients to use assistive/communication devices  Outcome: Progressing     Problem: GASTROINTESTINAL - ADULT  Goal: Maintains or returns to baseline bowel function  Description: INTERVENTIONS:  - Assess bowel function  - Encourage oral fluids to ensure adequate hydration  - Administer IV fluids if ordered to ensure adequate hydration  - Administer ordered medications as needed  - Encourage mobilization and activity  - Consider nutritional services referral to assist patient with adequate nutrition and appropriate food choices  Outcome: Progressing     Problem: SKIN/TISSUE INTEGRITY - ADULT  Goal: Skin Integrity remains intact(Skin Breakdown Prevention)  Description: Assess:  -Perform Noam assessment   -Clean and moisturize skin   -Inspect skin when repositioning, toileting, and assisting with ADLS  -Assess under medical devices   -Assess extremities for adequate circulation and sensation     Bed Management:  -Have minimal linens on bed & keep smooth, unwrinkled  -Change linens as needed when moist or perspiring  -Avoid sitting or lying in one position for more 2 hours while in bed  -Keep HOB at 30 degrees     Toileting:  -Offer bedside commode  -Assess for incontinence  -Use incontinent care products after each incontinent episode     Activity:  -Mobilize patient  times a day  -Encourage activity and walks on unit  -Encourage or provide ROM exercises   -Turn and reposition patient every 2 Hours  -Use appropriate equipment to lift or move patient in bed  -Instruct/ Assist with weight shifting  when out of bed in chair  -Consider limitation of chair time hour intervals    Skin Care:  -Avoid use of baby powder, tape, friction and shearing, hot water or constrictive clothing  -Relieve pressure over bony prominences  -Do not massage red bony areas    Next Steps:  -Teach patient strategies to minimize risks    -Consider consults to  interdisciplinary teams   Outcome: Progressing

## 2022-06-08 NOTE — PROGRESS NOTES
06/08/22 1230   Pain Assessment   Pain Assessment Tool 0-10   Pain Score No Pain   Restrictions/Precautions   Precautions Cognitive; Fall Risk   Weight Bearing Restrictions Yes   LLE Weight Bearing Per Order WBAT   Braces or Orthoses Other (Comment)  (B/L surgical shoes)   Lifestyle   Autonomy "How am I doing without the walker?"   Putting On/Taking Off Footwear   Type of Assistance Needed Supervision   Physical Assistance Level No physical assistance   Comment Seated EOB to don/doff B/L surgical shoes  Putting On/Taking Off Footwear CARE Score 4   Sit to Lying   Type of Assistance Needed Supervision   Physical Assistance Level No physical assistance   Comment HOB flat   Sit to Lying CARE Score 4   Lying to Sitting on Side of Bed   Type of Assistance Needed Supervision   Physical Assistance Level No physical assistance   Comment HOB flat   Lying to Sitting on Side of Bed CARE Score 4   Sit to Stand   Type of Assistance Needed Incidental touching   Physical Assistance Level No physical assistance   Comment CGA no AD   Sit to Stand CARE Score 4   Bed-Chair Transfer   Type of Assistance Needed Incidental touching   Physical Assistance Level No physical assistance   Comment CGA no AD for short distance fxnl mobility; RW for longer distance fxnl mobility   Chair/Bed-to-Chair Transfer CARE Score 4   Meal Prep   Meal Prep Level   (no AD)   Meal Prep Level of Assistance Contact guard   Meal Preparation Completed light meal task w/o use of RW req pt to retrieve a cup of coffee from the kitchen counter and make a piece of toast  Pt overall req CGA with no LOB, however, pt does report slight fatigue and req seated rest break as toast cooked  Pt reports goal of completing IADL's w/o use of AD for D/C     Light Housekeeping   Light Housekeeping Level   (no AD)   Light Housekeeping Level of Assistance Contact guard   Light Housekeeping Engaged in simulated laundry task req pt to amb around OT gym w/o RW to retrieve laundry items and transport back to laundry basket  Pt reports previously "kicking" laundry basket across flr to get to washer/dryer  Trailed carrying light load in laundry basket across OT gym req overall CGA and no LOB  Fall EDU provided to avoid kicking laundry basket across room with pt verbalizing understanding  Cognition   Overall Cognitive Status Impaired   Arousal/Participation Alert; Cooperative   Attention Attends with cues to redirect   Orientation Level Oriented X4   Memory Decreased short term memory   Following Commands Follows multistep commands with increased time or repetition   Activity Tolerance   Activity Tolerance Patient tolerated treatment well   Assessment   Treatment Assessment Pt participated in 60 min skilled OT Tx session with focus on IADL management w/o use of AD  See above for further Tx details  Pt is demonstrating positive gains towards OT goals, completing IADL of light meal prep and laundry at [address] w/o use of RW  Improvement noted with fxnl dynamic balance and overall activity tolerance  Communication to PT in regards to pt's progress w/o AD during IADL engagement  OT plan to continue engaging pt in AD/IADL management and short distance fxnl mobility w/o use of AD  Continue POC  Prognosis Good   Problem List Decreased strength;Decreased endurance; Impaired balance;Decreased mobility; Decreased coordination;Decreased cognition   Barriers to Discharge Decreased caregiver support   Plan   Treatment/Interventions ADL retraining;Functional transfer training; Therapeutic exercise; Endurance training   Progress Progressing toward goals   Recommendation   OT Discharge Recommendation   (pending progress)   OT - OK to Discharge No   OT Therapy Minutes   OT Time In 1230   OT Time Out 1330   OT Total Time (minutes) 60   OT Mode of treatment - Individual (minutes) 60   OT Mode of treatment - Concurrent (minutes) 0   OT Mode of treatment - Group (minutes) 0   OT Mode of treatment - Co-treat (minutes) 0 OT Mode of Treatment - Total time(minutes) 60 minutes   OT Cumulative Minutes 480   Therapy Time missed   Time missed?  No

## 2022-06-08 NOTE — ASSESSMENT & PLAN NOTE
L foot XR: acute appearing minimally displaced oblique fracture involving medial base of the proximal phalanx of the 1st digit  Podiatry consulted in acute setting - recommended WBAT and surgical shoes to both feet with ambulation  Follow-up with Podiatry as outpatient  Verified surgical shoes with Dr Pooja Ruiz - should continue for 3 weeks after XR on 5/26  Repeat XR in 3 weeks (6/16)

## 2022-06-08 NOTE — PROGRESS NOTES
06/08/22 0900   Pain Assessment   Pain Assessment Tool 0-10   Pain Score No Pain   Restrictions/Precautions   Precautions Cognitive; Fall Risk;Supervision on toilet/commode   Weight Bearing Restrictions Yes   LLE Weight Bearing Per Order WBAT   Braces or Orthoses   (B/L surgical shoes)   Lifestyle   Autonomy "Am I improving?"   Sit to Lying   Type of Assistance Needed Supervision   Physical Assistance Level No physical assistance   Comment HOB flat   Sit to Lying CARE Score 4   Sit to Stand   Type of Assistance Needed Incidental touching   Physical Assistance Level No physical assistance   Comment CGA no AD; CS with RW   Sit to Stand CARE Score 4   Bed-Chair Transfer   Type of Assistance Needed Incidental touching   Physical Assistance Level No physical assistance   Comment CGA no AD   Chair/Bed-to-Chair Transfer CARE Score 4   Toileting Hygiene   Type of Assistance Needed Incidental touching   Physical Assistance Level No physical assistance   Comment CGA in stance unsupported to complete CM and ashley hygiene   Toileting Hygiene CARE Score 4   Toilet Transfer   Type of Assistance Needed Incidental touching; Adaptive equipment   Physical Assistance Level No physical assistance   Comment CGA no RW with occass use of L side grab bar to raised toilet seat   Toilet Transfer CARE Score 4   Health Management   Health Management Level of Assistance Independent   Health Management Pt provided with current list of scheduled medications in preparation for medication management task  OT created medication reference list as visual aide to assist with identifying each medications purpose and frequency  Pt reports she did not utilize a pill organizer PTA and is not receptive to trialing at this time  Verbalized keeping medications in kitchen cabinet and retrieving meds as indicated by MD  Pt challenged to retrieve medications and verbalize purpose and frequency   Pt able to correctly identify all medications purpose and frequency utilizing reference list, as well as administer meds correctly when given scenarios with different times of day  Pt did demonstrate some difficulty manuevering medication bottles open, stating her medication bottles are "easy open " Pt will be IND for med management for D/C with no further questions in regards to meds at this time  Cognition   Overall Cognitive Status Impaired   Arousal/Participation Alert; Cooperative   Attention Attends with cues to redirect   Orientation Level Oriented X4   Memory Decreased short term memory   Following Commands Follows multistep commands with increased time or repetition   Activity Tolerance   Activity Tolerance Patient tolerated treatment well   Assessment   Treatment Assessment Pt participated in 60 min skilled OT Tx session with focus on ADL of toileting w/o use of RW and IADL of medication management  See above for further Tx details  Pt demo improved standing balance and activity tolerance throughout ADL of toileting, completing at [facility] w/ use of RW  Pt reports goal of completing ADL's/IADL's w/o use of AD upon D/C  OT plan to trial IADL's w/o utilizing RW during PM OT Tx session  Pt demo G ability to correctly identify all medications purpose/frequency utilizing medication reference list, as well as correctly administer when given scenarios  Pt will be IND for med management upon D/C  Pt would continue to benefit from skilled OT services to continue progressing towards OT goals and dec caregiver burden upon D/C  Prognosis Good   Problem List Decreased strength;Decreased endurance; Impaired balance;Decreased mobility; Decreased coordination;Decreased cognition   Barriers to Discharge Decreased caregiver support   Plan   Treatment/Interventions Functional transfer training;ADL retraining; Therapeutic exercise; Endurance training   Progress Progressing toward goals   Recommendation   OT Discharge Recommendation   (Pending progress)   OT - OK to Discharge No   OT Therapy Minutes OT Time In 0900   OT Time Out 1000   OT Total Time (minutes) 60   OT Mode of treatment - Individual (minutes) 60   OT Mode of treatment - Concurrent (minutes) 0   OT Mode of treatment - Group (minutes) 0   OT Mode of treatment - Co-treat (minutes) 0   OT Mode of Treatment - Total time(minutes) 60 minutes   OT Cumulative Minutes 420   Therapy Time missed   Time missed?  No

## 2022-06-08 NOTE — ASSESSMENT & PLAN NOTE
Home regimen: methotrexate 17 5mg on Mondays  Per Rheumatology - continue to hold until outpatient follow-up with Rheumatologist (Dr Mathew Blue)

## 2022-06-08 NOTE — PROGRESS NOTES
[facility]  Progress Note - Chris Canseco 1945, 64 y o  female MRN: [id number]  Unit/Bed#: -30 Encounter: [id number]  Primary Care Provider: Sayra Sosa DO   Date and time admitted to hospital: 6/2/2022  2:47 PM    Osteoporosis  Assessment & Plan  DXA scan on 5/13/22 showed osteoporosis  Started on Vitamin D 2000u daily and calcium 1000mg daily  Vitamin D level 31 3 on 6/6  Follow-up with PCP or Rheumatology at discharge  Fracture, toe  Assessment & Plan  L foot XR: acute appearing minimally displaced oblique fracture involving medial base of the proximal phalanx of the 1st digit  Podiatry consulted in acute setting - recommended WBAT and surgical shoes to both feet with ambulation  Follow-up with Podiatry as outpatient  Verified surgical shoes with Dr Chloé Suh - should continue for 3 weeks after XR on 5/26  Repeat XR in 3 weeks (6/16)  Weakness of both lower extremities  Assessment & Plan  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  CK 6976 on admission - currently 39  Simvastatin was stopped and currently on hold  Per Rheumatology - presentation not consistent with inflammatory myositis  MRI L spine showed severe facet degenerative change at L4-L5 resulting in moderate central stenosis, moderate R foramina narrowing, and moderate to severe R lateral recess stenosis  JONATHON on 5/31 was WNL  Venous duplex on 5/26 negative for DVTs  Myositis panel pending  Primary team following  PT/OT  Elevated liver enzymes  Assessment & Plan  Likely due to recent rhabdomyolysis  AST 32 and ALT 51 (from 63 and 110 respectively)  Continue to trend with routine CMP  HLD (hyperlipidemia)  Assessment & Plan  Last lipid panel on 3/31/22: Cholesterol 169, Triglycerides 69, HDL 87, and LDL 68  Hold statin at this time due to recent rhabdomyolysis  Essential hypertension  Assessment & Plan  Continue home regimen: Cardizem CD 240mg daily    Monitor BP with routine VS   Follow-up with PCP as outpatient  Psoriatic arthritis ([facility] [address] )  Assessment & Plan  Home regimen: methotrexate 17 5mg on   Per Rheumatology - continue to hold until outpatient follow-up with Rheumatologist (Dr Caity Romero)  History of TIA (transient ischemic attack)  Assessment & Plan  Hx of TIA without any deficits  Continue Plavix 75mg daily  Statin on hold  * Rhabdomyolysis  Assessment & Plan  Likely due to statin  Simvastatin currently on hold  CK 6976 on admission - currently 39  Improved with IV fluids in acute setting  Continue to trend with routine labs  Per Rheumatology - presentation not consistent with inflammatory myositis  Rheumatology recommended holding methotrexate until further follow-up with outpatient Rheumatologist (Dr Caity Romero)  Ensure adequate hydration  Continue PT/OT  VTE Pharmacologic Prophylaxis:   Pharmacologic: Enoxaparin (Lovenox)  Mechanical VTE Prophylaxis in Place: Yes - sequential compression devices  Current Length of Stay: 6 day(s)    Current Patient Status: Inpatient Rehab     Discharge Plan: As per primary team     Code Status: Level 1 - Full Code    Subjective:   Pt examined while pt sitting in bed in pt room  Currently denies any pain  Did have complaints of L knee pain earlier in therapy, improved with Volteren gel  Denies any numbness or tingling  Bruising/swelling is improving on the L great toe  Reviewed medications  Currently has no questions regarding her medications  Denies any dizziness, SOB, or palpitations  Occasionally has lightheadedness when she gets OOB too quickly, but improves with staying in position  LBM was yesterday per nursing, but pt states that it was a few days ago  Denies any abdominal pain  Slept well last night      Objective:     Vitals:   Temp (24hrs), Av 3 °F (36 8 °C), Min:97 5 °F (36 4 °C), Max:98 8 °F (37 1 °C)    Temp:  [97 5 °F (36 4 °C)-98 8 °F (37 1 °C)] 97 5 °F (36 4 °C)  HR:  [54-60] 58  Resp:  [16] 16  BP: (132-145)/(60-65) 137/63  SpO2:  [96 %-97 %] 97 %  Body mass index is 18 53 kg/m²  Review of Systems   Constitutional: Negative for appetite change, chills, fatigue and fever  HENT: Negative for trouble swallowing  Eyes: Negative for visual disturbance  Respiratory: Negative for cough, shortness of breath, wheezing and stridor  Cardiovascular: Negative for chest pain, palpitations and leg swelling  Gastrointestinal: Negative for abdominal distention, abdominal pain, constipation, diarrhea, nausea and vomiting  LBM 6/6 per nursing, per pt it has been a "few days"   Genitourinary: Negative for difficulty urinating  Musculoskeletal: Positive for arthralgias (L knee pain earlier today, improved with Voltaren gel)  Negative for back pain  Neurological: Positive for light-headedness (lightheadedness if she gets OOB too quickly, resolves with taking her time/staying in position)  Negative for dizziness, weakness, numbness and headaches  Psychiatric/Behavioral: Negative for sleep disturbance  All other systems reviewed and are negative  Input and Output Summary (last 24 hours): Intake/Output Summary (Last 24 hours) at 6/8/2022 0918  Last data filed at 6/8/2022 0855  Gross per 24 hour   Intake 420 ml   Output --   Net 420 ml       Physical Exam:     Physical Exam  Vitals and nursing note reviewed  Constitutional:       Appearance: Normal appearance  HENT:      Head: Normocephalic and atraumatic  Cardiovascular:      Rate and Rhythm: Normal rate and regular rhythm  Pulses: Normal pulses  Heart sounds: Normal heart sounds  No murmur heard  No friction rub  Pulmonary:      Effort: Pulmonary effort is normal  No respiratory distress  Breath sounds: Normal breath sounds  No wheezing or rhonchi  Abdominal:      General: Abdomen is flat  Bowel sounds are normal  There is no distension  Palpations: Abdomen is soft        Tenderness: There is no abdominal tenderness  Musculoskeletal:      Cervical back: Normal range of motion and neck supple  Right lower leg: No edema  Left lower leg: No edema  Skin:     General: Skin is warm and dry  Findings: Bruising (L great toe bruising, impoving) present  Neurological:      Mental Status: She is alert and oriented to person, place, and time     Psychiatric:         Mood and Affect: Mood normal          Behavior: Behavior normal          Additional Data:     Labs:    Results from last 7 days   Lab Units 06/06/22  0602   WBC Thousand/uL 8 13   HEMOGLOBIN g/dL 13 0   HEMATOCRIT % 39 6   PLATELETS Thousands/uL 221   NEUTROS PCT % 62   LYMPHS PCT % 23   MONOS PCT % 13*   EOS PCT % 2     Results from last 7 days   Lab Units 06/06/22  0601   SODIUM mmol/L 137   POTASSIUM mmol/L 3 7   CHLORIDE mmol/L 101   CO2 mmol/L 31*   BUN mg/dL 9   CREATININE mg/dL 0 55*   ANION GAP mmol/L 5   CALCIUM mg/dL 8 6   ALBUMIN g/dL 3 1   TOTAL BILIRUBIN mg/dL 0 60   ALK PHOS U/L 53   ALT U/L 51*   AST U/L 32   GLUCOSE RANDOM mg/dL 91                       Labs reviewed    Imaging:    Imaging reviewed    Recent Cultures (last 7 days):           Last 24 Hours Medication List:   Current Facility-Administered Medications   Medication Dose Route Frequency Provider Last Rate    acetaminophen  650 mg Oral Q6H PRN Yumiko Sahu MD      aluminum-magnesium hydroxide-simethicone  30 mL Oral Q6H PRN Yumiko Sahu MD      bisacodyl  10 mg Rectal Daily PRN Yumiko Sahu MD      calcium carbonate  2 tablet Oral Daily With Breakfast ANNE Mcdaniels      cholecalciferol  2,000 Units Oral Daily ANNE Mcdaniels      clopidogrel  75 mg Oral HS Yumiko Sahu MD      Diclofenac Sodium  2 g Topical 4x Daily PRN ANNE Mcdaniels      diltiazem  240 mg Oral Daily Yumiko Sahu MD      docusate sodium  100 mg Oral BID Yumiko Sahu MD      enoxaparin  40 mg Subcutaneous Q24H [address], CRNP      folic acid  1 mg Oral TID Jarett Javed MD      latanoprost  1 drop Both Eyes HS Jarett Javed MD      polyethylene glycol  17 g Oral Daily PRN Jarett Javed MD      polyethylene glycol  17 g Oral Daily Jarett Javed MD      senna  1 tablet Oral BID Jarett Javed MD          M*Modal software was used to dictate this note  It may contain errors with dictating incorrect words or incorrect spelling  Please contact the provider directly with any questions

## 2022-06-08 NOTE — PROGRESS NOTES
06/08/22 1430   Pain Assessment   Pain Assessment Tool 0-10   Pain Score No Pain   Restrictions/Precautions   Precautions Fall Risk;Cognitive   Braces or Orthoses Other (Comment)  (using B surgical shoes)   Cognition   Memory Decreased short term memory   Comments Decreased insight to her functional limitations   Subjective   Subjective reports being "tired", wants to walk again w/o a device   Roll Left and Right   Type of Assistance Needed Supervision   Roll Left and Right CARE Score 4   Sit to Lying   Type of Assistance Needed Supervision   Comment practiced with bed high off floor to level of her home bed, HOB flat, gets in/out left side   Sit to Lying CARE Score 4   Lying to Sitting on Side of Bed   Type of Assistance Needed Supervision   Comment practiced with bed high off floor to level of her home bed, HOB flat, gets in/out left side   Lying to Sitting on Side of Bed CARE Score 4   Sit to Stand   Type of Assistance Needed Verbal cues; Supervision; Adaptive equipment   Comment cueing to improve body mechanics and balance, RW or Hand for suport upon standing   Sit to Stand CARE Score 4   Bed-Chair Transfer   Type of Assistance Needed Incidental touching   Comment can perform w/o device   Chair/Bed-to-Chair Transfer CARE Score 4   Car Transfer   Reason if not Attempted Environmental limitations   Car Transfer CARE Score 10   Walk 10 Feet   Type of Assistance Needed Incidental touching; Adaptive equipment;Physical assistance   Physical Assistance Level 25% or less   Comment ammy PATEL, mIn A with HHA   Walk 10 Feet CARE Score 3   Walk 50 Feet with Two Turns   Type of Assistance Needed Incidental touching;Physical assistance; Adaptive equipment   Physical Assistance Level 25% or less   Comment ammy PATEL, mIn A with HHA   Walk 50 Feet with Two Turns CARE Score 3   Walk 150 Feet   Type of Assistance Needed Incidental touching;Physical assistance; Adaptive equipment   Physical Assistance Level 25% or less   Comment ammy PATEL mIn A with HHA   Walk 150 Feet CARE Score 3   Walking 10 Feet on Uneven Surfaces   Type of Assistance Needed Incidental touching;Physical assistance; Adaptive equipment   Comment wih RW, mIn A with HHA   Walking 10 Feet on Uneven Surfaces CARE Score -   Ambulation   Does the patient walk? 2  Yes   Primary Mode of Locomotion Prior to Admission Walk   Distance Walked (feet) 200 ft  (x 3)   Assist Device Hand Hold;Roller Walker   Gait Pattern Wide SULAIMAN; Slow Holly; Step through   Limitations Noted In Strength;Posture; Heel Strike; Endurance;Balance; Safety   Provided Assistance with: Balance   Findings Rw or HHA, refusing to use SPC  Pt B knees flexed throughout with posterior pelvic tilt, poor heelstrike and reports of B quads fatiguing   Wheelchair mobility   Does the patient use a wheelchair? 0  No   Curb or Single Stair   Style negotiated Curb   Type of Assistance Needed Physical assistance; Adaptive equipment   Physical Assistance Level 25% or less   Comment 8" curb with RW   1 Step (Curb) CARE Score 3   4 Steps   Type of Assistance Needed Incidental touching   Comment B HR step to, completed 8   4 Steps CARE Score 4   12 Steps   Reason if not Attempted Activity not applicable   12 Steps CARE Score 9   Toilet Transfer   Type of Assistance Needed Incidental touching; Adaptive equipment   Comment pt using grab bar   Toilet Transfer CARE Score 4   Therapeutic Interventions   Strengthening repeated STS from various heights working on body mechanics to utilize B LE more and improve initial standing balance   Neuromuscular Re-Education walking in P bars with mirror to work on posture; anterior pelvic tilt with extended knees and longer stride   Equipment Use   NuStep 8 min lv 1 for first time to work on endurance of  LE muscles, tolerated well   Assessment   Treatment Assessment Pt participating well overall  Worke don bed mob using bed height similar to her home and pt able to safely perform on her own   Postural weakness and B LE deconditioning affecting her gait, balance and standing tolerance  P with decreased self awareness of postural deficits  Pt also resistant to using SPC, set on only walking w/o device, currently safest with RW  Improved transfers and gait at end of session with practice, will need to assess carryover  Recommendation   PT Discharge Recommendation Home with home health rehabilitation   PT Therapy Minutes   PT Time In 1430   PT Time Out 1530   PT Total Time (minutes) 60   PT Mode of treatment - Individual (minutes) 60   PT Mode of treatment - Concurrent (minutes) 0   PT Mode of treatment - Group (minutes) 0   PT Mode of treatment - Co-treat (minutes) 0   PT Mode of Treatment - Total time(minutes) 60 minutes   PT Cumulative Minutes 435   Therapy Time missed   Time missed?  No

## 2022-06-08 NOTE — PROGRESS NOTES
Physical Medicine and Rehabilitation Progress Note  Liza Cisse 64 y o  female MRN: [id number]  Unit/Bed#: [facility] 266-01 Encounter: [id number]      Assessment & Plan:     Decline in ADLs and mobility: Functional assessment - stable today but overall improving         FIM  Care Score  Admit Score Recent Score    Total assist  1-100% or 2p    Tot     (Former TA) 2-76-99%  Sub     Max assist 2-51-75%  Sub     Mod assist 3- 26-50%  Par     Min assist 3- 25% or < Par To hygiene, bathing, dressing  Bathing, LBD    CG assist 4  TA  To hygiene    Supervision 4 Sup     Set-up  5 NORIEGA     Mod-I/Indep 6 MI      Transfers  Min assist  CGA assist     Ambulation   150 ft  ft CG assist     Stairs   Mod assist 4 steps  4 steps CGA      Goal: Mod indep  Major barriers:  Weakness, lack of support at home  Dispo & ELOS: Home with ELOS 12 days from admission      Weakness of both lower extremities  Assessment & Plan  Continues to improve   - HMG-CoA reductase and MyoMarker 3 still pending - OP follow-up rheum   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  Skilled therapies as outlined  Optimal nutrition and medical mgmt   Fall precautions     Impaired mobility and ADLs  Assessment & Plan  Functioning improving   Multifactorial: Rhabdo/statin-induced myopathy, foot fracture, deconditioning   - Optimal management of each as listed -   - Continue acute comprehensive interdisciplinary inpatient rehabilitation to include intensive skilled therapies (PT, OT +/- ST) as outlined with oversight and management by rehabilitation physician as well as inpatient rehab level nursing, case management and weekly interdisciplinary team meetings         * Rhabdomyolysis  Assessment & Plan  Strength continues to improve, function improving   CK max almost 7000 > trended down and now wnl 6/5   Believed to be 2/2 statin induced - hold statin  Hold MTX per rheum  OP follow-up with PCP and rheum    Fracture, toe  Assessment & Plan  Denies pain   L 1st prox phalanx fx with minimal displacement  - WABT with sx shoes bilaterally   - Repeat XR 6/16/22  - OP podiatry follow-up     Left knee pain  Assessment & Plan  Patient reports chronic bilateral knee and multi-joint pain from RA - recent increased L knee pain since fall PTA  - Some TTP medial jt line otherwise exam largely unremarkable - likely OA/RA +/- meniscus  - No x-rays in computer - if does not improve or worsens obtain   - Avoid over activity  - Voltaren gel QID  - APAP PRN  - Monitor     At risk for venous thromboembolism (VTE)  Assessment & Plan  SCDs, ambulation, and lovenox     Constipation  Assessment & Plan  Improved   - Daily miralax for now  - Colace/Senna BID  - PRN bowel regimen (Miralax PRN/Dulcolax supp PRN)  - monitor for signs and symptoms of obstruction/ileus > not currently; hold stimulant lax if occurs  - Limiting constipating medications if possible  - Ensure adequate hydration       Osteoporosis  Assessment & Plan  D3 2000 units qday and calcium   Vit D level 31    Elevated liver enzymes  Assessment & Plan  Possibly 2/2 rhabdo  Resolving - only ALT still elevated but now down to 51 on 6/6    HLD (hyperlipidemia)  Assessment & Plan  Statin on hold   Per IM "Last lipid panel on 3/31/22: Cholesterol 169, Triglycerides 69, HDL 87, and LDL 68"  OP PCP follow-up     Essential hypertension  Assessment & Plan  Internal medicine consulted and co-management with their service  Monitor vitals with and without activity; monitor for orthostasis  Monitor hemoglobin, electrolytes, kidney function, hydration status   Current meds: Cardizem       Psoriatic arthritis ([address] )  Assessment & Plan  OP follow-up with rheum Dr Emilia Arroyo  - Weekly MTX on hold for now per prior recs  - Monitor for flare     History of TIA (transient ischemic attack)  Assessment & Plan  Secondary stroke prevention  - Antithrombotic: plavix  - Holding Statin with recent possible statin induced myopathy/rhabdo  - Optimal management of blood pressure       Other Medical Issues:   None    Follow-up providers and other issues to be followed up after discharge  · PCP  · Rheum    CODE: Level 1: Full Code    Restrictions include: Fall precautions    Objective:     Allergies per EMR  Diagnostic Studies: Reviewed, no new imaging  No orders to display     See above as well    Laboratory: Labs reviewed  Results from last 7 days   Lab Units 06/06/22  0602   HEMOGLOBIN g/dL 13 0   HEMATOCRIT % 39 6   WBC Thousand/uL 8 13     Results from last 7 days   Lab Units 06/06/22  0601 06/01/22  1716   BUN mg/dL 9 11   SODIUM mmol/L 137 141   POTASSIUM mmol/L 3 7 3 6   CHLORIDE mmol/L 101 103   CREATININE mg/dL 0 55* 0 52*   AST U/L 32 63*   ALT U/L 51* 110*            Drug regimen reviewed, all potential adverse effects identified and addressed:    Scheduled Meds:  Current Facility-Administered Medications   Medication Dose Route Frequency Provider Last Rate    acetaminophen  650 mg Oral Q6H PRN Kaitlin Espino MD      aluminum-magnesium hydroxide-simethicone  30 mL Oral Q6H PRN Kaitlin Espino MD      bisacodyl  10 mg Rectal Daily PRN Kaitlin Espino MD      calcium carbonate  2 tablet Oral Daily With Breakfast ANNE Pena      cholecalciferol  2,000 Units Oral Daily ANNE Pena      clopidogrel  75 mg Oral HS Kaitlin Espino MD      Diclofenac Sodium  2 g Topical 4x Daily PRN ANNE Pena      diltiazem  240 mg Oral Daily Kaitlin Espino MD      docusate sodium  100 mg Oral BID Kaitlin Espino MD      enoxaparin  40 mg Subcutaneous Q24H [address], CRNP      folic acid  1 mg Oral TID Kaitlin Espino MD      latanoprost  1 drop Both Eyes HS Kaitlin Espino MD      polyethylene glycol  17 g Oral Daily PRN Kaitlin Espino MD      polyethylene glycol  17 g Oral Daily Kaitlin Espino MD      senna  1 tablet Oral BID Kaitlin Espino MD         Chief Complaints:  Rehab follow-up     Subjective:     Patient reports some increased L knee pain since she fell she reports chronic bilateral knee pain from RA and well as other jts  She denies acute worsening  Patient denies fever, chills, increased swelling, SOB, worsening strength or other new complaints  ROS: A 10 point ROS was performed; negative except as noted above  Physical Exam:  Vitals:    06/08/22 0811   BP: 137/63   Pulse: 58   Resp:    Temp:    SpO2:      GEN:  Lying in bed in NAD   HEENT/NECK: MMM  CARDIAC: Regular rate rhythm, no murmers, no rubs, no gallops  LUNGS:  clear to auscultation, no wheezes, rales, or rhonchi  ABDOMEN: Soft, non-tender, non-distended, normal active bowel sounds  EXTREMITIES/SKIN:  no calf edema, no calf tenderness to palpation and MSK: No edema, increased warmth, or redness in either knee; L knee some medial jt line tenderness negative anterior drawer, Zack equivocal   NEURO:   MENTAL STATUS:  Appropriate wakefulness and interaction  and Strength/MMT:  Prox RLE 4+/5, distal RLE 5-/5, Prox LLE 4-/5, distal 5-/5  PSYCH:  Affect:  Euthymic     HPI:  Zeinab Machado is a 64 y o  female with PMH psoriatic arthritis, HTN, HL, TIA, who legs gave out from her causing foot pain and she was noted to have significant bilateral LE weakness and was evaluated at the hospital   CK was elevated to close to 7000 and she was initially with acute kidney injury and had elevated LFTs  IVF were provided and CK trended down  X-ray showed displaced oblique fracture involving the medical base of the proximal phalanx of the 1st digit with recommendation by podiatry for WBAT in sx shoes  Neuro had extensive work-up which included MRI L spine which showed moderate central canal stenosis L4-5, Moderate right foraminal narrowing and moderate to sever right lateral recess stenosis  Rheum reviewed case as well  Overall she was not felt to have inflammatory myositis and 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

## 2022-06-08 NOTE — ASSESSMENT & PLAN NOTE
Likely due to statin  Simvastatin currently on hold  CK 6976 on admission - currently 39  Improved with IV fluids in acute setting  Continue to trend with routine labs  Per Rheumatology - presentation not consistent with inflammatory myositis  Rheumatology recommended holding methotrexate until further follow-up with outpatient Rheumatologist (Dr Mckenzie Nip)  Ensure adequate hydration  Continue PT/OT

## 2022-06-09 PROBLEM — E87.6 HYPOKALEMIA: Status: ACTIVE | Noted: 2022-06-09

## 2022-06-09 LAB
ANION GAP SERPL CALCULATED.3IONS-SCNC: 4 MMOL/L (ref 5–14)
BASOPHILS # BLD AUTO: 0.04 THOUSANDS/ΜL (ref 0–0.1)
BASOPHILS NFR BLD AUTO: 1 % (ref 0–1)
BUN SERPL-MCNC: 10 MG/DL (ref 5–25)
CALCIUM SERPL-MCNC: 8.5 MG/DL (ref 8.4–10.2)
CHLORIDE SERPL-SCNC: 103 MMOL/L (ref 97–108)
CO2 SERPL-SCNC: 29 MMOL/L (ref 22–30)
CREAT SERPL-MCNC: 0.58 MG/DL (ref 0.6–1.2)
EOSINOPHIL # BLD AUTO: 0.12 THOUSAND/ΜL (ref 0–0.61)
EOSINOPHIL NFR BLD AUTO: 2 % (ref 0–6)
ERYTHROCYTE [DISTWIDTH] IN BLOOD BY AUTOMATED COUNT: 15 % (ref 11.6–15.1)
GFR SERPL CREATININE-BSD FRML MDRD: 88 ML/MIN/1.73SQ M
GLUCOSE P FAST SERPL-MCNC: 87 MG/DL (ref 70–99)
GLUCOSE SERPL-MCNC: 87 MG/DL (ref 70–99)
HCT VFR BLD AUTO: 41.8 % (ref 34.8–46.1)
HGB BLD-MCNC: 13.6 G/DL (ref 11.5–15.4)
IMM GRANULOCYTES # BLD AUTO: 0.01 THOUSAND/UL (ref 0–0.2)
IMM GRANULOCYTES NFR BLD AUTO: 0 % (ref 0–2)
LYMPHOCYTES # BLD AUTO: 2.17 THOUSANDS/ΜL (ref 0.6–4.47)
LYMPHOCYTES NFR BLD AUTO: 37 % (ref 14–44)
MCH RBC QN AUTO: 32.6 PG (ref 26.8–34.3)
MCHC RBC AUTO-ENTMCNC: 32.5 G/DL (ref 31.4–37.4)
MCV RBC AUTO: 100 FL (ref 82–98)
MONOCYTES # BLD AUTO: 0.75 THOUSAND/ΜL (ref 0.17–1.22)
MONOCYTES NFR BLD AUTO: 13 % (ref 4–12)
NEUTROPHILS # BLD AUTO: 2.76 THOUSANDS/ΜL (ref 1.85–7.62)
NEUTS SEG NFR BLD AUTO: 47 % (ref 43–75)
NRBC BLD AUTO-RTO: 0 /100 WBCS
PLATELET # BLD AUTO: 231 THOUSANDS/UL (ref 149–390)
PMV BLD AUTO: 10 FL (ref 8.9–12.7)
POTASSIUM SERPL-SCNC: 3.4 MMOL/L (ref 3.6–5)
RBC # BLD AUTO: 4.17 MILLION/UL (ref 3.81–5.12)
SODIUM SERPL-SCNC: 136 MMOL/L (ref 137–147)
WBC # BLD AUTO: 5.85 THOUSAND/UL (ref 4.31–10.16)

## 2022-06-09 PROCEDURE — 97116 GAIT TRAINING THERAPY: CPT

## 2022-06-09 PROCEDURE — 99233 SBSQ HOSP IP/OBS HIGH 50: CPT

## 2022-06-09 PROCEDURE — 97530 THERAPEUTIC ACTIVITIES: CPT

## 2022-06-09 PROCEDURE — 97110 THERAPEUTIC EXERCISES: CPT

## 2022-06-09 PROCEDURE — 99232 SBSQ HOSP IP/OBS MODERATE 35: CPT | Performed by: INTERNAL MEDICINE

## 2022-06-09 PROCEDURE — 85025 COMPLETE CBC W/AUTO DIFF WBC: CPT | Performed by: NURSE PRACTITIONER

## 2022-06-09 PROCEDURE — 80048 BASIC METABOLIC PNL TOTAL CA: CPT | Performed by: NURSE PRACTITIONER

## 2022-06-09 PROCEDURE — 97535 SELF CARE MNGMENT TRAINING: CPT

## 2022-06-09 RX ORDER — POTASSIUM CHLORIDE 20 MEQ/1
40 TABLET, EXTENDED RELEASE ORAL ONCE
Status: COMPLETED | OUTPATIENT
Start: 2022-06-09 | End: 2022-06-09

## 2022-06-09 RX ADMIN — DICLOFENAC SODIUM 2 G: 10 GEL TOPICAL at 21:14

## 2022-06-09 RX ADMIN — FOLIC ACID 1 MG: 1 TABLET ORAL at 08:26

## 2022-06-09 RX ADMIN — Medication 2000 UNITS: at 08:26

## 2022-06-09 RX ADMIN — ENOXAPARIN SODIUM 40 MG: 100 INJECTION SUBCUTANEOUS at 08:26

## 2022-06-09 RX ADMIN — POLYETHYLENE GLYCOL 3350 17 G: 17 POWDER, FOR SOLUTION ORAL at 08:26

## 2022-06-09 RX ADMIN — LATANOPROST 1 DROP: 50 SOLUTION OPHTHALMIC at 21:14

## 2022-06-09 RX ADMIN — DILTIAZEM HYDROCHLORIDE 240 MG: 120 CAPSULE, COATED, EXTENDED RELEASE ORAL at 08:25

## 2022-06-09 RX ADMIN — CALCIUM 2 TABLET: 500 TABLET ORAL at 08:25

## 2022-06-09 RX ADMIN — FOLIC ACID 1 MG: 1 TABLET ORAL at 21:14

## 2022-06-09 RX ADMIN — DOCUSATE SODIUM 100 MG: 100 CAPSULE, LIQUID FILLED ORAL at 08:28

## 2022-06-09 RX ADMIN — DICLOFENAC SODIUM 2 G: 10 GEL TOPICAL at 08:26

## 2022-06-09 RX ADMIN — CLOPIDOGREL BISULFATE 75 MG: 75 TABLET ORAL at 21:14

## 2022-06-09 RX ADMIN — SENNOSIDES 8.6 MG: 8.6 TABLET, FILM COATED ORAL at 08:26

## 2022-06-09 RX ADMIN — DOCUSATE SODIUM 100 MG: 100 CAPSULE, LIQUID FILLED ORAL at 17:02

## 2022-06-09 RX ADMIN — POTASSIUM CHLORIDE 40 MEQ: 1500 TABLET, EXTENDED RELEASE ORAL at 09:08

## 2022-06-09 RX ADMIN — SENNOSIDES 8.6 MG: 8.6 TABLET, FILM COATED ORAL at 17:02

## 2022-06-09 RX ADMIN — FOLIC ACID 1 MG: 1 TABLET ORAL at 17:02

## 2022-06-09 NOTE — ASSESSMENT & PLAN NOTE
Likely due to statin  Simvastatin currently on hold  CK 6976 on admission - currently 39  Improved with IV fluids in acute setting  Continue to trend with routine labs  Per Rheumatology - presentation not consistent with inflammatory myositis  Rheumatology recommended holding methotrexate until further follow-up with outpatient Rheumatologist (Dr David Oates)  Ensure adequate hydration  Continue PT/OT

## 2022-06-09 NOTE — PCC CARE MANAGEMENT
Pt lives alone and has neighbors that help out  She believes that they will be willing to assist with transport, etc  She has left railing with 3 BISI and in the garage she has 1 BISI through the garage and has handrails  She has a walker and a high toilet  She has no hx of therapy  She uses [facility] on [address] for rx needs  Reivewed team meeting process, potential LOS, and IMM  Following to assist w/ d/c planning needs

## 2022-06-09 NOTE — PCC PHYSICAL THERAPY
6/9/22 Pt admitted with B LE weakness, balance & gait deficits requiring use of RW and assistance for all mobility  Pt with decreased insight to her deficits and limited carryover from day to day  Poor postural awareness with weakness of gluteals and quads causing her to walk with flexed knees  Poor righting reactions, making her high risk for falls  Pt was Ind prior and her goal is to return home w/o using RW   Good rehab potential

## 2022-06-09 NOTE — ASSESSMENT & PLAN NOTE
Home regimen: methotrexate 17 5mg on Mondays  Per Rheumatology - continue to hold until outpatient follow-up with Rheumatologist (Dr Milan Higgins)

## 2022-06-09 NOTE — PROGRESS NOTES
[facility]  Progress Note - Barbara Mayers 1945, 64 y o  female MRN: [id number]  Unit/Bed#: -10 Encounter: [id number]  Primary Care Provider: Shayne Allen DO   Date and time admitted to hospital: 6/2/2022  2:47 PM    Osteoporosis  Assessment & Plan  DXA scan on 5/13/22 showed osteoporosis  Started on Vitamin D 2000u daily and calcium 1000mg daily  Vitamin D level 31 3 on 6/6  Follow-up with PCP or Rheumatology at discharge  Fracture, toe  Assessment & Plan  L foot XR: acute appearing minimally displaced oblique fracture involving medial base of the proximal phalanx of the 1st digit  Podiatry consulted in acute setting - recommended WBAT and surgical shoes to both feet with ambulation  Follow-up with Podiatry as outpatient  Verified surgical shoes with Dr Darío Lisa - should continue for 3 weeks after XR on 5/26  Repeat XR in 3 weeks (6/16)  Weakness of both lower extremities  Assessment & Plan  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  CK 6976 on admission - currently 39  Simvastatin was stopped and currently on hold  Per Rheumatology - presentation not consistent with inflammatory myositis  MRI L spine showed severe facet degenerative change at L4-L5 resulting in moderate central stenosis, moderate R foramina narrowing, and moderate to severe R lateral recess stenosis  JONATHON on 5/31 was WNL  Venous duplex on 5/26 negative for DVTs  Myositis panel pending  Primary team following  PT/OT  Elevated liver enzymes  Assessment & Plan  Likely due to recent rhabdomyolysis  AST 32 and ALT 51 (from 63 and 110 respectively)  Continue to trend with routine CMP  HLD (hyperlipidemia)  Assessment & Plan  Last lipid panel on 3/31/22: Cholesterol 169, Triglycerides 69, HDL 87, and LDL 68  Hold statin at this time due to recent rhabdomyolysis  Essential hypertension  Assessment & Plan  Continue home regimen: Cardizem CD 240mg daily    Monitor BP with routine VS   Follow-up with PCP as outpatient  Psoriatic arthritis ([facility] [address] )  Assessment & Plan  Home regimen: methotrexate 17 5mg on   Per Rheumatology - continue to hold until outpatient follow-up with Rheumatologist (Dr Farideh Bennett)  History of TIA (transient ischemic attack)  Assessment & Plan  Hx of TIA without any deficits  Continue Plavix 75mg daily  Statin on hold  * Rhabdomyolysis  Assessment & Plan  Likely due to statin  Simvastatin currently on hold  CK 6976 on admission - currently 39  Improved with IV fluids in acute setting  Continue to trend with routine labs  Per Rheumatology - presentation not consistent with inflammatory myositis  Rheumatology recommended holding methotrexate until further follow-up with outpatient Rheumatologist (Dr Farideh Bennett)  Ensure adequate hydration  Continue PT/OT  VTE Pharmacologic Prophylaxis:   Pharmacologic: Enoxaparin (Lovenox)  Mechanical VTE Prophylaxis in Place: Yes - sequential compression devices  Current Length of Stay: 7 day(s)    Current Patient Status: Inpatient Rehab     Discharge Plan: As per primary team     Code Status: Level 1 - Full Code    Subjective:   Pt examined while pt sitting in bed in pt room  Currently denies any pain  Denies any lower extremity weakness  Slept well last night  Feels that therapy is going well  Still has poor food intake, dislikes the way the food is prepared here but encouraged to gradually eat what she can tolerate  Denies any lightheadedness, dizziness, palpitations, or SOB  LBM was on   Denies any abdominal pain and states that this is normal for her  Has no other concerns or complaints at this time  Objective:     Vitals:   Temp (24hrs), Av °F (36 7 °C), Min:96 4 °F (35 8 °C), Max:98 8 °F (37 1 °C)    Temp:  [96 4 °F (35 8 °C)-98 8 °F (37 1 °C)] 96 4 °F (35 8 °C)  HR:  [56-67] 67  Resp:  [16-20] 20  BP: (128-137)/(59-65) 128/62  SpO2:  [97 %] 97 %  Body mass index is 18 53 kg/m²  Review of Systems   Constitutional: Positive for appetite change (poor food intake, does not like the way that the food is prepared here)  Negative for chills, fatigue and fever  HENT: Negative for trouble swallowing  Eyes: Negative for visual disturbance  Respiratory: Negative for cough, shortness of breath, wheezing and stridor  Cardiovascular: Negative for chest pain, palpitations and leg swelling  Gastrointestinal: Negative for abdominal distention, abdominal pain, constipation, diarrhea, nausea and vomiting  LBM 6/6   Genitourinary: Negative for difficulty urinating  Musculoskeletal: Negative for arthralgias and back pain  Neurological: Negative for dizziness, weakness, light-headedness, numbness and headaches  Psychiatric/Behavioral: Negative for sleep disturbance  All other systems reviewed and are negative  Input and Output Summary (last 24 hours): Intake/Output Summary (Last 24 hours) at 6/9/2022 1379  Last data filed at 6/8/2022 1819  Gross per 24 hour   Intake 240 ml   Output --   Net 240 ml       Physical Exam:     Physical Exam  Vitals and nursing note reviewed  Constitutional:       Appearance: Normal appearance  HENT:      Head: Normocephalic and atraumatic  Cardiovascular:      Rate and Rhythm: Normal rate and regular rhythm  Pulses: Normal pulses  Heart sounds: Normal heart sounds  No murmur heard  No friction rub  Pulmonary:      Effort: Pulmonary effort is normal  No respiratory distress  Breath sounds: Normal breath sounds  No wheezing or rhonchi  Abdominal:      General: Abdomen is flat  Bowel sounds are normal  There is no distension  Palpations: Abdomen is soft  Tenderness: There is no abdominal tenderness  Musculoskeletal:      Cervical back: Normal range of motion and neck supple  Right lower leg: No edema  Left lower leg: No edema  Skin:     General: Skin is warm and dry     Neurological: Mental Status: She is alert and oriented to person, place, and time     Psychiatric:         Mood and Affect: Mood normal          Behavior: Behavior normal          Additional Data:     Labs:    Results from last 7 days   Lab Units 06/09/22  0451   WBC Thousand/uL 5 85   HEMOGLOBIN g/dL 13 6   HEMATOCRIT % 41 8   PLATELETS Thousands/uL 231   NEUTROS PCT % 47   LYMPHS PCT % 37   MONOS PCT % 13*   EOS PCT % 2     Results from last 7 days   Lab Units 06/09/22  0451 06/06/22  0601   SODIUM mmol/L 136* 137   POTASSIUM mmol/L 3 4* 3 7   CHLORIDE mmol/L 103 101   CO2 mmol/L 29 31*   BUN mg/dL 10 9   CREATININE mg/dL 0 58* 0 55*   ANION GAP mmol/L 4* 5   CALCIUM mg/dL 8 5 8 6   ALBUMIN g/dL  --  3 1   TOTAL BILIRUBIN mg/dL  --  0 60   ALK PHOS U/L  --  53   ALT U/L  --  51*   AST U/L  --  32   GLUCOSE RANDOM mg/dL 87 91                       Labs reviewed    Imaging:    Imaging reviewed    Recent Cultures (last 7 days):           Last 24 Hours Medication List:   Current Facility-Administered Medications   Medication Dose Route Frequency Provider Last Rate    acetaminophen  650 mg Oral Q6H PRN Jarett Javed MD      aluminum-magnesium hydroxide-simethicone  30 mL Oral Q6H PRN Jarett Javed MD      bisacodyl  10 mg Rectal Daily PRN Jarett Javed MD      calcium carbonate  2 tablet Oral Daily With Breakfast Richie Peabody, CRNP      cholecalciferol  2,000 Units Oral Daily Richie Peabody, CRNP      clopidogrel  75 mg Oral HS Jarett Javed MD      Diclofenac Sodium  2 g Topical 4x Daily PRN Jarett Javed MD      Diclofenac Sodium  2 g Topical 4x Daily Jarett Javed MD      diltiazem  240 mg Oral Daily Jarett Javed MD      docusate sodium  100 mg Oral BID Jarett Javed MD      enoxaparin  40 mg Subcutaneous Q24H [address], CRNP      folic acid  1 mg Oral TID Jarett Javed MD      latanoprost  1 drop Both Eyes HS Jarett Javed MD      polyethylene glycol  17 g Oral Daily PRN MD Roz Farias Levi polyethylene glycol  17 g Oral Daily Donney Severe, MD      senna  1 tablet Oral BID Donney Severe, MD          M*Modal software was used to dictate this note  It may contain errors with dictating incorrect words or incorrect spelling  Please contact the provider directly with any questions

## 2022-06-09 NOTE — ASSESSMENT & PLAN NOTE
L foot XR: acute appearing minimally displaced oblique fracture involving medial base of the proximal phalanx of the 1st digit  Podiatry consulted in acute setting - recommended WBAT and surgical shoes to both feet with ambulation  Follow-up with Podiatry as outpatient  Verified surgical shoes with Dr Derrell Stone - should continue for 3 weeks after XR on 5/26  Repeat XR in 3 weeks (6/16)

## 2022-06-09 NOTE — PLAN OF CARE
Problem: Nutrition/Hydration-ADULT  Goal: Nutrient/Hydration intake appropriate for improving, restoring or maintaining nutritional needs  Description: Monitor and assess patient's nutrition/hydration status for malnutrition  Collaborate with interdisciplinary team and initiate plan and interventions as ordered  Monitor patient's weight and dietary intake as ordered or per policy  Utilize nutrition screening tool and intervene as necessary  Determine patient's food preferences and provide high-protein, high-caloric foods as appropriate  INTERVENTIONS:  - Monitor oral intake, urinary output, labs, and treatment plans  - Assess nutrition and hydration status and recommend course of action  - Evaluate amount of meals eaten  - Assist patient with eating if necessary   - Allow adequate time for meals  - Recommend/ encourage appropriate diets, oral nutritional supplements, and vitamin/mineral supplements  - Order, calculate, and assess calorie counts as needed  - Recommend, monitor, and adjust tube feedings and TPN/PPN based on assessed needs  - Assess need for intravenous fluids  - Provide specific nutrition/hydration education as appropriate  - Include patient/family/caregiver in decisions related to nutrition  Outcome: Progressing     Problem: Nutrition/Hydration-ADULT  Goal: Nutrient/Hydration intake appropriate for improving, restoring or maintaining nutritional needs  Description: Monitor and assess patient's nutrition/hydration status for malnutrition  Collaborate with interdisciplinary team and initiate plan and interventions as ordered  Monitor patient's weight and dietary intake as ordered or per policy  Utilize nutrition screening tool and intervene as necessary  Determine patient's food preferences and provide high-protein, high-caloric foods as appropriate       INTERVENTIONS:  - Monitor oral intake, urinary output, labs, and treatment plans  - Assess nutrition and hydration status and recommend course of action  - Evaluate amount of meals eaten  - Assist patient with eating if necessary   - Allow adequate time for meals  - Recommend/ encourage appropriate diets, oral nutritional supplements, and vitamin/mineral supplements  - Order, calculate, and assess calorie counts as needed  - Recommend, monitor, and adjust tube feedings and TPN/PPN based on assessed needs  - Assess need for intravenous fluids  - Provide specific nutrition/hydration education as appropriate  - Include patient/family/caregiver in decisions related to nutrition  Outcome: Progressing     Problem: NEUROSENSORY - ADULT  Goal: Achieves maximal functionality and self care  Description: INTERVENTIONS  - Monitor swallowing and airway patency with patient fatigue and changes in neurological status  - Encourage and assist patient to increase activity and self care     - Encourage visually impaired, hearing impaired and aphasic patients to use assistive/communication devices  Outcome: Progressing     Problem: CARDIOVASCULAR - ADULT  Goal: Maintains optimal cardiac output and hemodynamic stability  Description: INTERVENTIONS:  - Monitor I/O, vital signs and rhythm  - Monitor for S/S and trends of decreased cardiac output  - Administer and titrate ordered vasoactive medications to optimize hemodynamic stability  - Assess quality of pulses, skin color and temperature  - Assess for signs of decreased coronary artery perfusion  - Instruct patient to report change in severity of symptoms  Outcome: Progressing  Goal: Absence of cardiac dysrhythmias or at baseline rhythm  Description: INTERVENTIONS:  - Continuous cardiac monitoring, vital signs, obtain 12 lead EKG if ordered  - Administer antiarrhythmic and heart rate control medications as ordered  - Monitor electrolytes and administer replacement therapy as ordered  Outcome: Progressing     Problem: RESPIRATORY - ADULT  Goal: Achieves optimal ventilation and oxygenation  Description: INTERVENTIONS:  - Assess for changes in respiratory status  - Assess for changes in mentation and behavior  - Position to facilitate oxygenation and minimize respiratory effort  - Oxygen administered by appropriate delivery if ordered  - Initiate smoking cessation education as indicated  - Encourage broncho-pulmonary hygiene including cough, deep breathe, Incentive Spirometry  - Assess the need for suctioning and aspirate as needed  - Assess and instruct to report SOB or any respiratory difficulty  - Respiratory Therapy support as indicated  Outcome: Progressing     Problem: GASTROINTESTINAL - ADULT  Goal: Maintains or returns to baseline bowel function  Description: INTERVENTIONS:  - Assess bowel function  - Encourage oral fluids to ensure adequate hydration  - Administer IV fluids if ordered to ensure adequate hydration  - Administer ordered medications as needed  - Encourage mobilization and activity  - Consider nutritional services referral to assist patient with adequate nutrition and appropriate food choices  Outcome: Progressing  Goal: Maintains adequate nutritional intake  Description: INTERVENTIONS:  - Monitor percentage of each meal consumed  - Identify factors contributing to decreased intake, treat as appropriate  - Assist with meals as needed  - Monitor I&O, weight, and lab values if indicated  - Obtain nutrition services referral as needed  Outcome: Progressing  Goal: Oral mucous membranes remain intact  Description: INTERVENTIONS  - Assess oral mucosa and hygiene practices  - Implement preventative oral hygiene regimen  - Implement oral medicated treatments as ordered  - Initiate Nutrition services referral as needed  Outcome: Progressing     Problem: GENITOURINARY - ADULT  Goal: Maintains or returns to baseline urinary function  Description: INTERVENTIONS:  - Assess urinary function  - Encourage oral fluids to ensure adequate hydration if ordered  - Administer IV fluids as ordered to ensure adequate hydration  - Administer ordered medications as needed  - Offer frequent toileting  - Follow urinary retention protocol if ordered  Outcome: Progressing  Goal: Absence of urinary retention  Description: INTERVENTIONS:  - Assess patients ability to void and empty bladder  - Monitor I/O  - Bladder scan as needed  - Discuss with physician/AP medications to alleviate retention as needed  - Discuss catheterization for long term situations as appropriate  Outcome: Progressing     Problem: METABOLIC, FLUID AND ELECTROLYTES - ADULT  Goal: Electrolytes maintained within normal limits  Description: INTERVENTIONS:  - Monitor labs and assess patient for signs and symptoms of electrolyte imbalances  - Administer electrolyte replacement as ordered  - Monitor response to electrolyte replacements, including repeat lab results as appropriate  - Instruct patient on fluid and nutrition as appropriate  Outcome: Progressing  Goal: Fluid balance maintained  Description: INTERVENTIONS:  - Monitor labs   - Monitor I/O and WT  - Instruct patient on fluid and nutrition as appropriate  - Assess for signs & symptoms of volume excess or deficit  Outcome: Progressing     Problem: SKIN/TISSUE INTEGRITY - ADULT  Goal: Skin Integrity remains intact(Skin Breakdown Prevention)  Description: Assess:  -Inspect skin when repositioning, toileting, and assisting with ADLS  -Assess extremities for adequate circulation and sensation     Bed Management:  -Have minimal linens on bed & keep smooth, unwrinkled  -Change linens as needed when moist or perspiring    Toileting:  -Offer bedside commode    Activity:  -Encourage activity and walks on unit  -Encourage or provide ROM exercises     Skin Care:  -Avoid use of baby powder, tape, friction and shearing, hot water or constrictive clothing  -Do not massage red bony areas  Outcome: Progressing     Problem: HEMATOLOGIC - ADULT  Goal: Maintains hematologic stability  Description: INTERVENTIONS  - Assess for signs and symptoms of bleeding or hemorrhage  - Monitor labs  - Administer supportive blood products/factors as ordered and appropriate  Outcome: Progressing     Problem: MUSCULOSKELETAL - ADULT  Goal: Maintain or return mobility to safest level of function  Description: INTERVENTIONS:  - Assess patient's ability to carry out ADLs; assess patient's baseline for ADL function and identify physical deficits which impact ability to perform ADLs (bathing, care of mouth/teeth, toileting, grooming, dressing, etc )  - Assess/evaluate cause of self-care deficits   - Assess range of motion  - Assess patient's mobility  - Assess patient's need for assistive devices and provide as appropriate  - Encourage maximum independence but intervene and supervise when necessary  - Involve family in performance of ADLs  - Assess for home care needs following discharge   - Consider OT consult to assist with ADL evaluation and planning for discharge  - Provide patient education as appropriate  Outcome: Progressing  Goal: Maintain proper alignment of affected body part  Description: INTERVENTIONS:  - Support, maintain and protect limb and body alignment  - Provide patient/ family with appropriate education  Outcome: Progressing     Problem: PAIN - ADULT  Goal: Verbalizes/displays adequate comfort level or baseline comfort level  Description: Interventions:  - Encourage patient to monitor pain and request assistance  - Assess pain using appropriate pain scale  - Administer analgesics based on type and severity of pain and evaluate response  - Implement non-pharmacological measures as appropriate and evaluate response  - Consider cultural and social influences on pain and pain management  - Notify physician/advanced practitioner if interventions unsuccessful or patient reports new pain  Outcome: Progressing     Problem: INFECTION - ADULT  Goal: Absence or prevention of progression during hospitalization  Description: INTERVENTIONS:  - Assess and monitor for signs and symptoms of infection  - Monitor lab/diagnostic results  - Monitor all insertion sites, i e  indwelling lines, tubes, and drains  - Monitor endotracheal if appropriate and nasal secretions for changes in amount and color  - [place] appropriate cooling/warming therapies per order  - Administer medications as ordered  - Instruct and encourage patient and family to use good hand hygiene technique  - Identify and instruct in appropriate isolation precautions for identified infection/condition  Outcome: Progressing  Goal: Absence of fever/infection during neutropenic period  Description: INTERVENTIONS:  - Monitor WBC    Outcome: Progressing     Problem: DISCHARGE PLANNING  Goal: Discharge to home or other facility with appropriate resources  Description: INTERVENTIONS:  - Identify barriers to discharge w/patient and caregiver  - Arrange for needed discharge resources and transportation as appropriate  - Identify discharge learning needs (meds, wound care, etc )  - Arrange for interpretive services to assist at discharge as needed  - Refer to Case Management Department for coordinating discharge planning if the patient needs post-hospital services based on physician/advanced practitioner order or complex needs related to functional status, cognitive ability, or social support system  Outcome: Progressing     Problem: MOBILITY - ADULT  Goal: Maintain or return to baseline ADL function  Description: INTERVENTIONS:  -  Assess patient's ability to carry out ADLs; assess patient's baseline for ADL function and identify physical deficits which impact ability to perform ADLs (bathing, care of mouth/teeth, toileting, grooming, dressing, etc )  - Assess/evaluate cause of self-care deficits   - Assess range of motion  - Assess patient's mobility; develop plan if impaired  - Assess patient's need for assistive devices and provide as appropriate  - Encourage maximum independence but intervene and supervise when necessary  - Involve family in performance of ADLs  - Assess for home care needs following discharge   - Consider OT consult to assist with ADL evaluation and planning for discharge  - Provide patient education as appropriate  Outcome: Progressing  Goal: Maintains/Returns to pre admission functional level  Description: INTERVENTIONS:  - Perform BMAT or MOVE assessment daily    - Set and communicate daily mobility goal to care team and patient/family/caregiver     - Collaborate with rehabilitation services on mobility goals if consulted  - Out of bed for toileting  - Record patient progress and toleration of activity level   Outcome: Progressing

## 2022-06-09 NOTE — PROGRESS NOTES
06/09/22 1330   Restrictions/Precautions   Precautions Cognitive; Fall Risk;Pain   LLE Weight Bearing Per Order WBAT   Braces or Orthoses   (bilat post op shoes)   Subjective   Subjective no complaints, ready for session   Sit to Lying   Type of Assistance Needed Supervision   Sit to Lying CARE Score 4   Lying to Sitting on Side of Bed   Type of Assistance Needed Supervision   Lying to Sitting on Side of Bed CARE Score 4   Sit to Stand   Type of Assistance Needed Supervision   Sit to Stand CARE Score 4   Bed-Chair Transfer   Type of Assistance Needed Incidental touching   Comment no device  (close S/ CG)   Chair/Bed-to-Chair Transfer CARE Score 4   Walk 10 Feet   Type of Assistance Needed Incidental touching   Comment CG no device   Walk 10 Feet CARE Score 4   Walk 50 Feet with Two Turns   Type of Assistance Needed Incidental touching   Comment CG no device   Walk 50 Feet with Two Turns CARE Score 4   Ambulation   Does the patient walk? 2  Yes   Primary Mode of Locomotion Prior to Admission Walk   Distance Walked (feet) 100 ft  (multiple house hold level in gym unsupported - did not use walker the whole session)   Provided Assistance with: Balance   Walk Assist Level Contact Guard;Close Supervision   Findings No significant change in gt this session, but progress with increase activity tolerance without walker   Wheel 50 Feet with Two Turns   Reason if not Attempted Activity not applicable   Wheel 50 Feet with Two Turns CARE Score 9   Wheel 150 Feet   Reason if not Attempted Activity not applicable   Wheel 458 Feet CARE Score 9   Wheelchair mobility   Does the patient use a wheelchair? 0   No   4 Steps   Type of Assistance Needed Incidental touching   Comment CG with bilat HR,  needed cues to stay more forward during descent   4 Steps CARE Score 4   12 Steps   Reason if not Attempted Activity not applicable   12 Steps CARE Score 9   Stairs   Type Stairs   # of Steps 8   Findings reciprocal ascend with step to descent, performed for strengthening   Therapeutic Interventions   Balance Amb unsupported needed HHA at times stepping over canes on floor (performed multiple reps) needed Min/ mod A for balance,  Amb 50 feet bouncing and catching physioball,  Amb 48' with stop and go on command (close S),  Amb 48' with pivot on command,  Amb 48' with head turns (min A )   Equipment Use   NuStep 5mins with intermittent rest   Assessment   Treatment Assessment 60 min session focused on mostly balance with unsupported dynamic tasks  Some pt needed min A and some pt was CG/ Close S level  She is easily put off balance by external barriers or abrupt movements  Educated and discussed therapy will continue to maximize balance and continue working without device but might need to reccommend RW next week for D/C dependening on how balance and safety looks  Barriers to Discharge Decreased caregiver support; Inaccessible home environment  (need to work on step with grab bar)   Plan   Progress Progressing toward goals   Recommendation   PT Discharge Recommendation Home with home health rehabilitation   PT Therapy Minutes   PT Time In 1330   PT Time Out 1430   PT Total Time (minutes) 60   PT Mode of treatment - Individual (minutes) 60   PT Mode of treatment - Concurrent (minutes) 0   PT Mode of treatment - Group (minutes) 0   PT Mode of treatment - Co-treat (minutes) 0   PT Mode of Treatment - Total time(minutes) 60 minutes   PT Cumulative Minutes 525   Therapy Time missed   Time missed?  No

## 2022-06-09 NOTE — PROGRESS NOTES
06/09/22 1100   Pain Assessment   Pain Assessment Tool 0-10   Pain Score 1   Pain Location/Orientation Orientation: Left; Location: Knee   Restrictions/Precautions   Precautions Cognitive; Fall Risk;Pain   LLE Weight Bearing Per Order WBAT   Braces or Orthoses   (bilat post op shoes)   Sit to Stand   Type of Assistance Needed Supervision   Comment close S   Sit to Stand CARE Score 4   Bed-Chair Transfer   Type of Assistance Needed Incidental touching   Comment no device   Chair/Bed-to-Chair Transfer CARE Score 4   Walk 10 Feet   Type of Assistance Needed Incidental touching   Comment CG no device   Walk 10 Feet CARE Score 4   Walk 50 Feet with Two Turns   Type of Assistance Needed Incidental touching   Comment CG no device-  close S at best   Walk 50 Feet with Two Turns CARE Score 4   Ambulation   Does the patient walk? 2  Yes   Primary Mode of Locomotion Prior to Admission Walk   Distance Walked (feet) 115 ft  (RW      40x3 unsupported)   Walk Assist Level Contact Guard   Findings Pt stating shes not wanting to use RW at home so started trials without device  Close S needed   Curb or Single Stair   Style negotiated Single stair   Type of Assistance Needed Physical assistance   Physical Assistance Level 25% or less   Comment Simulated home set up with a step and used pole on steps to simulate grab bar (poor eccentric control when stepping down needed min A   1 Step (Curb) CARE Score 3   12 Steps   Reason if not Attempted Activity not applicable   12 Steps CARE Score 9   Therapeutic Interventions   Strengthening seated Leg press to target extensors 10x3 MRE mod resisted,  Hip abd red Tband 10x3   Assessment   Treatment Assessment 30 min session focused on balance / amb without device , LE strengthening,  pt is continuing to show progress and will benefit from cont strengthening and bouts without device  Barriers to Discharge Decreased caregiver support; Inaccessible home environment   Recommendation   PT Discharge Recommendation Home with home health rehabilitation   PT Therapy Minutes   PT Time In 1100   PT Time Out 1130   PT Total Time (minutes) 30   PT Mode of treatment - Individual (minutes) 30   PT Mode of treatment - Concurrent (minutes) 0   PT Mode of treatment - Group (minutes) 0   PT Mode of treatment - Co-treat (minutes) 0   PT Mode of Treatment - Total time(minutes) 30 minutes   PT Cumulative Minutes 465   Therapy Time missed   Time missed?  No

## 2022-06-09 NOTE — PCC NURSING
Tamara Perry is a 64 y o  female with PMH psoriatic arthritis, HTN, HL, TIA, who legs gave out from her causing foot pain and she was noted to have significant bilateral LE weakness and was evaluated at the hospital   CK was elevated to close to 7000 and she was initially with acute kidney injury and had elevated LFTs  IVF were provided and CK trended down  X-ray showed displaced oblique fracture involving the medical base of the proximal phalanx of the 1st digit with recommendation by podiatry for WBAT in sx shoes  Neuro had extensive work-up which included MRI L spine which showed moderate central canal stenosis L4-5, Moderate right foraminal narrowing and moderate to sever right lateral recess stenosis  Rheum reviewed case as well  Overall she was not felt to have inflammatory myositis and 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 and statin has been held  She was recommended to hold MTX for now as well  Strength has been improving some  Patient was evaluated by skilled therapies and was found to have significant decline in ADLs and ambulation and appears appropriate for admission to [place] [id number]  Pt has osteoporosis and was started on Vit D 2000u daily and calcium 1000 mg daily  She is on cardizem 240 mg daily for her HTN  Hx of TIA's, continue Plavix 75 mg daily  [id number] 5 mg for her psoriatic arthritis is on hold  Lovenox for DVT prophylaxis  This week we will monitor pt's vital signs and lab results  Pt will have safe transfers with the use of call bell and hourly rounding to prevent falls  Pt will be educated on importance of T/R and off loading wt while on bed/chair to prevent pressure injury  Pt will have adequate pain relief to fully participate in therapies  Pt will be educated on energy conservation and encourage independence with ADL's

## 2022-06-09 NOTE — PCC OCCUPATIONAL THERAPY
Pt is making positive gains towards OT goals of [place]  Pt lives alone with 3 BISI, 1 BISI through [place]  Reports having supportive neighbors that help out  She believes that they will be willing to assist with transport to appointments upon D/C  DME includes RW and a raised toilet seat  Pt would benefit from purchasing a shower chair with back for D/C  Barriers include dec activity tolerance, dec fxnl standing balance, dec endurance, limited family support  OT plan to continue addressing above noted barriers in order for pt to progress towards OT goals  Anticipated plan to set D/C for next week with recommendation for Home OT services

## 2022-06-09 NOTE — TEAM CONFERENCE
Acute RehabilitationTeam Conference Note  Date: 6/9/2022   Time: 1:43 PM       Patient Name:  Tamara Perry       Medical Record Number: [id number]   YOB: 1945  Sex: Female          Room/Bed:  [facility] 266/[facility] 266-01  Payor Info:  Payor: Cheo Doing / Plan: MEDICARE A AND B / Product Type: Medicare A & B Fee for Service /      Admitting Diagnosis: Rhabdomyolysis [M62 82]   Admit Date/Time:  6/2/2022  2:47 PM  Admission Comments: No comment available     Primary Diagnosis:  Rhabdomyolysis  Principal Problem: Rhabdomyolysis    Patient Active Problem List    Diagnosis Date Noted    Hypokalemia 06/09/2022    Left knee pain 06/08/2022    At risk for venous thromboembolism (VTE) 06/02/2022    Osteoporosis 06/02/2022    Fracture, toe 06/01/2022    Weakness of both lower extremities 05/30/2022    Impaired mobility and ADLs 05/26/2022    Rhabdomyolysis 05/26/2022    Elevated liver enzymes 05/26/2022    Laceration of forehead 12/28/2021    Mild protein-calorie malnutrition ([facility] [address] ) 11/30/2021    Constipation 05/29/2019    Trigeminal neuralgia 03/12/2018    History of TIA (transient ischemic attack) 03/12/2018    Psoriatic arthritis ([facility] [address] ) 03/12/2018    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    Fatty liver 03/25/2014    Arteriosclerotic heart disease 01/23/2014    Essential hypertension 11/12/2013    Transient cerebral ischemia 08/26/2012    HLD (hyperlipidemia) 08/25/2012       Physical Therapy:    Weight Bearing Status: Full Weight Bearing  Transfers: Incidental Touching  Bed Mobility: Supervision  Amulation Distance (ft): 150 feet  Ambulation: Supervision, Incidental Touching  Assistive Device for Ambulation: Roller Walker (have initiatd HHA, safest with RW currently)  Wheelchair Mobility Distance: 0 ft (NA)  Number of Stairs: 8  Assistive Device for Stairs: Bilateral [employer]  Stair Assistance: Incidental Touching (Step to with cueing)  Ramp: Minimal Assistance  Assistive Device for Ramp: Roller Walker  Discharge Recommendations: Home with:  [address] with[de-identified] Family Support, Outpatient Physical Therapy, Home Physical Therapy (Home VS outpt pending progress)    6/9/22 Pt admitted with B LE weakness, balance & gait deficits requiring use of RW and assistance for all mobility  Pt with decreased insight to her deficits and limited carryover from day to day  Poor postural awareness with weakness of gluteals and quads causing her to walk with flexed knees  Poor righting reactions, making her high risk for falls  Pt was Ind prior and her goal is to return home w/o using RW  Good rehab potential       Occupational Therapy:  Eating: Independent  Grooming: Incidental Touching (in stance)  Bathing: Incidental Touching  Bathing: Incidental Touching  Upper Body Dressing:  (S/U)  Lower Body Dressing: Incidental Touching  Toileting: Incidental Touching  Tub/Shower Transfer: Incidental Touching  Toilet Transfer: Incidental Touching  Cognition: Within Defined Limits  Orientation: Person, Place, Time, Situation  Discharge Recommendations: Home with:  [address] with[de-identified] Home Occupational Therapy       Pt is making positive gains towards OT goals of [place]  Pt lives alone with 3 BISI, 1 BISI through garage  Reports having supportive neighbors that help out  She believes that they will be willing to assist with transport to appointments upon D/C  DME includes RW and a raised toilet seat  Pt would benefit from purchasing a shower chair with back for D/C  Barriers include dec activity tolerance, dec fxnl standing balance, dec endurance, limited family support  OT plan to continue addressing above noted barriers in order for pt to progress towards OT goals  Anticipated plan to set D/C for next week with recommendation for Home OT services  Speech Therapy:           No notes on file    Nursing Notes:  Appetite: Good  Diet Type: Regular/House                           Type of Wound (LDA):  Wound Pain Location/Orientation: Orientation: Left, Location: Knee  Pain Score: 0                       Hospital Pain Intervention(s): Shower/Bath (Pt reports pain dec to 2/10 following shower)            Lazaro Alves is a 64 y o  female with PMH psoriatic arthritis, HTN, HL, TIA, who legs gave out from her causing foot pain and she was noted to have significant bilateral LE weakness and was evaluated at the hospital   CK was elevated to close to 7000 and she was initially with acute kidney injury and had elevated LFTs  IVF were provided and CK trended down  X-ray showed displaced oblique fracture involving the medical base of the proximal phalanx of the 1st digit with recommendation by podiatry for WBAT in sx shoes  Neuro had extensive work-up which included MRI L spine which showed moderate central canal stenosis L4-5, Moderate right foraminal narrowing and moderate to sever right lateral recess stenosis  Rheum reviewed case as well  Overall she was not felt to have inflammatory myositis and 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 and statin has been held  She was recommended to hold MTX for now as well  Strength has been improving some  Patient was evaluated by skilled therapies and was found to have significant decline in ADLs and ambulation and appears appropriate for admission to [place] [id number]  Pt has osteoporosis and was started on Vit D 2000u daily and calcium 1000 mg daily  She is on cardizem 240 mg daily for her HTN  Hx of TIA's, continue Plavix 75 mg daily  [id number] 5 mg for her psoriatic arthritis is on hold  Lovenox for DVT prophylaxis  This week we will monitor pt's vital signs and lab results  Pt will have safe transfers with the use of call bell and hourly rounding to prevent falls  Pt will be educated on importance of T/R and off loading wt while on bed/chair to prevent pressure injury    Pt will have adequate pain relief to fully participate in therapies  Pt will be educated on energy conservation and encourage independence with ADL's  Case Management:     Discharge Planning  Living Arrangements: Lives Alone  Support Systems: Kelley Gaston  Assistance Needed: To be determined  Type of Current Residence: Private residence  Current Home Care Services: No  Pt lives alone and has neighbors that help out  She believes that they will be willing to assist with transport, etc  She has left railing with 3 BISI and in the garage she has 1 BISI through the garage and has handrails  She has a walker and a high toilet  She has no hx of therapy  She uses [facility] on [address] for rx needs  Reivewed team meeting process, potential LOS, and IMM  Following to assist w/ d/c planning needs  Is the patient actively participating in therapies? yes  List any modifications to the treatment plan:     Barriers Interventions   Generalized fatigue and weakness Therapy exercises   Cognitive impairments Therapy exercises, MOCA   Poor insight to deficits  Therapy exercises   Poor activity tolerance Therapy exercises and energy conservation education   balance Therapy exercises, AE     Is the patient making expected progress toward goals?  yes  List any update or changes to goals:     Medical Goals: Patient will be medically stable for discharge to [facility] upon completion of rehab program and Patient will be able to manage medical conditions and comorbid conditions with medications and follow up upon completion of rehab program    Weekly Team Goals:   Rehab Team Goals  ADL Team Goal: Patient will be independent with ADLs with least restrictive device upon completion of rehab program  Transfer Team Goal: Patient will be independent with transfers with least restrictive device upon completion of rehab program  Locomotion Team Goal: Patient will be independent with locomotion with least restrictive device upon completion of rehab program    Discussion: Pt presents with the above barriers  She is functioning well but has some cognitive and memory deficits  She is functioning at CGA/ sarah without device and CGA close supervision with walker up to 175 feet  Goal is for pt to progress to min a by next week  Home PT and OT are recommendations  Anticipated Discharge Date:  6/14  [facility] Team Members Present: The following team members are supervising care for this patient and were present during this Weekly Team Conference      Physician: Dr Tomy Stroud MD  : KENAN Mosley  Registered Nurse: KENAN Mancera  Physical Therapist: Romel Rangel, JULIET  Occupational Therapist: Brian Lombardo MS, OTR/L  Speech Therapist: Manav Kaur [address], [id number] [facility]

## 2022-06-09 NOTE — PROGRESS NOTES
06/09/22 0930   Pain Assessment   Pain Assessment Tool 0-10   Pain Score 9   Pain Location/Orientation Orientation: Left; Location: Knee   Pain Onset/Description Onset: Gradual   Effect of Pain on Daily Activities Tolerated   Patient's Stated Pain Goal No pain   Hospital Pain Intervention(s) Shower/Bath  (Pt reports pain dec to 2/10 following shower)   Multiple Pain Sites No   Restrictions/Precautions   Precautions Cognitive; Fall Risk;Pain   Weight Bearing Restrictions Yes   LLE Weight Bearing Per Order WBAT   Braces or Orthoses Other (Comment)  (B/L LE surgical shoes)   Lifestyle   Autonomy Pt agreeable to participate in Tx session  Oral Hygiene   Comment Pt declined to perform, stating preference to perform following lunch meal    Shower/Bathe Self   Type of Assistance Needed Incidental touching   Physical Assistance Level No physical assistance   Comment Completed shower routine with majority of shower completed seated on shower seat with back  Xleg technique to bathe BLE's to feet  CG/CS in stance to bathe ashley/buttocks with vc to utilize shower wall for support, as pt reports having "plastic grab bar"  Discussed not utilizing for support upon D/C  Shower/Bathe Self CARE Score 4   Bathing   Assessed Bath Style Shower   Anticipated D/C Bath Style Shower   Able to [place] No   Able to [employer] Temperature No   Able to Wash/Rinse/Dry (body part) Left Arm;Right Arm;L Upper Leg;R Upper Leg;L Lower Leg/Foot;R Lower Leg/Foot;Chest;Abdomen;Perineal Area; Buttocks   Limitations Noted in Balance; Endurance   Positioning Seated;Standing   Adaptive Equipment Shower Seat;Hand Held Shower   Tub/Shower Transfer   Limitations Noted In Balance; Endurance   Adaptive Equipment Seat with Back   Assessed Shower   Findings CGA side stepping in/out of wet shower environment over simulated 2" lip, utilizing shower wall for support     Upper Body Dressing   Type of Assistance Needed Supervision   Physical Assistance Level No physical assistance   Comment Seated to doff/don [place] shirt   Upper Body Dressing CARE Score 4   Lower Body Dressing   Type of Assistance Needed Incidental touching   Physical Assistance Level No physical assistance   Comment Seated to thread BLE's into under garment and pants  CG/CS in stance unsupported to CM up over hips   Lower Body Dressing CARE Score 4   Putting On/Taking Off Footwear   Type of Assistance Needed Supervision   Physical Assistance Level No physical assistance   Comment Seated performing xleg technique to doff/don B/L socks and shoes   Putting On/Taking Off Footwear CARE Score 4   Lying to Sitting on Side of Bed   Type of Assistance Needed Supervision   Physical Assistance Level No physical assistance   Comment HOB flat   Lying to Sitting on Side of Bed CARE Score 4   Sit to Stand   Type of Assistance Needed Supervision   Physical Assistance Level No physical assistance   Comment CS no AD   Sit to Stand CARE Score 4   Bed-Chair Transfer   Type of Assistance Needed Incidental touching   Physical Assistance Level No physical assistance   Comment CGA no AD   Chair/Bed-to-Chair Transfer CARE Score 4   Toileting Hygiene   Type of Assistance Needed Incidental touching   Physical Assistance Level No physical assistance   Comment CGA in stance unsupported to complete CM and ashley hygiene task  Toileting Hygiene CARE Score 4   Toilet Transfer   Type of Assistance Needed Incidental touching;Verbal cues   Physical Assistance Level No physical assistance   Comment CGA no AD to raised toilet seat with vc to trial w/o use of L side grab bar   Toilet Transfer CARE Score 4   Therapeutic Excerise-Strength   UE Strength Yes   Right Upper Extremity- Strength   R Shoulder Flexion;ABduction; Extension;Horizontal ABduction; External rotation; Internal rotation   R Elbow Elbow flexion;Elbow extension   R Position Standing;Seated   Equipment Dowel  (2#)   R Weight/Reps/Sets 2 x 15   RUE Strength Comment Engaged in UB TE utilizing 3# dowel to perform 2 sets of 15 reps of indicated planes, performing 1 set seated and 1 set in stance  Focus of activity to improve pt's UB strength, as well as overall activity tolerance and fxnl standing balance  Pt tolerated well with rest breaks between each set  No LOB while in stance unsupported and engaging in TE  Left Upper Extremity-Strength   L Shoulder Flexion;ABduction; Extension;Horizontal ABduction; External rotation; Internal rotation   L Elbow Elbow flexion;Elbow extension   L Position Standing  (and seated)   Equipment Dowel  (3#)   L Weights/Reps/Sets 2 x 15   LUE Strength Comment See above   Cognition   Overall Cognitive Status Impaired   Arousal/Participation Alert; Cooperative   Attention Attends with cues to redirect   Orientation Level Oriented X4   Memory Decreased short term memory   Following Commands Follows multistep commands with increased time or repetition   Activity Tolerance   Activity Tolerance Patient tolerated treatment well   Assessment   Treatment Assessment Pt engaged in 90min skilled OT Tx session with focus on ADL performance, ADL transfer's, UB TE, and improving overall activity tolerance  See above for further Tx details  Pt is demonstrating improvement with ADL of bathing and dressing, completing at a CGA level  Pt is performing ADL transfer's, including a wet shower transfer with a simulated lip, at [facility] with no AD  Utilized RW for longer distance mobility to amb from pt's room <> OT gym  Pt initially reporting 9/10 pain in L knee upon beginning Tx session, however, pain decreased to 2/10 pain following shower  Discussed recommendation for pt to purchase a shower seat with back for D/C, as pt was able to identify fatigue following shower, even with utilizing shower seat  Pt to reach out to neighbors to inquire if they could complete an online order for shower seat, however, is agreeable for therapy to order as well   OT plan to follow up with pt in regards to purchasing shower chair with back  Continue POC with focus on dynamic standing balance, implementing BUE HEP, and ADL/IADL engagement w/o use of AD  Prognosis Good   Problem List Decreased strength;Decreased endurance; Impaired balance;Decreased mobility; Decreased coordination;Decreased cognition;Pain   Barriers to Discharge Decreased caregiver support   Plan   Treatment/Interventions ADL retraining;Functional transfer training; Therapeutic exercise; Endurance training   Progress Progressing toward goals   Recommendation   OT Discharge Recommendation   (Pending progress)   Equipment Recommended Shower/Tub chair with back ($)   OT Equipment ordered   (Plan to follow up with pt in regards to purchasing shower chair IND vs Therapy ordering )   OT - OK to Discharge No   OT Therapy Minutes   OT Time In 0930   OT Time Out 1100   OT Total Time (minutes) 90   OT Mode of treatment - Individual (minutes) 90   OT Mode of treatment - Concurrent (minutes) 0   OT Mode of treatment - Group (minutes) 0   OT Mode of treatment - Co-treat (minutes) 0   OT Mode of Treatment - Total time(minutes) 90 minutes   OT Cumulative Minutes 570   Therapy Time missed   Time missed?  No

## 2022-06-09 NOTE — CASE MANAGEMENT
Haresh met with pt and reviewed team meeting update  She is in agreement with d/c on 6/15  She wants to talk to her neighbors who help her about whether [employer] should come to her house for outpt PT and OT  She is not interested in Me[employer] Wheels  Haresh walked past pt's room 5 minutes later and she asked cm to repeat what she had said before not remembering the above conversation  SO haresh wrote it down for her  Following to assist w/ d/c planning needs

## 2022-06-10 PROBLEM — G31.84 MILD COGNITIVE IMPAIRMENT: Status: ACTIVE | Noted: 2022-06-10

## 2022-06-10 PROCEDURE — 97129 THER IVNTJ 1ST 15 MIN: CPT

## 2022-06-10 PROCEDURE — 99232 SBSQ HOSP IP/OBS MODERATE 35: CPT

## 2022-06-10 PROCEDURE — 97530 THERAPEUTIC ACTIVITIES: CPT

## 2022-06-10 PROCEDURE — 99232 SBSQ HOSP IP/OBS MODERATE 35: CPT | Performed by: INTERNAL MEDICINE

## 2022-06-10 PROCEDURE — 97130 THER IVNTJ EA ADDL 15 MIN: CPT

## 2022-06-10 PROCEDURE — 97116 GAIT TRAINING THERAPY: CPT

## 2022-06-10 PROCEDURE — 97110 THERAPEUTIC EXERCISES: CPT

## 2022-06-10 RX ORDER — BISACODYL 10 MG
10 SUPPOSITORY, RECTAL RECTAL ONCE
Status: DISCONTINUED | OUTPATIENT
Start: 2022-06-11 | End: 2022-06-10

## 2022-06-10 RX ADMIN — SENNOSIDES 8.6 MG: 8.6 TABLET, FILM COATED ORAL at 18:07

## 2022-06-10 RX ADMIN — CALCIUM 2 TABLET: 500 TABLET ORAL at 08:19

## 2022-06-10 RX ADMIN — ENOXAPARIN SODIUM 40 MG: 100 INJECTION SUBCUTANEOUS at 08:18

## 2022-06-10 RX ADMIN — FOLIC ACID 1 MG: 1 TABLET ORAL at 08:19

## 2022-06-10 RX ADMIN — POLYETHYLENE GLYCOL 3350 17 G: 17 POWDER, FOR SOLUTION ORAL at 08:19

## 2022-06-10 RX ADMIN — CLOPIDOGREL BISULFATE 75 MG: 75 TABLET ORAL at 21:27

## 2022-06-10 RX ADMIN — DOCUSATE SODIUM 100 MG: 100 CAPSULE, LIQUID FILLED ORAL at 18:07

## 2022-06-10 RX ADMIN — DOCUSATE SODIUM 100 MG: 100 CAPSULE, LIQUID FILLED ORAL at 08:19

## 2022-06-10 RX ADMIN — FOLIC ACID 1 MG: 1 TABLET ORAL at 18:07

## 2022-06-10 RX ADMIN — SENNOSIDES 8.6 MG: 8.6 TABLET, FILM COATED ORAL at 08:19

## 2022-06-10 RX ADMIN — DILTIAZEM HYDROCHLORIDE 240 MG: 120 CAPSULE, COATED, EXTENDED RELEASE ORAL at 08:19

## 2022-06-10 RX ADMIN — BISACODYL 10 MG: 5 TABLET, COATED ORAL at 12:06

## 2022-06-10 RX ADMIN — Medication 2000 UNITS: at 08:19

## 2022-06-10 RX ADMIN — FOLIC ACID 1 MG: 1 TABLET ORAL at 21:26

## 2022-06-10 RX ADMIN — LATANOPROST 1 DROP: 50 SOLUTION OPHTHALMIC at 21:26

## 2022-06-10 NOTE — OCCUPATIONAL THERAPY NOTE
Pt gave permission to therapist to contact neighbor Sharon Bailey instead ([phone])  Therapist contacted to give update and recommendations for assistance for driving pt to appt  However, no answer, left CARINA Umaña MS, OTR/L

## 2022-06-10 NOTE — PLAN OF CARE
Problem: INFECTION - ADULT  Goal: Absence or prevention of progression during hospitalization  Description: INTERVENTIONS:  - Assess and monitor for signs and symptoms of infection  - Monitor lab/diagnostic results  - Monitor all insertion sites, i e  indwelling lines, tubes, and drains  - Monitor endotracheal if appropriate and nasal secretions for changes in amount and color  - [place] appropriate cooling/warming therapies per order  - Administer medications as ordered  - Instruct and encourage patient and family to use good hand hygiene technique  - Identify and instruct in appropriate isolation precautions for identified infection/condition  Outcome: Progressing     Problem: INFECTION - ADULT  Goal: Absence of fever/infection during neutropenic period  Description: INTERVENTIONS:  - Monitor WBC    Outcome: Completed

## 2022-06-10 NOTE — PLAN OF CARE
Problem: NEUROSENSORY - ADULT  Goal: Achieves maximal functionality and self care  Description: INTERVENTIONS  - Monitor swallowing and airway patency with patient fatigue and changes in neurological status  - Encourage and assist patient to increase activity and self care     - Encourage visually impaired, hearing impaired and aphasic patients to use assistive/communication devices  Outcome: Progressing     Problem: MUSCULOSKELETAL - ADULT  Goal: Maintain or return mobility to safest level of function  Description: INTERVENTIONS:  - Assess patient's ability to carry out ADLs; assess patient's baseline for ADL function and identify physical deficits which impact ability to perform ADLs (bathing, care of mouth/teeth, toileting, grooming, dressing, etc )  - Assess/evaluate cause of self-care deficits   - Assess range of motion  - Assess patient's mobility  - Assess patient's need for assistive devices and provide as appropriate  - Encourage maximum independence but intervene and supervise when necessary  - Involve family in performance of ADLs  - Assess for home care needs following discharge   - Consider OT consult to assist with ADL evaluation and planning for discharge  - Provide patient education as appropriate  Outcome: Progressing

## 2022-06-10 NOTE — PROGRESS NOTES
[facility]  Progress Note - Jean Carlos Cordon 1945, 64 y o  female MRN: [id number]  Unit/Bed#: -44 Encounter: [id number]  Primary Care Provider: Lambert Freeman DO   Date and time admitted to hospital: 6/2/2022  2:47 PM    Osteoporosis  Assessment & Plan  DXA scan on 5/13/22 showed osteoporosis  Started on Vitamin D 2000u daily and calcium 1000mg daily  Vitamin D level 31 3 on 6/6  Follow-up with PCP or Rheumatology at discharge  Fracture, toe  Assessment & Plan  L foot XR: acute appearing minimally displaced oblique fracture involving medial base of the proximal phalanx of the 1st digit  Podiatry consulted in acute setting - recommended WBAT and surgical shoes to both feet with ambulation  Follow-up with Podiatry as outpatient  Verified surgical shoes with Dr Filomena Beard - should continue for 3 weeks after XR on 5/26  Repeat XR in 3 weeks (6/16)  Weakness of both lower extremities  Assessment & Plan  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  CK 6976 on admission - currently 39  Simvastatin was stopped and currently on hold  Per Rheumatology - presentation not consistent with inflammatory myositis  MRI L spine showed severe facet degenerative change at L4-L5 resulting in moderate central stenosis, moderate R foramina narrowing, and moderate to severe R lateral recess stenosis  JONATHON on 5/31 was WNL  Venous duplex on 5/26 negative for DVTs  Myositis panel pending  Primary team following  PT/OT  Elevated liver enzymes  Assessment & Plan  Likely due to recent rhabdomyolysis  AST 32 and ALT 51 (from 63 and 110 respectively)  Continue to trend with routine CMP  HLD (hyperlipidemia)  Assessment & Plan  Last lipid panel on 3/31/22: Cholesterol 169, Triglycerides 69, HDL 87, and LDL 68  Hold statin at this time due to recent rhabdomyolysis  Essential hypertension  Assessment & Plan  Continue home regimen: Cardizem CD 240mg daily    Monitor BP with routine VS   Follow-up with PCP as outpatient  Psoriatic arthritis ([facility] [address] )  Assessment & Plan  Home regimen: methotrexate 17 5mg on   Per Rheumatology - continue to hold until outpatient follow-up with Rheumatologist (Dr Sekou Garcia)  History of TIA (transient ischemic attack)  Assessment & Plan  Hx of TIA without any deficits  Continue Plavix 75mg daily  Statin on hold  * Rhabdomyolysis  Assessment & Plan  Likely due to statin  Simvastatin currently on hold  CK 6976 on admission - currently 39  Improved with IV fluids in acute setting  Continue to trend with routine labs  Per Rheumatology - presentation not consistent with inflammatory myositis  Rheumatology recommended holding methotrexate until further follow-up with outpatient Rheumatologist (Dr Sekou Garcia)  Ensure adequate hydration  Continue PT/OT  VTE Pharmacologic Prophylaxis:   Pharmacologic: Enoxaparin (Lovenox)  Mechanical VTE Prophylaxis in Place: Yes - sequential compression devices  Current Length of Stay: 8 day(s)    Current Patient Status: Inpatient Rehab     Discharge Plan: As per primary team     Code Status: Level 1 - Full Code    Subjective:   Pt examined while pt lying in bed in pt room  Currently denies any pain  Slept well last night  Feels that everything is going well with her therapies  Denies any dizziness, SOB, palpitations, or CP  Does occasionally has lightheadedness when she gets OOB too quickly but this is similar to her baseline  LBM was on , denies any abdominal pain  States that this is normal for her  Plans to increase bowel regimen today  Has no other concerns or complaints at this time  Objective:     Vitals:   Temp (24hrs), Av 2 °F (36 8 °C), Min:98 1 °F (36 7 °C), Max:98 3 °F (36 8 °C)    Temp:  [98 1 °F (36 7 °C)-98 3 °F (36 8 °C)] 98 1 °F (36 7 °C)  HR:  [55-58] 55  Resp:  [17-20] 17  BP: (118-149)/(58-67) 124/60  SpO2:  [96 %] 96 %  Body mass index is 18 53 kg/m²       Review of Systems   Constitutional: Negative for appetite change, chills, fatigue and fever  HENT: Negative for trouble swallowing  Eyes: Negative for visual disturbance  Respiratory: Negative for cough, shortness of breath, wheezing and stridor  Cardiovascular: Negative for chest pain, palpitations and leg swelling  Gastrointestinal: Positive for constipation  Negative for abdominal distention, abdominal pain, diarrhea, nausea and vomiting  LBM 6/6, feels that this is similar to her baseline  Denies abdominal pain  Bowel regimen increased today  Genitourinary: Negative for difficulty urinating  Musculoskeletal: Negative for arthralgias and back pain  Neurological: Positive for light-headedness (occasional lightheadedness when getting up too quickly, resolves with taking her time)  Negative for dizziness, weakness, numbness and headaches  Psychiatric/Behavioral: Negative for sleep disturbance  All other systems reviewed and are negative  Input and Output Summary (last 24 hours): Intake/Output Summary (Last 24 hours) at 6/10/2022 0942  Last data filed at 6/9/2022 1700  Gross per 24 hour   Intake 600 ml   Output --   Net 600 ml       Physical Exam:     Physical Exam  Vitals and nursing note reviewed  Constitutional:       Appearance: Normal appearance  HENT:      Head: Normocephalic and atraumatic  Cardiovascular:      Rate and Rhythm: Normal rate and regular rhythm  Pulses: Normal pulses  Heart sounds: Normal heart sounds  No murmur heard  No friction rub  Pulmonary:      Effort: Pulmonary effort is normal  No respiratory distress  Breath sounds: Normal breath sounds  No wheezing or rhonchi  Abdominal:      General: Abdomen is flat  Bowel sounds are normal  There is no distension  Palpations: Abdomen is soft  Tenderness: There is no abdominal tenderness  Musculoskeletal:      Cervical back: Normal range of motion and neck supple        Right lower leg: No edema  Left lower leg: No edema  Skin:     General: Skin is warm and dry  Neurological:      Mental Status: She is alert and oriented to person, place, and time     Psychiatric:         Mood and Affect: Mood normal          Behavior: Behavior normal          Additional Data:     Labs:    Results from last 7 days   Lab Units 06/09/22  0451   WBC Thousand/uL 5 85   HEMOGLOBIN g/dL 13 6   HEMATOCRIT % 41 8   PLATELETS Thousands/uL 231   NEUTROS PCT % 47   LYMPHS PCT % 37   MONOS PCT % 13*   EOS PCT % 2     Results from last 7 days   Lab Units 06/09/22  0451 06/06/22  0601   SODIUM mmol/L 136* 137   POTASSIUM mmol/L 3 4* 3 7   CHLORIDE mmol/L 103 101   CO2 mmol/L 29 31*   BUN mg/dL 10 9   CREATININE mg/dL 0 58* 0 55*   ANION GAP mmol/L 4* 5   CALCIUM mg/dL 8 5 8 6   ALBUMIN g/dL  --  3 1   TOTAL BILIRUBIN mg/dL  --  0 60   ALK PHOS U/L  --  53   ALT U/L  --  51*   AST U/L  --  32   GLUCOSE RANDOM mg/dL 87 91                       Labs reviewed    Imaging:    Imaging reviewed    Recent Cultures (last 7 days):           Last 24 Hours Medication List:   Current Facility-Administered Medications   Medication Dose Route Frequency Provider Last Rate    acetaminophen  650 mg Oral Q6H PRN Marbella Willingham MD      aluminum-magnesium hydroxide-simethicone  30 mL Oral Q6H PRN Marbella Willingham MD      bisacodyl  10 mg Oral Once Marbella Willingham MD      bisacodyl  10 mg Rectal Daily PRN Marbella Willingham MD      [START ON 6/11/2022] bisacodyl  10 mg Rectal Once Marbella Willingham MD      calcium carbonate  2 tablet Oral Daily With Breakfast ANNE Hess      cholecalciferol  2,000 Units Oral Daily ANNE Hess      clopidogrel  75 mg Oral HS Marbella Willingham MD      Diclofenac Sodium  2 g Topical 4x Daily PRN Marbella Willingham MD      Diclofenac Sodium  2 g Topical 4x Daily Marbella Willingham MD      diltiazem  240 mg Oral Daily Marbella Willingham MD      docusate sodium  100 mg Oral BID MD Landon Vicente enoxaparin  40 mg Subcutaneous Q24H [facility] ANNE Hess      folic acid  1 mg Oral TID Marbella Willingham MD      latanoprost  1 drop Both Eyes HS Marbella Willingham MD      polyethylene glycol  17 g Oral Daily PRN Marbella Willingham MD      polyethylene glycol  17 g Oral Daily Marbella Willingham MD      senna  1 tablet Oral BID Marbella Willingham MD          M*Modal software was used to dictate this note  It may contain errors with dictating incorrect words or incorrect spelling  Please contact the provider directly with any questions

## 2022-06-10 NOTE — ASSESSMENT & PLAN NOTE
Likely due to statin  Simvastatin currently on hold  CK 6976 on admission - currently 39  Improved with IV fluids in acute setting  Continue to trend with routine labs  Per Rheumatology - presentation not consistent with inflammatory myositis  Rheumatology recommended holding methotrexate until further follow-up with outpatient Rheumatologist (Dr Neftali iRvera)  Ensure adequate hydration  Continue PT/OT

## 2022-06-10 NOTE — ASSESSMENT & PLAN NOTE
L foot XR: acute appearing minimally displaced oblique fracture involving medial base of the proximal phalanx of the 1st digit  Podiatry consulted in acute setting - recommended WBAT and surgical shoes to both feet with ambulation  Follow-up with Podiatry as outpatient  Verified surgical shoes with Dr Willem Rodney - should continue for 3 weeks after XR on 5/26  Repeat XR in 3 weeks (6/16)

## 2022-06-10 NOTE — OCCUPATIONAL THERAPY NOTE
Modesta Leyva returned phone call  Reports that he has only been helping pt a few days prior to hospital admission  Reports him helping started recently with her  passing away  Reports pt has had frequent falls at home PTA as she did call him to assist from floor  Hermann Pate reports that he can continue to check up on her, assist with groceries and  her to appointments  However, cannot do it all the time as he does work  Reports He will be out of the area starting June 20th for work and is not sure for how long  Therapist contacted CM to begin Reatha Kehr application  To assist with rides  Reports he can continue to assist with cat care  Educ neighbor that pt will continue to receive [facility] therapy  Educ neighbor that recommend use of shower chair and life alert as pt is alone which neighbor was in agreement  Therapist emailed shower chair and information on emergency safety systems to [email or web address]  Hermann Pate reporting he will purchase shower chair for pt   In agreement with DC on Wed 6/15 DC around 1230 and he can pickup      -Justin Fernandez MS, OTR/L

## 2022-06-10 NOTE — PROGRESS NOTES
06/10/22 0830   Pain Assessment   Pain Score No Pain   Restrictions/Precautions   Precautions Cognitive; Fall Risk   Weight Bearing Restrictions Yes   LLE Weight Bearing Per Order WBAT   Braces or Orthoses   (bilateral surgical shoes)   Lifestyle   Autonomy "I can't wait to go home and see my cat Zuleika"   Putting On/Taking Off Footwear   Type of Assistance Needed Supervision   Comment Seated EOB, pt able to don B/L surgical shoes  Putting On/Taking Off Footwear CARE Score 4   Lying to Sitting on Side of Bed   Type of Assistance Needed Supervision   Comment HOB flat pt able to come to EOB lying to sit at supervision level   Lying to Sitting on Side of Bed CARE Score 4   Sit to Stand   Type of Assistance Needed Supervision   Comment S w/ RW   Sit to Stand CARE Score 4   Bed-Chair Transfer   Type of Assistance Needed Supervision   Comment S w/ no AD   Chair/Bed-to-Chair Transfer CARE Score 4   Transfer Bed/Chair/Wheelchair   Limitations Noted In Balance; Endurance;UE Strength;LE Strength   Adaptive Equipment Roller Walker   Toileting Hygiene   Type of Assistance Needed Supervision   Comment S in stance with RW, pt able to perform toileting hygiene and CM   Toileting Hygiene CARE Score 4   Toileting   Able to [address] down yes, up yes  Able to Manage Clothing Closures Yes   Manage Hygiene Bladder   Limitations Noted In UE Strength;LE Strength;Balance;ROM   Toilet Transfer   Type of Assistance Needed Supervision   Comment S w/ RW   Toilet Transfer CARE Score 4   Toilet Transfer   Surface Assessed Standard Toilet   Transfer Technique Standard   Limitations Noted In Balance; Endurance;UE Strength;LE Strength   Adaptive Equipment Walker   Meal Prep   Meal Prep Level Other (Comment)  (no AD)   Meal Prep Level of Assistance Distant supervision   Meal Preparation Engaged in light meal prep and kitchen mobility with focus on ambulating around the kitchen without an AD to inc fxl performance    Pt tolerated activity well and able to ambulate around the kitchen with no LOB observed  Pt retrieved cup from [facility] and poured a cup of coffee  She also retrieved a plate from the [place] cabinet and transported both the plate and cup of coffee to the kitchen table where the toaster is located  Pt then retrieved bread from freezer and butter from fridge and transported to kitchen table  In stance pt removed a slice of bread from the toaster and then placed in toaster  Pt sat at kitchen table while bread toasted  Pt then removed toast when it was ready and buttered it  Kitchen Mobility   Kitchen-Mobility Level Walker   Kitchen Activity Retrieve items;Transport items; Other (Comment)   Kitchen Mobility Comments DS w/out AD  Please see meal prep for more details  Light Housekeeping   Light Housekeeping Level Other (Comment)   Light Housekeeping Level of Assistance Minimum assistance   Light Housekeeping Simulated home laundry set up with front loading washer and dryer side by side  Pt demonstrated G carryover of only carrying light loads of laundry to washer and dryer  Pt reports having a stool by her washer and dryer where she sits to load the washer and dryer  Therapist demonstrated how pt will carry light load of laundry to washer and dryer, put basket on raised platform and then sit to do laundry  When pt was walking carrying laundry basket with light load, she lost her balance turning to the left to go in front of the chair  Min A to guide pt down to chair  Therapist educ pt on putting laundry basket down first before going to sit down  Pt replied that she just kicks her laundry basket instead of carrying it and that is probably what she will do when she returns home  Pt also reporting that she kicks the basket with clean laundry in it to her computer room where she folds it   Pt educ on risk of kicking basket and recommended getting a cart on wheels to put her laundry in to safely move to the washer and dryer and back to her room  Pt unreceptive, reporting that it will not work on the carpet  Therapist then reinterated carrying small loads of laundry and having a chair or something elevated to laundry on before turning to sit down  Therapeutic Excerise-Strength   UE Strength Yes   Right Upper Extremity- Strength   R Shoulder Flexion;ABduction; Extension;Horizontal ABduction; External rotation; Internal rotation   R Elbow Elbow flexion;Elbow extension   R Position Seated   Equipment Theraband  (yellow)   R Weight/Reps/Sets 2 x 15   RUE Strength Comment Implemented UE HEP with yellow theraband to inc fxl performance in ADLs and fxl transfers  Pt tolerated well with rest breaks between each set  Therapist demonstrated how to perform exercises  Pt completed 2 x 15 doing shoulder flexion, extension, abduction, and horizontal abduction, as well as, elbow flexion and extension with yellow theraband  Left Upper Extremity-Strength   LUE Strength Comment see above   Cognition   Overall Cognitive Status Impaired   Arousal/Participation Alert; Cooperative   Attention Attends with cues to redirect   Orientation Level Oriented X4   Memory Decreased short term memory   Following Commands Follows multistep commands with increased time or repetition   Comments Administered MoCA 8 1 scoring 23/30 indicating a mild cognitive impairement  Results can be found below  Activity Tolerance   Activity Tolerance Patient tolerated treatment well   Assessment   Treatment Assessment Pt engaged in 90 min skilled OT services with focus on IADLs including meal prep, kitchen mobility, and laundry without using AD  Therapist also provided UE HEP to inc strength for improved fxl performance in ADLs, IADLs, and fxl transfers  Therapist adminstered MoCA 8 1 with pt scoring 23/30 indicating a mild cognitive impairment  Results can be found below  Pt tolerated session well    Pt demonstrated LOB while carrying light load of laundry in basket; however, there was no LOB while engaging in light meal prep and kitchen mobility  Cont skilled OT services with focus on balance, IADLs with no AD, standing tolerance, and UE strengthening for inc fxl performance and decrease risk of readmission  Prognosis Good   Problem List Decreased strength;Decreased endurance; Impaired balance;Decreased mobility; Decreased coordination;Decreased cognition;Pain   Barriers to Discharge Decreased caregiver support; Inaccessible home environment   Barriers to Discharge Comments lives alone, BISI   Plan   Treatment/Interventions ADL retraining;Functional transfer training; Therapeutic exercise; Endurance training;Patient/family training;Equipment eval/education; Compensatory technique education   Progress Progressing toward goals   Recommendation   OT Discharge Recommendation   (pending progress)   Equipment Recommended Shower/Tub chair with back ($)   OT - OK to Discharge No   OT Therapy Minutes   OT Time In 0830   OT Time Out 1000   OT Total Time (minutes) 90   OT Mode of treatment - Individual (minutes) 90   OT Mode of treatment - Concurrent (minutes) 0   OT Mode of treatment - Group (minutes) 0   OT Mode of treatment - Co-treat (minutes) 0   OT Mode of Treatment - Total time(minutes) 90 minutes   OT Cumulative Minutes 660   Therapy Time missed   Time missed? No     Pt completed Ball Cognitive Assessment (MOCA) version 8 1  Pt scored overall 23 / 30  indicating a mild cognitive deficit  Visuospatial/executive: 3/5   Naming: 3/3   Memory:    (worth no points)   Attention:  6/ 6   Language: 2 / 3   Abstraction: 2 / 2   Delayed recall: 1 / 5   Orientation: 5 / 6   +1 point for at least 12 yr education      Total score 23/30, indicating a mild cognitive deficit

## 2022-06-10 NOTE — PROGRESS NOTES
06/10/22 1315   Pain Assessment   Pain Assessment Tool 0-10   Pain Score No Pain   Restrictions/Precautions   Precautions Cognitive; Fall Risk   LLE Weight Bearing Per Order WBAT   Braces or Orthoses   (bilat post op shoes)   Sit to Stand   Type of Assistance Needed Supervision   Sit to Stand CARE Score 4   Bed-Chair Transfer   Type of Assistance Needed Supervision   Comment no device   Chair/Bed-to-Chair Transfer CARE Score 4   Walk 10 Feet   Type of Assistance Needed Supervision   Comment close S no device   Walk 10 Feet CARE Score 4   Walk 50 Feet with Two Turns   Type of Assistance Needed Supervision   Comment close s no device   Walk 50 Feet with Two Turns CARE Score 4   Walk 150 Feet   Type of Assistance Needed Supervision   Comment CS no device   Walk 150 Feet CARE Score 4   Walking 10 Feet on Uneven Surfaces   Type of Assistance Needed Supervision   Comment CS without device -   Walking 10 Feet on Uneven Surfaces CARE Score 4   Ambulation   Does the patient walk? 2  Yes   Primary Mode of Locomotion Prior to Admission Walk   Distance Walked (feet) 150 ft  (225)   Findings Challenged slightly further distance with no device,  pt showing much progress with endurance and LEs less fatigued,  reports 12/ 20 on RPE   Wheel 50 Feet with Two Turns   Reason if not Attempted Activity not applicable   Wheel 50 Feet with Two Turns CARE Score 9   Wheel 150 Feet   Reason if not Attempted Activity not applicable   Wheel 692 Feet CARE Score 9   Wheelchair mobility   Does the patient use a wheelchair? 0   No   Curb or Single Stair   Style negotiated Single stair   Type of Assistance Needed Incidental touching   Comment CG up/down 6inch step with grab bar (used handle on lite gait)  still poor eccentric control with step down but didnt need much assistance,   1 Step (Curb) CARE Score 4   4 Steps   Type of Assistance Needed Incidental touching   Comment CG for slight post bias   4 Steps CARE Score 4   12 Steps   Reason if not Attempted Activity not applicable   12 Steps CARE Score 9   Stairs   Type Stairs   # of Steps 8   Assist Devices Bilateral Rail   Findings performed for strengthening,  still lacks control with descent   Picking Up Object   Type of Assistance Needed Supervision   Comment close s  no reacher   Picking Up Object CARE Score 4   Therapeutic Interventions   Strengthening seated Hip abd and hip ext MRE mod resisted 10x2   Balance amb unsupported stepping over canes (min A),  unsupported toe tap on 2inch box (min A for post LOB) ,  static stance on foam mat (significant post leaning) min A   Assessment   Treatment Assessment 90 min skilled PT session focused on amb without device which pt is showing progress with distances and less LOB noted  Pt continues to be weak in glute max and abductors continue strengthening may benefit from mat program   Continue stair trainig for strengthening,  and balance to reduce fall risk and pt needs to reach independent level due to living alone  Barriers to Discharge Decreased caregiver support; Inaccessible home environment   Plan   Progress Progressing toward goals   Recommendation   PT Discharge Recommendation Home with home health rehabilitation   PT Therapy Minutes   PT Time In 1315   PT Time Out 1445   PT Total Time (minutes) 90   PT Mode of treatment - Individual (minutes) 90   PT Mode of treatment - Concurrent (minutes) 0   PT Mode of treatment - Group (minutes) 0   PT Mode of treatment - Co-treat (minutes) 0   PT Mode of Treatment - Total time(minutes) 90 minutes   PT Cumulative Minutes 615   Therapy Time missed   Time missed?  No

## 2022-06-10 NOTE — ASSESSMENT & PLAN NOTE
Home regimen: methotrexate 17 5mg on Mondays  Per Rheumatology - continue to hold until outpatient follow-up with Rheumatologist (Dr George Sims)

## 2022-06-11 PROCEDURE — 97530 THERAPEUTIC ACTIVITIES: CPT

## 2022-06-11 PROCEDURE — 97116 GAIT TRAINING THERAPY: CPT

## 2022-06-11 RX ADMIN — FOLIC ACID 1 MG: 1 TABLET ORAL at 17:15

## 2022-06-11 RX ADMIN — DOCUSATE SODIUM 100 MG: 100 CAPSULE, LIQUID FILLED ORAL at 09:38

## 2022-06-11 RX ADMIN — ENOXAPARIN SODIUM 40 MG: 100 INJECTION SUBCUTANEOUS at 09:38

## 2022-06-11 RX ADMIN — Medication 2000 UNITS: at 09:38

## 2022-06-11 RX ADMIN — FOLIC ACID 1 MG: 1 TABLET ORAL at 21:22

## 2022-06-11 RX ADMIN — SENNOSIDES 8.6 MG: 8.6 TABLET, FILM COATED ORAL at 09:38

## 2022-06-11 RX ADMIN — LATANOPROST 1 DROP: 50 SOLUTION OPHTHALMIC at 21:22

## 2022-06-11 RX ADMIN — SENNOSIDES 8.6 MG: 8.6 TABLET, FILM COATED ORAL at 17:15

## 2022-06-11 RX ADMIN — DILTIAZEM HYDROCHLORIDE 240 MG: 120 CAPSULE, COATED, EXTENDED RELEASE ORAL at 09:38

## 2022-06-11 RX ADMIN — DOCUSATE SODIUM 100 MG: 100 CAPSULE, LIQUID FILLED ORAL at 17:15

## 2022-06-11 RX ADMIN — FOLIC ACID 1 MG: 1 TABLET ORAL at 09:38

## 2022-06-11 RX ADMIN — CLOPIDOGREL BISULFATE 75 MG: 75 TABLET ORAL at 21:22

## 2022-06-11 RX ADMIN — CALCIUM 2 TABLET: 500 TABLET ORAL at 09:38

## 2022-06-11 NOTE — PROGRESS NOTES
Patient today stated that after breakfast she had her "phelgm issue " Patient states that in some mornings she brings up phelgm  During medication pass today patient seem to experience same episode of bringing up mucous/saliva after she swallowed them  At times she appeared to be gagging on the mucous  Lungs are clear, patient stated that she did not have any difficulty with swallowing that this was something "that just happens some mornings " Communicated with MD and placed order for ST eval  No other incidences noted after the morning             Mindi CLAIREN, RN, [facility]

## 2022-06-11 NOTE — PROGRESS NOTES
06/11/22 1030   Pain Assessment   Pain Assessment Tool 0-10   Pain Score No Pain   Restrictions/Precautions   Precautions Fall Risk;Cognitive;Supervision on toilet/commode   Weight Bearing Restrictions Yes   LLE Weight Bearing Per Order WBAT   ROM Restrictions No   Braces or Orthoses   (B surgical shoes)   Cognition   Arousal/Participation Cooperative   Subjective   Subjective Pt reports feeling well, just using the bathroom with nursing  Pt agreeable to paticipate in PT  Sit to Lying   Type of Assistance Needed Supervision   Sit to Lying CARE Score 4   Lying to Sitting on Side of Bed   Type of Assistance Needed Supervision   Lying to Sitting on Side of Bed CARE Score 4   Sit to Stand   Type of Assistance Needed Supervision   Sit to Stand CARE Score 4   Bed-Chair Transfer   Type of Assistance Needed Supervision   Chair/Bed-to-Chair Transfer CARE Score 4   Car Transfer   Reason if not Attempted Environmental limitations   Car Transfer CARE Score 10   Walk 10 Feet   Type of Assistance Needed Supervision   Walk 10 Feet CARE Score 4   Walk 50 Feet with Two Turns   Type of Assistance Needed Supervision   Walk 50 Feet with Two Turns CARE Score 4   Walk 150 Feet   Type of Assistance Needed Supervision   Walk 150 Feet CARE Score 4   Walking 10 Feet on Uneven Surfaces   Type of Assistance Needed Supervision   Comment 10ftx4 without AD with SBA (ramp)   Walking 10 Feet on Uneven Surfaces CARE Score 4   Ambulation   Does the patient walk? 2  Yes   Primary Mode of Locomotion Prior to Admission Walk   Distance Walked (feet) 380 ft  (290, 210)   Assist Device   (no AD  Initially used RW to ambulate from room > PT gym 150ft)   Gait Pattern Decreased foot clearance; Lateral deviation; Step through; Improper weight shift; Slow Holly  (1 episode of L foot drag)   Limitations Noted In Balance; Endurance; Safety;Speed;Strength   Walk Assist Level Close Supervision   Findings Pt reporting RPE of 3 5/10 at max with long distance ambulation  Pt had occasional lateral sway but no major LOB   Wheelchair mobility   Does the patient use a wheelchair? 0  No   Curb or Single Stair   Style negotiated Single stair   Type of Assistance Needed Incidental touching   1 Step (Curb) CARE Score 4   4 Steps   Type of Assistance Needed Incidental touching   4 Steps CARE Score 4   Stairs   Type Stairs   # of Steps 8   Assist Devices Single Rail;Bilateral Rail   Findings B HR with non reciprocal pattern x4, unilateral HR with non reciprocal pattern x4  Good control noted with B HR, 1 slight posterior LOB with descending with unilateral HR   Picking Up Object   Type of Assistance Needed Supervision   Comment SBA without reacher x2, difficulty with grasping small object, no LOB   Picking Up Object CARE Score 4   Therapeutic Interventions   Strengthening Standing at RW: hip abd and hip ext 10x B with cues for appropriate muscle engagement and postural alignment to strengthen hip abductors and hip extensors   Assessment   Treatment Assessment Pt demonstrated improved functional capacity with long distance ambulation up to 390ft with no major LOB, occasional lateral deviations noted  Instructed pt on standing LE TE with B UE support to improve hip abductor and extensor strengthening  Pt demonstrating adequate eccentric control when negotiating stairs with B HR but posterior LOB when descending with unilateral HR  Pt progressing as expected, preparing for D/C home with [facility] 6/15  PT Barriers   Physical Impairment Decreased strength;Decreased endurance; Impaired balance;Decreased mobility   Functional Limitation Standing;Transfers; Walking   Plan   Treatment/Interventions Functional transfer training;LE strengthening/ROM; Elevations; Therapeutic exercise; Endurance training;Gait training   Progress Progressing toward goals   Recommendation   PT Discharge Recommendation Home with home health rehabilitation   PT Therapy Minutes   PT Time In 1030   PT Time Out 1130 PT Total Time (minutes) 60   PT Mode of treatment - Individual (minutes) 60   PT Mode of treatment - Concurrent (minutes) 0   PT Mode of treatment - Group (minutes) 0   PT Mode of treatment - Co-treat (minutes) 0   PT Mode of Treatment - Total time(minutes) 60 minutes   PT Cumulative Minutes 675   Therapy Time missed   Time missed?  No

## 2022-06-11 NOTE — PROGRESS NOTES
Physical Medicine and Rehabilitation Progress Note  Barbara Mayers 64 y o  female MRN: [id number]  Unit/Bed#: [facility] 266-01 Encounter: [id number]      Assessment & Plan:     Decline in ADLs and mobility: Functional assessment - stable        FIM  Care Score  Admit Score Recent Score    Total assist  1-100% or 2p    Tot     (Former TA) 2-76-99%  Sub     Max assist 2-51-75%  Sub     Mod assist 3- 26-50%  Par     Min assist 3- 25% or < Par To hygiene, bathing, dressing  Bathing, LBD    CG assist 4  TA  To hygiene    Supervision 4 Sup     Set-up  5 NORIEGA     Mod-I/Indep 6 MI      Transfers  Min assist  CGA assist     Ambulation   150 ft CGA 50 ft CG assist     Stairs   Mod assist 4 steps  4 steps CGA      Goal: Mod indep  Major barriers:  Weakness, lack of support at home  Dispo & [facility]: Home with [facility] Wed with HH PT, OT       Weakness of both lower extremities  Assessment & Plan  Continues to improve   - HMG-CoA reductase and MyoMarker 3 still pending - OP follow-up rheum   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  Skilled therapies as outlined  Optimal nutrition and medical mgmt   Fall precautions     Impaired mobility and ADLs  Assessment & Plan  Functioning improving   Multifactorial: Rhabdo/statin-induced myopathy, foot fracture, deconditioning   - Optimal management of each as listed -   - Continue acute comprehensive interdisciplinary inpatient rehabilitation to include intensive skilled therapies (PT, OT +/- ST) as outlined with oversight and management by rehabilitation physician as well as inpatient rehab level nursing, case management and weekly interdisciplinary team meetings         * Rhabdomyolysis  Assessment & Plan  Improving   CK max almost 7000 > trended down and now wnl 6/5   Believed to be 2/2 statin induced - hold statin  Hold MTX per rheum  OP follow-up with PCP and rheum    Fracture, toe  Assessment & Plan  Denies pain   L 1st prox phalanx fx with minimal displacement  - WABT with sx shoes bilaterally - Repeat XR 6/16/22  - OP podiatry follow-up     Left knee pain  Assessment & Plan  Somewhat better today  Patient reports chronic bilateral knee and multi-joint pain from RA - recent increased L knee pain since fall PTA  - Some TTP medial jt line otherwise exam largely unremarkable - likely OA/RA +/- meniscus  - No x-rays in computer - if does not improve or worsens obtain   - Avoid over activity  - Voltaren gel QID  - APAP PRN  - Monitor     At risk for venous thromboembolism (VTE)  Assessment & Plan  SCDs, ambulation, and lovenox     Constipation  Assessment & Plan  Reoccurrence   - Daily miralax for now  - Colace/Senna BID  - Dulcolax supp if no BM by later   - PRN bowel regimen (Miralax PRN/Dulcolax supp PRN)  - monitor for signs and symptoms of obstruction/ileus > not currently; hold stimulant lax if occurs  - Limiting constipating medications if possible  - Ensure adequate hydration       Hypokalemia  Assessment & Plan  K 3 4 on 6/9  IM consulted, monitoring, repletion, and overall management at their discretion during ARC course      Osteoporosis  Assessment & Plan  D3 2000 units qday and calcium   Vit D level 31    Elevated liver enzymes  Assessment & Plan  Possibly 2/2 rhabdo  Resolving - only ALT still elevated but now down to 51 on 6/6    HLD (hyperlipidemia)  Assessment & Plan  Statin on hold   Per IM "Last lipid panel on 3/31/22: Cholesterol 169, Triglycerides 69, HDL 87, and LDL 68"  OP PCP follow-up     Essential hypertension  Assessment & Plan  Internal medicine consulted and co-management with their service  Monitor vitals with and without activity; monitor for orthostasis  Monitor hemoglobin, electrolytes, kidney function, hydration status   Current meds: Cardizem       Psoriatic arthritis ([facility] [address] )  Assessment & Plan  OP follow-up with rheum Dr Bob Kang  - Weekly MTX on hold for now per prior recs  - Monitor for flare     History of TIA (transient ischemic attack)  Assessment & Plan  Secondary stroke prevention  - Antithrombotic: plavix  - Holding Statin with recent possible statin induced myopathy/rhabdo  - Optimal management of blood pressure       Other Medical Issues:   None    Follow-up providers and other issues to be followed up after discharge  · PCP  · Rheum    CODE: Level 1: Full Code    Restrictions include: Fall precautions    Objective:     Allergies per EMR  Diagnostic Studies: Reviewed, no new imaging  No orders to display     See above as well    Laboratory: Labs reviewed  Results from last 7 days   Lab Units 06/09/22  0451 06/06/22  0602   HEMOGLOBIN g/dL 13 6 13 0   HEMATOCRIT % 41 8 39 6   WBC Thousand/uL 5 85 8 13     Results from last 7 days   Lab Units 06/09/22  0451 06/06/22  0601   BUN mg/dL 10 9   SODIUM mmol/L 136* 137   POTASSIUM mmol/L 3 4* 3 7   CHLORIDE mmol/L 103 101   CREATININE mg/dL 0 58* 0 55*   AST U/L  --  32   ALT U/L  --  51*            Drug regimen reviewed, all potential adverse effects identified and addressed:    Scheduled Meds:  Current Facility-Administered Medications   Medication Dose Route Frequency Provider Last Rate    acetaminophen  650 mg Oral Q6H PRN Shira Richard MD      aluminum-magnesium hydroxide-simethicone  30 mL Oral Q6H PRN Shira Richard MD      bisacodyl  10 mg Rectal Daily PRN Shira Richard MD      [START ON 6/11/2022] bisacodyl  10 mg Rectal Once Shira Richard MD      calcium carbonate  2 tablet Oral Daily With Breakfast ANNE Hilton      cholecalciferol  2,000 Units Oral Daily ANNE Hilton      clopidogrel  75 mg Oral HS Shira Richard MD      Diclofenac Sodium  2 g Topical 4x Daily PRN Shira Richard MD      Diclofenac Sodium  2 g Topical 4x Daily Shira Richard MD      diltiazem  240 mg Oral Daily Shira Richard MD      docusate sodium  100 mg Oral BID Shira Richard MD      enoxaparin  40 mg Subcutaneous Q24H [address], CRNP      folic acid  1 mg Oral TID Shira Richard MD      latanoprost  1 drop Both Eyes HS Yumiko Sahu MD      polyethylene glycol  17 g Oral Daily PRN Yumiko Sahu MD      polyethylene glycol  17 g Oral Daily Yumiko Sahu MD      senna  1 tablet Oral BID Yumiko Sahu MD         Chief Complaints:  Rehab follow-up     Subjective:     Patient reports L knee pain feels better today  She reports LBM a few days ago but this is not uncommon for her and she has not been eating too much  She reports strength is stable without lightheadedness, SOB, abdominal pain, or other new complaints  ROS: A 10 point ROS was performed; negative except as noted above  Physical Exam:  06/09/22 0630 96 4 °F (35 8 °C) Abnormal  67 20 129/59     Vitals above reviewed on date of encounter    GEN:  Lying in bed in NAD   HEENT/NECK: MMM  CARDIAC: Regular rate rhythm, no murmers, no rubs, no gallops  LUNGS:  clear to auscultation, no wheezes, rales, or rhonchi  ABDOMEN: Soft, non-tender, non-distended, normal active bowel sounds  EXTREMITIES/SKIN:  no calf edema, no calf tenderness to palpation and MSK - L knee stable  NEURO:   MENTAL STATUS:  Appropriate wakefulness and interaction   PSYCH:  Affect:  Euthymic     HPI:  Teodoro Thayer is a 64 y o  female with PMH psoriatic arthritis, HTN, HL, TIA, who legs gave out from her causing foot pain and she was noted to have significant bilateral LE weakness and was evaluated at the hospital   CK was elevated to close to 7000 and she was initially with acute kidney injury and had elevated LFTs  IVF were provided and CK trended down  X-ray showed displaced oblique fracture involving the medical base of the proximal phalanx of the 1st digit with recommendation by podiatry for WBAT in sx shoes  Neuro had extensive work-up which included MRI L spine which showed moderate central canal stenosis L4-5, Moderate right foraminal narrowing and moderate to sever right lateral recess stenosis  Rheum reviewed case as well    Overall she was not felt to have inflammatory myositis and 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 and statin has been held  She was recommended to hold MTX for now as well  Strength has been improving some  Patient was evaluated by skilled therapies and was found to have significant decline in ADLs and ambulation and appears appropriate for admission to [facility]      ** Please Note: Fluency Direct voice to text software may have been used in the creation of this document  **    Total time spent:  35 minutes, with more than 50% spent counseling/coordinating care  Counseling includes discussion with patient re: progress in therapies, functional issues observed by therapy staff, and discussion with patient his/her current medical state/wellbeing  Coordination of patient's care was performed in conjunction with Internal Medicine service to monitor patient's labs, vitals, and management of their comorbidities  In addition, this patient was discussed by the interdisciplinary team in weekly case conference today  The care of the patient was extensively discussed with all care providers and an appropriate rehabilitation plan was formulated unique for this patient  Barriers were identified preventing progression of therapy and appropriate interventions were discussed with each discipline  Please see the team note for input from all disciplines regarding barriers, intervention, and discharge planning  I personally performed the required components and examined the patient myself in person on 6/9/22

## 2022-06-11 NOTE — ASSESSMENT & PLAN NOTE
- MOCA 23/30 on 6/10 mostly delayed recall and some on VS/exec  - Able to adequately manage meds per therapy and do basic household iADLs   - Neighbor will do 1-2 times per day check ins on most days   - Patient teaching - patient doing adequately well on med mgmt per tx  - OP follow-up with PCP   - Recommend continued HH PT, OT, MSW, RN

## 2022-06-11 NOTE — PROGRESS NOTES
06/11/22 1030   Pain Assessment   Pain Assessment Tool 0-10   Pain Score No Pain   Subjective   Subjective Pt reports feeling well, just using the bathroom with nursing  Pt agreeable to paticipate in PT  Sit to Lying   Type of Assistance Needed Supervision   Sit to Lying CARE Score 4   Lying to Sitting on Side of Bed   Type of Assistance Needed Supervision   Lying to Sitting on Side of Bed CARE Score 4   Sit to Stand   Type of Assistance Needed Supervision   Sit to Stand CARE Score 4   Bed-Chair Transfer   Type of Assistance Needed Supervision   Chair/Bed-to-Chair Transfer CARE Score 4   Car Transfer   Reason if not Attempted Environmental limitations   Car Transfer CARE Score 10   Walk 10 Feet   Type of Assistance Needed Supervision   Walk 10 Feet CARE Score 4   Walk 50 Feet with Two Turns   Type of Assistance Needed Supervision   Walk 50 Feet with Two Turns CARE Score 4   Walk 150 Feet   Type of Assistance Needed Supervision   Walk 150 Feet CARE Score 4   Walking 10 Feet on Uneven Surfaces   Type of Assistance Needed Supervision   Comment 10ftx4 without AD with SBA (ramp)   Walking 10 Feet on Uneven Surfaces CARE Score 4   Ambulation   Does the patient walk? 2  Yes   Primary Mode of Locomotion Prior to Admission Walk   Distance Walked (feet) 380 ft  (290, 210)   Assist Device   (no AD  Initially used RW to ambulate from room > PT gym 150ft)   Gait Pattern Decreased foot clearance; Lateral deviation; Step through; Improper weight shift; Slow Holly  (1 episode of L foot drag)   Limitations Noted In Balance; Endurance; Safety;Speed;Strength   Walk Assist Level Close Supervision   Findings Pt reporting RPE of 3 5/10 at max with long distance ambulation  Pt had occasional lateral sway but no major LOB   Wheelchair mobility   Does the patient use a wheelchair? 0   No   Curb or Single Stair   Style negotiated Single stair   Type of Assistance Needed Incidental touching   1 Step (Curb) CARE Score 4   4 Steps   Type of Assistance Needed Incidental touching   4 Steps CARE Score 4   Stairs   Type Stairs   # of Steps 8   Assist Devices Single Rail;Bilateral Rail   Findings B HR with non reciprocal pattern x4, unilateral HR with non reciprocal pattern x4  Good control noted with B HR, 1 slight posterior LOB with descending with unilateral HR   Picking Up Object   Type of Assistance Needed Supervision   Comment SBA without reacher x2, difficulty with grasping small object, no LOB   Picking Up Object CARE Score 4   Therapeutic Interventions   Strengthening Standing at RW: hip abd and hip ext 10x B with cues for appropriate muscle engagement and postural alignment to strengthen hip abductors and hip extensors   Assessment   Treatment Assessment Pt demonstrated improved functional capacity with long distance ambulation up to 390ft with no major LOB, occasional lateral deviations noted  Instructed pt on standing LE TE with B UE support to improve hip abductor and extensor strengthening  Pt demonstrating adequate eccentric control when negotiating stairs with B HR but posterior LOB when descending with unilateral HR  Pt progressing as expected, preparing for D/C home with [facility] services 6/15  PT Barriers   Physical Impairment Decreased strength;Decreased endurance; Impaired balance;Decreased mobility   Functional Limitation Standing;Transfers; Walking   Plan   Treatment/Interventions Functional transfer training;LE strengthening/ROM; Elevations; Therapeutic exercise; Endurance training;Gait training   Progress Progressing toward goals   Recommendation   PT Discharge Recommendation Home with home health rehabilitation   PT Therapy Minutes   PT Time In 1030   PT Time Out 1130   PT Total Time (minutes) 60   PT Mode of treatment - Individual (minutes) 60   Therapy Time missed   Time missed?  No

## 2022-06-11 NOTE — PROGRESS NOTES
Physical Medicine and Rehabilitation Progress Note  Artur Jonesumble 64 y o  female MRN: [id number]  Unit/Bed#: [facility] 266-01 Encounter: [id number]      Assessment & Plan:     Decline in ADLs and mobility: Functional assessment - improving         FIM  Care Score  Admit Score Recent Score    Total assist  1-100% or 2p    Tot     (Former TA) 2-76-99%  Sub     Max assist 2-51-75%  Sub     Mod assist 3- 26-50%  Par     Min assist 3- 25% or < Par To hygiene, bathing, dressing     CG assist 4  TA  Bathing, LBD    Supervision 4 Sup     Set-up  5 NORIEGA     Mod-I/Indep 6 MI      Transfers  Min assist  Supervision    Ambulation   150 ft CGA Supervision 150 ft     Stairs   Mod assist 4 steps  4 steps CGA      Goal: Mod indep  Major barriers:  Weakness, lack of support at home  Dispo & ELOS: Home with ELOS Wed with HH PT, OT       Weakness of both lower extremities  Assessment & Plan  Continues to improve   - HMG-CoA reductase and MyoMarker 3 still pending - OP follow-up rheum   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  Skilled therapies as outlined  Optimal nutrition and medical mgmt   Fall precautions     Impaired mobility and ADLs  Assessment & Plan  Functioning improving further   Multifactorial: Rhabdo/statin-induced myopathy, foot fracture, deconditioning   - Optimal management of each as listed -   - Continue acute comprehensive interdisciplinary inpatient rehabilitation to include intensive skilled therapies (PT, OT +/- ST) as outlined with oversight and management by rehabilitation physician as well as inpatient rehab level nursing, case management and weekly interdisciplinary team meetings         * Rhabdomyolysis  Assessment & Plan  Improving   CK max almost 7000 > trended down and now wnl 6/5   Believed to be 2/2 statin induced - hold statin  Hold MTX per rheum  OP follow-up with PCP and rheum    Fracture, toe  Assessment & Plan  Denies pain   L 1st prox phalanx fx with minimal displacement  - WABT with sx shoes bilaterally   - Repeat XR 6/16/22  - OP podiatry follow-up     Mild cognitive impairment  Assessment & Plan  - [facility] on [facility]10 mostly delayed recall and some on VS/exec  - Dementia risk - recommend OP follow-up/testing   - Increased risk of delirium - monitor   - Patient/family/CG teaching - patient doing adequately well on med mgmt per tx  - Optimal Sleep/wake cycle management  - Limit sedating/deliriogenic meds when possible  - Optimal medical, mood, and pain management   - Skilled therapies as outlined   - Compensatory strategies   - Fall precautions  - Optimize oversight after discharge  - OP follow-up with PCP         Left knee pain  Assessment & Plan  Somewhat better today  Patient reports chronic bilateral knee and multi-joint pain from RA - recent increased L knee pain since fall PTA  - Some TTP medial jt line otherwise exam largely unremarkable - likely OA/RA +/- meniscus  - No x-rays in computer - if does not improve or worsens obtain   - Avoid over activity  - Voltaren gel QID  - APAP PRN  - Monitor     At risk for venous thromboembolism (VTE)  Assessment & Plan  SCDs, ambulation, and lovenox     Constipation  Assessment & Plan  Improve after PO dulcolax - decline NV dulcolax   - Daily miralax for now  - Colace/Senna BID  - Dulcolax supp if no BM by later   - PRN bowel regimen (Miralax PRN/Dulcolax PO PRN)  - monitor for signs and symptoms of obstruction/ileus > not currently; hold stimulant lax if occurs  - Limiting constipating medications if possible  - Ensure adequate hydration       Hypokalemia  Assessment & Plan  K 3 4 on 6/9  IM consulted, monitoring, repletion, and overall management at their discretion during ARC course      Osteoporosis  Assessment & Plan  D3 2000 units qday and calcium   Vit D level 31    Elevated liver enzymes  Assessment & Plan  Possibly 2/2 rhabdo  Resolving - only ALT still elevated but now down to 51 on 6/6    HLD (hyperlipidemia)  Assessment & Plan  Statin on hold Per IM "Last lipid panel on 3/31/22: Cholesterol 169, Triglycerides 69, HDL 87, and LDL 68"  OP PCP follow-up     Essential hypertension  Assessment & Plan  Internal medicine consulted and co-management with their service  Monitor vitals with and without activity; monitor for orthostasis  Monitor hemoglobin, electrolytes, kidney function, hydration status   Current meds: Cardizem       Psoriatic arthritis ([address] )  Assessment & Plan  OP follow-up with rheum Dr Nuno Garcia  - Weekly MTX on hold for now per prior recs  - Monitor for flare     History of TIA (transient ischemic attack)  Assessment & Plan  Secondary stroke prevention  - Antithrombotic: plavix  - Holding Statin with recent possible statin induced myopathy/rhabdo  - Optimal management of blood pressure       Other Medical Issues:   None    Follow-up providers and other issues to be followed up after discharge  · PCP  · Rheum    CODE: Level 1: Full Code    Restrictions include: Fall precautions    Objective:     Allergies per EMR  Diagnostic Studies: Reviewed, no new imaging  No orders to display     See above as well    Laboratory: Labs reviewed  Results from last 7 days   Lab Units 06/09/22  0451 06/06/22  0602   HEMOGLOBIN g/dL 13 6 13 0   HEMATOCRIT % 41 8 39 6   WBC Thousand/uL 5 85 8 13     Results from last 7 days   Lab Units 06/09/22  0451 06/06/22  0601   BUN mg/dL 10 9   SODIUM mmol/L 136* 137   POTASSIUM mmol/L 3 4* 3 7   CHLORIDE mmol/L 103 101   CREATININE mg/dL 0 58* 0 55*   AST U/L  --  32   ALT U/L  --  51*            Drug regimen reviewed, all potential adverse effects identified and addressed:    Scheduled Meds:  Current Facility-Administered Medications   Medication Dose Route Frequency Provider Last Rate    acetaminophen  650 mg Oral Q6H PRN Balbina David MD      aluminum-magnesium hydroxide-simethicone  30 mL Oral Q6H PRN Balbina David MD      bisacodyl  10 mg Oral Daily PRN Balbina David MD      calcium carbonate  2 tablet Oral Daily With Breakfast ANNE Khan      cholecalciferol  2,000 Units Oral Daily ANNE Khan      clopidogrel  75 mg Oral HS Yamilet Rousseau MD      Diclofenac Sodium  2 g Topical 4x Daily PRN Yamilet Rousseau MD      Diclofenac Sodium  2 g Topical 4x Daily Yamilet Rousseau MD      diltiazem  240 mg Oral Daily Yamilet Rousseau MD      docusate sodium  100 mg Oral BID Yamilet Rousseau MD      enoxaparin  40 mg Subcutaneous Q24H [address], CRNP      folic acid  1 mg Oral TID Yamilet Rousseau MD      latanoprost  1 drop Both Eyes HS Yamilet Rousseau MD      polyethylene glycol  17 g Oral Daily PRN Yamilet Rousseau MD      polyethylene glycol  17 g Oral Daily Yamilet Rousseau MD      senna  1 tablet Oral BID Yamilet Rousseau MD         Chief Complaints:  Rehab follow-up     Subjective:     Patient reports like she will have BM and denies abdominal pain, nausea, increased knee pain, SOB, lightheadedness  She decline HI dulcolax but will take PO     ROS: A 10 point ROS was performed; negative except as noted above  Physical Exam:  Vitals:    06/10/22 2133   BP: 127/59   Pulse: 56   Resp: 18   Temp: (!) 97 °F (36 1 °C)   SpO2: 97%     GEN:  Sitting in NAD   HEENT/NECK: MMM  CARDIAC: Regular rate rhythm, no murmers, no rubs, no gallops  LUNGS:  clear to auscultation, no wheezes, rales, or rhonchi  ABDOMEN: Soft, non-tender, non-distended, normal active bowel sounds  EXTREMITIES/SKIN:  no calf edema, no calf tenderness to palpation  NEURO:   MENTAL STATUS:  Appropriate wakefulness and interaction  and Strength/MMT:  Stable  PSYCH:  Affect:  Euthymic     HPI:  Lazaro Alves is a 64 y o  female with PMH psoriatic arthritis, HTN, HL, TIA, who legs gave out from her causing foot pain and she was noted to have significant bilateral LE weakness and was evaluated at the hospital   CK was elevated to close to 7000 and she was initially with acute kidney injury and had elevated LFTs    IVF were provided and CK trended down  X-ray showed displaced oblique fracture involving the medical base of the proximal phalanx of the 1st digit with recommendation by podiatry for WBAT in sx shoes  Neuro had extensive work-up which included MRI L spine which showed moderate central canal stenosis L4-5, Moderate right foraminal narrowing and moderate to sever right lateral recess stenosis  Rheum reviewed case as well  Overall she was not felt to have inflammatory myositis and 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 and statin has been held  She was recommended to hold MTX for now as well  Strength has been improving some  Patient was evaluated by skilled therapies and was found to have significant decline in ADLs and ambulation and appears [facility]      ** Please Note: Fluency Direct voice to text software may have been used in the creation of this document   **

## 2022-06-12 PROCEDURE — 97530 THERAPEUTIC ACTIVITIES: CPT

## 2022-06-12 PROCEDURE — 97112 NEUROMUSCULAR REEDUCATION: CPT

## 2022-06-12 PROCEDURE — 97110 THERAPEUTIC EXERCISES: CPT

## 2022-06-12 PROCEDURE — 97535 SELF CARE MNGMENT TRAINING: CPT

## 2022-06-12 PROCEDURE — 92610 EVALUATE SWALLOWING FUNCTION: CPT

## 2022-06-12 RX ADMIN — FOLIC ACID 1 MG: 1 TABLET ORAL at 21:38

## 2022-06-12 RX ADMIN — POLYETHYLENE GLYCOL 3350 17 G: 17 POWDER, FOR SOLUTION ORAL at 09:05

## 2022-06-12 RX ADMIN — Medication 2000 UNITS: at 09:04

## 2022-06-12 RX ADMIN — CALCIUM 2 TABLET: 500 TABLET ORAL at 09:04

## 2022-06-12 RX ADMIN — FOLIC ACID 1 MG: 1 TABLET ORAL at 09:04

## 2022-06-12 RX ADMIN — DILTIAZEM HYDROCHLORIDE 240 MG: 120 CAPSULE, COATED, EXTENDED RELEASE ORAL at 09:04

## 2022-06-12 RX ADMIN — FOLIC ACID 1 MG: 1 TABLET ORAL at 17:44

## 2022-06-12 RX ADMIN — ENOXAPARIN SODIUM 40 MG: 100 INJECTION SUBCUTANEOUS at 09:04

## 2022-06-12 RX ADMIN — CLOPIDOGREL BISULFATE 75 MG: 75 TABLET ORAL at 21:38

## 2022-06-12 RX ADMIN — LATANOPROST 1 DROP: 50 SOLUTION OPHTHALMIC at 21:38

## 2022-06-12 RX ADMIN — DOCUSATE SODIUM 100 MG: 100 CAPSULE, LIQUID FILLED ORAL at 17:44

## 2022-06-12 RX ADMIN — SENNOSIDES 8.6 MG: 8.6 TABLET, FILM COATED ORAL at 09:04

## 2022-06-12 RX ADMIN — DOCUSATE SODIUM 100 MG: 100 CAPSULE, LIQUID FILLED ORAL at 09:04

## 2022-06-12 NOTE — PROGRESS NOTES
SLP ARC TAA       06/12/22 1200   Patient Data   Rehab Impairment Impairment of mobility, safety and Activities of Daily Living (ADLs) due to Neurologic Conditions:  03 8  Neuromuscular Disorders   Etiologic Diagnosis Rhabdomyolysis, Myopathy  (statin induced)   Date of Onset 05/26/22   Restrictions/Precautions   Precautions Fall Risk;Cognitive;Supervision on toilet/commode   Weight Bearing Restrictions Yes   LLE Weight Bearing Per Order WBAT   ROM Restrictions No   Pain Assessment   Pain Assessment Tool 0-10   Pain Score No Pain   Eating Assessment   Swallow Precautions No   Bedside Swallow Results Yes   VBS Study Results No   Food To Mouth Yes   Positioning Upright   Safety Needs Increase Time   Meal Assessed Lunch   QI: Swallowing/Nutritional Status Regular food   Current Diet Regular; Thin   Intake Mode PO   Finishes Timely Yes   Opens Packages Yes   Findings [facility] oral and pharyngeal swallow stages  Recommend cont regular diet and thin liquids  See SLP rehab note for details  Type of Assistance Needed Independent   Physical Assistance Level No physical assistance   Eating CARE Score 6   Discharge Information   Vocational Plan Retired/not working   Patient's Discharge Plan return home   Barriers to Discharge Home No Family Support;Decreased Strength;Decreased Endurance; Safety Considerations   Impressions Pt completed bedside swallow assessment after reports from nursing with choking episode on mucous during breakfast on 6/11/2022  Pt presents [facility] for oral and pharyngeal swallow stages at this time  Recommend cont regular diet and thin liquids  See SLP rehab note for details     SLP Therapy Minutes   SLP Time In 1200   SLP Time Out 1230   SLP Total Time (minutes) 30   SLP Mode of treatment - Individual (minutes) 30   SLP Mode of treatment - Concurrent (minutes) 0   SLP Mode of treatment - Group (minutes) 0   SLP Mode of treatment - Co-treat (minutes) 0   SLP Mode of Treatment - Total time(minutes) 30 minutes   SLP Cumulative Minutes 30   Cumulative Minutes   Cumulative therapy minutes [id number]

## 2022-06-12 NOTE — PROGRESS NOTES
SLP Bedside Swallow Evaluation       06/12/22 1200   Pain Assessment   Pain Assessment Tool 0-10   Pain Score No Pain   Restrictions/Precautions   Precautions Fall Risk;Cognitive;Supervision on toilet/commode   Weight Bearing Restrictions Yes   LLE Weight Bearing Per Order WBAT   ROM Restrictions No   Eating   Type of Assistance Needed Independent   Physical Assistance Level No physical assistance   Eating CARE Score 6   Swallow Assessment   Swallow Treatment Assessment Bedside Swallow Evaluation      Patient Name: Sulaiman BARBOSA Date: 6/12/2022     Problem List  Principal Problem:    Rhabdomyolysis  Active Problems:    History of TIA (transient ischemic attack)    Psoriatic arthritis ([address] )    Essential hypertension    HLD (hyperlipidemia)    Constipation    Impaired mobility and ADLs    Elevated liver enzymes    Weakness of both lower extremities    Fracture, toe    At risk for venous thromboembolism (VTE)    Osteoporosis    Left knee pain    Hypokalemia    Mild cognitive impairment      Past Medical History  Past Medical History:   Diagnosis Date    Abnormal liver function test     last assessed: 3/25/2014    KARIME (acute kidney injury) ([facility] ) 1/11/2020    Anemia     Chronic constipation     last assessed: 12/2/2014    Closed fracture of 4th metacarpal 3/3/2018    Herpes zoster     last assessed: 3/16/2016    Hyperlipidemia     Hypertension     Open nondisplaced fracture of first cervical vertebra with routine healing 3/3/2018    Psoriatic arthritis ([address] )     last assessed;3/25/2014    Recent weight loss     last assessed: 7/13/2016    Rheumatoid arthritis ([address] )     Stroke ([facility] )     Syncope 1/11/2020    Trigeminal neuritis        Past Surgical History  Past Surgical History:   Procedure Laterality Date    APPENDECTOMY      FOOT SURGERY      HYSTERECTOMY      KIDNEY SURGERY         Reason for consult: nursing reporting pt having "phlegm issue" during the morning and with breakfast on 6/11/2022  Swallow Information   Current Risks for Dysphagia & Aspiration: general debilitation  Current Symptoms/Concerns: coughing with po per nursing- not observed this meal  Current Diet: regular diet and thin liquids   Baseline Diet: regular diet and thin liquids    Baseline Assessment   Behavior/Cognition: alert  Speech/Language Status: able to participate in conversation and able to follow commands  Patient Positioning: upright in bed  Pain Status/Interventions/Response to Interventions:  No report of pain  Swallow Mechanism Exam  Facial: symmetrical  Labial: WFL  Lingual: WFL  Velum: symmetrical  Mandible: adequate ROM  Dentition: adequate  Vocal quality:clear/adequate   Volitional Cough: strong/productive   Respiratory Status: on RA      Consistencies Assessed and Performance   Consistencies Administered: thin liquids and hard solids  Materials administered included tuna fish, crackers, ice cream, thin liquids    Oral stage:WFL  Lip closure: adequate for liquids and solids  Anterior spillage: none  Mastication: functional  Bolus formation: functional  Bolus control: functional  Transfer: timely  Oral residue: none  Pocketing: none     Pharyngeal stage:WFL  Swallow promptness: timely  Hyolaryngeal elevation: adequate  Wet voice: none  Throat clear: none  Cough: none  Secondary swallows: completed as needed  Audible swallows: none     Esophageal stage:WFL    Summary:      Pt presenting with WFL oropharyngeal stages of swallow characterized by functional retrieval of all consistencies from utensil, bite or straw sip  No anterior loss present, functional mastication, bolus formation and control, Transfers and swallows functional and timely with no s/s aspiration present  Discussed events yesterday, pt stated "the french toast with the syrup just gets hung up sometimes so I stopped ordering it"   Discussed basic strategies with OOB fully upright, setting up tray with cutting item smaller, thorough chewing, alternating food and liquids  Pt receptive and stated "when I go home, I'll be back to what I'm used to"  Recommendations:  Diet: regular diet and thin liquids  Meds: whole with liquid  Strategies: upright posture, only feed when fully alert, slow rate of feeding, small bites/sips, effortful swallow, quiet environment (tv off, limit talking, door closed, etc ) and alternating bites and sips    Set up assistance as needed    Consider consult with:    Results reviewed with:  patient and RN and PCA  Aspiration precautions posted  F/u ST tx: Pt with functional swallow and appropriate for regular diet and thin liquids  No dysphagia present  Cont diet as tolerated  No further skilled SLP services warranted at this time  Swallow Assessment Prognosis   Prognosis Good   Prognosis Considerations Age; Co-morbidities; Medical diagnosis; Medical prognosis;Previous level of function;New learning ability; Severity of impairments;Ability to carry over; Cooperation   SLP Therapy Minutes   SLP Time In 1200   SLP Time Out 1230   SLP Total Time (minutes) 30   SLP Mode of treatment - Individual (minutes) 30   SLP Mode of treatment - Concurrent (minutes) 0   SLP Mode of treatment - Group (minutes) 0   SLP Mode of treatment - Co-treat (minutes) 0   SLP Mode of Treatment - Total time(minutes) 30 minutes   SLP Cumulative Minutes 30

## 2022-06-12 NOTE — PROGRESS NOTES
OT Treatment Note       06/12/22 0815   Pain Assessment   Pain Assessment Tool 0-10   Pain Score No Pain   Restrictions/Precautions   Precautions Fall Risk;Cognitive;Supervision on toilet/commode   Weight Bearing Restrictions Yes   LLE Weight Bearing Per Order WBAT   ROM Restrictions No   Braces or Orthoses   (B/L surgical shoes)   Lifestyle   Autonomy "Oh good I can wash my hair," re: plan for ADL routine today   Eating   Type of Assistance Needed Independent   Physical Assistance Level No physical assistance   Comment sitting in bedside chair   Eating CARE Score 6   Oral Hygiene   Type of Assistance Needed Supervision   Physical Assistance Level No physical assistance   Comment in stance at sink   Oral Hygiene CARE Score 4   Grooming   Able To Comb/Brush Hair;Wash/Dry Face;Brush/Clean Teeth;Wash/Dry Hands   Findings SUP in stance at sink   Shower/Bathe Self   Type of Assistance Needed Supervision   Physical Assistance Level No physical assistance   Comment majority of bathing completed sitting on shower chair using crossed leg technique to wash feet, SUP in stance to wash groin/buttocks   Shower/Bathe Self CARE Score 4   Tub/Shower Transfer   Adaptive Equipment Seat with Back   Assessed Shower   Findings Functionally mobilizing from bedside chair->shower chair without AD with SUP   Upper Body Dressing   Type of Assistance Needed Set-up / clean-up   Physical Assistance Level No physical assistance   Comment Setup to doff/don overhead shirt   Upper Body Dressing CARE Score 5   Lower Body Dressing   Type of Assistance Needed Supervision   Physical Assistance Level No physical assistance   Comment SUP to doff/don underwear, pants sit->stand level   Lower Body Dressing CARE Score 4   Putting On/Taking Off Footwear   Type of Assistance Needed Set-up / clean-up   Physical Assistance Level No physical assistance   Comment to doff/don socks and surgical shoes using crossed leg technique   Putting On/Taking Off Footwear CARE Score 5   Sit to Lying   Type of Assistance Needed Independent   Physical Assistance Level No physical assistance   Sit to Lying CARE Score 6   Lying to Sitting on Side of Bed   Type of Assistance Needed Independent   Physical Assistance Level No physical assistance   Lying to Sitting on Side of Bed CARE Score 6   Sit to Stand   Type of Assistance Needed Supervision   Physical Assistance Level No physical assistance   Comment without RW   Sit to Stand CARE Score 4   Bed-Chair Transfer   Type of Assistance Needed Supervision   Physical Assistance Level No physical assistance   Comment without RW   Chair/Bed-to-Chair Transfer CARE Score 4   Toileting Hygiene   Type of Assistance Needed Supervision   Physical Assistance Level No physical assistance   Comment Seated for bladder hygiene, SUP in stance during CM   [address] Score 4   Toilet Transfer   Type of Assistance Needed Supervision   Physical Assistance Level No physical assistance   Comment without AD   Toilet Transfer CARE Score 4   Functional Standing Tolerance   Comments For increased standing balance and tolerance for ADL tasks, Pt participating in leisure word search standing on air[employer] balance mat with occasional unilateral UE support for 3-4 minutes at a time with close SUP and no LOB noted though reporting fatigue   Exercise Tools   Other Exercise Tool 1 For increased functional strength and standing tolerance required for ADL tasks and functional transfers pt completing standing BUE therex using 2# db including: chest press, lat raises and bicep curls [id number]  For increased balance challenge, pt then completing 1 set while alternating stepping forward/backward and sidestepping  All exercises completed with close SUP without AD and no LOB noted  Cognition   Overall Cognitive Status Impaired   Arousal/Participation Alert; Cooperative   Attention Attends with cues to redirect   Orientation Level Oriented X4   Memory Decreased short term memory   Following Commands Follows multistep commands with increased time or repetition   Additional Activities   Additional Activities Comments Functional mobility room<->therapy gym with SUP without AD   Activity Tolerance   Activity Tolerance Patient tolerated treatment well   Assessment   Treatment Assessment Pt participating in 90 min OT session focusing on ADL routine, functional transfers/mobility, bed mobility, standing balance/tolerance activities and BUE strengthening  Pt reporting fatigue with standing activities though tolerated session well with rest breaks as needed  Pt completing ADL routine at SUP level, see above for further details  OT to continue POC to focus on standing balance/tolerance, IADLs without AD and UE strengthening to maximize (I) and safety prior to d/c  Prognosis Good   Problem List Decreased strength;Decreased endurance; Impaired balance;Decreased mobility; Decreased coordination;Decreased cognition   Barriers to Discharge Inaccessible home environment;Decreased caregiver support   Plan   Treatment/Interventions ADL retraining;Functional transfer training; Therapeutic exercise; Endurance training;Patient/family training;Equipment eval/education; Bed mobility; Compensatory technique education   Progress Progressing toward goals   Recommendation   OT Discharge Recommendation Home with home health rehabilitation   OT Therapy Minutes   OT Time In 0815   OT Time Out 0945   OT Total Time (minutes) 90   OT Mode of treatment - Individual (minutes) 90   OT Mode of treatment - Concurrent (minutes) 0   OT Mode of treatment - Group (minutes) 0   OT Mode of treatment - Co-treat (minutes) 0   OT Mode of Treatment - Total time(minutes) 90 minutes   OT Cumulative Minutes 750   Therapy Time missed   Time missed?  No

## 2022-06-12 NOTE — NURSING NOTE
Offered bowel meds patient refused her miralax and also did take her colace twice took senna this am refused the evening dose

## 2022-06-12 NOTE — PLAN OF CARE
Problem: SAFETY ADULT  Goal: Patient will remain free of falls  Description: INTERVENTIONS:  - Educate patient/family on patient safety including physical limitations  - Instruct patient to call for assistance with activity   - Consult OT/PT to assist with strengthening/mobility   - Keep Call bell within reach  - Keep bed low and locked with side rails adjusted as appropriate  - Keep care items and personal belongings within reach  - Initiate and maintain comfort rounds  - Make Fall Risk Sign visible to staff  - Offer Toileting every 4Hours, in advance of need  -   - Obtain necessary fall risk management equipment: rw  - Apply yellow socks and bracelet for high fall risk patients  - Consider moving patient to room near nurses station  Outcome: Progressing  Goal: Maintain or return to baseline ADL function  Description: INTERVENTIONS:  -  Assess patient's ability to carry out ADLs; assess patient's baseline for ADL function and identify physical deficits which impact ability to perform ADLs (bathing, care of mouth/teeth, toileting, grooming, dressing, etc )  - Assess/evaluate cause of self-care deficits   - Assess range of motion  - Assess patient's mobility; develop plan if impaired  - Assess patient's need for assistive devices and provide as appropriate  - Encourage maximum independence but intervene and supervise when necessary  - Involve family in performance of ADLs  - Assess for home care needs following discharge   - Consider OT consult to assist with ADL evaluation and planning for discharge  - Provide patient education as appropriate  Outcome: Progressing  Goal: Maintains/Returns to pre admission functional level  Description: INTERVENTIONS:  - Perform BMAT or MOVE assessment daily    - Set and communicate daily mobility goal to care team and patient/family/caregiver  - Collaborate with rehabilitation services on mobility goals if consulted  -   - Reposition patient every 4 hours    -  -  - Out of bed to chair 4times a day   - Out of bed for meals 3 times a day  - Out of bed for toileting  - Record patient progress and toleration of activity level   Outcome: Progressing

## 2022-06-12 NOTE — PROGRESS NOTES
06/12/22 1100   Pain Assessment   Pain Assessment Tool 0-10   Pain Score No Pain   Restrictions/Precautions   Precautions Fall Risk;Cognitive;Supervision on toilet/commode   Weight Bearing Restrictions Yes   LLE Weight Bearing Per Order WBAT   ROM Restrictions No   Cognition   Overall Cognitive Status Impaired   Arousal/Participation Alert; Cooperative   Attention Attends with cues to redirect   Orientation Level Oriented X4   Memory Decreased short term memory   Following Commands Follows multistep commands with increased time or repetition   Subjective   Subjective Pt reports " I am willing to do anything"   Roll Left and Right   Type of Assistance Needed Supervision   Physical Assistance Level No physical assistance   Comment   (use of bed rails, HOB flat)   Roll Left and Right CARE Score 4   Lying to Sitting on Side of Bed   Type of Assistance Needed Independent   Physical Assistance Level No physical assistance   Comment HOB flat, patient able to complete with no assistance   Lying to Sitting on Side of Bed CARE Score 6   Sit to Stand   Type of Assistance Needed Supervision   Physical Assistance Level No physical assistance   Comment w/o RW   Sit to Stand CARE Score 4   Bed-Chair Transfer   Type of Assistance Needed Supervision   Physical Assistance Level No physical assistance   Comment w/o RW   Chair/Bed-to-Chair Transfer CARE Score 4   Walk 10 Feet   Type of Assistance Needed Supervision   Physical Assistance Level No physical assistance   Comment CS due to no device   Walk 10 Feet CARE Score 4   Walk 50 Feet with Two Turns   Type of Assistance Needed Supervision   Physical Assistance Level No physical assistance   Comment CS due to no device   Walk 50 Feet with Two Turns CARE Score 4   Walk 150 Feet   Type of Assistance Needed Supervision   Physical Assistance Level No physical assistance   Comment CS due to no device   Walk 150 Feet CARE Score 4   Ambulation   Does the patient walk? 2   Yes   Primary Mode of Locomotion Prior to Admission Walk   Distance Walked (feet) 200 ft   Assist Device   (None)   Gait Pattern Decreased foot clearance; Improper weight shift   Limitations Noted In Balance; Endurance   Provided Assistance with: Balance   Walk Assist Level Close Supervision   Curb or Single Stair   Style negotiated Single stair   Type of Assistance Needed Supervision   Physical Assistance Level No physical assistance   Comment CS with BHR   1 Step (Curb) CARE Score 4   4 Steps   Type of Assistance Needed Supervision   Physical Assistance Level No physical assistance   Comment CS with BHR   4 Steps CARE Score 4   Stairs   Type Stairs   # of Steps 4   Assist Devices Bilateral Rail   Findings Good stability with BHR no physical assistance required (no retropulsion)   Therapeutic Interventions   Strengthening B/L 3 lb ankle weights: Seated marches until fatigue ea side x1 rep, Seated LAQ until fatigue x1 set,   Balance Side stepping on foam beam (down back x 3 cycles ebony breaks b/w)   Neuromuscular Re-Education Repeated STS: 12 reps x3 sets (first set B/L UE assist) last two sets 0-1 UE to complete, last set 0 UE the entire time, FT Chops firm ground  10 reps ea side x3 sets CG,   Equipment Use   Parallel Bars 3 lb ankle weights worn for all exercises B/L: Walking marches down/back 2 cycles, Side stepping down/back x3 cycles, FWD walking on foam beam x 2cycles able to wean to 1 UE (inc retropulsion noted)   Assessment   Treatment Assessment Patient demonstrated consistent progress towards her mobility goals  Patient able to complete the entire session with 3 lb B/L ankle weights donned  Patient able to complete STS with 0 UE assist and no physical assistance required during session today  Patient challenged more on compliant surfaces, but with instructions for ankle strategy able to illustrate >25% of the time to correct near LOB  Patient challenged at times due to LE fatigue, but able to recover with seated rest breaks  Patient would continue to benefit from skilled PT focused on endurance and improving independence with functional mobility  Family/Caregiver Present no   PT Family training done with: Patient demonstrated consistent progress towards her mobility goals  Patient able to complete the entire session with 3 lb B/L ankle weights donned  Patient able to complete STS with 0 UE assist and no physical assistance required during session today  Patient challenged at times due to LE fatigue, but able to recover with seated rest breaks  Patient would continue to benefit from skilled PT focused on endurance and improving independence with functional mobility  Problem List Decreased strength;Decreased endurance; Impaired balance;Decreased mobility; Decreased coordination;Decreased cognition   Barriers to Discharge Inaccessible home environment;Decreased caregiver support   PT Barriers   Physical Impairment Decreased strength;Decreased endurance; Impaired balance;Decreased mobility   Functional Limitation Standing;Transfers; Walking   Plan   Treatment/Interventions Functional transfer training;LE strengthening/ROM; Elevations; Therapeutic exercise;Cognitive reorientation; Endurance training;Patient/family training;Equipment eval/education; Bed mobility;Gait training   Progress Progressing toward goals   PT Therapy Minutes   PT Time In 1230   PT Time Out 1400   PT Total Time (minutes) 90   PT Mode of treatment - Individual (minutes) 90   PT Mode of treatment - Concurrent (minutes) 0   PT Mode of treatment - Group (minutes) 0   PT Mode of treatment - Co-treat (minutes) 0   PT Mode of Treatment - Total time(minutes) 90 minutes   PT Cumulative Minutes 765

## 2022-06-13 ENCOUNTER — HOME HEALTH ADMISSION (OUTPATIENT)
Dept: HOME HEALTH SERVICES | Facility: HOME HEALTHCARE | Age: 77
End: 2022-06-13
Payer: MEDICARE

## 2022-06-13 LAB
ANION GAP SERPL CALCULATED.3IONS-SCNC: 6 MMOL/L (ref 5–14)
BASOPHILS # BLD AUTO: 0.05 THOUSANDS/ΜL (ref 0–0.1)
BASOPHILS NFR BLD AUTO: 1 % (ref 0–1)
BUN SERPL-MCNC: 12 MG/DL (ref 5–25)
CALCIUM SERPL-MCNC: 8.6 MG/DL (ref 8.4–10.2)
CHLORIDE SERPL-SCNC: 103 MMOL/L (ref 97–108)
CK SERPL-CCNC: 37 U/L (ref 30–135)
CO2 SERPL-SCNC: 29 MMOL/L (ref 22–30)
CREAT SERPL-MCNC: 0.64 MG/DL (ref 0.6–1.2)
EOSINOPHIL # BLD AUTO: 0.14 THOUSAND/ΜL (ref 0–0.61)
EOSINOPHIL NFR BLD AUTO: 2 % (ref 0–6)
ERYTHROCYTE [DISTWIDTH] IN BLOOD BY AUTOMATED COUNT: 14.8 % (ref 11.6–15.1)
GFR SERPL CREATININE-BSD FRML MDRD: 85 ML/MIN/1.73SQ M
GLUCOSE SERPL-MCNC: 83 MG/DL (ref 70–99)
HCT VFR BLD AUTO: 46.4 % (ref 34.8–46.1)
HGB BLD-MCNC: 14.6 G/DL (ref 11.5–15.4)
IMM GRANULOCYTES # BLD AUTO: 0.03 THOUSAND/UL (ref 0–0.2)
IMM GRANULOCYTES NFR BLD AUTO: 0 % (ref 0–2)
LYMPHOCYTES # BLD AUTO: 2.98 THOUSANDS/ΜL (ref 0.6–4.47)
LYMPHOCYTES NFR BLD AUTO: 44 % (ref 14–44)
MCH RBC QN AUTO: 33 PG (ref 26.8–34.3)
MCHC RBC AUTO-ENTMCNC: 31.5 G/DL (ref 31.4–37.4)
MCV RBC AUTO: 105 FL (ref 82–98)
MONOCYTES # BLD AUTO: 0.86 THOUSAND/ΜL (ref 0.17–1.22)
MONOCYTES NFR BLD AUTO: 13 % (ref 4–12)
NEUTROPHILS # BLD AUTO: 2.66 THOUSANDS/ΜL (ref 1.85–7.62)
NEUTS SEG NFR BLD AUTO: 40 % (ref 43–75)
NRBC BLD AUTO-RTO: 0 /100 WBCS
PLATELET # BLD AUTO: 210 THOUSANDS/UL (ref 149–390)
PMV BLD AUTO: 10.6 FL (ref 8.9–12.7)
POTASSIUM SERPL-SCNC: 3.6 MMOL/L (ref 3.6–5)
RBC # BLD AUTO: 4.42 MILLION/UL (ref 3.81–5.12)
SODIUM SERPL-SCNC: 138 MMOL/L (ref 137–147)
WBC # BLD AUTO: 6.72 THOUSAND/UL (ref 4.31–10.16)

## 2022-06-13 PROCEDURE — 97530 THERAPEUTIC ACTIVITIES: CPT

## 2022-06-13 PROCEDURE — 82550 ASSAY OF CK (CPK): CPT | Performed by: NURSE PRACTITIONER

## 2022-06-13 PROCEDURE — 99232 SBSQ HOSP IP/OBS MODERATE 35: CPT

## 2022-06-13 PROCEDURE — 97535 SELF CARE MNGMENT TRAINING: CPT

## 2022-06-13 PROCEDURE — 80048 BASIC METABOLIC PNL TOTAL CA: CPT | Performed by: NURSE PRACTITIONER

## 2022-06-13 PROCEDURE — 97110 THERAPEUTIC EXERCISES: CPT

## 2022-06-13 PROCEDURE — 85025 COMPLETE CBC W/AUTO DIFF WBC: CPT | Performed by: NURSE PRACTITIONER

## 2022-06-13 PROCEDURE — 99232 SBSQ HOSP IP/OBS MODERATE 35: CPT | Performed by: NURSE PRACTITIONER

## 2022-06-13 RX ADMIN — DILTIAZEM HYDROCHLORIDE 240 MG: 120 CAPSULE, COATED, EXTENDED RELEASE ORAL at 08:02

## 2022-06-13 RX ADMIN — DOCUSATE SODIUM 100 MG: 100 CAPSULE, LIQUID FILLED ORAL at 17:08

## 2022-06-13 RX ADMIN — LATANOPROST 1 DROP: 50 SOLUTION OPHTHALMIC at 21:13

## 2022-06-13 RX ADMIN — SENNOSIDES 8.6 MG: 8.6 TABLET, FILM COATED ORAL at 08:03

## 2022-06-13 RX ADMIN — Medication 2000 UNITS: at 08:03

## 2022-06-13 RX ADMIN — FOLIC ACID 1 MG: 1 TABLET ORAL at 17:08

## 2022-06-13 RX ADMIN — SENNOSIDES 8.6 MG: 8.6 TABLET, FILM COATED ORAL at 17:08

## 2022-06-13 RX ADMIN — ENOXAPARIN SODIUM 40 MG: 100 INJECTION SUBCUTANEOUS at 08:03

## 2022-06-13 RX ADMIN — POLYETHYLENE GLYCOL 3350 17 G: 17 POWDER, FOR SOLUTION ORAL at 08:03

## 2022-06-13 RX ADMIN — CLOPIDOGREL BISULFATE 75 MG: 75 TABLET ORAL at 21:13

## 2022-06-13 RX ADMIN — CALCIUM 2 TABLET: 500 TABLET ORAL at 08:02

## 2022-06-13 RX ADMIN — FOLIC ACID 1 MG: 1 TABLET ORAL at 21:13

## 2022-06-13 RX ADMIN — DOCUSATE SODIUM 100 MG: 100 CAPSULE, LIQUID FILLED ORAL at 08:03

## 2022-06-13 RX ADMIN — FOLIC ACID 1 MG: 1 TABLET ORAL at 08:03

## 2022-06-13 NOTE — ASSESSMENT & PLAN NOTE
Likely due to statin  Simvastatin currently on hold  CK 6976 on admission - currently 39  Improved with IV fluids in acute setting  Continue to trend with routine labs  Per Rheumatology - presentation not consistent with inflammatory myositis  Rheumatology recommended holding methotrexate until further follow-up with outpatient Rheumatologist (Dr Tia Greer)  Ensure adequate hydration  Continue PT/OT

## 2022-06-13 NOTE — CASE MANAGEMENT
In preparation for d/c, shaun sent referral to [facility] for home RN,  PT and OT  Pt reports [place] the weekend, [facility] [id number]  Following to assist w/ d/c planning needs

## 2022-06-13 NOTE — PROGRESS NOTES
06/13/22 1230   Pain Assessment   Pain Assessment Tool 0-10   Pain Score No Pain   Restrictions/Precautions   Precautions Fall Risk   Weight Bearing Restrictions Yes   LLE Weight Bearing Per Order WBAT   ROM Restrictions No   Braces or Orthoses Other (Comment)  (B/L surgical shoes for all transfer's and mobility)   Lifestyle   Autonomy Pt agreeable to participate in Tx session  Putting On/Taking Off Footwear   Type of Assistance Needed Independent   Physical Assistance Level No physical assistance   Comment Seated EOB to don/doff surgical shoes   Putting On/Taking Off Footwear CARE Score 6   Sit to Lying   Type of Assistance Needed Independent   Physical Assistance Level No physical assistance   Comment HOB flat   Sit to Lying CARE Score 6   Lying to Sitting on Side of Bed   Type of Assistance Needed Independent   Physical Assistance Level No physical assistance   Comment HOB flat   Lying to Sitting on Side of Bed CARE Score 6   Sit to Stand   Type of Assistance Needed Independent   Physical Assistance Level No physical assistance   Comment IND no AD   Sit to Stand CARE Score 6   Bed-Chair Transfer   Type of Assistance Needed Independent   Physical Assistance Level No physical assistance   Comment IND no AD   Chair/Bed-to-Chair Transfer CARE Score 6   Toileting Hygiene   Type of Assistance Needed Independent   Physical Assistance Level No physical assistance   Comment IND no AD for CM and hygiene task   Toileting Hygiene CARE Score 6   Toilet Transfer   Type of Assistance Needed Independent   Physical Assistance Level No physical assistance   Comment IND no AD to raised toilet seat; Progressed to IRP's w/o use of AD and with pt demonstrating G understanding to don B/L surgical shoes for all transfer;s and mobility     Toilet Transfer CARE Score 6   Meal Prep   Meal Prep Level   (no AD)   Meal Prep Level of Assistance Independent   Meal Preparation Engaged in light meal prep task of making a pot of coffee w/o use of AD  Pt utilized counter top for support as she filled coffee pot with water and poured water into coffee machine  Reamined seated as coffee brewed  No LOB throughout light meal prep task with G safety noted  Kitchen Mobility   Kitchen-Mobility Level   (no AD)   Kitchen Activity Retrieve items;Transport items   Kitchen Mobility Comments Pt engaged in fxnl kitchen mobility w/o AD at IND level to retrieve and transport items from around kitchen environment  Pt able to recall recommendation to keep freq used items within reach to dec risk of falls  G safety noted with pt's positioning prior to retrieving indicated items  Light Housekeeping   Light Housekeeping Level   (no AD)   Light Housekeeping Level of Assistance Independent   Light Housekeeping Engaged in simulated laundry task req pt to retrieve laundry items from around OT gym and place items in washer/dryer units  Once items were complete with "washing/drying" pt removed items from dryer and placed in laundry basket to transport light load of laundry to deisgnated area to simulate home routine  Pt performed simulated laundry task at IND level w/o use of AD  Therapeutic Excerise-Strength   UE Strength Yes   Right Upper Extremity- Strength   R Shoulder Flexion; Extension   R Elbow Elbow flexion   R Position Standing;Seated   Equipment Dumbbell  (2#)   R Weight/Reps/Sets 2 x 15   RUE Strength Comment Engaged in UB TE utilizing 2# dumbbell to perform 2 sets of 15 reps of indicated planes  First set completed seated, with 2nd set performed in stance to improve overall activity tolerance and fxnl standing balance  Pt tolerated well with rest breaks between each set  Pt verbalized being given BUE HEP in preparation for D/C  Left Upper Extremity-Strength   L Shoulder Flexion; Extension   L Elbow Elbow flexion;Elbow extension   L Position Seated   Equipment Dumbell  (2#)   L Weights/Reps/Sets 2 x 15   LUE Strength Comment See above     Cognition   Overall Cognitive Status Impaired   Arousal/Participation Alert; Cooperative   Attention Attends with cues to redirect   Orientation Level Oriented X4   Memory Decreased short term memory   Following Commands Follows multistep commands with increased time or repetition   Activity Tolerance   Activity Tolerance Patient tolerated treatment well   Assessment   Treatment Assessment Pt participated in 90 min skilled OT Tx session with focus on IADL management and progressing to IRP's  See above for further Tx details  Pt has achieved OT goals of IND for IADL tasks of light meal prep, laundry, and kitchen mobility  Pt achieved Cholo for med management task during previous OT sessions  Pt assessed for IRP's with pt demo G safety and ability to identify need to ring for assist if pt is feeling dizzy or feeling unsteady  Pt demo G ability to IND don/doff B/L surgical shoes, verbalizing G understanding that surgical shoes must be donned for all transfer's and mobility  OT plan to engage pt in D/C ADL during upcoming Tx session with focus on pt achieving Cholo goals  Anticipated D/C scheduled for 6/15 with recommendation for Home OT/PT services  Prognosis Good   Problem List Decreased strength;Decreased endurance; Impaired balance   Barriers to Discharge Inaccessible home environment;Decreased caregiver support   Plan   Treatment/Interventions ADL retraining;Functional transfer training; Therapeutic exercise; Endurance training   Progress Progressing toward goals   Recommendation   OT Discharge Recommendation Home with home health rehabilitation   OT - OK to Discharge No   OT Therapy Minutes   OT Time In 1230   OT Time Out 1400   OT Total Time (minutes) 90   OT Mode of treatment - Individual (minutes) 90   OT Mode of treatment - Concurrent (minutes) 0   OT Mode of treatment - Group (minutes) 0   OT Mode of treatment - Co-treat (minutes) 0   OT Mode of Treatment - Total time(minutes) 90 minutes   OT Cumulative Minutes 840   Therapy Time missed   Time missed?  No

## 2022-06-13 NOTE — PROGRESS NOTES
[facility]  Progress Note - Guerda Castillo 1945, 64 y o  female MRN: [id number]  Unit/Bed#: -01 Encounter: [id number]  Primary Care Provider: Shruti Haywood,    Date and time admitted to hospital: 6/2/2022  2:47 PM    Osteoporosis  Assessment & Plan  DXA scan on 5/13/22 showed osteoporosis  Started on Vitamin D 2000u daily and calcium 1000mg daily  Vitamin D level 31 3 on 6/6  Follow-up with PCP or Rheumatology at discharge  Fracture, toe  Assessment & Plan  L foot XR: acute appearing minimally displaced oblique fracture involving medial base of the proximal phalanx of the 1st digit  Podiatry consulted in acute setting - recommended WBAT and surgical shoes to both feet with ambulation  Follow-up with Podiatry as outpatient  Verified surgical shoes with Dr John Gregory - should continue for 3 weeks after XR on 5/26  Repeat XR in 3 weeks (6/16)  Weakness of both lower extremities  Assessment & Plan  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  CK 6976 on admission - currently 39  Simvastatin was stopped and currently on hold  Per Rheumatology - presentation not consistent with inflammatory myositis  MRI L spine showed severe facet degenerative change at L4-L5 resulting in moderate central stenosis, moderate R foramina narrowing, and moderate to severe R lateral recess stenosis  JONATHON on 5/31 was WNL  Venous duplex on 5/26 negative for DVTs  Myositis panel pending  Primary team following  PT/OT  Elevated liver enzymes  Assessment & Plan  Likely due to recent rhabdomyolysis  AST 32 and ALT 51 (from 63 and 110 respectively)  Continue to trend with routine CMP  HLD (hyperlipidemia)  Assessment & Plan  Last lipid panel on 3/31/22: Cholesterol 169, Triglycerides 69, HDL 87, and LDL 68  Hold statin at this time due to recent rhabdomyolysis  Essential hypertension  Assessment & Plan  Continue home regimen: Cardizem CD 240mg daily    Monitor BP with routine VS   Follow-up with PCP as outpatient  Psoriatic arthritis ([facility] [address] )  Assessment & Plan  Home regimen: methotrexate 17 5mg on   Per Rheumatology - continue to hold until outpatient follow-up with Rheumatologist (Dr Nicki Vanessa)  History of TIA (transient ischemic attack)  Assessment & Plan  Hx of TIA without any deficits  Continue Plavix 75mg daily  Statin on hold  * Rhabdomyolysis  Assessment & Plan  Likely due to statin  Simvastatin currently on hold  CK 6976 on admission - currently 39  Improved with IV fluids in acute setting  Continue to trend with routine labs  Per Rheumatology - presentation not consistent with inflammatory myositis  Rheumatology recommended holding methotrexate until further follow-up with outpatient Rheumatologist (Dr Nicki Vanessa)  Ensure adequate hydration  Continue PT/OT  VTE Pharmacologic Prophylaxis:   Pharmacologic: Enoxaparin (Lovenox)  Mechanical VTE Prophylaxis in Place: Yes - sequential compression devices  Current Length of Stay: 11 day(s)    Current Patient Status: Inpatient Rehab     Discharge Plan: As per primary team     Code Status: Level 1 - Full Code    Subjective:   Pt examined while pt sitting in recliner in pt room  Currently denies any pain  Sleeping well at night  Denies any weakness to her lower extremities  Denies any lightheadedness, dizziness, SOB, or palpitations  Plans for discharge later this week  Currently has no questions in regards to her medications or discharge  Objective:     Vitals:   Temp (24hrs), Av 4 °F (36 9 °C), Min:98 2 °F (36 8 °C), Max:98 7 °F (37 1 °C)    Temp:  [98 2 °F (36 8 °C)-98 7 °F (37 1 °C)] 98 3 °F (36 8 °C)  HR:  [51-58] 58  Resp:  [16] 16  BP: (117-133)/(56-60) 133/58  SpO2:  [95 %-96 %] 95 %  Body mass index is 18 53 kg/m²  Review of Systems   Constitutional: Negative for appetite change, chills, fatigue and fever  HENT: Negative for trouble swallowing      Eyes: Negative for visual disturbance  Respiratory: Negative for cough, shortness of breath, wheezing and stridor  Cardiovascular: Negative for chest pain, palpitations and leg swelling  Gastrointestinal: Negative for abdominal distention, abdominal pain, constipation, diarrhea, nausea and vomiting  LBM 6/11   Genitourinary: Negative for difficulty urinating  Musculoskeletal: Negative for arthralgias and back pain  Neurological: Negative for dizziness, weakness, light-headedness, numbness and headaches  Psychiatric/Behavioral: Negative for sleep disturbance  All other systems reviewed and are negative  Input and Output Summary (last 24 hours): Intake/Output Summary (Last 24 hours) at 6/13/2022 0937  Last data filed at 6/13/2022 0802  Gross per 24 hour   Intake 660 ml   Output --   Net 660 ml       Physical Exam:     Physical Exam  Vitals and nursing note reviewed  Constitutional:       Appearance: Normal appearance  HENT:      Head: Normocephalic and atraumatic  Cardiovascular:      Rate and Rhythm: Normal rate and regular rhythm  Pulses: Normal pulses  Heart sounds: Normal heart sounds  No murmur heard  No friction rub  Pulmonary:      Effort: Pulmonary effort is normal  No respiratory distress  Breath sounds: Normal breath sounds  No wheezing or rhonchi  Abdominal:      General: Abdomen is flat  Bowel sounds are normal  There is no distension  Palpations: Abdomen is soft  Tenderness: There is no abdominal tenderness  Musculoskeletal:      Cervical back: Normal range of motion and neck supple  Right lower leg: No edema  Left lower leg: No edema  Skin:     General: Skin is warm and dry  Neurological:      Mental Status: She is alert and oriented to person, place, and time     Psychiatric:         Mood and Affect: Mood normal          Behavior: Behavior normal          Additional Data:     Labs:    Results from last 7 days   Lab Units 06/13/22  0404   WBC Thousand/uL 6 72   HEMOGLOBIN g/dL 14 6   HEMATOCRIT % 46 4*   PLATELETS Thousands/uL 210   NEUTROS PCT % 40*   LYMPHS PCT % 44   MONOS PCT % 13*   EOS PCT % 2     Results from last 7 days   Lab Units 06/13/22  0404   SODIUM mmol/L 138   POTASSIUM mmol/L 3 6   CHLORIDE mmol/L 103   CO2 mmol/L 29   BUN mg/dL 12   CREATININE mg/dL 0 64   ANION GAP mmol/L 6   CALCIUM mg/dL 8 6   GLUCOSE RANDOM mg/dL 83                       Labs reviewed    Imaging:    Imaging reviewed    Recent Cultures (last 7 days):           Last 24 Hours Medication List:   Current Facility-Administered Medications   Medication Dose Route Frequency Provider Last Rate    acetaminophen  650 mg Oral Q6H PRN Johnny Paiz MD      aluminum-magnesium hydroxide-simethicone  30 mL Oral Q6H PRN Johnny Paiz MD      bisacodyl  10 mg Oral Daily PRN Aiyana Farr MD      calcium carbonate  2 tablet Oral Daily With Breakfast ANNE Carlisle      cholecalciferol  2,000 Units Oral Daily ANNE Carlisle      clopidogrel  75 mg Oral HS Johnny Paiz MD      Diclofenac Sodium  2 g Topical 4x Daily PRN ANNE Carlisle      diltiazem  240 mg Oral Daily Johnny Paiz MD      docusate sodium  100 mg Oral BID Johnny Paiz MD      enoxaparin  40 mg Subcutaneous Q24H [address], CRNP      folic acid  1 mg Oral TID Johnny Paiz MD      latanoprost  1 drop Both Eyes HS Johnny Paiz MD      polyethylene glycol  17 g Oral Daily PRN Johnny Paiz MD      polyethylene glycol  17 g Oral Daily Johnny Paiz MD      senna  1 tablet Oral BID Johnny Paiz MD          M*Modal software was used to dictate this note  It may contain errors with dictating incorrect words or incorrect spelling  Please contact the provider directly with any questions

## 2022-06-13 NOTE — ASSESSMENT & PLAN NOTE
Home regimen: methotrexate 17 5mg on Mondays  Per Rheumatology - continue to hold until outpatient follow-up with Rheumatologist (Dr Omari Tamez)

## 2022-06-13 NOTE — PROGRESS NOTES
Physical Medicine and Rehabilitation Progress Note  Lolly Agosto 64 y o  female MRN: [id number]  Unit/Bed#: [facility] 266-01 Encounter: [id number]      Assessment & Plan:     Decline in ADLs and mobility: Functional assessment - improving         FIM  Care Score  Admit Score Recent Score    Total assist  1-100% or 2p    Tot     (Former TA) 2-76-99%  Sub     Max assist 2-51-75%  Sub     Mod assist 3- 26-50%  Par     Min assist 3- 25% or < Par To hygiene, bathing, dressing     CG assist 4  TA     Supervision 4 Sup  To hygiene, dressing   Set-up  5 NORIEGA     Mod-I/Indep 6 MI      Transfers  Min assist  Mod-indep-supervision     Ambulation   150 ft CGA Mod indep 10 ft Supervision 150 ft     Stairs   Mod assist 4 steps  4 steps supervision      Goal: Mod indep  Major barriers:  Weakness, lack of support at home  Dispo & ELOS: Home with ELOS Wed with  PT, OT, RN, MSW       Weakness of both lower extremities  Assessment & Plan  Continues to improve   - HMG-CoA reductase negative   - MyoMarker 3 still pending - OP follow-up rheum   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  Skilled therapies as outlined  Optimal nutrition and medical mgmt   Fall precautions     Impaired mobility and ADLs  Assessment & Plan  Improving - trial IRP today   Multifactorial: Rhabdo/statin-induced myopathy, foot fracture, deconditioning   - Optimal management of each as listed -   - Continue acute comprehensive interdisciplinary inpatient rehabilitation to include intensive skilled therapies (PT, OT +/- ST) as outlined with oversight and management by rehabilitation physician as well as inpatient rehab level nursing, case management and weekly interdisciplinary team meetings     - Plan for d/c Wed with  PT, OT, RN       Fracture, toe  Assessment & Plan  Denies pain   L 1st prox phalanx fx with minimal displacement  - WABT with sx shoes bilaterally   - Repeat XR 6/16/22  - OP podiatry follow-up     * Rhabdomyolysis  Assessment & Plan  Improved   CK max almost 7000 > trended down and now wnl 6/5   Believed to be 2/2 statin induced - hold statin  Hold MTX per rheum  OP follow-up with PCP and rheum    Mild cognitive impairment  Assessment & Plan  - MOCA 23/30 on 6/10 mostly delayed recall and some on VS/exec  - Able to adequately manage meds per therapy and do basic household iADLs   - Neighbor will do 1-2 times per day check ins on most days   - Patient teaching - patient doing adequately well on med mgmt per tx  - Skilled therapies as outlined   - Compensatory strategies   - Fall precautions  - Optimize oversight after discharge  - OP follow-up with PCP   - Recommend continued HH PT, OT, MSW, RN         Left knee pain  Assessment & Plan  Slightly increased but better than earlier in course  Patient reports chronic bilateral knee and multi-joint pain from RA - recent increased L knee pain since fall PTA  - Off chronic MTX - will follow-up with patient's OP rheum prior to d/c  - Some TTP medial jt line otherwise exam largely unremarkable - likely OA/RA +/- meniscus  - No x-rays in computer - if does not improve or worsens obtain   - Avoid over activity  - Voltaren gel QID  - APAP PRN  - Monitor     At risk for venous thromboembolism (VTE)  Assessment & Plan  SCDs, ambulation, and lovenox     Constipation  Assessment & Plan  - PO dulcolax if does not go soon   - Daily miralax for now  - Colace/Senna BID  - PRN bowel regimen (Miralax PRN/Dulcolax PO PRN)  - monitor for signs and symptoms of obstruction/ileus > not currently; hold stimulant lax if occurs  - Limiting constipating medications if possible  - Ensure adequate hydration       Hypokalemia  Assessment & Plan  K 3 6 on 6/13  IM consulted, monitoring, repletion, and overall management at their discretion during ARC course      Osteoporosis  Assessment & Plan  D3 2000 units qday and calcium   Vit D level 31    Elevated liver enzymes  Assessment & Plan  Possibly 2/2 rhabdo  Resolving - only ALT still elevated but now down to 51 on 6/6    HLD (hyperlipidemia)  Assessment & Plan  Statin on hold   Per IM "Last lipid panel on 3/31/22: Cholesterol 169, Triglycerides 69, HDL 87, and LDL 68"  OP PCP follow-up     Essential hypertension  Assessment & Plan  Internal medicine consulted and co-management with their service  Monitor vitals with and without activity; monitor for orthostasis  Monitor hemoglobin, electrolytes, kidney function, hydration status   Current meds: Cardizem       Psoriatic arthritis ([address] )  Assessment & Plan  OP follow-up with rheum Dr [facility]  - Weekly MTX on hold for now per prior recs - will reach out to rheum prior to d/c   - Monitor for flare   - Discuss with rheum when to restart MTX     History of TIA (transient ischemic attack)  Assessment & Plan  Secondary stroke prevention  - Antithrombotic: plavix  - Holding Statin with recent possible statin induced myopathy/rhabdo  - Optimal management of blood pressure       Other Medical Issues:   None    Follow-up providers and other issues to be followed up after discharge  · PCP  · Rheum    CODE: Level 1: Full Code    Restrictions include: Fall precautions    Objective:     Allergies per EMR  Diagnostic Studies: Reviewed, no new imaging  XR foot 3+ vw left    (Results Pending)     See above as well    Laboratory: Labs reviewed  Results from last 7 days   Lab Units 06/13/22  0404 06/09/22  0451   HEMOGLOBIN g/dL 14 6 13 6   HEMATOCRIT % 46 4* 41 8   WBC Thousand/uL 6 72 5 85     Results from last 7 days   Lab Units 06/13/22  0404 06/09/22  0451   BUN mg/dL 12 10   SODIUM mmol/L 138 136*   POTASSIUM mmol/L 3 6 3 4*   CHLORIDE mmol/L 103 103   CREATININE mg/dL 0 64 0 58*            Drug regimen reviewed, all potential adverse effects identified and addressed:    Scheduled Meds:  Current Facility-Administered Medications   Medication Dose Route Frequency Provider Last Rate    acetaminophen  650 mg Oral Q6H PRN Megha Chilel MD      aluminum-magnesium hydroxide-simethicone  30 mL Oral Q6H PRN Yumiko Sahu MD      bisacodyl  10 mg Oral Daily PRN Masha Lopez MD      bisacodyl  10 mg Oral Once Yumiko Sahu MD      calcium carbonate  2 tablet Oral Daily With Breakfast ANNE Mcdaniels      cholecalciferol  2,000 Units Oral Daily ANNE Mcdaniels      clopidogrel  75 mg Oral HS uYmiko Sahu MD      Diclofenac Sodium  2 g Topical 4x Daily PRN ANNE Mcdaniels      diltiazem  240 mg Oral Daily Yumiko Sahu MD      docusate sodium  100 mg Oral BID Yumiko Sahu MD      enoxaparin  40 mg Subcutaneous Q24H [address], CRNP      folic acid  1 mg Oral TID Yumiko Sahu MD      latanoprost  1 drop Both Eyes HS Yumiko Sahu MD      polyethylene glycol  17 g Oral Daily PRN Yumiko Sahu MD      polyethylene glycol  17 g Oral Daily Yumiko Sahu MD      senna  1 tablet Oral BID Yumiko Sahu MD         Chief Complaints:  Rehab follow-up     Subjective:     Patient reports no new issues this weekend  She reports knees are a little sore but not too bad  Patient reports strength continues to improve nicely  She denies nausea, increased lightheadedness, SOB, abdominal pain, nausea, or other new complaints  ROS: A 10 point ROS was performed; negative except as noted above         Physical Exam:  Vitals:    06/13/22 0802   BP: 133/58   Pulse: 58   Resp:    Temp:    SpO2:    Vitals above reviewed on date of encounter    GEN:  Lying in bed in NAD   HEENT/NECK: MMM  CARDIAC: Regular rate rhythm, no murmers, no rubs, no gallops  LUNGS:  clear to auscultation, no wheezes, rales, or rhonchi  ABDOMEN: Soft, non-tender, non-distended, normal active bowel sounds  EXTREMITIES/SKIN:  no calf edema, no calf tenderness to palpation  NEURO:   MENTAL STATUS:  Appropriate wakefulness and interaction  and Strength/MMT:  5/5 BLE; MSK - bilateral knees without increased edema, warmth, erythema  PSYCH:  Affect:  Euthymic     HPI:  Teodoro Tahyer is a 64 y o  female with PMH psoriatic arthritis, HTN, HL, TIA, who legs gave out from her causing foot pain and she was noted to have significant bilateral LE weakness and was evaluated at the hospital   CK was elevated to close to 7000 and she was initially with acute kidney injury and had elevated LFTs  IVF were provided and CK trended down  X-ray showed displaced oblique fracture involving the medical base of the proximal phalanx of the 1st digit with recommendation by podiatry for WBAT in sx shoes  Neuro had extensive work-up which included MRI L spine which showed moderate central canal stenosis L4-5, Moderate right foraminal narrowing and moderate to sever right lateral recess stenosis  Rheum reviewed case as well  Overall she was not felt to have inflammatory myositis and 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 and statin has been held  She was recommended to hold MTX for now as well  Strength has been improving some  Patient was evaluated by skilled therapies and was found to have significant decline in ADLs and ambulation and [facility] [address]      ** Please Note: Fluency Direct voice to text software may have been used in the creation of this document  **    I personally performed the required components and examined the patient myself in person on 6/13/22

## 2022-06-13 NOTE — PROGRESS NOTES
06/13/22 0830   Pain Assessment   Pain Assessment Tool 0-10   Pain Score No Pain   Restrictions/Precautions   Precautions Fall Risk   LLE Weight Bearing Per Order WBAT   Braces or Orthoses   (post op shoes)   Roll Left and Right   Type of Assistance Needed Independent   Roll Left and Right CARE Score 6   Sit to Lying   Type of Assistance Needed Independent   Sit to Lying CARE Score 6   Lying to Sitting on Side of Bed   Type of Assistance Needed Independent   Lying to Sitting on Side of Bed CARE Score 6   Sit to Stand   Type of Assistance Needed Independent   Comment no device   Sit to Stand CARE Score 6   Bed-Chair Transfer   Type of Assistance Needed Independent   Comment no device   Chair/Bed-to-Chair Transfer CARE Score 6   Car Transfer   Reason if not Attempted Environmental limitations   Car Transfer CARE Score 10   Walk 10 Feet   Type of Assistance Needed Independent   Comment no device   Walk 10 Feet CARE Score 6   Walk 50 Feet with Two Turns   Type of Assistance Needed Supervision   Comment DS no device   Walk 50 Feet with Two Turns CARE Score 4   Walk 150 Feet   Type of Assistance Needed Supervision   Comment no device   Walk 150 Feet CARE Score 4   Walking 10 Feet on Uneven Surfaces   Type of Assistance Needed Supervision   Comment no device   Walking 10 Feet on Uneven Surfaces CARE Score 4   Ambulation   Does the patient walk? 2  Yes   Primary Mode of Locomotion Prior to Admission Walk   Distance Walked (feet) 250 ft  (175, 150x2  no device)   Findings DS for ambulation for longer hallway walking,  Performed multiple 10x4 chair- chair  at independent level without device, no lob noted   Wheel 50 Feet with Two Turns   Reason if not Attempted Activity not applicable   Wheel 50 Feet with Two Turns CARE Score 9   Wheel 150 Feet   Reason if not Attempted Activity not applicable   Wheel 657 Feet CARE Score 9   Wheelchair mobility   Does the patient use a wheelchair? 0   No   Curb or Single Stair   Style negotiated Single stair   Type of Assistance Needed Supervision   Comment 1 HR   1 Step (Curb) CARE Score 4   4 Steps   Type of Assistance Needed Supervision   Comment bilat HR   4 Steps CARE Score 4   Picking Up Object   Comment (S)  assess tomorrow   Therapeutic Interventions   Strengthening HEP- seated LAQ, ankle PF/ DF,  Hip add isometric, hip flex march,  Supine bridges, sidelying clamshells arom,  Standing Hip abd arom,  10x3   Balance Amb 150 feet ball toss/ catch (close S),  Amb 150 feet with head turns on command and pivot walk other direction on command (S ) level,   Equipment Use   NuStep 5min warm up SPM 50   Assessment   Treatment Assessment 90 min session focused on short distances progressing to independent level, and performed more DS with longer distance  Provided pt with HEP sheet and performed - she appeared safe with std ex  Progressing to independent in room- ed on not moving furniture and ring if something is not set up in room  Cont to assess care scores and HEP tomorrow  PT Barriers   Physical Impairment Decreased strength;Decreased endurance; Impaired balance;Decreased mobility   Plan   Progress Progressing toward goals   Recommendation   PT Discharge Recommendation Home with home health rehabilitation   PT Therapy Minutes   PT Time In 0830   PT Time Out 1000   PT Total Time (minutes) 90   PT Mode of treatment - Individual (minutes) 90   PT Mode of treatment - Concurrent (minutes) 0   PT Mode of treatment - Group (minutes) 0   PT Mode of treatment - Co-treat (minutes) 0   PT Mode of Treatment - Total time(minutes) 90 minutes   PT Cumulative Minutes 855   Therapy Time missed   Time missed?  No

## 2022-06-13 NOTE — ASSESSMENT & PLAN NOTE
L foot XR: acute appearing minimally displaced oblique fracture involving medial base of the proximal phalanx of the 1st digit  Podiatry consulted in acute setting - recommended WBAT and surgical shoes to both feet with ambulation  Follow-up with Podiatry as outpatient  Verified surgical shoes with Dr Olsen Rule - should continue for 3 weeks after XR on 5/26  Repeat XR in 3 weeks (6/16)

## 2022-06-13 NOTE — PLAN OF CARE
Problem: GASTROINTESTINAL - ADULT  Goal: Oral mucous membranes remain intact  Description: INTERVENTIONS  - Assess oral mucosa and hygiene practices  - Implement preventative oral hygiene regimen  - Implement oral medicated treatments as ordered  - Initiate Nutrition services referral as needed  Outcome: Completed     Problem: GENITOURINARY - ADULT  Goal: Absence of urinary retention  Description: INTERVENTIONS:  - Assess patients ability to void and empty bladder  - Monitor I/O  - Bladder scan as needed  - Discuss with physician/AP medications to alleviate retention as needed  - Discuss catheterization for long term situations as appropriate  Outcome: Completed     Problem: GASTROINTESTINAL - ADULT  Goal: Maintains adequate nutritional intake  Description: INTERVENTIONS:  - Monitor percentage of each meal consumed  - Identify factors contributing to decreased intake, treat as appropriate  - Assist with meals as needed  - Monitor I&O, weight, and lab values if indicated  - Obtain nutrition services referral as needed  Outcome: Progressing

## 2022-06-14 ENCOUNTER — APPOINTMENT (INPATIENT)
Dept: RADIOLOGY | Facility: HOSPITAL | Age: 77
DRG: 558 | End: 2022-06-14
Payer: MEDICARE

## 2022-06-14 PROCEDURE — 97530 THERAPEUTIC ACTIVITIES: CPT

## 2022-06-14 PROCEDURE — 97116 GAIT TRAINING THERAPY: CPT

## 2022-06-14 PROCEDURE — 73630 X-RAY EXAM OF FOOT: CPT

## 2022-06-14 PROCEDURE — 97112 NEUROMUSCULAR REEDUCATION: CPT

## 2022-06-14 PROCEDURE — 97110 THERAPEUTIC EXERCISES: CPT

## 2022-06-14 PROCEDURE — 99232 SBSQ HOSP IP/OBS MODERATE 35: CPT | Performed by: NURSE PRACTITIONER

## 2022-06-14 PROCEDURE — 99232 SBSQ HOSP IP/OBS MODERATE 35: CPT

## 2022-06-14 PROCEDURE — 97535 SELF CARE MNGMENT TRAINING: CPT

## 2022-06-14 RX ADMIN — DOCUSATE SODIUM 100 MG: 100 CAPSULE, LIQUID FILLED ORAL at 08:42

## 2022-06-14 RX ADMIN — CLOPIDOGREL BISULFATE 75 MG: 75 TABLET ORAL at 21:44

## 2022-06-14 RX ADMIN — DILTIAZEM HYDROCHLORIDE 240 MG: 120 CAPSULE, COATED, EXTENDED RELEASE ORAL at 08:42

## 2022-06-14 RX ADMIN — FOLIC ACID 1 MG: 1 TABLET ORAL at 17:13

## 2022-06-14 RX ADMIN — CALCIUM 2 TABLET: 500 TABLET ORAL at 08:42

## 2022-06-14 RX ADMIN — FOLIC ACID 1 MG: 1 TABLET ORAL at 08:42

## 2022-06-14 RX ADMIN — POLYETHYLENE GLYCOL 3350 17 G: 17 POWDER, FOR SOLUTION ORAL at 08:41

## 2022-06-14 RX ADMIN — BISACODYL 10 MG: 5 TABLET, COATED ORAL at 08:42

## 2022-06-14 RX ADMIN — SENNOSIDES 8.6 MG: 8.6 TABLET, FILM COATED ORAL at 08:42

## 2022-06-14 RX ADMIN — Medication 2 G: at 08:43

## 2022-06-14 RX ADMIN — LATANOPROST 1 DROP: 50 SOLUTION OPHTHALMIC at 21:44

## 2022-06-14 RX ADMIN — FOLIC ACID 1 MG: 1 TABLET ORAL at 21:44

## 2022-06-14 RX ADMIN — ACETAMINOPHEN 650 MG: 325 TABLET ORAL at 08:42

## 2022-06-14 RX ADMIN — ENOXAPARIN SODIUM 40 MG: 100 INJECTION SUBCUTANEOUS at 08:42

## 2022-06-14 RX ADMIN — Medication 2000 UNITS: at 08:42

## 2022-06-14 RX ADMIN — SENNOSIDES 8.6 MG: 8.6 TABLET, FILM COATED ORAL at 17:13

## 2022-06-14 RX ADMIN — DOCUSATE SODIUM 100 MG: 100 CAPSULE, LIQUID FILLED ORAL at 17:13

## 2022-06-14 NOTE — PROGRESS NOTES
Physical Medicine and Rehabilitation Progress Note  Bettye Saint 64 y o  female MRN: [id number]  Unit/Bed#: -01 Encounter: [id number]      Assessment & Plan:     Decline in ADLs and mobility: Functional assessment - improved significantly        FIM  Care Score  Admit Score Recent Score    Total assist  1-100% or 2p    Tot     (Former TA) 2-76-99%  Sub     Max assist 2-51-75%  Sub     Mod assist 3- 26-50%  Par     Min assist 3- 25% or < Par To hygiene, bathing, dressing     CG assist 4  TA     Supervision 4 Sup     Set-up  5 NORIEGA     Mod-I/Indep 6 MI  ADLs    Transfers  Min assist  Mod-indep-supervision     Ambulation   150 ft CGA Mod indep 10 ft Supervision 150 ft     Stairs   Mod assist 4 steps  4 steps supervision      Goal: Mod indep  Major barriers:  Weakness, lack of support at home  Dispo & ELOS: Home with ELOS Wed with  PT, OT, RN, MSW       Weakness of both lower extremities  Assessment & Plan  Continues to improve   - HMG-CoA reductase negative   - MyoMarker 3 still pending - OP follow-up rheum   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  Skilled therapies as outlined  Optimal nutrition and medical mgmt   Fall precautions     Impaired mobility and ADLs  Assessment & Plan  Improving - tolerating IRP  Multifactorial: Rhabdo/statin-induced myopathy, foot fracture, deconditioning   - Optimal management of each as listed -   - Completing acute comprehensive interdisciplinary inpatient rehabilitation to include intensive skilled therapies (PT, OT +/- ST) as outlined with oversight and management by rehabilitation physician as well as inpatient rehab level nursing, case management and weekly interdisciplinary team meetings     - Per tx - patient should be able to perform cooking, laundry, med mgmt; neighbor will assist with groceries and driving and CM give her info on [facility] for d/c Wed with  PT, OT, RN, MSW      Fracture, toe  Assessment & Plan  Denies pain   L 1st prox phalanx fx with minimal displacement  - WABT with sx shoes bilaterally   - Obtain follow-up XR 6/14 prior to d/c and follow-up with pods here first   - OP podiatry follow-up     * Rhabdomyolysis  Assessment & Plan  Improved   CK max almost 7000 > trended down and now wnl 6/5   Believed to be 2/2 statin induced - hold statin  Hold MTX per rheum  OP follow-up with PCP and rheum    Mild cognitive impairment  Assessment & Plan  - MOCA 23/30 on 6/10 mostly delayed recall and some on VS/exec  - Able to adequately manage meds per therapy and do basic household iADLs   - Neighbor will do 1-2 times per day check ins on most days   - Patient teaching - patient doing adequately well on med mgmt per tx  - Skilled therapies as outlined   - Compensatory strategies   - Fall precautions  - Optimize oversight after discharge  - OP follow-up with PCP   - Recommend continued HH PT, OT, MSW, RN         Left knee pain  Assessment & Plan  Slightly increased in bilateral knees but no acute swelling, erythema, increased warmth   - MTX had been held acutely but discussed with IM who spoke with OP rheum and patient should be able to resume prior MTx at this time  Patient reports chronic bilateral knee and multi-joint pain from RA - recent increased L knee pain since fall PTA  - Some TTP medial jt line otherwise exam largely unremarkable - likely OA/RA +/- meniscus  - No x-rays in computer - if does not improve or worsens obtain   - Avoid over activity  - Voltaren gel QID  - APAP PRN  - Monitor     At risk for venous thromboembolism (VTE)  Assessment & Plan  SCDs, ambulation, and lovenox     Constipation  Assessment & Plan  Improved with PO dulcolax   - Daily miralax for now  - Colace/Senna BID  - PRN bowel regimen (Miralax PRN/Dulcolax PO PRN)  - monitor for signs and symptoms of obstruction/ileus > not currently; hold stimulant lax if occurs  - Limiting constipating medications if possible  - Ensure adequate hydration       Hypokalemia  Assessment & Plan  K 3 6 on 6/13  IM consulted, monitoring, repletion, and overall management at their discretion during ARC course      Osteoporosis  Assessment & Plan  D3 2000 units qday and calcium   Vit D level 31    Elevated liver enzymes  Assessment & Plan  Possibly 2/2 rhabdo  Resolving - only ALT still elevated but now down to 51 on 6/6    HLD (hyperlipidemia)  Assessment & Plan  Statin on hold   Per IM "Last lipid panel on 3/31/22: Cholesterol 169, Triglycerides 69, HDL 87, and LDL 68"  OP PCP follow-up     Essential hypertension  Assessment & Plan  Internal medicine consulted and co-management with their service  Monitor vitals with and without activity; monitor for orthostasis  Monitor hemoglobin, electrolytes, kidney function, hydration status   Current meds: Cardizem       Psoriatic arthritis ([address] )  Assessment & Plan  OP follow-up with rheum Dr Ramandeep Grajeda  - Weekly MTX can be resumed next Monday - IM discussed with OP rheum Dr Ramandeep Grajeda 6/14   - Monitor for flare       History of TIA (transient ischemic attack)  Assessment & Plan  Secondary stroke prevention  - Antithrombotic: plavix  - Holding Statin with recent possible statin induced myopathy/rhabdo  - Optimal management of blood pressure       Other Medical Issues:   None    Follow-up providers and other issues to be followed up after discharge  · PCP  · Rheum    CODE: Level 1: Full Code    Restrictions include: Fall precautions    Objective:     Allergies per EMR  Diagnostic Studies: Reviewed, no new imaging  XR foot 3+ vw left    (Results Pending)     See above as well    Laboratory: Labs reviewed  Results from last 7 days   Lab Units 06/13/22  0404 06/09/22  0451   HEMOGLOBIN g/dL 14 6 13 6   HEMATOCRIT % 46 4* 41 8   WBC Thousand/uL 6 72 5 85     Results from last 7 days   Lab Units 06/13/22  0404 06/09/22  0451   BUN mg/dL 12 10   SODIUM mmol/L 138 136*   POTASSIUM mmol/L 3 6 3 4*   CHLORIDE mmol/L 103 103   CREATININE mg/dL 0 64 0 58*            Drug regimen reviewed, all potential adverse effects identified and addressed:    Scheduled Meds:  Current Facility-Administered Medications   Medication Dose Route Frequency Provider Last Rate    acetaminophen  650 mg Oral Q6H PRN Reymundo Thompson MD      aluminum-magnesium hydroxide-simethicone  30 mL Oral Q6H PRN Reymundo Thompson MD      bisacodyl  10 mg Oral Daily PRN Nelsy Meng MD      bisacodyl  10 mg Oral Once Reymundo Thompson MD      calcium carbonate  2 tablet Oral Daily With Breakfast Meriam Blotter, CRNP      cholecalciferol  2,000 Units Oral Daily Meriam Blotter, CRNP      clopidogrel  75 mg Oral HS Reymundo Thompson MD      Diclofenac Sodium  2 g Topical 4x Daily PRN Garrym ANNE Khan      diltiazem  240 mg Oral Daily Reymundo Thompson MD      docusate sodium  100 mg Oral BID Reymundo Thompson MD      enoxaparin  40 mg Subcutaneous Q24H [address], CRNP      folic acid  1 mg Oral TID Reymundo Thompson MD      latanoprost  1 drop Both Eyes HS Reymundo Thompson MD      polyethylene glycol  17 g Oral Daily PRN Reymundo Thompson MD      polyethylene glycol  17 g Oral Daily Reymundo Thompson MD      senna  1 tablet Oral BID Reymundo Thompson MD         Chief Complaints:  Rehab follow-up     Subjective:     Patient reports still some bilateral knee pain but not too bad  She denies worsening weakness  She reports occasional lightheadedness when she first sits up but this is chronic and stable and tolerable  Patient denies fever, chills, increased swelling, calf pain, or other new complaints  ROS: A 10 point ROS was performed; negative except as noted above         Physical Exam:  Vitals:    06/14/22 1000   BP: 156/68   Pulse: 59   Resp:    Temp:    SpO2:        GEN:  Lying in bed in NAD   HEENT/NECK: MMM  CARDIAC: Regular rate rhythm, no murmers, no rubs, no gallops  LUNGS:  clear to auscultation, no wheezes, rales, or rhonchi  ABDOMEN: Soft, non-tender, non-distended, normal active bowel sounds  EXTREMITIES/SKIN:  no calf edema, no calf tenderness to palpation  NEURO:   MENTAL STATUS:  Appropriate wakefulness and interaction  and Strength/MMT:  5/5 BLE; MSK - bilateral knees without increased edema, warmth, erythema - again today  PSYCH:  Affect:  Euthymic     HPI:  Artur Sierra is a 64 y o  female with PMH psoriatic arthritis, HTN, HL, TIA, who legs gave out from her causing foot pain and she was noted to have significant bilateral LE weakness and was evaluated at the hospital   CK was elevated to close to 7000 and she was initially with acute kidney injury and had elevated LFTs  IVF were provided and CK trended down  X-ray showed displaced oblique fracture involving the medical base of the proximal phalanx of the 1st digit with recommendation by podiatry for WBAT in sx shoes  Neuro had extensive work-up which included MRI L spine which showed moderate central canal stenosis L4-5, Moderate right foraminal narrowing and moderate to sever right lateral recess stenosis  Rheum reviewed case as well  Overall she was not felt to have inflammatory myositis and 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

## 2022-06-14 NOTE — PLAN OF CARE
Problem: Nutrition/Hydration-ADULT  Goal: Nutrient/Hydration intake appropriate for improving, restoring or maintaining nutritional needs  Description: Monitor and assess patient's nutrition/hydration status for malnutrition  Collaborate with interdisciplinary team and initiate plan and interventions as ordered  Monitor patient's weight and dietary intake as ordered or per policy  Utilize nutrition screening tool and intervene as necessary  Determine patient's food preferences and provide high-protein, high-caloric foods as appropriate       INTERVENTIONS:  - Monitor oral intake, urinary output, labs, and treatment plans  - Assess nutrition and hydration status and recommend course of action  - Evaluate amount of meals eaten  - Assist patient with eating if necessary   - Allow adequate time for meals  - Recommend/ encourage appropriate diets, oral nutritional supplements, and vitamin/mineral supplements  - Order, calculate, and assess calorie counts as needed  - Recommend, monitor, and adjust tube feedings and TPN/PPN based on assessed needs  - Assess need for intravenous fluids  - Provide specific nutrition/hydration education as appropriate  - Include patient/family/caregiver in decisions related to nutrition  Outcome: Progressing     Problem: MUSCULOSKELETAL - ADULT  Goal: Maintain or return mobility to safest level of function  Description: INTERVENTIONS:  - Assess patient's ability to carry out ADLs; assess patient's baseline for ADL function and identify physical deficits which impact ability to perform ADLs (bathing, care of mouth/teeth, toileting, grooming, dressing, etc )  - Assess/evaluate cause of self-care deficits   - Assess range of motion  - Assess patient's mobility  - Assess patient's need for assistive devices and provide as appropriate  - Encourage maximum independence but intervene and supervise when necessary  - Involve family in performance of ADLs  - Assess for home care needs following discharge   - Consider OT consult to assist with ADL evaluation and planning for discharge  - Provide patient education as appropriate  Outcome: Progressing     Problem: INFECTION - ADULT  Goal: Absence or prevention of progression during hospitalization  Description: INTERVENTIONS:  - Assess and monitor for signs and symptoms of infection  - Monitor lab/diagnostic results  - Monitor all insertion sites, i e  indwelling lines, tubes, and drains  - Monitor endotracheal if appropriate and nasal secretions for changes in amount and color  - [place] appropriate cooling/warming therapies per order  - Administer medications as ordered  - Instruct and encourage patient and family to use good hand hygiene technique  - Identify and instruct in appropriate isolation precautions for identified infection/condition  Outcome: Progressing

## 2022-06-14 NOTE — ASSESSMENT & PLAN NOTE
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

## 2022-06-14 NOTE — PROGRESS NOTES
06/14/22 1000   Pain Assessment   Pain Score No Pain   Restrictions/Precautions   Precautions Fall Risk   Weight Bearing Restrictions Yes   LLE Weight Bearing Per Order WBAT   ROM Restrictions No   Braces or Orthoses   (B/L surgical shoes (3 weeks))   Lifestyle   Autonomy "I am looking forward to going home and slowly getting back to shopping, going to my appointments, and driving"   Oral Hygiene   Type of Assistance Needed Independent   Comment IND in stance at sink w/o AD, pt able to perform oral hygiene   Oral Hygiene CARE Score 6   Grooming   Able To Comb/Brush Hair;Brush/Clean Teeth   Limitation Noted In Strength;Timeliness   Findings IND seated EOB, pt able to brush hair  IND in stance at sink to brush teeth   Shower/Bathe Self   Type of Assistance Needed Independent   Comment IND seated, pt able to wash 10/10 body parts  Pt utilizing x-legged method for washing feet  In stance, pt able to wash ashley/buttock areas with no LOB observed   Shower/Bathe Self CARE Score 6   Bathing   Assessed Bath Style Shower   Anticipated D/C Bath Style Shower   Able to [place] Yes   Able to [employer] Temperature Yes   Able to Wash/Rinse/Dry (body part) Left Arm;Right Arm;L Upper Leg;L Lower Leg/Foot;R Upper Leg;R Lower Leg/Foot;Chest;Abdomen;Perineal Area; Buttocks   Limitations Noted in Balance; Endurance;Strength   Positioning Seated;Standing   Adaptive Equipment Shower Seat   Tub/Shower Transfer   Limitations Noted In Balance; Endurance   Adaptive Equipment Seat with Back   Assessed Shower   Findings IND w/ no AD   Upper Body Dressing   Type of Assistance Needed Independent   Comment IND seated pt able to doff and don pullover shirt   Upper Body Dressing CARE Score 6   Lower Body Dressing   Type of Assistance Needed Independent   Comment IND seated pt able to doff undergarment as well as thread BLE with undergarment and pants  In stance w/ no AD, pt able to perform CM with no LOB observed     Lower Body Dressing CARE Score 6   Putting On/Taking Off Footwear   Type of Assistance Needed Independent   Comment IND seated pt able to doff/don socks and surgical shoes utilizing x-legged method  Putting On/Taking Off Footwear CARE Score 6   Sit to Lying   Type of Assistance Needed Independent   Comment HOB flat   Sit to Lying CARE Score 6   Lying to Sitting on Side of Bed   Type of Assistance Needed Independent   Comment HOB flat   Lying to Sitting on Side of Bed CARE Score 6   Sit to Stand   Type of Assistance Needed Independent   Comment IND w/ no AD   Sit to Stand CARE Score 6   Bed-Chair Transfer   Type of Assistance Needed Independent   Comment IND w/ no AD   Chair/Bed-to-Chair Transfer CARE Score 6   Transfer Bed/Chair/Wheelchair   Limitations Noted In Balance; Endurance;UE Strength;LE Strength   Adaptive Equipment None   Toileting Hygiene   Type of Assistance Needed Independent   Comment IND w/ no AD, pt able to perform toileting hygiene and CM   Toileting Hygiene CARE Score 6   Toileting   Able to [address] down yes, up yes  Able to Manage Clothing Closures Yes   Manage Hygiene Bladder   Limitations Noted In Balance;ROM;UE Strength;LE Strength   Toilet Transfer   Type of Assistance Needed Independent   Comment IND w/ no AD   Toilet Transfer CARE Score 6   Toilet Transfer   Surface Assessed Standard Toilet   Transfer Technique Standard   Limitations Noted In Balance; Endurance;UE Strength;LE Strength   Therapeutic Excerise-Strength   UE Strength Yes   Right Upper Extremity- Strength   R Shoulder Flexion; Extension;ABduction;Horizontal ABduction   R Elbow Elbow flexion;Elbow extension   R Position Seated   Equipment Theraband  (yellow)   R Weight/Reps/Sets 2 x 15   RUE Strength Comment Reviewed UE HEP utilizing yellow theraband to maintain and inc strength to improve fxl performance in ADLs, IADLs, and fxl transfers  Pt demonstrating good recall of exercises and able to perform exercises with no VC    Pt tolerated exercises well with rest breaks between sets completing 2 x 15 with yellow theraband  Left Upper Extremity-Strength   LUE Strength Comment see above   Cognition   Overall Cognitive Status Impaired   Arousal/Participation Alert; Cooperative   Attention Attends with cues to redirect   Orientation Level Oriented X4   Memory Decreased short term memory   Following Commands Follows multistep commands with increased time or repetition   Additional Activities   Additional Activities   (balance)   Additional Activities Comments Pt engaged in dynamic standing balance activity to inc fxl performance in ADLs and IADLs  Pt stood unsupported and overhand threw golf sized balls to target  Pt performed 3 trials with 12 balls per trial   Pt demonstrated LOB in first trial, but able to regain balance by reaching for table for steadiness  There was no other LOB observed in the remaining trials  Pt tolerated activity well with rest breaks between each trial    Activity Tolerance   Activity Tolerance Patient tolerated treatment well   Assessment   Treatment Assessment Pt engaged in 90 min skilled OT services with focus on ADLs including bathing, dressing, oral hygiene, and toileting, as well as, UE strengthening and dynamic standing balance  Pt tolerated session well  Pt demonstrated good recall of UE HEP given in previous session  Pt has progressed and met all goals and is performing at a Mod I level  Pt is ready for DC on 6/15/22 and will be receiving home health services for OT and PT  Prognosis Good   Problem List Decreased strength;Decreased endurance; Impaired balance   Barriers to Discharge Inaccessible home environment;Decreased caregiver support   Barriers to Discharge Comments lives alone, BISI   Recommendation   OT Discharge Recommendation Home with home health rehabilitation   Equipment Recommended Shower/Tub chair with back ($)  (life alert/emergency prepardness)   OT Equipment ordered life alert/fast help luz emailed to aby Hobbs   OT - OK to Discharge Yes   Discharge Summary anticipated DC 6/15/22   OT Therapy Minutes   OT Time In 1000   OT Time Out 1130   OT Total Time (minutes) 90   OT Mode of treatment - Individual (minutes) 0   OT Mode of treatment - Concurrent (minutes) 90   OT Mode of treatment - Group (minutes) 0   OT Mode of treatment - Co-treat (minutes) 0   OT Mode of Treatment - Total time(minutes) 90 minutes   OT Cumulative Minutes 930   Therapy Time missed   Time missed?  No     Barthel Index Score (IE 6/3/22)  Feeding- 10/10  Bathing- 0/5  Grooming- 0/5  Dressing- 5/10  Bowels- 10/10  Bladder- 10/10  Toilet use- 5/10  Transfers-10/15  Mobility- 10/15  Stairs-5/10     Total- 65/100  (0-20 total dependence) (21-60 severe dependence) (61-90 moderate dependence) (91-99 slight dependence) (100 independent)    Barthel Index Score (6/14/22)  Feeding- 10/10  Bathing- 5/5  Grooming- 5/5  Dressing- 10/10  Bowels- 10/10  Bladder- 10/10  Toilet use- 10/10  Transfers-15/15  Mobility- 15/15  Stairs-10/10    Total- 100/100  (0-20 total dependence) (21-60 severe dependence) (61-90 moderate dependence) (91-99 slight dependence) (100 independent)

## 2022-06-14 NOTE — PROGRESS NOTES
[facility]  Progress Note - Jimena Velazco 1945, 64 y o  female MRN: [id number]  Unit/Bed#: [facility] 331-31 Encounter: [id number]  Primary Care Provider: Ariela Lopez DO   Date and time admitted to hospital: 6/2/2022  2:47 PM    Osteoporosis  Assessment & Plan  DXA scan on 5/13/22 showed osteoporosis  Started on Vitamin D 2000u daily and calcium 1000mg daily  Vitamin D level 31 3 on 6/6  Follow-up with PCP or Rheumatology at discharge  Fracture, toe  Assessment & Plan  L foot XR: acute appearing minimally displaced oblique fracture involving medial base of the proximal phalanx of the 1st digit  Podiatry consulted in acute setting - recommended WBAT and surgical shoes to both feet with ambulation  Follow-up with Podiatry as outpatient  Verified surgical shoes with Dr Griselda Paris - should continue for 3 weeks after XR on 5/26  Repeat XR in 3 weeks (6/16)  Repeat XR ordered prior to discharge  Weakness of both lower extremities  Assessment & Plan  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  CK 6976 on admission - currently 37  Simvastatin was stopped and currently on hold  Per Rheumatology - presentation not consistent with inflammatory myositis  MRI L spine showed severe facet degenerative change at L4-L5 resulting in moderate central stenosis, moderate R foramina narrowing, and moderate to severe R lateral recess stenosis  JONATHON on 5/31 was WNL  Venous duplex on 5/26 negative for DVTs  Myositis panel pending  Primary team following  PT/OT  Elevated liver enzymes  Assessment & Plan  Likely due to recent rhabdomyolysis  AST 32 and ALT 51 (from 63 and 110 respectively)  Continue to trend with routine CMP  HLD (hyperlipidemia)  Assessment & Plan  Last lipid panel on 3/31/22: Cholesterol 169, Triglycerides 69, HDL 87, and LDL 68  Hold statin at this time due to recent rhabdomyolysis      Essential hypertension  Assessment & Plan  Continue home regimen: Cardizem CD 240mg daily  Monitor BP with routine VS   Follow-up with PCP as outpatient  Psoriatic arthritis ([facility] [address] )  Assessment & Plan  Home regimen: methotrexate 17 5mg on   Per Rheumatology - continue to hold until outpatient follow-up with Rheumatologist (Dr Tia Greer)  History of TIA (transient ischemic attack)  Assessment & Plan  Hx of TIA without any deficits  Continue Plavix 75mg daily  Statin on hold  * Rhabdomyolysis  Assessment & Plan  Possibly due to statin  Simvastatin currently on hold  CK 6976 on admission - currently 37  Improved with IV fluids in acute setting  Continue to trend with routine labs  Per Rheumatology - presentation not consistent with inflammatory myositis  Rheumatology recommended holding methotrexate until further follow-up with outpatient Rheumatologist (Dr Tia Greer)  Ensure adequate hydration  Continue PT/OT  VTE Pharmacologic Prophylaxis:   Pharmacologic: Enoxaparin (Lovenox)  Mechanical VTE Prophylaxis in Place: Yes - sequential compression devices  Current Length of Stay: 12 day(s)    Current Patient Status: Inpatient Rehab     Discharge Plan: As per primary team     Code Status: Level 1 - Full Code    Subjective:   Pt examined while pt lying in bed in pt room  Currently denies any pain or weakness in her lower extremities  Slept well last night  Feels that everything is going well with therapy  LBM was on , states that this is normal for her and denies any abdominal pain  States that she is passing gas  Currently taking Miralax, colace, and senna  Plans for discharge tomorrow - reviewed medications and answered any questions  Has no other concerns or complaints at this time      Objective:     Vitals:   Temp (24hrs), Av 3 °F (36 8 °C), Min:98 2 °F (36 8 °C), Max:98 4 °F (36 9 °C)    Temp:  [98 2 °F (36 8 °C)-98 4 °F (36 9 °C)] 98 4 °F (36 9 °C)  HR:  [50-72] 72  Resp:  [16-18] 18  BP: (122-142)/(58-65) 142/65  SpO2:  [96 %-98 %] 96 %  Body mass index is 18 53 kg/m²  Review of Systems   Constitutional: Negative for appetite change, chills, fatigue and fever  HENT: Negative for trouble swallowing  Eyes: Negative for visual disturbance  Respiratory: Negative for cough and shortness of breath  Cardiovascular: Negative for chest pain, palpitations and leg swelling  Gastrointestinal: Positive for constipation  Negative for abdominal distention, abdominal pain, diarrhea, nausea and vomiting  LBM 6/11, feels that this is normal for her  Denies any abdominal pain, passing gas  Genitourinary: Negative for difficulty urinating  Musculoskeletal: Negative for arthralgias and back pain  Neurological: Negative for dizziness, weakness, light-headedness, numbness and headaches  Psychiatric/Behavioral: Negative for sleep disturbance  All other systems reviewed and are negative  Input and Output Summary (last 24 hours): Intake/Output Summary (Last 24 hours) at 6/14/2022 0929  Last data filed at 6/14/2022 0900  Gross per 24 hour   Intake 340 ml   Output --   Net 340 ml       Physical Exam:     Physical Exam  Vitals and nursing note reviewed  Constitutional:       Appearance: Normal appearance  HENT:      Head: Normocephalic and atraumatic  Cardiovascular:      Rate and Rhythm: Normal rate and regular rhythm  Pulses: Normal pulses  Heart sounds: Normal heart sounds  No murmur heard  No friction rub  Pulmonary:      Effort: Pulmonary effort is normal  No respiratory distress  Breath sounds: Normal breath sounds  No wheezing or rhonchi  Abdominal:      General: Abdomen is flat  Bowel sounds are normal  There is no distension  Palpations: Abdomen is soft  Tenderness: There is no abdominal tenderness  Musculoskeletal:      Cervical back: Normal range of motion and neck supple  Right lower leg: No edema  Left lower leg: No edema  Skin:     General: Skin is warm and dry  Neurological:      Mental Status: She is alert and oriented to person, place, and time  Psychiatric:         Mood and Affect: Mood normal          Behavior: Behavior normal          Additional Data:     Labs:    Results from last 7 days   Lab Units 06/13/22  0404   WBC Thousand/uL 6 72   HEMOGLOBIN g/dL 14 6   HEMATOCRIT % 46 4*   PLATELETS Thousands/uL 210   NEUTROS PCT % 40*   LYMPHS PCT % 44   MONOS PCT % 13*   EOS PCT % 2     Results from last 7 days   Lab Units 06/13/22  0404   SODIUM mmol/L 138   POTASSIUM mmol/L 3 6   CHLORIDE mmol/L 103   CO2 mmol/L 29   BUN mg/dL 12   CREATININE mg/dL 0 64   ANION GAP mmol/L 6   CALCIUM mg/dL 8 6   GLUCOSE RANDOM mg/dL 83                       Labs reviewed    Imaging:    Imaging reviewed    Recent Cultures (last 7 days):           Last 24 Hours Medication List:   Current Facility-Administered Medications   Medication Dose Route Frequency Provider Last Rate    acetaminophen  650 mg Oral Q6H PRN Irena Beasley MD      aluminum-magnesium hydroxide-simethicone  30 mL Oral Q6H PRN Irena Beasley MD      bisacodyl  10 mg Oral Daily PRN Chanda Santamaria MD      bisacodyl  10 mg Oral Once Irena Beasley MD      calcium carbonate  2 tablet Oral Daily With Breakfast ANNE Fernandez      cholecalciferol  2,000 Units Oral Daily ANNE Fernandez      clopidogrel  75 mg Oral HS Irena Beasley MD      Diclofenac Sodium  2 g Topical 4x Daily PRN ANNE Fernandez      diltiazem  240 mg Oral Daily Irena Beasley MD      docusate sodium  100 mg Oral BID Irena Beasley MD      enoxaparin  40 mg Subcutaneous Q24H [address], CRNP      folic acid  1 mg Oral TID Irena Beasley MD      latanoprost  1 drop Both Eyes HS Irena Beasley MD      polyethylene glycol  17 g Oral Daily PRN Irena Beasley MD      polyethylene glycol  17 g Oral Daily Irena Beasley MD      senna  1 tablet Oral BID Irena Beasley MD          M*Modal software was used to dictate this note    It may contain errors with dictating incorrect words or incorrect spelling  Please contact the provider directly with any questions

## 2022-06-14 NOTE — PROGRESS NOTES
06/14/22 1300   Pain Assessment   Pain Assessment Tool 0-10   Pain Score No Pain   Restrictions/Precautions   Precautions Fall Risk   LLE Weight Bearing Per Order WBAT   ROM Restrictions No   Braces or Orthoses Other (Comment)  (Bilat surgical shoes)   Cognition   Arousal/Participation Alert; Cooperative   Attention Attends with cues to redirect   Orientation Level Oriented X4   Memory Decreased short term memory   Subjective   Subjective Agreeable to PT; no c/o  "I'm full from my big lunch but I'm doing okay " Pt expecting xray to L foot later today  Nicky Lundberg for d/c tomorrow 6/15/22  Roll Left and Right   Type of Assistance Needed Independent   Physical Assistance Level No physical assistance   Roll Left and Right CARE Score 6   Sit to Lying   Type of Assistance Needed Independent   Physical Assistance Level No physical assistance   Sit to Lying CARE Score 6   Lying to Sitting on Side of Bed   Type of Assistance Needed Independent   Physical Assistance Level No physical assistance   Lying to Sitting on Side of Bed CARE Score 6   Sit to Stand   Type of Assistance Needed Independent   Physical Assistance Level No physical assistance   Comment Indep, no AD   Sit to Stand CARE Score 6   Bed-Chair Transfer   Type of Assistance Needed Independent   Physical Assistance Level No physical assistance   Comment Indep, no AD   Chair/Bed-to-Chair Transfer CARE Score 6   Transfer Bed/Chair/Wheelchair   Limitations Noted In Balance;Confidence; Endurance;LE Strength   Adaptive Equipment None   Car Transfer   Reason if not Attempted Environmental limitations   Car Transfer CARE Score 10   Walk 10 Feet   Type of Assistance Needed Independent   Physical Assistance Level No physical assistance   Walk 10 Feet CARE Score 6   Walk 50 Feet with Two Turns   Type of Assistance Needed Independent   Physical Assistance Level No physical assistance   Walk 50 Feet with Two Turns CARE Score 6   Walk 150 Feet   Type of Assistance Needed Independent   Physical Assistance Level No physical assistance   Walk 150 Feet CARE Score 6   Walking 10 Feet on Uneven Surfaces   Type of Assistance Needed Supervision   Physical Assistance Level No physical assistance   Comment Attempted amb over mat with rolled ankle weight under mat to mimic grassy/uneven surface  Pt fearful, required close sup at times due to mild LOB/unsteadiness  Rec sup for uneven surfaces  Discussed rec with pt; she was receptive and verbalized understanding  Pt also amb up/down ramp without difficulty  Spoke with pt re: difference between ramps (smooth surfaces) and grassy/uneven surfaces  She verbalized good understanding and acknowledged her current limitations and residual dynamic balance deficits  Adding, "I'll never walk in grass  I hate bugs "   Walking 10 Feet on Uneven Surfaces CARE Score 4   Ambulation   Does the patient walk? 2  Yes   Primary Mode of Locomotion Prior to Admission Walk   Distance Walked (feet) 150 ft  (150ft x3, multiple short distance amb attempts (throughout gym with balance and dual task activities)  All without AD )   Assist Device Other  (None)   Gait Pattern Improper weight shift; Inconsistant Holly; Slow Holly;Decreased foot clearance   Walk Assist Level Independent   Wheel 50 Feet with Two Turns   Reason if not Attempted Activity not applicable   Wheel 50 Feet with Two Turns CARE Score 9   Wheel 150 Feet   Reason if not Attempted Activity not applicable   Wheel 369 Feet CARE Score 9   Wheelchair mobility   Does the patient use a wheelchair? 0  No   Curb or Single Stair   Style negotiated Single stair   Type of Assistance Needed Independent; Adaptive equipment   Physical Assistance Level No physical assistance   Comment Single HR   1 Step (Curb) CARE Score 6   4 Steps   Type of Assistance Needed Independent; Adaptive equipment   Physical Assistance Level No physical assistance   Comment Bilat HR   4 Steps CARE Score 6   12 Steps   Comment Pt with stair glide at home  Full flight not required for safe d/c    Reason if not Attempted Activity not applicable   12 Steps CARE Score 9   Stairs   Type Stairs   # of Steps 4   Assist Devices Bilateral Rail   Picking Up Object   Type of Assistance Needed Independent   Physical Assistance Level No physical assistance   Comment Cone  x6, tissue box x1 and rolls of tape x3  No reacher  Picking Up Object CARE Score 6   Toilet Transfer   Type of Assistance Needed Independent   Physical Assistance Level No physical assistance   Toilet Transfer CARE Score 6   Therapeutic Interventions   Strengthening Review of HEP: seated TE  Pt performed seated LAQ 2#, hip flex 2#, hip abd red theraband, hamstring curls red theraband  Neuromuscular Re-Education Amb over therapy mat with 6# carry, amb through cones with 6# carry, dynamic gait without AD including quick stop/starts, change in speed and change in direction on command  Equipment Use   NuStep Level 1 x8 minutes, UE/LE  SPM >50  Other Comments   Comments Attempted floor transfer however pt unable to complete indep  Was able to roll and achieve tall kneel position indep however required min A boost to reach seated positioning on edge of mat  Edu pt on safety at home re: falls and plan for IF she would fall at home  Pt demo and verbalized good understanding of all recommendations  Assessment   Treatment Assessment Pt participated in 90 minute PT session with focus on review of HEP, mobility without AD and edu on safety with all mobility in prep for d/c home tomorrow 6/15/22, indep with homecare services  Pt achieved indep/mod I goals for bed mob, transfers, amb without AD and stairs (up to 4 with HR(s))  Pt able to amb up/down ramps without AD indep however on simulated grass/uneven surfaces pt conts to require close sup due to residual balance deficits and dec righting reactions  Pt demo good recall of HEP  She is safe and appropriate for d/c home tomorrow   Rec ongoing PT services via home health care to further improve dynamic balance, endurance and facilitate return to prior to level of indep/IADLs  Family/Caregiver Present No   Problem List Decreased strength;Decreased endurance; Impaired balance;Decreased mobility; Decreased coordination   Barriers to Discharge Decreased caregiver support   PT Barriers   Physical Impairment Decreased strength; Impaired balance;Decreased endurance;Decreased mobility   Functional Limitation Stair negotiation;Standing;Walking   Plan   Treatment/Interventions Functional transfer training;Elevations;LE strengthening/ROM; Therapeutic exercise; Endurance training; Compensatory technique education;Gait training   Progress Progressing toward goals   Recommendation   PT Discharge Recommendation Home with home health rehabilitation   PT Therapy Minutes   PT Time In 1300   PT Time Out 1430   PT Total Time (minutes) 90   PT Mode of treatment - Individual (minutes) 90   PT Mode of treatment - Concurrent (minutes) 0   PT Mode of treatment - Group (minutes) 0   PT Mode of treatment - Co-treat (minutes) 0   PT Mode of Treatment - Total time(minutes) 90 minutes   PT Cumulative Minutes 945   Therapy Time missed   Time missed?  No

## 2022-06-14 NOTE — ASSESSMENT & PLAN NOTE
Home regimen: methotrexate 17 5mg on Mondays  Per Rheumatology - continue to hold until outpatient follow-up with Rheumatologist (Dr Rosa Ramsey)

## 2022-06-14 NOTE — PLAN OF CARE
Problem: SKIN/TISSUE INTEGRITY - ADULT  Goal: Skin Integrity remains intact(Skin Breakdown Prevention)  Description: Assess:    -Assess extremities for adequate circulation and sensation     Bed Management:  -Have minimal linens on bed & keep smooth, unwrinkled  -Change linens as needed when moist or perspiring    Activity:  -Encourage activity and walks on unit  -Encourage or provide ROM exercises   -Use appropriate equipment to lift or move patient in bed    Skin Care:  -Avoid use of baby powder, tape, friction and shearing, hot water or constrictive clothing  -Do not massage red bony areas    Outcome: Progressing

## 2022-06-14 NOTE — ASSESSMENT & PLAN NOTE
Possibly due to statin  Simvastatin currently on hold  CK 6976 on admission - currently 37  Improved with IV fluids in acute setting  Continue to trend with routine labs  Per Rheumatology - presentation not consistent with inflammatory myositis  Rheumatology recommended holding methotrexate until further follow-up with outpatient Rheumatologist (Dr Rian Colorado)  Ensure adequate hydration  Continue PT/OT

## 2022-06-14 NOTE — OCCUPATIONAL THERAPY NOTE
Therapist received phone call from pts neighbor Ana Maria Cecy), she was reporting she felt a bit overwhelmed in what her responsibilities are when pt returns home as they are not immediate family and have only been helping pt here and there since her  passed away  Reports that in the 6 years they have been neighbors they have only been communicating a bit  Georgette reports that they can assist with groceries and maybe some appointments here and there, they will check up on her as much as they can  Jey Caruso reports that they got her a RW just in case, ordered shower chair and looking into life alert  She reports they will be going into her home to clear up any throw rugs, open space, place frequently used items (laundry detergent, food, pots/pans, etc) within reach  Therapist educ that as per Dr Isidro Iyer pt is not to drive until cleared from MD, which she verbalized understanding  Georgette asking what  They should do if for some reason pt falls at home, calls them and they can't assist her up  Therapist educ that the safes thing to do is call 911 for assistance which she verbalized understanding  Educ neighbor that pt is overall [place] for fx mobility w/o ADL, indep for washing/dressing, toileting, meal prep and laundry  Cont to report they can check in as needed   Neighbor with no further questions and reporting they will be here tomorrow 6/15 around 1230 for DC      -Jeff Presley MS, OTR/L

## 2022-06-14 NOTE — ASSESSMENT & PLAN NOTE
L foot XR: acute appearing minimally displaced oblique fracture involving medial base of the proximal phalanx of the 1st digit  Podiatry consulted in acute setting - recommended WBAT and surgical shoes to both feet with ambulation  Follow-up with Podiatry as outpatient  Verified surgical shoes with Dr Yamil Reyes - should continue for 3 weeks after XR on 5/26  Repeat XR in 3 weeks (6/16)  Repeat XR ordered prior to discharge

## 2022-06-15 VITALS
BODY MASS INDEX: 18.55 KG/M2 | SYSTOLIC BLOOD PRESSURE: 135 MMHG | WEIGHT: 111.33 LBS | HEART RATE: 55 BPM | HEIGHT: 65 IN | TEMPERATURE: 98.4 F | DIASTOLIC BLOOD PRESSURE: 63 MMHG | OXYGEN SATURATION: 96 % | RESPIRATION RATE: 20 BRPM

## 2022-06-15 PROBLEM — Z91.89 AT RISK FOR VENOUS THROMBOEMBOLISM (VTE): Status: RESOLVED | Noted: 2022-06-02 | Resolved: 2022-06-15

## 2022-06-15 PROCEDURE — 99239 HOSP IP/OBS DSCHRG MGMT >30: CPT

## 2022-06-15 PROCEDURE — 99232 SBSQ HOSP IP/OBS MODERATE 35: CPT | Performed by: NURSE PRACTITIONER

## 2022-06-15 RX ORDER — POLYETHYLENE GLYCOL 3350 17 G/17G
17 POWDER, FOR SOLUTION ORAL DAILY PRN
Qty: 507 G | Refills: 0
Start: 2022-06-15

## 2022-06-15 RX ORDER — MELATONIN
2000 DAILY
Qty: 60 TABLET | Refills: 0
Start: 2022-06-16

## 2022-06-15 RX ORDER — CALCIUM CARBONATE 500(1250)
2 TABLET ORAL
Qty: 60 TABLET | Refills: 0
Start: 2022-06-16

## 2022-06-15 RX ORDER — DOCUSATE SODIUM 100 MG/1
100 CAPSULE, LIQUID FILLED ORAL 2 TIMES DAILY
Qty: 60 CAPSULE | Refills: 0 | Status: SHIPPED | OUTPATIENT
Start: 2022-06-15 | End: 2022-06-27 | Stop reason: ALTCHOICE

## 2022-06-15 RX ORDER — ACETAMINOPHEN 325 MG/1
650 TABLET ORAL EVERY 6 HOURS PRN
Refills: 0
Start: 2022-06-15

## 2022-06-15 RX ORDER — SENNOSIDES 8.6 MG
8.6 TABLET ORAL 2 TIMES DAILY
Qty: 60 TABLET | Refills: 0 | Status: SHIPPED | OUTPATIENT
Start: 2022-06-15 | End: 2022-06-27 | Stop reason: ALTCHOICE

## 2022-06-15 RX ADMIN — CALCIUM 2 TABLET: 500 TABLET ORAL at 09:24

## 2022-06-15 RX ADMIN — FOLIC ACID 1 MG: 1 TABLET ORAL at 09:24

## 2022-06-15 RX ADMIN — ENOXAPARIN SODIUM 40 MG: 100 INJECTION SUBCUTANEOUS at 09:24

## 2022-06-15 RX ADMIN — DILTIAZEM HYDROCHLORIDE 240 MG: 120 CAPSULE, COATED, EXTENDED RELEASE ORAL at 09:29

## 2022-06-15 RX ADMIN — Medication 2000 UNITS: at 09:24

## 2022-06-15 NOTE — ASSESSMENT & PLAN NOTE
Home regimen: methotrexate 17 5mg on Mondays  Per Rheumatology - continue to hold until outpatient follow-up with Rheumatologist (Dr Desire Pinto)

## 2022-06-15 NOTE — PROGRESS NOTES
[facility]  Progress Note - Ino Muller 1945, 64 y o  female MRN: [id number]  Unit/Bed#: -36 Encounter: [id number]  Primary Care Provider: Sujatha Weinstein DO   Date and time admitted to hospital: 6/2/2022  2:47 PM    Osteoporosis  Assessment & Plan  DXA scan on 5/13/22 showed osteoporosis  Started on Vitamin D 2000u daily and calcium 1000mg daily  Vitamin D level 31 3 on 6/6  Follow-up with PCP or Rheumatology at discharge  Fracture, toe  Assessment & Plan  L foot XR: acute appearing minimally displaced oblique fracture involving medial base of the proximal phalanx of the 1st digit  Podiatry consulted in acute setting - recommended WBAT and surgical shoes to both feet with ambulation  Follow-up with Podiatry as outpatient  Repeat XRs on 6/14: interval healing of first proximal phalangeal base fracture  Discussed with Dr Morales Booker - no longer requires surgical shoes  Follow-up in 2-3 weeks as outpatient  Weakness of both lower extremities  Assessment & Plan  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  CK 6976 on admission - currently 37  Simvastatin was stopped and currently on hold  Per Rheumatology - presentation not consistent with inflammatory myositis  MRI L spine showed severe facet degenerative change at L4-L5 resulting in moderate central stenosis, moderate R foramina narrowing, and moderate to severe R lateral recess stenosis  JONATHON on 5/31 was WNL  Venous duplex on 5/26 negative for DVTs  Myositis panel pending  Primary team following  PT/OT  Elevated liver enzymes  Assessment & Plan  Likely due to recent rhabdomyolysis  AST 32 and ALT 51 (from 63 and 110 respectively)  Continue to trend with routine CMP  HLD (hyperlipidemia)  Assessment & Plan  Last lipid panel on 3/31/22: Cholesterol 169, Triglycerides 69, HDL 87, and LDL 68  Hold statin at this time due to recent rhabdomyolysis      Essential hypertension  Assessment & Plan  Continue home regimen: Cardizem CD 240mg daily  Monitor BP with routine VS   Follow-up with PCP as outpatient  Psoriatic arthritis ([facility] [address] )  Assessment & Plan  Home regimen: methotrexate 17 5mg on   Per Rheumatology - continue to hold until outpatient follow-up with Rheumatologist (Dr Hanh Barnes)  History of TIA (transient ischemic attack)  Assessment & Plan  Hx of TIA without any deficits  Continue Plavix 75mg daily  Statin on hold  * Rhabdomyolysis  Assessment & Plan  Possibly due to statin  Simvastatin currently on hold  CK 6976 on admission - currently 37  Improved with IV fluids in acute setting  Continue to trend with routine labs  Per Rheumatology - presentation not consistent with inflammatory myositis  Rheumatology recommended holding methotrexate until further follow-up with outpatient Rheumatologist (Dr Hanh Barnes)  Ensure adequate hydration  Continue PT/OT  VTE Pharmacologic Prophylaxis:   Pharmacologic: Enoxaparin (Lovenox)  Mechanical VTE Prophylaxis in Place: Yes - sequential compression devices  Current Length of Stay: 13 day(s)    Current Patient Status: Inpatient Rehab     Discharge Plan: As per primary team     Code Status: Level 1 - Full Code    Subjective:   Pt examined while pt sitting in bed in pt room  Currently has no complaints of pain  Slept well last night  Denies any lightheadedness, dizziness, SOB, or palpitations  Had a BM today without any issues  Plans for discharge later today  Reviewed medications and has no further questions  Has no concerns or questions at this time  Objective:     Vitals:   Temp (24hrs), Av 6 °F (37 °C), Min:98 4 °F (36 9 °C), Max:98 7 °F (37 1 °C)    Temp:  [98 4 °F (36 9 °C)-98 7 °F (37 1 °C)] 98 4 °F (36 9 °C)  HR:  [55-61] 55  Resp:  [18-20] 20  BP: (117-141)/(53-65) 135/63  SpO2:  [95 %-96 %] 96 %  Body mass index is 18 53 kg/m²       Review of Systems   Constitutional: Negative for appetite change, chills, fatigue and fever    HENT: Negative for trouble swallowing  Eyes: Negative for visual disturbance  Respiratory: Negative for cough, shortness of breath, wheezing and stridor  Cardiovascular: Negative for chest pain, palpitations and leg swelling  Gastrointestinal: Negative for abdominal distention, abdominal pain, constipation, diarrhea, nausea and vomiting  LBM 6/15   Genitourinary: Negative for difficulty urinating  Musculoskeletal: Negative for arthralgias and back pain  Neurological: Negative for dizziness, weakness, light-headedness, numbness and headaches  Psychiatric/Behavioral: Negative for sleep disturbance  All other systems reviewed and are negative  Input and Output Summary (last 24 hours): Intake/Output Summary (Last 24 hours) at 6/15/2022 1155  Last data filed at 6/14/2022 1701  Gross per 24 hour   Intake 240 ml   Output --   Net 240 ml       Physical Exam:     Physical Exam  Vitals and nursing note reviewed  Constitutional:       Appearance: Normal appearance  HENT:      Head: Normocephalic and atraumatic  Cardiovascular:      Rate and Rhythm: Normal rate and regular rhythm  Pulses: Normal pulses  Heart sounds: Normal heart sounds  No murmur heard  No friction rub  Pulmonary:      Effort: Pulmonary effort is normal  No respiratory distress  Breath sounds: Normal breath sounds  No wheezing or rhonchi  Abdominal:      General: Abdomen is flat  Bowel sounds are normal  There is no distension  Palpations: Abdomen is soft  Tenderness: There is no abdominal tenderness  Musculoskeletal:         General: Normal range of motion  Cervical back: Normal range of motion and neck supple  Right lower leg: No edema  Left lower leg: No edema  Skin:     General: Skin is warm and dry  Capillary Refill: Capillary refill takes less than 2 seconds  Neurological:      Mental Status: She is alert and oriented to person, place, and time  Psychiatric:         Mood and Affect: Mood normal          Behavior: Behavior normal          Additional Data:     Labs:    Results from last 7 days   Lab Units 06/13/22  0404   WBC Thousand/uL 6 72   HEMOGLOBIN g/dL 14 6   HEMATOCRIT % 46 4*   PLATELETS Thousands/uL 210   NEUTROS PCT % 40*   LYMPHS PCT % 44   MONOS PCT % 13*   EOS PCT % 2     Results from last 7 days   Lab Units 06/13/22  0404   SODIUM mmol/L 138   POTASSIUM mmol/L 3 6   CHLORIDE mmol/L 103   CO2 mmol/L 29   BUN mg/dL 12   CREATININE mg/dL 0 64   ANION GAP mmol/L 6   CALCIUM mg/dL 8 6   GLUCOSE RANDOM mg/dL 83                       Labs reviewed    Imaging:    Imaging reviewed    Recent Cultures (last 7 days):           Last 24 Hours Medication List:   Current Facility-Administered Medications   Medication Dose Route Frequency Provider Last Rate    acetaminophen  650 mg Oral Q6H PRN Patricia Wilson MD      aluminum-magnesium hydroxide-simethicone  30 mL Oral Q6H PRN Patricia Wilson MD      bisacodyl  10 mg Oral Daily PRN Tarah Mello MD      calcium carbonate  2 tablet Oral Daily With Breakfast ANNE Love      cholecalciferol  2,000 Units Oral Daily ANNE Love      clopidogrel  75 mg Oral HS Patricia Wilson MD      Diclofenac Sodium  2 g Topical 4x Daily PRN ANNE Love      diltiazem  240 mg Oral Daily Patricia Wilson MD      docusate sodium  100 mg Oral BID Patricia Wilson MD      enoxaparin  40 mg Subcutaneous Q24H [address], CRNP      folic acid  1 mg Oral TID Patricia Wilson MD      latanoprost  1 drop Both Eyes HS Patricia Wilson MD      polyethylene glycol  17 g Oral Daily PRN Patricia Wilson MD      polyethylene glycol  17 g Oral Daily Patricia Wilson MD      senna  1 tablet Oral BID Patricia Wilson MD          M*Modal software was used to dictate this note  It may contain errors with dictating incorrect words or incorrect spelling  Please contact the provider directly with any questions

## 2022-06-15 NOTE — CASE MANAGEMENT
Team d/c summary:     Pt made good rehab progress and returned home with a neighbor  She will continued home PT, OT, RN, and SW through [facility]  She did not present with any DME needs at d/c  She was not interested in [address] [place] [address] or [facility]  Pt is alert and oriented and understood her discharge instructions

## 2022-06-15 NOTE — ASSESSMENT & PLAN NOTE
Possibly due to statin  Simvastatin currently on hold  CK 6976 on admission - currently 37  Improved with IV fluids in acute setting  Continue to trend with routine labs  Per Rheumatology - presentation not consistent with inflammatory myositis  Rheumatology recommended holding methotrexate until further follow-up with outpatient Rheumatologist (Dr Ac Mcintyre)  Ensure adequate hydration  Continue PT/OT

## 2022-06-15 NOTE — NURSING NOTE
Patient discharged to home with [facility] services, home PT/OT/RN  Patient accompanied by her neighbor/friend, Pancho Hobbs  Reviewed discharge medications, instructions and follow up appointments with patient and Pancho Hobbs, no questions or concerns at this time  Belongings at bedside, nothing locked with security or pharmacy  Patient transported off unit via wheelchair with this RN and PT student

## 2022-06-15 NOTE — ASSESSMENT & PLAN NOTE
L foot XR: acute appearing minimally displaced oblique fracture involving medial base of the proximal phalanx of the 1st digit  Podiatry consulted in acute setting - recommended WBAT and surgical shoes to both feet with ambulation  Follow-up with Podiatry as outpatient  Repeat XRs on 6/14: interval healing of first proximal phalangeal base fracture  Discussed with Dr Ericka Wills - no longer requires surgical shoes  Follow-up in 2-3 weeks as outpatient

## 2022-06-15 NOTE — PLAN OF CARE
Problem: Nutrition/Hydration-ADULT  Goal: Nutrient/Hydration intake appropriate for improving, restoring or maintaining nutritional needs  Description: Monitor and assess patient's nutrition/hydration status for malnutrition  Collaborate with interdisciplinary team and initiate plan and interventions as ordered  Monitor patient's weight and dietary intake as ordered or per policy  Utilize nutrition screening tool and intervene as necessary  Determine patient's food preferences and provide high-protein, high-caloric foods as appropriate  INTERVENTIONS:  - Monitor oral intake, urinary output, labs, and treatment plans  - Assess nutrition and hydration status and recommend course of action  - Evaluate amount of meals eaten  - Assist patient with eating if necessary   - Allow adequate time for meals  - Recommend/ encourage appropriate diets, oral nutritional supplements, and vitamin/mineral supplements  - Order, calculate, and assess calorie counts as needed  - Recommend, monitor, and adjust tube feedings and TPN/PPN based on assessed needs  - Assess need for intravenous fluids  - Provide specific nutrition/hydration education as appropriate  - Include patient/family/caregiver in decisions related to nutrition  Outcome: Adequate for Discharge     Problem: NEUROSENSORY - ADULT  Goal: Achieves maximal functionality and self care  Description: INTERVENTIONS  - Monitor swallowing and airway patency with patient fatigue and changes in neurological status  - Encourage and assist patient to increase activity and self care     - Encourage visually impaired, hearing impaired and aphasic patients to use assistive/communication devices  Outcome: Adequate for Discharge     Problem: CARDIOVASCULAR - ADULT  Goal: Maintains optimal cardiac output and hemodynamic stability  Description: INTERVENTIONS:  - Monitor I/O, vital signs and rhythm  - Monitor for S/S and trends of decreased cardiac output  - Administer and titrate ordered vasoactive medications to optimize hemodynamic stability  - Assess quality of pulses, skin color and temperature  - Assess for signs of decreased coronary artery perfusion  - Instruct patient to report change in severity of symptoms  Outcome: Adequate for Discharge  Goal: Absence of cardiac dysrhythmias or at baseline rhythm  Description: INTERVENTIONS:  - Continuous cardiac monitoring, vital signs, obtain 12 lead EKG if ordered  - Administer antiarrhythmic and heart rate control medications as ordered  - Monitor electrolytes and administer replacement therapy as ordered  Outcome: Adequate for Discharge     Problem: RESPIRATORY - ADULT  Goal: Achieves optimal ventilation and oxygenation  Description: INTERVENTIONS:  - Assess for changes in respiratory status  - Assess for changes in mentation and behavior  - Position to facilitate oxygenation and minimize respiratory effort  - Oxygen administered by appropriate delivery if ordered  - Initiate smoking cessation education as indicated  - Encourage broncho-pulmonary hygiene including cough, deep breathe, Incentive Spirometry  - Assess the need for suctioning and aspirate as needed  - Assess and instruct to report SOB or any respiratory difficulty  - Respiratory Therapy support as indicated  Outcome: Adequate for Discharge     Problem: GASTROINTESTINAL - ADULT  Goal: Maintains or returns to baseline bowel function  Description: INTERVENTIONS:  - Assess bowel function  - Encourage oral fluids to ensure adequate hydration  - Administer IV fluids if ordered to ensure adequate hydration  - Administer ordered medications as needed  - Encourage mobilization and activity  - Consider nutritional services referral to assist patient with adequate nutrition and appropriate food choices  Outcome: Adequate for Discharge  Goal: Maintains adequate nutritional intake  Description: INTERVENTIONS:  - Monitor percentage of each meal consumed  - Identify factors contributing to decreased intake, treat as appropriate  - Assist with meals as needed  - Monitor I&O, weight, and lab values if indicated  - Obtain nutrition services referral as needed  Outcome: Adequate for Discharge     Problem: GENITOURINARY - ADULT  Goal: Maintains or returns to baseline urinary function  Description: INTERVENTIONS:  - Assess urinary function  - Encourage oral fluids to ensure adequate hydration if ordered  - Administer IV fluids as ordered to ensure adequate hydration  - Administer ordered medications as needed  - Offer frequent toileting  - Follow urinary retention protocol if ordered  Outcome: Adequate for Discharge     Problem: METABOLIC, FLUID AND ELECTROLYTES - ADULT  Goal: Electrolytes maintained within normal limits  Description: INTERVENTIONS:  - Monitor labs and assess patient for signs and symptoms of electrolyte imbalances  - Administer electrolyte replacement as ordered  - Monitor response to electrolyte replacements, including repeat lab results as appropriate  - Instruct patient on fluid and nutrition as appropriate  Outcome: Adequate for Discharge  Goal: Fluid balance maintained  Description: INTERVENTIONS:  - Monitor labs   - Monitor I/O and WT  - Instruct patient on fluid and nutrition as appropriate  - Assess for signs & symptoms of volume excess or deficit  Outcome: Adequate for Discharge     Problem: SKIN/TISSUE INTEGRITY - ADULT  Goal: Skin Integrity remains intact(Skin Breakdown Prevention)  Description: Assess:  -Perform Noam assessment every shift  -Clean and moisturize skin every day  -Inspect skin when repositioning, toileting, and assisting with ADLS  -Assess extremities for adequate circulation and sensation     Bed Management:  -Have minimal linens on bed & keep smooth, unwrinkled  -Change linens as needed when moist or perspiring  -Avoid sitting or lying in one position for more than 2 hours while in bed      Toileting:  -Offer bedside commode  -Assess for incontinence every day  -Use incontinent care products after each incontinent episode such as     Activity:  -Mobilize patient 6 times a day  -Encourage activity and walks on unit  -Encourage or provide ROM exercises   -Use appropriate equipment to lift or move patient in bed  -Instruct/ Assist with weight shifting every 2 h when out of bed in chair  -Consider limitation of chair time  hour intervals    Skin Care:  -Avoid use of baby powder, tape, friction and shearing, hot water or constrictive clothing  -Do not massage red bony areas    Next Steps:  -Teach patient strategies to minimize risks such as   -Consider consults to  interdisciplinary teams such as   Outcome: Adequate for Discharge     Problem: HEMATOLOGIC - ADULT  Goal: Maintains hematologic stability  Description: INTERVENTIONS  - Assess for signs and symptoms of bleeding or hemorrhage  - Monitor labs  - Administer supportive blood products/factors as ordered and appropriate  Outcome: Adequate for Discharge     Problem: MUSCULOSKELETAL - ADULT  Goal: Maintain or return mobility to safest level of function  Description: INTERVENTIONS:  - Assess patient's ability to carry out ADLs; assess patient's baseline for ADL function and identify physical deficits which impact ability to perform ADLs (bathing, care of mouth/teeth, toileting, grooming, dressing, etc )  - Assess/evaluate cause of self-care deficits   - Assess range of motion  - Assess patient's mobility  - Assess patient's need for assistive devices and provide as appropriate  - Encourage maximum independence but intervene and supervise when necessary  - Involve family in performance of ADLs  - Assess for home care needs following discharge   - Consider OT consult to assist with ADL evaluation and planning for discharge  - Provide patient education as appropriate  Outcome: Adequate for Discharge  Goal: Maintain proper alignment of affected body part  Description: INTERVENTIONS:  - Support, maintain and protect limb and body alignment  - Provide patient/ family with appropriate education  Outcome: Adequate for Discharge     Problem: PAIN - ADULT  Goal: Verbalizes/displays adequate comfort level or baseline comfort level  Description: Interventions:  - Encourage patient to monitor pain and request assistance  - Assess pain using appropriate pain scale  - Administer analgesics based on type and severity of pain and evaluate response  - Implement non-pharmacological measures as appropriate and evaluate response  - Consider cultural and social influences on pain and pain management  - Notify physician/advanced practitioner if interventions unsuccessful or patient reports new pain  Outcome: Adequate for Discharge     Problem: INFECTION - ADULT  Goal: Absence or prevention of progression during hospitalization  Description: INTERVENTIONS:  - Assess and monitor for signs and symptoms of infection  - Monitor lab/diagnostic results  - Monitor all insertion sites, i e  indwelling lines, tubes, and drains  - Monitor endotracheal if appropriate and nasal secretions for changes in amount and color  - [place] appropriate cooling/warming therapies per order  - Administer medications as ordered  - Instruct and encourage patient and family to use good hand hygiene technique  - Identify and instruct in appropriate isolation precautions for identified infection/condition  Outcome: Adequate for Discharge     Problem: SAFETY ADULT  Goal: Patient will remain free of falls  Description: INTERVENTIONS:  - Educate patient/family on patient safety including physical limitations  - Instruct patient to call for assistance with activity   - Consult OT/PT to assist with strengthening/mobility   - Keep Call bell within reach  - Keep bed low and locked with side rails adjusted as appropriate  - Keep care items and personal belongings within reach  - Initiate and maintain comfort rounds  - Make Fall Risk Sign visible to staff  - Obtain necessary fall risk management equipment: RW  - Apply yellow socks and bracelet for high fall risk patients  - Consider moving patient to room near nurses station  Outcome: Adequate for Discharge  Goal: Maintain or return to baseline ADL function  Description: INTERVENTIONS:  -  Assess patient's ability to carry out ADLs; assess patient's baseline for ADL function and identify physical deficits which impact ability to perform ADLs (bathing, care of mouth/teeth, toileting, grooming, dressing, etc )  - Assess/evaluate cause of self-care deficits   - Assess range of motion  - Assess patient's mobility; develop plan if impaired  - Assess patient's need for assistive devices and provide as appropriate  - Encourage maximum independence but intervene and supervise when necessary  - Involve family in performance of ADLs  - Assess for home care needs following discharge   - Consider OT consult to assist with ADL evaluation and planning for discharge  - Provide patient education as appropriate  Outcome: Adequate for Discharge  Goal: Maintains/Returns to pre admission functional level  Description: INTERVENTIONS:  - Perform BMAT or MOVE assessment daily    - Set and communicate daily mobility goal to care team and patient/family/caregiver     - Collaborate with rehabilitation services on mobility goals if consulted  - Out of bed for meals 3 times a day  - Out of bed for toileting  - Record patient progress and toleration of activity level   Outcome: Adequate for Discharge     Problem: DISCHARGE PLANNING  Goal: Discharge to home or other facility with appropriate resources  Description: INTERVENTIONS:  - Identify barriers to discharge w/patient and caregiver  - Arrange for needed discharge resources and transportation as appropriate  - Identify discharge learning needs (meds, wound care, etc )  - Arrange for interpretive services to assist at discharge as needed  - Refer to Case Management Department for coordinating discharge planning if the patient needs post-hospital services based on physician/advanced practitioner order or complex needs related to functional status, cognitive ability, or social support system  Outcome: Adequate for Discharge     Problem: MOBILITY - ADULT  Goal: Maintain or return to baseline ADL function  Description: INTERVENTIONS:  -  Assess patient's ability to carry out ADLs; assess patient's baseline for ADL function and identify physical deficits which impact ability to perform ADLs (bathing, care of mouth/teeth, toileting, grooming, dressing, etc )  - Assess/evaluate cause of self-care deficits   - Assess range of motion  - Assess patient's mobility; develop plan if impaired  - Assess patient's need for assistive devices and provide as appropriate  - Encourage maximum independence but intervene and supervise when necessary  - Involve family in performance of ADLs  - Assess for home care needs following discharge   - Consider OT consult to assist with ADL evaluation and planning for discharge  - Provide patient education as appropriate  Outcome: Adequate for Discharge  Goal: Maintains/Returns to pre admission functional level  Description: INTERVENTIONS:  - Perform BMAT or MOVE assessment daily    - Set and communicate daily mobility goal to care team and patient/family/caregiver  - Collaborate with rehabilitation services on mobility goals if consulted  - Out of bed for toileting  - Record patient progress and toleration of activity level   Outcome: Adequate for Discharge     Problem: Nutrition/Hydration-ADULT  Goal: Nutrient/Hydration intake appropriate for improving, restoring or maintaining nutritional needs  Description: Monitor and assess patient's nutrition/hydration status for malnutrition  Collaborate with interdisciplinary team and initiate plan and interventions as ordered  Monitor patient's weight and dietary intake as ordered or per policy  Utilize nutrition screening tool and intervene as necessary   Determine patient's food preferences and provide high-protein, high-caloric foods as appropriate       INTERVENTIONS:  - Monitor oral intake, urinary output, labs, and treatment plans  - Assess nutrition and hydration status and recommend course of action  - Evaluate amount of meals eaten  - Assist patient with eating if necessary   - Allow adequate time for meals  - Recommend/ encourage appropriate diets, oral nutritional supplements, and vitamin/mineral supplements  - Order, calculate, and assess calorie counts as needed  - Recommend, monitor, and adjust tube feedings and TPN/PPN based on assessed needs  - Assess need for intravenous fluids  - Provide specific nutrition/hydration education as appropriate  - Include patient/family/caregiver in decisions related to nutrition  Outcome: Adequate for Discharge     Problem: Prexisting or High Potential for Compromised Skin Integrity  Goal: Skin integrity is maintained or improved  Description: INTERVENTIONS:  - Identify patients at risk for skin breakdown  - Assess and monitor skin integrity  - Assess and monitor nutrition and hydration status  - Monitor labs   - Assess for incontinence   - Turn and reposition patient  - Assist with mobility/ambulation  - Relieve pressure over bony prominences  - Avoid friction and shearing  - Provide appropriate hygiene as needed including keeping skin clean and dry  - Evaluate need for skin moisturizer/barrier cream  - Collaborate with interdisciplinary team   - Patient/family teaching  - Consider wound care consult   Outcome: Adequate for Discharge     Problem: Potential for Falls  Goal: Patient will remain free of falls  Description: INTERVENTIONS:  - Educate patient/family on patient safety including physical limitations  - Instruct patient to call for assistance with activity   - Consult OT/PT to assist with strengthening/mobility   - Keep Call bell within reach  - Keep bed low and locked with side rails adjusted as appropriate  - Keep care items and personal belongings within reach  - Initiate and maintain comfort rounds  - Make Fall Risk Sign visible to staff  - Apply yellow socks and bracelet for high fall risk patients  - Consider moving patient to room near nurses station  Outcome: Adequate for Discharge

## 2022-06-15 NOTE — DISCHARGE INSTRUCTIONS
DISCHARGE INSTRUCTIONS: Freya Dueñas [age]    Bring these instructions with you to your Outpatient Physician appointments so they can order and follow-up any additional lab work or imaging recommended at time of discharge  It  is you or your caregivers responsibility to obtain follow-up MEDICATION REFILLS  As indicated through your Primary Care Physician (PCP) and other outpatient specialty provider(s) after discharge  Please follow-up with your PCP as soon as possible after discharge to set-up follow-up management and when appropriate refills  If you (or your health care proxy) have any questions or concerns regarding your acute rehabilitation stay including issues with medications, rehabilitation, and follow-up plan, please call:          [facility] at [facility] at [phone]    Should you develop fevers, chills, new weakness, changes in sensation, difficulty speaking, facial weakness, confusion, shortness of breath, chest pain, or other concerning symptoms please call 911 and/or obtain transportation to nearest ER immediately  Should you develop feelings of significant hopelessness, severe depression, severe anxiety, or suicide you should call 911 or obtain transportation to nearest ER  National Suicide Prevention Lifeline is 7-609.648.3479 and is available 24 hrs/7 days a week  [facility] [phone] is available 24 hrs/7 days for mental health  [facility] ([facility] AT [place] [phone] is available 24 hrs/7 days for mental health     PHYSICIANS to see:  Please see your doctors listed in the follow up providers section of your discharge paperwork, and take the discharge paperwork with you to your appointments  Home health has been ordered for you: Your home health agency should reach out to you or your family soon to arrange follow-up      (If [facility] health is your provider their phone number is [phone])    If you are unable to get in touch with home health you may contact your  at [phone]  [facility]    LAB WORK recommended after discharge: Follow-up lab work at discretion of your outpatient physicians to be determined at time of your future appointments  [facility] profile lab is still pending - follow-up with primary care and rheumatology these results  (Was discussed with patient and written on instructions at time of discharge from rehab)    CBC and BMP to monitor hemoglobin, white blood cell count, electrolytes and kidney function at next visit within 2 weeks  Obtain follow-up liver function tests given recently elevated levels  Follow-up lipid panel now that you are off of statin within 1 month     IMAGING to follow-up:  Follow-up imaging as discussed with your recent physicians or at discretion of your outpatient physicians to be determined at time of your appointments  ADDITIONAL FINDINGS and ISSUES to follow-up:    Check under the "DISCHARGE PROVIDER" section of these DISCHARGE INSTRUCTIONS for provider specific issues as well  Excessive delay or lack of appropriate follow-up could potentially increase your risk of complications which could be severe and even life-threatening  It is you or your caregiver's responsibility to ensure these tests are ordered by your outpatient providers and followed up with accordingly  SKIN CARE INSTRUCTIONS to follow:    Monitor skin for increased redness or breadown and promptly notify your physician should these develop      WEIGHTBEARING/ACTIVITY PRECAUTIONS to follow:    Weightbearing as tolerated  You may wear normal shoe ware on both feet  Follow-up with podiatry within 2-3 weeks for recent foot fracture  Driving restrictions: You are recommended against driving until cleared by an outpatient physician  Work restrictions:   You should NOT return to work (if working) until cleared by an outpatient physician  You should not operate heavy machinery (if applicable) until cleared by an outpatient physician  Alcohol restrictions: You are recommended to not drink alcohol at this time unless cleared by an outpatient physician  Drinking alcohol in your current functional condition can increase your risk of injury which could be severe  Drinking alcohol given your current health problems can lead to increased medical complications which could be severe  Combining alcohol with your current medications can increase your risk of injury which could be severe  Smoking restrictions: You are recommended to not smoke nicotine  Smoking increases your risk of heart attack, stroke, emphysema/COPD, and lung cancer  MEDICATIONS:  Please see a full list of your medications outlined in the After Visit Summary that is attached to these Discharge Instructions  Please note changes may have been made to your medications please refer to your discharge paperwork for your current medications and take this list with you to all your doctors appointments for your doctors to review  Please do not resume a home medication unless the medication reconciliation sheet indicates to do so, please do not assume that a medication that you were given a prescription for is the same as a medication you have at home based on both medications having the same name as dosages and frequency may have changed  Unless specifically noted in your medication list provided to you in your discharge paper work do not resume prior vitamins, minerals, or supplements you may have been taking prior to your hospitalization unless instructed by an outpatient physician in the future  Discuss with your primary care at next visit if applicable  Please note a summary of your hospital stay with relevant information for your doctors will try to be sent to them    Please confirm with your doctors at your follow up visits that they have received this summary and have them contact [address] [place] [address] if they have not received them along with any other medical records they may require       Taryn Shirley Phone Number:  [phone]

## 2022-06-16 ENCOUNTER — TRANSITIONAL CARE MANAGEMENT (OUTPATIENT)
Dept: FAMILY MEDICINE CLINIC | Facility: CLINIC | Age: 77
End: 2022-06-16

## 2022-06-16 ENCOUNTER — HOME CARE VISIT (OUTPATIENT)
Dept: HOME HEALTH SERVICES | Facility: HOME HEALTHCARE | Age: 77
End: 2022-06-16

## 2022-06-16 LAB
EJ AB SER QL: NEGATIVE
ENA JO1 AB SER IA-ACNC: <20 UNITS
ENA PM/SCL AB SER-ACNC: <20 UNITS
ENA SS-A 52KD IGG SER IA-ACNC: <20 UNITS
FIBRILLARIN AB SER QL: NEGATIVE
KU AB SER QL: NEGATIVE
MDA5 AB SER LINE BLOT-ACNC: <20 UNITS
MI2 AB SER QL: NEGATIVE
MJ AB SER LINE BLOT-ACNC: <20 UNITS
OJ AB SER QL: NEGATIVE
PL12 AB SER QL: NEGATIVE
PL7 AB SER QL: NEGATIVE
SAE1 IGG SER QL LINE BLOT: <20 UNITS
SRP AB SERPL QL: NEGATIVE
TIF1-GAMMA AB SER LINE BLOT-ACNC: <20 UNITS
U1 SNRNP AB SER IA-ACNC: <20 UNITS
U2 SNRNP AB SER QL: NEGATIVE

## 2022-06-16 NOTE — DISCHARGE SUMMARY
Discharge Summary - Lesley Paiz 64 y o  female MRN: [id number]  Unit/Bed#: -36 Encounter: [id number]    Admission Date: 6/2/2022     Discharge Date:  6/15/2022    Rehabilitation/Etiologic Diagnosis:   Neurologic Conditions:  03 8  Neuromuscular Disorders  Statin induced myopathy/rhabdomyolysis        Discharge Diagnoses:      Patient Active Problem List   Diagnosis    Psoriatic arthritis ([address] )    HLD (hyperlipidemia)    Constipation    Impaired mobility and ADLs    Rhabdomyolysis    Elevated liver enzymes    Weakness of both lower extremities    Fracture, toe    Osteoporosis    Left knee pain    Hypokalemia    Mild cognitive impairment       Acute Rehabilitation Center Course:     Patient participated in a comprehensive interdisciplinary inpatient rehabilitation program which included involvment of MD, therapies (PT, OT), RN, CM/SW, dietary  She made significant functional gains and was able to be advanced to a largely mod independent level of assist and is considered adequately safe for discharge home with home health  Medical issues with comanagement from our internal medicine team as outlined below  Please see below for patient's hospital course and day to day management of medical needs with significant findings, complications (if applicable), treatment, and services provided in problem list format          Decline in ADLs and mobility: Functional assessment - much improved         FIM  Care Score  Admit Score Recent Score    Total assist  1-100% or 2p    Tot     (Former TA) 2-76-99%  Sub     Max assist 2-51-75%  Sub     Mod assist 3- 26-50%  Par     Min assist 3- 25% or < Par To hygiene, bathing, dressing     CG assist 4  TA     Supervision 4 Sup     Set-up  5 NORIEGA     Mod-I/Indep 6 MI  ADLs    Transfers  Min assist  Mod-indep    Ambulation   150 ft CGA Mod indep 150 ft     Stairs   Mod assist 4 steps  4 steps mod indep     Goal: Mod indep  Major barriers:  Weakness, lack of support at home  Dispo & ELOS: Home with ELOS Wed with  PT, OT, RN, MSW      Weakness of both lower extremities  Assessment & Plan  Bilateral LE weakness resolved - strength 5/5 on d/c   - HMG-CoA reductase negative   - MyoMarker 3 still pending - OP follow-up rheum and PCP - patient instructed to follow-up as this lab is still pending at d/c from [address] to be 2/2 statin induced myopathy - continue to hold statin   Skilled therapies   Optimal nutrition and medical mgmt   Fall precautions    Impaired mobility and ADLs  Assessment & Plan  Improving significantly   Multifactorial: Rhabdo/statin-induced myopathy, foot fracture, deconditioning   - Optimal management of each as listed -   - Completed acute comprehensive interdisciplinary inpatient rehabilitation to include intensive skilled therapies (PT, OT +/- ST) as outlined with oversight and management by rehabilitation physician as well as inpatient rehab level nursing, case management and weekly interdisciplinary team meetings  - Per tx - patient should be able to perform cooking, laundry, med mgmt; neighbor will assist with groceries and driving and CM give her info on Zina Goltz  - Recommend   PT, OT, RN, MSW      Fracture, toe  Assessment & Plan  Denies pain   L 1st prox phalanx fx with minimal displacement  - WBAT   - Follow-up XR 6/14 Interval healing of first proximal phalangeal base fracture   IM discussed with pods prior to d/c - can transition back to normal shoes  - OP podiatry follow-up in 2-3 weeks - pt aware     * Rhabdomyolysis  Assessment & Plan  Improved   CK back to wnl   Believed to be 2/2 statin induced - hold statin  OP follow-up with PCP and rheum    Mild cognitive impairment  Assessment & Plan  - MOCA 23/30 on 6/10 mostly delayed recall and some on VS/exec  - Able to adequately manage meds per therapy and do basic household iADLs   - Neighbor will do 1-2 times per day check ins on most days   - Patient teaching - patient doing adequately well on med mgmt per tx  - OP follow-up with PCP   - Recommend continued HH PT, OT, MSW, RN         Left knee pain  Assessment & Plan  Adequate control on d/c   Slightly increased in bilateral knees but no acute swelling, erythema, increased warmth earlier in course   - MTX had been held acutely but discussed with IM who spoke with OP rheum and patient should be able to resume prior MTx at this time  Patient reports chronic bilateral knee and multi-joint pain from RA - recent increased L knee pain since fall PTA  - Some TTP medial jt line otherwise exam largely unremarkable - likely OA/RA +/- meniscus  - APAP PRN  - OP Rheum follow-up     Constipation  Assessment & Plan  Improved with PO dulcolax   - D/C on OTC meds - Colace/Senna; Miralax; PRN dulcolax PO       Hypokalemia  Assessment & Plan  Improved - K 3 6 on 6/13  Follow-up with [facility]       Osteoporosis  Assessment & Plan  D3 2000 units qday and calcium   Vit D level 31    Elevated liver enzymes  Assessment & Plan  Possibly 2/2 rhabdo  Resolving - only ALT still elevated but now down to 51 on 6/6  Follow-up with PCP    HLD (hyperlipidemia)  Assessment & Plan  Statin on hold   Per IM "Last lipid panel on 3/31/22: Cholesterol 169, Triglycerides 69, HDL 87, and LDL 68"  OP PCP follow-up     Essential hypertension  Assessment & Plan  Internal medicine consulted and co-management with their service  Current meds: Cardizem       Psoriatic arthritis ([facility] [address] )  Assessment & Plan  OP follow-up with rheum Dr Saúl Gillis  - Weekly MTX can be resumed next Monday - IM discussed with OP rheum Dr Saúl Gillis 6/14   - Monitor for flare       History of TIA (transient ischemic attack)  Assessment & Plan  Secondary stroke prevention  - Antithrombotic: plavix  - Holding Statin with recent possible statin induced myopathy/rhabdo  - Optimal management of blood pressure         Follow-up providers and other issues to be followed up after discharge through PCP and other specialists   PCP   Rheum   Pods    - See AVS (After visit summary) with discharge instructions, discharge medications, and other details  Imaging (See above as well):  XR foot 3+ vw left   Final Result by Sen Bolanos MD (06/15 3420)      Interval healing of first proximal phalangeal base fracture              Workstation performed: [id number]             Pertinent Recent Labs (See Course above, EPIC EMR, and if needed request additional records):   Latest Reference Range & Units 06/13/22 04:04   Sodium 137 - 147 mmol/L 138   Potassium 3 6 - 5 0 mmol/L 3 6   Chloride 97 - 108 mmol/L 103   CO2 22 - 30 mmol/L 29   Anion Gap 5 - 14 mmol/L 6   BUN 5 - 25 mg/dL 12   Creatinine 0 60 - 1 20 mg/dL 0 64 [1]   Glucose, Random 70 - 99 mg/dL 83 [2]   Calcium 8 4 - 10 2 mg/dL 8 6   eGFR ml/min/1 73sq m 85   Total CK 30 - 135 U/L 37   WBC 4 31 - 10 16 Thousand/uL 6 72   Red Blood Cell Count 3 81 - 5 12 Million/uL 4 42   Hemoglobin 11 5 - 15 4 g/dL 14 6   HCT 34 8 - 46 1 % 46 4 (H)   MCV 82 - 98 fL 105 (H)   MCH 26 8 - 34 3 pg 33 0   MCHC 31 4 - 37 4 g/dL 31 5   RDW 11 6 - 15 1 % 14 8   Platelet Count 943 - 390 Thousands/uL 210     Procedures Performed During ARC Admission: None    Discharge Physical Examination:  HR:  [55] 55  BP: (135)/(63) 135/63  GEN:  Sitting in NAD   HEENT/NECK: MMM  CARDIAC: Regular rate rhythm, no murmers, no rubs, no gallops  LUNGS:  clear to auscultation, no wheezes, rales, or rhonchi  ABDOMEN: Soft, non-tender, non-distended, normal active bowel sounds  EXTREMITIES/SKIN:  no calf edema, no calf tenderness to palpation  NEURO:   MENTAL STATUS: awake, oriented to person, place, time, and situation, MENTAL STATUS:  Appropriate wakefulness and interaction  and Strength/MMT:  Full throughout   PSYCH:  Affect:  Euthymic     HPI:   Starla Cortez is a 64 y o  female with PMH psoriatic arthritis, HTN, HL, TIA, who legs gave out from her causing foot pain and she was noted to have significant bilateral LE weakness and was evaluated at the hospital   CK was elevated to close to 7000 and she was initially with acute kidney injury and had elevated LFTs  IVF were provided and CK trended down  X-ray showed displaced oblique fracture involving the medical base of the proximal phalanx of the 1st digit with recommendation by podiatry for WBAT in sx shoes  Neuro had extensive work-up which included MRI L spine which showed moderate central canal stenosis L4-5, Moderate right foraminal narrowing and moderate to sever right lateral recess stenosis  Rheum reviewed case as well  Overall she was not felt to have inflammatory myositis and 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 and statin has been held  She was recommended to hold MTX for now as well  Strength has been improving some  Patient was evaluated by skilled therapies and was found to have significant decline in ADLs and ambulation and [facility] [address]      Chief complaint:  Leg weakness     Subjective: On eval, patient reports L>R leg weakness and R leg getting quite a bit better and less so L  She denies significant foot pain  Patient notes feeling tired with little energy overall  She denies fever, chills, sweats, SOB, other pain, uncontrolled anxiety, depression, or other new complaints       Review of Systems: A 10-point review of systems was performed  Negative except as listed above      Condition at Discharge: good     Discharge instructions/Information to patient and family:   See after visit summary for information provided to patient and family  Provisions for Follow-Up Care:  See after visit summary for information related to follow-up care and any pertinent home health orders        Disposition: Home with [facility]    Planned Readmission:  No    Discharge Statement   Total time spent examining patient, counseling patient (or patient/family) on condition, medication, rehabilitation/medical plan, and coordinating care on day of discharge: at least 45 minutes  Greater than 50% of the total time was spent examining patient, answering all questions, directly discussing plan of care and post-discharge instructions with patient (or patient and family) some of which specifically related to recent weakness and functional decline  Additional time spent on coordinating care and other discharge activities  Discharge Medications:  See after visit summary for reconciled discharge medications provided to patient and family

## 2022-06-16 NOTE — PROGRESS NOTES
06/16/22 1417   Hello, [Guardians Name / Rubens Kelly, this is [Caller Chilolito Mejia from [facility], and our clinical care team wanted to check on you / your child after your recent visit to the hospital  It will only take 3-5 minutes  Is this a good time? Discharge Call Type/ Specific Diagnosis: General Call   General Discharge Phone Call   Were your/your child's discharge instructions clear and understandable? Please tell me in your own words how to care for yourself/your child now that you're home Yes;Patient understood instructions   Have you filled your/your child's new prescriptions yet? Yes   What questions do you have about those medications? No questions   Are you/your child having any unusual symptoms or problems? (Specific to problem- i e , dressing, pain, bruising or swelling, procedure, etc ) No reported symptoms/problems   Do you have follow up appointment with your/your child's physician? Yes   Is there anything preventing you from keeping that appointment? No;Patient able to keep appointment   Are there any physicians, nurses, or hospital staff you would like us to recognize for doing a very good job? Nurse;PCA/Tech;PT/OT/RT/SLP  (Everyone did a wonderful job!)   Thank you for taking the time to share with me about your care and recovery  Do you have any suggestions for us? No   This call resulted in: No interventions needed   Call Complete   Attempted Number of Calls 1   Discharge phone call complete? Complete   Hi, This is ________ from [facility]  This is just a courtesy call, and there is no need to call us back  Have a great day     (Called by [address])

## 2022-06-17 ENCOUNTER — HOME CARE VISIT (OUTPATIENT)
Dept: HOME HEALTH SERVICES | Facility: HOME HEALTHCARE | Age: 77
End: 2022-06-17
Payer: MEDICARE

## 2022-06-17 VITALS
HEART RATE: 60 BPM | TEMPERATURE: 97.9 F | RESPIRATION RATE: 16 BRPM | SYSTOLIC BLOOD PRESSURE: 120 MMHG | DIASTOLIC BLOOD PRESSURE: 68 MMHG | OXYGEN SATURATION: 98 %

## 2022-06-17 PROCEDURE — 10330081 VN NO-PAY CLAIM PROCEDURE

## 2022-06-17 PROCEDURE — G0151 HHCP-SERV OF PT,EA 15 MIN: HCPCS

## 2022-06-17 PROCEDURE — 400013 VN SOC

## 2022-06-17 PROCEDURE — G0299 HHS/HOSPICE OF RN EA 15 MIN: HCPCS

## 2022-06-17 PROCEDURE — G0152 HHCP-SERV OF OT,EA 15 MIN: HCPCS

## 2022-06-17 NOTE — CASE COMMUNICATION
[facility] has admitted your patient to [address] [place] [address] service with the following disciplines:      SN, PT and OT  This report is informational only, no responses is needed  Primary focus of home health care Musculoskeletal Assessment  Patient stated goals of care to get back to baseline  Anticipated visit pattern and next visit date 2w1 1w1  See medication list   all meds in home patient can manage  Significant clinical findings- rodo ent had some short term memory loss during vs in home  patient asked multiple times about blood work and doctors appointments and when VNA will visit  unclear as to if this is a chronic issue for patient or if it just due to recent events specifically hospitalization and death of her   Potential barriers to goal achievement weakness  patient lives alone with intermittement assistance  requires transportation from another person in  order to get to medical appointments  Other pertinent information- patient states she is coping well following the death of her  and recent hospitalization  patient declined need for MSW assessment stating she has neighbors that can assist with transportation grocery delivery and medication pickup    Thank you for allowing us to participate in the care of your patient        Quang Russell RN

## 2022-06-18 VITALS — DIASTOLIC BLOOD PRESSURE: 54 MMHG | HEART RATE: 57 BPM | OXYGEN SATURATION: 95 % | SYSTOLIC BLOOD PRESSURE: 116 MMHG

## 2022-06-20 ENCOUNTER — HOME CARE VISIT (OUTPATIENT)
Dept: HOME HEALTH SERVICES | Facility: HOME HEALTHCARE | Age: 77
End: 2022-06-20
Payer: MEDICARE

## 2022-06-20 VITALS
TEMPERATURE: 97.1 F | DIASTOLIC BLOOD PRESSURE: 60 MMHG | SYSTOLIC BLOOD PRESSURE: 110 MMHG | HEART RATE: 53 BPM | OXYGEN SATURATION: 96 %

## 2022-06-20 PROCEDURE — G0151 HHCP-SERV OF PT,EA 15 MIN: HCPCS

## 2022-06-20 NOTE — CASE COMMUNICATION
PT asessment and to be followed for 2W3 for LE strengthening and improved functional mobility skills for safety awarenss and technique, fall prevention  Patient did have fall during PT assessment  PT assessment of patient in and out of shower  Patient with step in shower stall with low lip  Stepping out of same patient lost balance  PT unable to help patient recover and lowered patient to bottom of shower stall, then assisted lul k up to her feet  Brenda Console

## 2022-06-21 ENCOUNTER — HOME CARE VISIT (OUTPATIENT)
Dept: HOME HEALTH SERVICES | Facility: HOME HEALTHCARE | Age: 77
End: 2022-06-21
Payer: MEDICARE

## 2022-06-21 VITALS
TEMPERATURE: 97.1 F | HEART RATE: 56 BPM | RESPIRATION RATE: 20 BRPM | DIASTOLIC BLOOD PRESSURE: 52 MMHG | SYSTOLIC BLOOD PRESSURE: 128 MMHG | OXYGEN SATURATION: 98 %

## 2022-06-21 VITALS — HEART RATE: 54 BPM | DIASTOLIC BLOOD PRESSURE: 54 MMHG | SYSTOLIC BLOOD PRESSURE: 116 MMHG | OXYGEN SATURATION: 95 %

## 2022-06-21 PROCEDURE — G0300 HHS/HOSPICE OF LPN EA 15 MIN: HCPCS

## 2022-06-21 NOTE — CASE COMMUNICATION
OT performed initial evaluation 6/17/22  OT plans to continue services 1 wk 1 then 2 x per wk x 2 weeks for ADL training, review of safety with transfers/ mobility and recommendation for DME as appropriate

## 2022-06-22 ENCOUNTER — HOME CARE VISIT (OUTPATIENT)
Dept: HOME HEALTH SERVICES | Facility: HOME HEALTHCARE | Age: 77
End: 2022-06-22
Payer: MEDICARE

## 2022-06-22 VITALS
SYSTOLIC BLOOD PRESSURE: 128 MMHG | OXYGEN SATURATION: 98 % | DIASTOLIC BLOOD PRESSURE: 70 MMHG | HEART RATE: 56 BPM | TEMPERATURE: 97.6 F

## 2022-06-22 VITALS — OXYGEN SATURATION: 98 % | HEART RATE: 57 BPM | DIASTOLIC BLOOD PRESSURE: 60 MMHG | SYSTOLIC BLOOD PRESSURE: 122 MMHG

## 2022-06-22 PROCEDURE — G0151 HHCP-SERV OF PT,EA 15 MIN: HCPCS

## 2022-06-22 PROCEDURE — G0152 HHCP-SERV OF OT,EA 15 MIN: HCPCS

## 2022-06-23 ENCOUNTER — HOME CARE VISIT (OUTPATIENT)
Dept: HOME HEALTH SERVICES | Facility: HOME HEALTHCARE | Age: 77
End: 2022-06-23
Payer: MEDICARE

## 2022-06-23 NOTE — OCCUPATIONAL THERAPY NOTE
OT DISCHARGE SUMMARY    Pt made good progress during stay on the ARC following admission for rhabdomyolysis  Pt presented upon IE with generalized weakness, decreased endurance, activity tolerance, and functional mobility  On evaluation, pt required min/mod A to complete all ADLs and functional transfers  Pt was discharged to home independently with neighbor support as needed  Pt currently functioning at independent level for ADL, independent level for transfers without AD  The following DME was recommended shower chair and life alert  No formal FT was completed, however, therapist did speak via phone with neighbor, Nina Torres, educating him that pt is mod I with all ADLs and transfers; can benefit from assistance for groceries and doctors appointments as able  Bernie Moe, reporting he has purchased shower chair and is looking into life alert  OT recommended pt continue to receive home health OT services with focus no IADLs, activity tolerance, safety in home environment, and overall strength to increase fxl performance and decrease risk of readmission  Barthel Index Score (IE 6/3/22)  Feeding- 10/10  Bathing- 0/5  Grooming- 0/5  Dressing- 5/10  Bowels- 10/10  Bladder- 10/10  Toilet use- 5/10  Transfers-10/15  Mobility- 10/15  Stairs-5/10     Total- 65/100  (0-20 total dependence) (21-60 severe dependence) (61-90 moderate dependence) (91-99 slight dependence) (100 independent)     Barthel Index Score (6/14/22)  Feeding- 10/10  Bathing- 5/5  Grooming- 5/5  Dressing- 10/10  Bowels- 10/10  Bladder- 10/10  Toilet use- 10/10  Transfers-15/15  Mobility- 15/15  Stairs-10/10     Total- 100/100  (0-20 total dependence) (21-60 severe dependence) (61-90 moderate dependence) (91-99 slight dependence) (100 independent)    Pt completed Rodolfo Cognitive Assessment (MOCA) version 8 1  Pt scored overall 23 / 30  indicating a mild cognitive deficit     Visuospatial/executive: 3/5   Naming: 3/3   Memory:    (worth no points) Attention:  6/ 6   Language: 2 / 3   Abstraction: 2 / 2   Delayed recall: 1 / 5   Orientation: 5 / 6   +1 point for at least 12 yr education      Total score 23/30, indicating a mild cognitive deficit      -BRYAN Mackenzie

## 2022-06-24 ENCOUNTER — HOME CARE VISIT (OUTPATIENT)
Dept: HOME HEALTH SERVICES | Facility: HOME HEALTHCARE | Age: 77
End: 2022-06-24
Payer: MEDICARE

## 2022-06-24 PROCEDURE — G0152 HHCP-SERV OF OT,EA 15 MIN: HCPCS

## 2022-06-24 PROCEDURE — G0299 HHS/HOSPICE OF RN EA 15 MIN: HCPCS

## 2022-06-24 PROCEDURE — G0155 HHCP-SVS OF CSW,EA 15 MIN: HCPCS

## 2022-06-26 VITALS — SYSTOLIC BLOOD PRESSURE: 124 MMHG | HEART RATE: 60 BPM | OXYGEN SATURATION: 91 % | DIASTOLIC BLOOD PRESSURE: 62 MMHG

## 2022-06-27 ENCOUNTER — OFFICE VISIT (OUTPATIENT)
Dept: FAMILY MEDICINE CLINIC | Facility: CLINIC | Age: 77
End: 2022-06-27
Payer: MEDICARE

## 2022-06-27 ENCOUNTER — HOME CARE VISIT (OUTPATIENT)
Dept: HOME HEALTH SERVICES | Facility: HOME HEALTHCARE | Age: 77
End: 2022-06-27
Payer: MEDICARE

## 2022-06-27 VITALS
OXYGEN SATURATION: 97 % | BODY MASS INDEX: 18.49 KG/M2 | WEIGHT: 111 LBS | RESPIRATION RATE: 18 BRPM | HEIGHT: 65 IN | DIASTOLIC BLOOD PRESSURE: 72 MMHG | HEART RATE: 63 BPM | TEMPERATURE: 97.8 F | SYSTOLIC BLOOD PRESSURE: 128 MMHG

## 2022-06-27 VITALS
SYSTOLIC BLOOD PRESSURE: 116 MMHG | OXYGEN SATURATION: 95 % | HEART RATE: 62 BPM | DIASTOLIC BLOOD PRESSURE: 60 MMHG | TEMPERATURE: 98.1 F | RESPIRATION RATE: 16 BRPM

## 2022-06-27 DIAGNOSIS — L40.50 PSORIATIC ARTHRITIS (HCC): ICD-10-CM

## 2022-06-27 DIAGNOSIS — R29.898 WEAKNESS OF BOTH LOWER EXTREMITIES: ICD-10-CM

## 2022-06-27 DIAGNOSIS — E44.1 MILD PROTEIN-CALORIE MALNUTRITION (HCC): ICD-10-CM

## 2022-06-27 DIAGNOSIS — I10 ESSENTIAL HYPERTENSION: ICD-10-CM

## 2022-06-27 DIAGNOSIS — S92.919A CLOSED NONDISPLACED FRACTURE OF PHALANX OF TOE, UNSPECIFIED LATERALITY, UNSPECIFIED TOE, INITIAL ENCOUNTER: ICD-10-CM

## 2022-06-27 DIAGNOSIS — G31.84 MILD COGNITIVE IMPAIRMENT: ICD-10-CM

## 2022-06-27 DIAGNOSIS — M62.82 NON-TRAUMATIC RHABDOMYOLYSIS: Primary | ICD-10-CM

## 2022-06-27 PROCEDURE — 99495 TRANSJ CARE MGMT MOD F2F 14D: CPT | Performed by: FAMILY MEDICINE

## 2022-06-27 NOTE — PROGRESS NOTES
Assessment/Plan:    Patient is 75-year-old female seen for follow-up to hospitalization  I reviewed the hospital an in-patient rehab record  I reviewed patient's medications  We discussed her MyoMarker her blood test that came back since her discharge-it was negative  She still has some weakness in her legs  Her neighbors are helping her with yardd work and driving her to appointments  Has appointment with Dr Letty Dukes in July for toe fracture  Sees Dr Kwesi Yin in August   I will see her back in three months  Diagnoses and all orders for this visit:    Non-traumatic rhabdomyolysis    Mild cognitive impairment    Essential hypertension    BMI less than 19,adult    Weakness of both lower extremities    Mild protein-calorie malnutrition (HCC)    Closed nondisplaced fracture of phalanx of toe, unspecified laterality, unspecified toe, initial encounter    Psoriatic arthritis ([facility] [address] )        Subjective:   Chief Complaint   Patient presents with    Transition of Care Management        Patient ID: Blayne Yu is a 64 y o  female  Patient is 75-year-old female who was admitted to the hospital on 5/26 with rhabdomyolysis  She was discharged on 6/to to in-patient rehab  She was discharged from rehab on 06/15    TCM Call (since 5/27/2022)     Date and time call was made  6/16/2022 12:05 PM    Hospital care reviewed  Records reviewed    [address]    Date of Admission  06/02/22    Date of discharge  06/15/22    Diagnosis  rhabdomyolysis, weakness bilateral lower extremities, toe   fracture, mild cognitive impairment    Disposition  Home    Were the patients medications reviewed and updated  No  patient unable to   review over phone, she said she will bring all paperwork to appointment    Current Symptoms  None  patient denies any problems or concerns at this   time      TCM Call (since 5/27/2022)     Post hospital issues  Reduced activity  no issues, but patient states she   is taking it easy at home    Scheduled for follow up? Yes    Patients specialists  Other (comment)    Other specialists names  Podiatry, Dr Mateusz Lyons    Referrals needed  patient provided with Dr Rohan Esqueda office number    Did you obtain your prescribed medications  Yes    Do you need help managing your prescriptions or medications  No    Is transportation to your appointment needed  Yes    Specify why  Neighbor will bring her to appointment    I have advised the patient to call PCP with any new or worsening symptoms    [facility]    Living Arrangements  Alone    Are you recieving any outpatient services  No    Are you recieving home care services  Yes    Types of home care services  Nurse visit    Are you using any community resources  No    Current waiver services  No    Interperter language line needed  No          Is still getting physical therapy at home  Last occupational therapy this week  Has a shower chair and has two bars and a new shower faucet  She is making her own meals  She is doing her own cleaning  She is not driving yet  She still does get some achiness  The following portions of the patient's history were reviewed and updated as appropriate: allergies, current medications, past family history, past medical history, past social history, past surgical history and problem list     Review of Systems   Constitutional: Positive for fatigue  Negative for chills and fever  HENT: Negative for congestion and sore throat  Respiratory: Negative for chest tightness  Cardiovascular: Negative for chest pain and palpitations  Gastrointestinal: Negative for abdominal pain, constipation, diarrhea and nausea  Genitourinary: Negative for difficulty urinating  Musculoskeletal: Positive for arthralgias and myalgias  Skin: Negative  Neurological: Negative for dizziness and headaches  Psychiatric/Behavioral: Negative            Objective:      /72 (BP Location: Right arm, Patient Position: Sitting, Cuff Size: Standard)   Pulse 63   Temp 97 8 °F (36 6 °C) (Tympanic)   Resp 18   Ht 5' 5" (1 651 m)   Wt 50 3 kg (111 lb)   SpO2 97%   BMI 18 47 kg/m²          Physical Exam  Vitals and nursing note reviewed  Constitutional:       General: She is not in acute distress  Appearance: She is underweight  She is ill-appearing  Neck:      Thyroid: No thyromegaly  Cardiovascular:      Rate and Rhythm: Normal rate and regular rhythm  Heart sounds: Normal heart sounds  Pulmonary:      Effort: Pulmonary effort is normal       Breath sounds: Normal breath sounds  Lymphadenopathy:      Cervical: No cervical adenopathy  Skin:     General: Skin is warm and dry  Neurological:      Mental Status: She is alert and oriented to person, place, and time  Motor: Weakness present

## 2022-06-27 NOTE — CASE COMMUNICATION
DC to Therapy 06/24/2022  patient offers no c o pain at this visit  all medications in home patient manages independently  VSS  patient advised to try and increase water intake as she states the only plain water she drinks daily is a few sips with medications

## 2022-06-28 ENCOUNTER — HOME CARE VISIT (OUTPATIENT)
Dept: HOME HEALTH SERVICES | Facility: HOME HEALTHCARE | Age: 77
End: 2022-06-28
Payer: MEDICARE

## 2022-06-28 VITALS — DIASTOLIC BLOOD PRESSURE: 50 MMHG | SYSTOLIC BLOOD PRESSURE: 104 MMHG | OXYGEN SATURATION: 98 % | HEART RATE: 54 BPM

## 2022-06-28 PROCEDURE — G0151 HHCP-SERV OF PT,EA 15 MIN: HCPCS

## 2022-06-28 NOTE — PHYSICAL THERAPY NOTE
Physical Therapy Discharge Summary    Pt discharged home using RW for all mobility  Pt made very good functional progress, achieving independence for transfers and ambulation  Required RW for UE support due to balance deficits  Pt will have neighbor assist as needed and will continue with skilled therapy to maximize her functional Ind and safety  It is recommended she have supervision with ambulation on uneven surfaces at this time

## 2022-06-28 NOTE — CASE COMMUNICATION
Patient declined visit for 6/27/22 due to having other appointments  OT to decrease visits to 1 visit in 7 days the week of 6/26/22 at patients request   OT to perform final visit for ADL training and additional review of safety with transfers/shower transfer training  followed by  discharge on 6/28/22

## 2022-06-29 ENCOUNTER — HOME CARE VISIT (OUTPATIENT)
Dept: HOME HEALTH SERVICES | Facility: HOME HEALTHCARE | Age: 77
End: 2022-06-29
Payer: MEDICARE

## 2022-06-29 VITALS — SYSTOLIC BLOOD PRESSURE: 124 MMHG | HEART RATE: 56 BPM | OXYGEN SATURATION: 98 % | DIASTOLIC BLOOD PRESSURE: 60 MMHG

## 2022-06-29 PROCEDURE — G0152 HHCP-SERV OF OT,EA 15 MIN: HCPCS

## 2022-07-01 ENCOUNTER — HOME CARE VISIT (OUTPATIENT)
Dept: HOME HEALTH SERVICES | Facility: HOME HEALTHCARE | Age: 77
End: 2022-07-01
Payer: MEDICARE

## 2022-07-01 VITALS — SYSTOLIC BLOOD PRESSURE: 130 MMHG | HEART RATE: 55 BPM | OXYGEN SATURATION: 98 % | DIASTOLIC BLOOD PRESSURE: 64 MMHG

## 2022-07-01 PROCEDURE — G0151 HHCP-SERV OF PT,EA 15 MIN: HCPCS

## 2022-07-05 ENCOUNTER — HOME CARE VISIT (OUTPATIENT)
Dept: HOME HEALTH SERVICES | Facility: HOME HEALTHCARE | Age: 77
End: 2022-07-05
Payer: MEDICARE

## 2022-07-05 PROCEDURE — G0151 HHCP-SERV OF PT,EA 15 MIN: HCPCS

## 2022-07-07 ENCOUNTER — HOME CARE VISIT (OUTPATIENT)
Dept: HOME HEALTH SERVICES | Facility: HOME HEALTHCARE | Age: 77
End: 2022-07-07
Payer: MEDICARE

## 2022-07-07 VITALS — HEART RATE: 60 BPM | OXYGEN SATURATION: 98 % | SYSTOLIC BLOOD PRESSURE: 130 MMHG | DIASTOLIC BLOOD PRESSURE: 54 MMHG

## 2022-07-07 PROCEDURE — G0151 HHCP-SERV OF PT,EA 15 MIN: HCPCS

## 2022-07-20 ENCOUNTER — OFFICE VISIT (OUTPATIENT)
Dept: PODIATRY | Facility: CLINIC | Age: 77
End: 2022-07-20
Payer: MEDICARE

## 2022-07-20 VITALS
HEIGHT: 65 IN | SYSTOLIC BLOOD PRESSURE: 134 MMHG | DIASTOLIC BLOOD PRESSURE: 58 MMHG | WEIGHT: 110 LBS | BODY MASS INDEX: 18.33 KG/M2

## 2022-07-20 DIAGNOSIS — S92.415A CLOSED NONDISPLACED FRACTURE OF PROXIMAL PHALANX OF LEFT GREAT TOE, INITIAL ENCOUNTER: Primary | ICD-10-CM

## 2022-07-20 PROCEDURE — 99213 OFFICE O/P EST LOW 20 MIN: CPT | Performed by: PODIATRIST

## 2022-07-20 NOTE — PROGRESS NOTES
Assessment/Plan:    Explained to patient that the left great toe fracture is likely healed  She is having no discomfort with palpation or range of motion  No need of additional x-rays  No need for aggressive treatment  Patient may resume all activities  Reappoint p r n  No problem-specific Assessment & Plan notes found for this encounter  Diagnoses and all orders for this visit:    Closed nondisplaced fracture of proximal phalanx of left great toe, initial encounter          Subjective:      Patient ID: Charisma Fan is a 64 y o  female  HPI     Patient, a 49-year-old female presents for assessment of left great toe fracture  On or approximately on May 26, 2022 patient fell and fractured the left great toe  Patient states that she had no pain even after the fall and injury  X-rays taken on May 26 confirmed the fracture  Subsequent x-rays revealed unremarkable healing  Patient is wearing a shoe today and has no discomfort  I personally reviewed an x-ray of the left foot dated 05/26/2022  A fracture at the medial base proximal phalanx noted  I personally reviewed x-rays of the left foot dated 06/14/2022  Evidence of healing of left hallux fracture noted  The following portions of the patient's history were reviewed and updated as appropriate: allergies, current medications, past family history, past medical history, past social history, past surgical history and problem list     Review of Systems   Cardiovascular:        History of heart disease   Musculoskeletal: Positive for arthralgias  Neurological: Positive for weakness  Objective:      /58   Ht 5' 5" (1 651 m)   Wt 49 9 kg (110 lb)   BMI 18 30 kg/m²          Physical Exam  Constitutional:       Appearance: Normal appearance  Cardiovascular:      Pulses: Normal pulses  Musculoskeletal:         General: No tenderness  Comments: No pain with palpation or range of motion left great toe     Skin: General: Skin is warm  Neurological:      General: No focal deficit present  Mental Status: She is oriented to person, place, and time

## 2022-08-01 ENCOUNTER — APPOINTMENT (OUTPATIENT)
Dept: LAB | Facility: CLINIC | Age: 77
End: 2022-08-01
Payer: MEDICARE

## 2022-08-01 DIAGNOSIS — E87.6 HYPOKALEMIA: ICD-10-CM

## 2022-08-01 DIAGNOSIS — I10 ESSENTIAL HYPERTENSION: ICD-10-CM

## 2022-08-01 DIAGNOSIS — L40.50 PSORIATIC ARTHROPATHY (HCC): ICD-10-CM

## 2022-08-01 LAB
ALBUMIN SERPL BCP-MCNC: 3.3 G/DL (ref 3.5–5)
ALP SERPL-CCNC: 59 U/L (ref 46–116)
ALT SERPL W P-5'-P-CCNC: 20 U/L (ref 12–78)
ANION GAP SERPL CALCULATED.3IONS-SCNC: 3 MMOL/L (ref 4–13)
AST SERPL W P-5'-P-CCNC: 16 U/L (ref 5–45)
BASOPHILS # BLD AUTO: 0.02 THOUSANDS/ΜL (ref 0–0.1)
BASOPHILS NFR BLD AUTO: 0 % (ref 0–1)
BILIRUB SERPL-MCNC: 0.57 MG/DL (ref 0.2–1)
BUN SERPL-MCNC: 11 MG/DL (ref 5–25)
CALCIUM ALBUM COR SERPL-MCNC: 9.7 MG/DL (ref 8.3–10.1)
CALCIUM SERPL-MCNC: 9.1 MG/DL (ref 8.3–10.1)
CHLORIDE SERPL-SCNC: 105 MMOL/L (ref 96–108)
CO2 SERPL-SCNC: 30 MMOL/L (ref 21–32)
CREAT SERPL-MCNC: 0.76 MG/DL (ref 0.6–1.3)
CRP SERPL QL: <3 MG/L
EOSINOPHIL # BLD AUTO: 0.06 THOUSAND/ΜL (ref 0–0.61)
EOSINOPHIL NFR BLD AUTO: 1 % (ref 0–6)
ERYTHROCYTE [DISTWIDTH] IN BLOOD BY AUTOMATED COUNT: 14.5 % (ref 11.6–15.1)
ERYTHROCYTE [SEDIMENTATION RATE] IN BLOOD: 19 MM/HOUR (ref 0–29)
GFR SERPL CREATININE-BSD FRML MDRD: 75 ML/MIN/1.73SQ M
GLUCOSE P FAST SERPL-MCNC: 94 MG/DL (ref 65–99)
HCT VFR BLD AUTO: 43.4 % (ref 34.8–46.1)
HGB BLD-MCNC: 14.4 G/DL (ref 11.5–15.4)
IMM GRANULOCYTES # BLD AUTO: 0.03 THOUSAND/UL (ref 0–0.2)
IMM GRANULOCYTES NFR BLD AUTO: 1 % (ref 0–2)
LYMPHOCYTES # BLD AUTO: 1.65 THOUSANDS/ΜL (ref 0.6–4.47)
LYMPHOCYTES NFR BLD AUTO: 30 % (ref 14–44)
MCH RBC QN AUTO: 34.1 PG (ref 26.8–34.3)
MCHC RBC AUTO-ENTMCNC: 33.2 G/DL (ref 31.4–37.4)
MCV RBC AUTO: 103 FL (ref 82–98)
MONOCYTES # BLD AUTO: 0.69 THOUSAND/ΜL (ref 0.17–1.22)
MONOCYTES NFR BLD AUTO: 13 % (ref 4–12)
NEUTROPHILS # BLD AUTO: 2.98 THOUSANDS/ΜL (ref 1.85–7.62)
NEUTS SEG NFR BLD AUTO: 55 % (ref 43–75)
NRBC BLD AUTO-RTO: 0 /100 WBCS
PLATELET # BLD AUTO: 222 THOUSANDS/UL (ref 149–390)
PMV BLD AUTO: 10.6 FL (ref 8.9–12.7)
POTASSIUM SERPL-SCNC: 3.7 MMOL/L (ref 3.5–5.3)
PROT SERPL-MCNC: 6.8 G/DL (ref 6.4–8.4)
RBC # BLD AUTO: 4.22 MILLION/UL (ref 3.81–5.12)
SODIUM SERPL-SCNC: 138 MMOL/L (ref 135–147)
WBC # BLD AUTO: 5.43 THOUSAND/UL (ref 4.31–10.16)

## 2022-08-01 PROCEDURE — 85025 COMPLETE CBC W/AUTO DIFF WBC: CPT

## 2022-08-01 PROCEDURE — 80053 COMPREHEN METABOLIC PANEL: CPT

## 2022-08-01 PROCEDURE — 85652 RBC SED RATE AUTOMATED: CPT

## 2022-08-01 PROCEDURE — 86140 C-REACTIVE PROTEIN: CPT

## 2022-08-01 PROCEDURE — 36415 COLL VENOUS BLD VENIPUNCTURE: CPT

## 2022-08-11 ENCOUNTER — HOSPITAL ENCOUNTER (OUTPATIENT)
Dept: MAMMOGRAPHY | Facility: MEDICAL CENTER | Age: 77
Discharge: HOME | End: 2022-08-11
Payer: MEDICARE

## 2022-08-11 VITALS — WEIGHT: 110 LBS | HEIGHT: 65 IN | BODY MASS INDEX: 18.33 KG/M2

## 2022-08-11 DIAGNOSIS — Z12.31 ENCOUNTER FOR SCREENING MAMMOGRAM FOR MALIGNANT NEOPLASM OF BREAST: ICD-10-CM

## 2022-08-11 PROCEDURE — 77063 BREAST TOMOSYNTHESIS BI: CPT

## 2022-08-11 PROCEDURE — 77067 SCR MAMMO BI INCL CAD: CPT

## 2022-09-03 DIAGNOSIS — I10 BENIGN ESSENTIAL HYPERTENSION: ICD-10-CM

## 2022-09-03 RX ORDER — DILTIAZEM HYDROCHLORIDE 240 MG/1
CAPSULE, EXTENDED RELEASE ORAL
Qty: 90 CAPSULE | Refills: 0 | Status: SHIPPED | OUTPATIENT
Start: 2022-09-03 | End: 2022-09-27 | Stop reason: SDUPTHER

## 2022-09-27 ENCOUNTER — OFFICE VISIT (OUTPATIENT)
Dept: FAMILY MEDICINE CLINIC | Facility: CLINIC | Age: 77
End: 2022-09-27
Payer: MEDICARE

## 2022-09-27 VITALS
BODY MASS INDEX: 18.49 KG/M2 | HEART RATE: 52 BPM | HEIGHT: 65 IN | SYSTOLIC BLOOD PRESSURE: 160 MMHG | TEMPERATURE: 97.5 F | DIASTOLIC BLOOD PRESSURE: 68 MMHG | RESPIRATION RATE: 16 BRPM | WEIGHT: 111 LBS | OXYGEN SATURATION: 97 %

## 2022-09-27 DIAGNOSIS — Z23 NEED FOR INFLUENZA VACCINATION: ICD-10-CM

## 2022-09-27 DIAGNOSIS — Z91.89 DRIVING SAFETY ISSUE: ICD-10-CM

## 2022-09-27 DIAGNOSIS — I10 BENIGN ESSENTIAL HYPERTENSION: Primary | ICD-10-CM

## 2022-09-27 DIAGNOSIS — Z23 NEED FOR VACCINATION AGAINST STREPTOCOCCUS PNEUMONIAE: ICD-10-CM

## 2022-09-27 PROCEDURE — 99214 OFFICE O/P EST MOD 30 MIN: CPT | Performed by: FAMILY MEDICINE

## 2022-09-27 PROCEDURE — 90682 RIV4 VACC RECOMBINANT DNA IM: CPT | Performed by: FAMILY MEDICINE

## 2022-09-27 PROCEDURE — 90677 PCV20 VACCINE IM: CPT | Performed by: FAMILY MEDICINE

## 2022-09-27 PROCEDURE — G0008 ADMIN INFLUENZA VIRUS VAC: HCPCS | Performed by: FAMILY MEDICINE

## 2022-09-27 PROCEDURE — G0009 ADMIN PNEUMOCOCCAL VACCINE: HCPCS | Performed by: FAMILY MEDICINE

## 2022-09-27 RX ORDER — DILTIAZEM HYDROCHLORIDE 240 MG/1
240 CAPSULE, EXTENDED RELEASE ORAL DAILY
Qty: 90 CAPSULE | Refills: 0 | Status: SHIPPED | OUTPATIENT
Start: 2022-09-27

## 2022-09-27 NOTE — PROGRESS NOTES
Name: Kosta Zhang      : 1945      MRN: [id number]  Encounter Provider: Veronica Garcia DO  Encounter Date: 2022   Encounter department: [address] [place] [address]     Patient is a 68-year-old female seen for follow-up chronic medical conditions  1  Benign essential hypertension  -     diltiazem (TIAZAC) 240 MG 24 hr capsule; Take 1 capsule (240 mg total) by mouth daily  -     Comprehensive metabolic panel; Future; Expected date: 2022  -     Lipid Panel with Direct LDL reflex; Future; Expected date: 2022    2  BMI less than 19,adult  -     Comprehensive metabolic panel; Future; Expected date: 2022  -     Lipid Panel with Direct LDL reflex; Future; Expected date: 2022    3  Need for influenza vaccination  -     FLUBLOK: influenza vaccine, quadrivalent, recombinant, PF, 0 5 mL    4  Need for vaccination against Streptococcus pneumoniae  -     Pneumococcal Conjugate Vaccine 20-valent (Pcv20)    5  Driving safety issue  -     Ambulatory Referral to Physical Therapy; Future    Patient was hospitalized in  with statin induced myopathy and rhabdomyolysis  Patient is getting around her home on her own   But she still appears frail  She has not put on much weight  She is asking to drive  I asked her to have an eval through physical therapy to see if she is safe to drive  I will see patient back in the office in three months  BMI Counseling: Body mass index is 18 33 kg/m²  The BMI is below normal  Patient advised to gain weight  Rationale for BMI follow-up plan is due to patient being underweight  Subjective      Chief Complaint   Patient presents with    Follow-up     3 month        Haln is seen for follow up of chronic medical conditions  She goes to bed late and gets up early  She is not driving  She is doing her own housework  Review of Systems   Constitutional: Negative for chills and fever     HENT: Negative for congestion and sore throat  Respiratory: Negative for chest tightness  Cardiovascular: Negative for chest pain and palpitations  Gastrointestinal: Negative for abdominal pain, constipation, diarrhea and nausea  Genitourinary: Negative for difficulty urinating  Musculoskeletal: Positive for arthralgias  Skin: Negative  Neurological: Negative for dizziness and headaches  Psychiatric/Behavioral: Positive for sleep disturbance  Current Outpatient Medications on File Prior to Visit   Medication Sig    acetaminophen (TYLENOL) 325 mg tablet Take 2 tablets (650 mg total) by mouth every 6 (six) hours as needed (Pain)    calcium carbonate (OYSTER SHELL,OSCAL) 500 mg Take 2 tablets by mouth daily with breakfast    cholecalciferol (VITAMIN D3) 1,000 units tablet Take 2 tablets (2,000 Units total) by mouth daily    clopidogrel (PLAVIX) 75 mg tablet Take 1 tablet (75 mg total) by mouth daily at bedtime    folic acid (FOLVITE) 1 mg tablet Take 1 mg by mouth 3 (three) times a day     latanoprost (XALATAN) 0 005 % ophthalmic solution Administer 1 drop to both eyes daily at bedtime     methotrexate 2 5 mg tablet Take 7 tablets (17 5 mg total) by mouth once a week    polyethylene glycol (GLYCOLAX) 17 GM/SCOOP powder Take 17 g by mouth daily as needed (Constipation) Can substitute for closest size available       Objective     /68   Pulse (!) 52   Temp 97 5 °F (36 4 °C) (Tympanic)   Resp 16   Ht 5' 5 25" (1 657 m)   Wt 50 3 kg (111 lb)   SpO2 97%   BMI 18 33 kg/m²     Physical Exam  Vitals and nursing note reviewed  Constitutional:       General: She is not in acute distress  Appearance: She is ill-appearing  HENT:      Head: Normocephalic  Neck:      Thyroid: No thyromegaly  Cardiovascular:      Rate and Rhythm: Normal rate and regular rhythm  Heart sounds: Normal heart sounds        Comments: /70  Pulmonary:      Effort: Pulmonary effort is normal       Breath sounds: Normal breath sounds  Musculoskeletal:      Right lower leg: No edema  Left lower leg: No edema  Lymphadenopathy:      Cervical: No cervical adenopathy  Skin:     General: Skin is warm and dry  Neurological:      Mental Status: She is alert and oriented to person, place, and time     Psychiatric:         Mood and Affect: Mood normal        Cj Prescott DO

## 2022-10-03 ENCOUNTER — TELEPHONE (OUTPATIENT)
Dept: FAMILY MEDICINE CLINIC | Facility: CLINIC | Age: 77
End: 2022-10-03

## 2022-10-03 NOTE — TELEPHONE ENCOUNTER
Pt LM stating she needs help scheduling with PT  She has called their office and they state they can not help her  Please assist pt with scheduling this

## 2022-10-12 PROBLEM — S01.81XA LACERATION OF FOREHEAD: Status: RESOLVED | Noted: 2021-12-28 | Resolved: 2022-10-12

## 2022-11-01 ENCOUNTER — TELEPHONE (OUTPATIENT)
Dept: FAMILY MEDICINE CLINIC | Facility: CLINIC | Age: 77
End: 2022-11-01

## 2022-11-01 DIAGNOSIS — Z91.89 DRIVING SAFETY ISSUE: Primary | ICD-10-CM

## 2022-11-01 NOTE — TELEPHONE ENCOUNTER
I put new order in  Please FAX to [facility]  I thought she was going through [facility] and when I called downstairs, they told me it was a physical therapy order

## 2022-11-01 NOTE — TELEPHONE ENCOUNTER
Celestina from [address] [place] [address] called stating that pt is scheduled for 9 a m  today and that the order should be for Occupational Therapy not Physical Therapy for Driving Safety      Please fax to [phone] asap

## 2022-11-07 ENCOUNTER — TELEPHONE (OUTPATIENT)
Dept: FAMILY MEDICINE CLINIC | Facility: CLINIC | Age: 77
End: 2022-11-07

## 2022-11-09 DIAGNOSIS — Z86.73 HISTORY OF TIA (TRANSIENT ISCHEMIC ATTACK): ICD-10-CM

## 2022-11-11 RX ORDER — CLOPIDOGREL BISULFATE 75 MG/1
TABLET ORAL
Qty: 90 TABLET | Refills: 0 | Status: SHIPPED | OUTPATIENT
Start: 2022-11-11

## 2022-11-11 NOTE — TELEPHONE ENCOUNTER
I spoke to patient after reviewing report from [address] [place] [address]  She can resume driving   She only drives short distances

## 2022-12-13 ENCOUNTER — APPOINTMENT (OUTPATIENT)
Dept: LAB | Facility: CLINIC | Age: 77
End: 2022-12-13

## 2022-12-13 DIAGNOSIS — I10 BENIGN ESSENTIAL HYPERTENSION: ICD-10-CM

## 2022-12-13 LAB
ALBUMIN SERPL BCP-MCNC: 3.7 G/DL (ref 3.5–5)
ALP SERPL-CCNC: 69 U/L (ref 46–116)
ALT SERPL W P-5'-P-CCNC: 17 U/L (ref 12–78)
ANION GAP SERPL CALCULATED.3IONS-SCNC: 5 MMOL/L (ref 4–13)
AST SERPL W P-5'-P-CCNC: 14 U/L (ref 5–45)
BILIRUB SERPL-MCNC: 0.69 MG/DL (ref 0.2–1)
BUN SERPL-MCNC: 12 MG/DL (ref 5–25)
CALCIUM SERPL-MCNC: 9.5 MG/DL (ref 8.3–10.1)
CHLORIDE SERPL-SCNC: 105 MMOL/L (ref 96–108)
CHOLEST SERPL-MCNC: 250 MG/DL
CO2 SERPL-SCNC: 30 MMOL/L (ref 21–32)
CREAT SERPL-MCNC: 0.78 MG/DL (ref 0.6–1.3)
GFR SERPL CREATININE-BSD FRML MDRD: 72 ML/MIN/1.73SQ M
GLUCOSE P FAST SERPL-MCNC: 104 MG/DL (ref 65–99)
HDLC SERPL-MCNC: 84 MG/DL
LDLC SERPL CALC-MCNC: 145 MG/DL (ref 0–100)
POTASSIUM SERPL-SCNC: 3.4 MMOL/L (ref 3.5–5.3)
PROT SERPL-MCNC: 7.1 G/DL (ref 6.4–8.4)
SODIUM SERPL-SCNC: 140 MMOL/L (ref 135–147)
TRIGL SERPL-MCNC: 103 MG/DL

## 2022-12-16 DIAGNOSIS — I10 BENIGN ESSENTIAL HYPERTENSION: ICD-10-CM

## 2022-12-16 RX ORDER — DILTIAZEM HYDROCHLORIDE 240 MG/1
240 CAPSULE, EXTENDED RELEASE ORAL DAILY
Qty: 90 CAPSULE | Refills: 0 | Status: SHIPPED | OUTPATIENT
Start: 2022-12-16

## 2023-01-03 ENCOUNTER — OFFICE VISIT (OUTPATIENT)
Dept: FAMILY MEDICINE CLINIC | Facility: CLINIC | Age: 78
End: 2023-01-03

## 2023-01-03 VITALS
WEIGHT: 113.2 LBS | SYSTOLIC BLOOD PRESSURE: 136 MMHG | OXYGEN SATURATION: 94 % | HEART RATE: 74 BPM | DIASTOLIC BLOOD PRESSURE: 66 MMHG | TEMPERATURE: 95.3 F | BODY MASS INDEX: 18.86 KG/M2 | HEIGHT: 65 IN

## 2023-01-03 DIAGNOSIS — E44.1 MILD PROTEIN-CALORIE MALNUTRITION (HCC): ICD-10-CM

## 2023-01-03 DIAGNOSIS — E78.5 HYPERLIPIDEMIA, UNSPECIFIED HYPERLIPIDEMIA TYPE: ICD-10-CM

## 2023-01-03 DIAGNOSIS — I10 ESSENTIAL HYPERTENSION: Primary | ICD-10-CM

## 2023-01-03 DIAGNOSIS — L40.50 PSORIATIC ARTHRITIS (HCC): ICD-10-CM

## 2023-01-03 DIAGNOSIS — E87.6 HYPOKALEMIA: ICD-10-CM

## 2023-01-03 DIAGNOSIS — R73.9 ELEVATED BLOOD SUGAR: ICD-10-CM

## 2023-01-03 DIAGNOSIS — I73.9 PERIPHERAL VASCULAR DISEASE, UNSPECIFIED (HCC): ICD-10-CM

## 2023-01-03 RX ORDER — EZETIMIBE 10 MG/1
10 TABLET ORAL DAILY
Qty: 30 TABLET | Refills: 1 | Status: SHIPPED | OUTPATIENT
Start: 2023-01-03

## 2023-01-03 NOTE — PATIENT INSTRUCTIONS
Increase protein - eat peanut butter or get string cheese sticks - low fat and eat half one twice a day  Drink juice and eat fruit daily to keep potassium up  Start new cholesterol medication - Ezetimibe  Take one daily

## 2023-01-03 NOTE — PROGRESS NOTES
Name: Kena Evans      : 1945      MRN: [id number]  Encounter Provider: Germaine Carolina DO  Encounter Date: 1/3/2023   Encounter department: [address] [place] [address]   Patient is here for follow up of chronic medical conditions  1  Essential hypertension  -     Comprehensive metabolic panel; Future; Expected date: 2023  -     TSH, 3rd generation with Free T4 reflex; Future; Expected date: 2023  -     CBC and differential; Future; Expected date: 2023    2  Hyperlipidemia, unspecified hyperlipidemia type  Assessment & Plan:  Patient's statin was discontiued because of rhabdomyolysis  Cholesterol 250 and     Orders:  -     ezetimibe (ZETIA) 10 mg tablet; Take 1 tablet (10 mg total) by mouth daily  -     Cholesterol, total; Future; Expected date: 2023  -     LDL cholesterol, direct; Future; Expected date: 2023    3  Mild protein-calorie malnutrition ([facility] )    4  Hypokalemia  Assessment & Plan:  Potassium 3 4      5  Elevated blood sugar  -     HEMOGLOBIN A1C W/ EAG ESTIMATION; Future; Expected date: 2023    6  Peripheral vascular disease, unspecified ([facility] )    7  Psoriatic arthritis (HCC)       Sees Dr Judith Mckenzie for arthritis  Discusssed elevated cholesterol since being off statin  She has history of TIA - is on Plavix  Recommended starting Ezetimibe to decrease cholesterol  She is hesitant but seems that she will try it  Also discussed that she needs to eat more - has to gain weight back since her 's death and her hospitalization  Also increase fruits, etc to improve K  Recheck in three months  AWV then  Blood work orders  Subjective      Chief Complaint   Patient presents with   • Follow-up       Patient is seen for follow up of chronic medical conditions  She is feeling better  She is driving short distances  She did not drive to the office - a neighbor brought her      Review of Systems   Constitutional: Negative for chills and fever  HENT: Negative for congestion and sore throat  Respiratory: Negative for chest tightness  Cardiovascular: Negative for chest pain and palpitations  Gastrointestinal: Negative for abdominal pain, constipation, diarrhea and nausea  Genitourinary: Negative for difficulty urinating  Skin: Negative  Neurological: Negative for dizziness and headaches  Psychiatric/Behavioral: Negative  Current Outpatient Medications on File Prior to Visit   Medication Sig   • acetaminophen (TYLENOL) 325 mg tablet Take 2 tablets (650 mg total) by mouth every 6 (six) hours as needed (Pain)   • calcium carbonate (OYSTER SHELL,OSCAL) 500 mg Take 2 tablets by mouth daily with breakfast   • cholecalciferol (VITAMIN D3) 1,000 units tablet Take 2 tablets (2,000 Units total) by mouth daily   • clopidogrel (PLAVIX) 75 mg tablet TAKE ONE TABLET BY MOUTH AT BEDTIME   • diltiazem (TIAZAC) 240 MG 24 hr capsule Take 1 capsule (240 mg total) by mouth daily   • folic acid (FOLVITE) 1 mg tablet Take 1 mg by mouth 3 (three) times a day    • latanoprost (XALATAN) 0 005 % ophthalmic solution Administer 1 drop to both eyes daily at bedtime    • methotrexate 2 5 mg tablet Take 7 tablets (17 5 mg total) by mouth once a week   • polyethylene glycol (GLYCOLAX) 17 GM/SCOOP powder Take 17 g by mouth daily as needed (Constipation) Can substitute for closest size available       Objective     /66   Pulse 74   Temp (!) 95 3 °F (35 2 °C) (Tympanic)   Ht 5' 5" (1 651 m)   Wt 51 3 kg (113 lb 3 2 oz)   SpO2 94%   BMI 18 84 kg/m²     Physical Exam  Vitals and nursing note reviewed  Constitutional:       General: She is not in acute distress  Appearance: She is underweight  HENT:      Head: Normocephalic  Neck:      Thyroid: No thyromegaly  Cardiovascular:      Rate and Rhythm: Normal rate and regular rhythm  Heart sounds: Normal heart sounds     Pulmonary:      Effort: Pulmonary effort is normal  Breath sounds: Normal breath sounds  Musculoskeletal:      Right lower leg: No edema  Left lower leg: No edema  Lymphadenopathy:      Cervical: No cervical adenopathy  Skin:     General: Skin is warm and dry  Neurological:      Mental Status: She is alert and oriented to person, place, and time     Psychiatric:         Mood and Affect: Mood normal        Penne Divers, DO 61

## 2023-02-07 ENCOUNTER — APPOINTMENT (OUTPATIENT)
Dept: LAB | Facility: CLINIC | Age: 78
End: 2023-02-07

## 2023-02-07 DIAGNOSIS — L40.50 PSORIATIC ARTHROPATHY (HCC): ICD-10-CM

## 2023-02-07 LAB
ALBUMIN SERPL BCP-MCNC: 3.4 G/DL (ref 3.5–5)
ALP SERPL-CCNC: 58 U/L (ref 46–116)
ALT SERPL W P-5'-P-CCNC: 19 U/L (ref 12–78)
ANION GAP SERPL CALCULATED.3IONS-SCNC: 3 MMOL/L (ref 4–13)
AST SERPL W P-5'-P-CCNC: 19 U/L (ref 5–45)
BASOPHILS # BLD AUTO: 0.02 THOUSANDS/ÂΜL (ref 0–0.1)
BASOPHILS NFR BLD AUTO: 0 % (ref 0–1)
BILIRUB SERPL-MCNC: 0.68 MG/DL (ref 0.2–1)
BUN SERPL-MCNC: 11 MG/DL (ref 5–25)
CALCIUM ALBUM COR SERPL-MCNC: 9.6 MG/DL (ref 8.3–10.1)
CALCIUM SERPL-MCNC: 9.1 MG/DL (ref 8.3–10.1)
CHLORIDE SERPL-SCNC: 106 MMOL/L (ref 96–108)
CO2 SERPL-SCNC: 31 MMOL/L (ref 21–32)
CREAT SERPL-MCNC: 0.74 MG/DL (ref 0.6–1.3)
CRP SERPL QL: <3 MG/L
EOSINOPHIL # BLD AUTO: 0.08 THOUSAND/ÂΜL (ref 0–0.61)
EOSINOPHIL NFR BLD AUTO: 1 % (ref 0–6)
ERYTHROCYTE [DISTWIDTH] IN BLOOD BY AUTOMATED COUNT: 14.6 % (ref 11.6–15.1)
ERYTHROCYTE [SEDIMENTATION RATE] IN BLOOD: 15 MM/HOUR (ref 0–29)
GFR SERPL CREATININE-BSD FRML MDRD: 77 ML/MIN/1.73SQ M
GLUCOSE P FAST SERPL-MCNC: 97 MG/DL (ref 65–99)
HCT VFR BLD AUTO: 44.2 % (ref 34.8–46.1)
HGB BLD-MCNC: 14.4 G/DL (ref 11.5–15.4)
IMM GRANULOCYTES # BLD AUTO: 0.01 THOUSAND/UL (ref 0–0.2)
IMM GRANULOCYTES NFR BLD AUTO: 0 % (ref 0–2)
LYMPHOCYTES # BLD AUTO: 1.48 THOUSANDS/ÂΜL (ref 0.6–4.47)
LYMPHOCYTES NFR BLD AUTO: 26 % (ref 14–44)
MCH RBC QN AUTO: 33.5 PG (ref 26.8–34.3)
MCHC RBC AUTO-ENTMCNC: 32.6 G/DL (ref 31.4–37.4)
MCV RBC AUTO: 103 FL (ref 82–98)
MONOCYTES # BLD AUTO: 0.69 THOUSAND/ÂΜL (ref 0.17–1.22)
MONOCYTES NFR BLD AUTO: 12 % (ref 4–12)
NEUTROPHILS # BLD AUTO: 3.36 THOUSANDS/ÂΜL (ref 1.85–7.62)
NEUTS SEG NFR BLD AUTO: 61 % (ref 43–75)
NRBC BLD AUTO-RTO: 0 /100 WBCS
PLATELET # BLD AUTO: 230 THOUSANDS/UL (ref 149–390)
PMV BLD AUTO: 10.6 FL (ref 8.9–12.7)
POTASSIUM SERPL-SCNC: 3.8 MMOL/L (ref 3.5–5.3)
PROT SERPL-MCNC: 6.7 G/DL (ref 6.4–8.4)
RBC # BLD AUTO: 4.3 MILLION/UL (ref 3.81–5.12)
SODIUM SERPL-SCNC: 140 MMOL/L (ref 135–147)
WBC # BLD AUTO: 5.64 THOUSAND/UL (ref 4.31–10.16)

## 2023-02-19 DIAGNOSIS — Z86.73 HISTORY OF TIA (TRANSIENT ISCHEMIC ATTACK): ICD-10-CM

## 2023-02-19 NOTE — TELEPHONE ENCOUNTER
Medication Refill Request     Name Plavis   Dose/Frequency 75mg, take 1 tab HS  Quantity 90  Verified pharmacy   [x]  Verified ordering Provider   [x]  Does patient have enough for the next 3 days?  Yes [x] No []

## 2023-02-20 DIAGNOSIS — Z86.73 HISTORY OF TIA (TRANSIENT ISCHEMIC ATTACK): ICD-10-CM

## 2023-02-20 RX ORDER — CLOPIDOGREL BISULFATE 75 MG/1
TABLET ORAL
Qty: 90 TABLET | Refills: 0 | Status: SHIPPED | OUTPATIENT
Start: 2023-02-20

## 2023-02-20 RX ORDER — CLOPIDOGREL BISULFATE 75 MG/1
75 TABLET ORAL
Qty: 90 TABLET | Refills: 0 | OUTPATIENT
Start: 2023-02-20

## 2023-03-02 DIAGNOSIS — E78.5 HYPERLIPIDEMIA, UNSPECIFIED HYPERLIPIDEMIA TYPE: ICD-10-CM

## 2023-03-02 RX ORDER — EZETIMIBE 10 MG/1
TABLET ORAL
Qty: 30 TABLET | Refills: 0 | Status: SHIPPED | OUTPATIENT
Start: 2023-03-02

## 2023-04-04 DIAGNOSIS — E78.5 HYPERLIPIDEMIA, UNSPECIFIED HYPERLIPIDEMIA TYPE: ICD-10-CM

## 2023-04-04 RX ORDER — EZETIMIBE 10 MG/1
TABLET ORAL
Qty: 30 TABLET | Refills: 0 | Status: SHIPPED | OUTPATIENT
Start: 2023-04-04

## 2023-04-12 ENCOUNTER — OFFICE VISIT (OUTPATIENT)
Dept: FAMILY MEDICINE CLINIC | Facility: CLINIC | Age: 78
End: 2023-04-12

## 2023-04-12 VITALS
OXYGEN SATURATION: 98 % | HEIGHT: 65 IN | SYSTOLIC BLOOD PRESSURE: 124 MMHG | HEART RATE: 59 BPM | WEIGHT: 118.2 LBS | BODY MASS INDEX: 19.69 KG/M2 | TEMPERATURE: 98.9 F | DIASTOLIC BLOOD PRESSURE: 60 MMHG

## 2023-04-12 DIAGNOSIS — E78.5 HYPERLIPIDEMIA, UNSPECIFIED HYPERLIPIDEMIA TYPE: ICD-10-CM

## 2023-04-12 DIAGNOSIS — L40.50 PSORIATIC ARTHRITIS (HCC): ICD-10-CM

## 2023-04-12 DIAGNOSIS — Z00.00 MEDICARE ANNUAL WELLNESS VISIT, SUBSEQUENT: Primary | ICD-10-CM

## 2023-04-12 DIAGNOSIS — M62.82 NON-TRAUMATIC RHABDOMYOLYSIS: ICD-10-CM

## 2023-04-12 DIAGNOSIS — I10 ESSENTIAL HYPERTENSION: ICD-10-CM

## 2023-04-12 NOTE — PROGRESS NOTES
Assessment and Plan:     Problem List Items Addressed This Visit        Cardiovascular and Mediastinum    Essential hypertension    Relevant Orders    Comprehensive metabolic panel    CK       Musculoskeletal and Integument    Psoriatic arthritis ([facility] )    Rhabdomyolysis    Relevant Orders    CK       Other    HLD (hyperlipidemia)     Chol down from 250 to 228 with Zetia  Cannot take statin because of rhabdomyolysis  Other Visit Diagnoses     Medicare annual wellness visit, subsequent    -  Primary          Depression Screening and Follow-up Plan: Patient was screened for depression during today's encounter  They screened negative with a PHQ-2 score of 0  Preventive health issues were discussed with patient, and age appropriate screening tests were ordered as noted in patient's After Visit Summary  Personalized health advice and appropriate referrals for health education or preventive services given if needed, as noted in patient's After Visit Summary       History of Present Illness:     Patient presents for a Medicare Wellness Visit       Patient Care Team:  Karson Butts DO as PCP - General  Romana Salk, MD Erika Flock, DO     Review of Systems:          Problem List:     Patient Active Problem List   Diagnosis   • Trigeminal neuralgia   • History of TIA (transient ischemic attack)   • Psoriatic arthritis ([facility] )   • Arteriosclerotic heart disease   • Essential hypertension   • Fatty liver   • HLD (hyperlipidemia)   • Constipation   • Nerve sheath tumor   • Transient cerebral ischemia   • Mild protein-calorie malnutrition (HCC)   • Impaired mobility and ADLs   • Rhabdomyolysis   • Elevated liver enzymes   • Weakness of both lower extremities   • Fracture, toe   • Osteoporosis   • Left knee pain   • Hypokalemia   • Mild cognitive impairment   • BMI less than 19,adult   • Peripheral vascular disease, unspecified ([facility] )      Past Medical and Surgical History:     Past Medical History: Diagnosis Date   • Abnormal liver function test     last assessed: 3/25/2014   • KARIME (acute kidney injury) [facility] ) 1/11/2020   • Anemia    • Chronic constipation     last assessed: 12/2/2014   • Closed fracture of 4th metacarpal 3/3/2018   • Herpes zoster     last assessed: 3/16/2016   • Hyperlipidemia    • Hypertension    • Open nondisplaced fracture of first cervical vertebra with routine healing 3/3/2018   • Psoriatic arthritis ([facility] )     last assessed;3/25/2014   • Recent weight loss     last assessed: 7/13/2016   • Rheumatoid arthritis ([facility] )    • Stroke [facility])    • Syncope 1/11/2020   • Trigeminal neuritis      Past Surgical History:   Procedure Laterality Date   • APPENDECTOMY     • FOOT SURGERY     • HYSTERECTOMY     • KIDNEY SURGERY        Family History:     Family History   Problem Relation Age of Onset   • No Known Problems Father    • No Known Problems Mother    • No Known Problems Maternal Grandmother    • No Known Problems Paternal Grandmother    • No Known Problems Paternal Aunt    • No Known Problems Paternal Aunt    • No Known Problems Maternal Grandfather    • No Known Problems Paternal Grandfather       Social History:     Social History     Socioeconomic History   • Marital status:       Spouse name: None   • Number of children: None   • Years of education: high school graduate    • Highest education level: None   Occupational History   • Occupation: Retired   Tobacco Use   • Smoking status: Never   • Smokeless tobacco: Never   Vaping Use   • Vaping Use: Never used   Substance and Sexual Activity   • Alcohol use: Not Currently   • Drug use: No   • Sexual activity: None   Other Topics Concern   • None   Social History Narrative   • None     Social Determinants of Health     Financial Resource Strain: Low Risk    • Difficulty of Paying Living Expenses: Not hard at all   Food Insecurity: No Food Insecurity   • Worried About [employer] in the Last Year: Never true   • Ran Out of Food in the Last Year: Never true   Transportation Needs: No Transportation Needs   • Lack of Transportation (Medical): No   • Lack of Transportation (Non-Medical): No   Physical Activity: Not on file   Stress: Not on file   Social Connections: Not on file   Intimate Partner Violence: Not on file   Housing Stability: Low Risk    • Unable to Pay for Housing in the Last Year: No   • Number of Places Lived in the Last Year: 1   • Unstable Housing in the Last Year: No      Medications and Allergies:     Current Outpatient Medications   Medication Sig Dispense Refill   • acetaminophen (TYLENOL) 325 mg tablet Take 2 tablets (650 mg total) by mouth every 6 (six) hours as needed (Pain)  0   • cholecalciferol (VITAMIN D3) 1,000 units tablet Take 2 tablets (2,000 Units total) by mouth daily 60 tablet 0   • clopidogrel (PLAVIX) 75 mg tablet TAKE ONE TABLET BY MOUTH AT BEDTIME 90 tablet 0   • diltiazem (TIAZAC) 240 MG 24 hr capsule Take 1 capsule (240 mg total) by mouth daily 90 capsule 0   • ezetimibe (ZETIA) 10 mg tablet TAKE ONE TABLET BY MOUTH EVERY DAY 30 tablet 0   • folic acid (FOLVITE) 1 mg tablet Take 1 mg by mouth 3 (three) times a day      • latanoprost (XALATAN) 0 005 % ophthalmic solution Administer 1 drop to both eyes daily at bedtime      • methotrexate 2 5 mg tablet Take 7 tablets (17 5 mg total) by mouth once a week 12 tablet 0   • polyethylene glycol (GLYCOLAX) 17 GM/SCOOP powder Take 17 g by mouth daily as needed (Constipation) Can substitute for closest size available 507 g 0   • calcium carbonate (OYSTER SHELL,OSCAL) 500 mg Take 2 tablets by mouth daily with breakfast (Patient not taking: Reported on 4/12/2023) 60 tablet 0     No current facility-administered medications for this visit       Allergies   Allergen Reactions   • Amoxicillin Edema   • Penicillins Edema   • Simvastatin Myalgia      Immunizations:     Immunization History   Administered Date(s) Administered   • COVID-19 MODERNA VACC 0 5 ML IM 03/30/2021, 04/29/2021, 11/10/2021   • COVID-19 Moderna Vac BIVALENT 12 Yr+ IM (BOOSTER ONLY) 0 5 ML 11/04/2022   • INFLUENZA 09/16/2012, 10/12/2013, 08/09/2014, 09/09/2014, 10/15/2015, 10/17/2015, 10/01/2016, 10/15/2021, 09/27/2022   • Influenza Split High Dose Preservative Free IM 09/09/2014, 10/15/2015, 10/01/2016, 08/22/2017   • Influenza, high dose seasonal 0 7 mL 09/26/2018, 10/15/2019, 10/16/2020   • Influenza, recombinant, quadrivalent,injectable, preservative free 09/27/2022   • Influenza, seasonal, injectable 10/12/2013   • Pneumococcal Conjugate 13-Valent 04/14/2015   • Pneumococcal Conjugate Vaccine 20-valent (Pcv20), Polysace 09/27/2022   • Pneumococcal Polysaccharide PPV23 11/11/2008   • Tdap 03/02/2018, 12/19/2021   • Zoster 01/03/2009   • Zoster Vaccine Recombinant 11/02/2019, 01/26/2020      Health Maintenance:         Topic Date Due   • DXA SCAN  05/13/2024   • Hepatitis C Screening  Completed     There are no preventive care reminders to display for this patient  Medicare Screening Tests and Risk Assessments: Annabella Chen is here for her Subsequent Wellness visit  Last Medicare Wellness visit information reviewed, patient interviewed and updates made to the record today  Health Risk Assessment:   Patient rates overall health as good  Patient feels that their physical health rating is same  Patient is satisfied with their life  Eyesight was rated as same  Hearing was rated as same  Patient feels that their emotional and mental health rating is same  Patients states they are never, rarely angry  Patient states they are never, rarely unusually tired/fatigued  Pain experienced in the last 7 days has been none  Patient states that she has experienced no weight loss or gain in last 6 months  Depression Screening:   PHQ-2 Score: 0      Fall Risk Screening:    In the past year, patient has experienced: no history of falling in past year      Urinary Incontinence Screening:   Patient has not leaked urine accidently in the last six months  Home Safety:  Patient does not have trouble with stairs inside or outside of their home  Patient has working smoke alarms and has working carbon monoxide detector  Home safety hazards include: none  Nutrition:   Current diet is Regular  Medications:   Patient is currently taking over-the-counter supplements  OTC medications include: see medication list  Patient is able to manage medications  Activities of Daily Living (ADLs)/Instrumental Activities of Daily Living (IADLs):   Walk and transfer into and out of bed and chair?: Yes  Dress and groom yourself?: Yes    Bathe or shower yourself?: Yes    Feed yourself? Yes  Do your laundry/housekeeping?: Yes  Manage your money, pay your bills and track your expenses?: Yes  Make your own meals?: Yes    Do your own shopping?: Yes    Previous Hospitalizations:   Any hospitalizations or ED visits within the last 12 months?: Yes    How many hospitalizations have you had in the last year?: 1-2    Advance Care Planning:   Living will: Yes    Durable POA for healthcare:  Yes    Advanced directive: Yes    Five wishes given: Yes    End of Life Decisions reviewed with patient: Yes      Cognitive Screening:   Provider or family/friend/caregiver concerned regarding cognition?: No    PREVENTIVE SCREENINGS      Cardiovascular Screening:    General: Screening Not Indicated and History Lipid Disorder      Diabetes Screening:     General: Screening Current      Breast Cancer Screening:     General: Screening Current      Cervical Cancer Screening:    General: Screening Not Indicated      Osteoporosis Screening:    General: Screening Not Indicated and History Osteoporosis      Lung Cancer Screening:     General: Screening Not Indicated      Hepatitis C Screening:    General: Screening Current    Screening, Brief Intervention, and Referral to Treatment (SBIRT)    Screening  Typical number of drinks in a day: 0  Typical number of drinks in a "week: 0  Interpretation: Low risk drinking behavior  Single Item Drug Screening:  How often have you used an illegal drug (including marijuana) or a prescription medication for non-medical reasons in the past year? never    Single Item Drug Screen Score: 0  Interpretation: Negative screen for possible drug use disorder    Brief Intervention  Alcohol & drug use screenings were reviewed  No concerns regarding substance use disorder identified  No results found       Physical Exam:     /60 (BP Location: Left arm, Patient Position: Sitting, Cuff Size: Adult)   Pulse 59   Temp 98 9 °F (37 2 °C)   Ht 5' 4 75\" (1 645 m)   Wt 53 6 kg (118 lb 3 2 oz)   SpO2 98%   BMI 19 82 kg/m²          Maldonado Gore, DO  "

## 2023-04-12 NOTE — PATIENT INSTRUCTIONS
Medicare Preventive Visit Patient Instructions  Thank you for completing your Welcome to Medicare Visit or Medicare Annual Wellness Visit today  Your next wellness visit will be due in one year (4/12/2024)  The screening/preventive services that you may require over the next 5-10 years are detailed below  Some tests may not apply to you based off risk factors and/or age  Screening tests ordered at today's visit but not completed yet may show as past due  Also, please note that scanned in results may not display below  Preventive Screenings:  Service Recommendations Previous Testing/Comments   Colorectal Cancer Screening  * Colonoscopy    * Fecal Occult Blood Test (FOBT)/Fecal Immunochemical Test (FIT)  * Fecal DNA/Cologuard Test  * Flexible Sigmoidoscopy Age: 39-70 years old   Colonoscopy: every 10 years (may be performed more frequently if at higher risk)  OR  FOBT/FIT: every 1 year  OR  Cologuard: every 3 years  OR  Sigmoidoscopy: every 5 years  Screening may be recommended earlier than age 39 if at higher risk for colorectal cancer  Also, an individualized decision between you and your healthcare provider will decide whether screening between the ages of 74-80 would be appropriate  Colonoscopy: 03/10/2020  FOBT/FIT: Not on file  Cologuard: Not on file  Sigmoidoscopy: Not on file          Breast Cancer Screening Age: 36 years old  Frequency: every 1-2 years  Not required if history of left and right mastectomy Mammogram: 08/11/2022    Screening Current   Cervical Cancer Screening Between the ages of 21-29, pap smear recommended once every 3 years  Between the ages of 33-67, can perform pap smear with HPV co-testing every 5 years     Recommendations may differ for women with a history of total hysterectomy, cervical cancer, or abnormal pap smears in past  Pap Smear: Not on file    Screening Not Indicated   Hepatitis C Screening Once for adults born between 1945 and 1965  More frequently in patients at high risk for Hepatitis C Hep C Antibody: 05/26/2022    Screening Current   Diabetes Screening 1-2 times per year if you're at risk for diabetes or have pre-diabetes Fasting glucose: 100 mg/dL (4/10/2023)  A1C: 5 4 % (4/10/2023)  Screening Current   Cholesterol Screening Once every 5 years if you don't have a lipid disorder  May order more often based on risk factors  Lipid panel: 12/13/2022    Screening Not Indicated  History Lipid Disorder     Other Preventive Screenings Covered by Medicare:  1  Abdominal Aortic Aneurysm (AAA) Screening: covered once if your at risk  You're considered to be at risk if you have a family history of AAA  2  Lung Cancer Screening: covers low dose CT scan once per year if you meet all of the following conditions: (1) Age 50-69; (2) No signs or symptoms of lung cancer; (3) Current smoker or have quit smoking within the last 15 years; (4) You have a tobacco smoking history of at least 20 pack years (packs per day multiplied by number of years you smoked); (5) You get a written order from a healthcare provider  3  Glaucoma Screening: covered annually if you're considered high risk: (1) You have diabetes OR (2) Family history of glaucoma OR (3)  aged 48 and older OR (3)  American aged 72 and older  3  Osteoporosis Screening: covered every 2 years if you meet one of the following conditions: (1) You're estrogen deficient and at risk for osteoporosis based off medical history and other findings; (2) Have a vertebral abnormality; (3) On glucocorticoid therapy for more than 3 months; (4) Have primary hyperparathyroidism; (5) On osteoporosis medications and need to assess response to drug therapy  · Last bone density test (DXA Scan): 05/16/2022   5  HIV Screening: covered annually if you're between the age of 15-65  Also covered annually if you are younger than 13 and older than 72 with risk factors for HIV infection   For pregnant patients, it is covered up to 3 times per pregnancy  Immunizations:  Immunization Recommendations   Influenza Vaccine Annual influenza vaccination during flu season is recommended for all persons aged >= 6 months who do not have contraindications   Pneumococcal Vaccine   * Pneumococcal conjugate vaccine = PCV13 (Prevnar 13), PCV15 (Vaxneuvance), PCV20 (Prevnar 20)  * Pneumococcal polysaccharide vaccine = PPSV23 (Pneumovax) Adults 25-60 years old: 1-3 doses may be recommended based on certain risk factors  Adults 72 years old: 1-2 doses may be recommended based off what pneumonia vaccine you previously received   Hepatitis B Vaccine 3 dose series if at intermediate or high risk (ex: diabetes, end stage renal disease, liver disease)   Tetanus (Td) Vaccine - COST NOT COVERED BY MEDICARE PART B Following completion of primary series, a booster dose should be given every 10 years to maintain immunity against tetanus  Td may also be given as tetanus wound prophylaxis  Tdap Vaccine - COST NOT COVERED BY MEDICARE PART B Recommended at least once for all adults  For pregnant patients, recommended with each pregnancy  Shingles Vaccine (Shingrix) - COST NOT COVERED BY MEDICARE PART B  2 shot series recommended in those aged 48 and above     Health Maintenance Due:      Topic Date Due   • DXA SCAN  05/13/2024   • Hepatitis C Screening  Completed     Immunizations Due:  There are no preventive care reminders to display for this patient  Advance Directives   What are advance directives? Advance directives are legal documents that state your wishes and plans for medical care  These plans are made ahead of time in case you lose your ability to make decisions for yourself  Advance directives can apply to any medical decision, such as the treatments you want, and if you want to donate organs  What are the types of advance directives? There are many types of advance directives, and each state has rules about how to use them   You may choose a combination of any of the following:  · Living will: This is a written record of the treatment you want  You can also choose which treatments you do not want, which to limit, and which to stop at a certain time  This includes surgery, medicine, IV fluid, and tube feedings  · Durable power of  for healthcare [facility]): This is a written record that states who you want to make healthcare choices for you when you are unable to make them for yourself  This person, called a proxy, is usually a family member or a friend  You may choose more than 1 proxy  · Do not resuscitate (DNR) order:  A DNR order is used in case your heart stops beating or you stop breathing  It is a request not to have certain forms of treatment, such as CPR  A DNR order may be included in other types of advance directives  · Medical directive: This covers the care that you want if you are in a coma, near death, or unable to make decisions for yourself  You can list the treatments you want for each condition  Treatment may include pain medicine, surgery, blood transfusions, dialysis, IV or tube feedings, and a ventilator (breathing machine)  · Values history: This document has questions about your views, beliefs, and how you feel and think about life  This information can help others choose the care that you would choose  Why are advance directives important? An advance directive helps you control your care  Although spoken wishes may be used, it is better to have your wishes written down  Spoken wishes can be misunderstood, or not followed  Treatments may be given even if you do not want them  An advance directive may make it easier for your family to make difficult choices about your care  © Copyright [employer] 2018 Information is for End User's use only and may not be sold, redistributed or otherwise used for commercial purposes   All illustrations and images included in CareNotes® are the copyrighted property of [email or web address] , [employer]  or [employer]

## 2023-04-12 NOTE — PROGRESS NOTES
Name: Daina Peres      : 1945      MRN: [id number]  Encounter Provider: Jeni Cruz DO  Encounter Date: 2023   Encounter department: [address] [place] [address]   Patient is a 76year old female seen for follow up of chronic medical conditions  1  Medicare annual wellness visit, subsequent    2  Essential hypertension  -     Comprehensive metabolic panel; Future; Expected date: 2023  -     CK; Future; Expected date: 2023    3  Psoriatic arthritis ([address] )    4  Hyperlipidemia, unspecified hyperlipidemia type  Assessment & Plan:  Chol down from 250 to 228 with Zetia  Cannot take statin because of rhabdomyolysis  5  Non-traumatic rhabdomyolysis  -     CK; Future; Expected date: 2023    Patient is due for Cologuard but she refuses because she is having issues with hemorrhoids  Subjective      Chief Complaint   Patient presents with   • Medicare Wellness Visit     AWV       Patient is seen for follow-up of chronic medical conditions  She did have blood work to discuss  She feels she has been doing well taking care of herself and driving short distances  Review of Systems   Constitutional: Negative for chills and fever  HENT: Negative for congestion and sore throat  Respiratory: Negative for chest tightness  Cardiovascular: Negative for chest pain and palpitations  Gastrointestinal: Negative for abdominal pain, constipation, diarrhea and nausea  Genitourinary: Negative for difficulty urinating  Skin: Negative  Neurological: Negative for dizziness and headaches  Psychiatric/Behavioral: Negative          Current Outpatient Medications on File Prior to Visit   Medication Sig   • acetaminophen (TYLENOL) 325 mg tablet Take 2 tablets (650 mg total) by mouth every 6 (six) hours as needed (Pain)   • cholecalciferol (VITAMIN D3) 1,000 units tablet Take 2 tablets (2,000 Units total) by mouth daily   • clopidogrel (PLAVIX) 75 mg tablet "TAKE ONE TABLET BY MOUTH AT BEDTIME   • diltiazem (TIAZAC) 240 MG 24 hr capsule Take 1 capsule (240 mg total) by mouth daily   • ezetimibe (ZETIA) 10 mg tablet TAKE ONE TABLET BY MOUTH EVERY DAY   • folic acid (FOLVITE) 1 mg tablet Take 1 mg by mouth 3 (three) times a day    • latanoprost (XALATAN) 0 005 % ophthalmic solution Administer 1 drop to both eyes daily at bedtime    • methotrexate 2 5 mg tablet Take 7 tablets (17 5 mg total) by mouth once a week   • polyethylene glycol (GLYCOLAX) 17 GM/SCOOP powder Take 17 g by mouth daily as needed (Constipation) Can substitute for closest size available   • calcium carbonate (OYSTER SHELL,OSCAL) 500 mg Take 2 tablets by mouth daily with breakfast (Patient not taking: Reported on 4/12/2023)       Objective     /60 (BP Location: Left arm, Patient Position: Sitting, Cuff Size: Adult)   Pulse 59   Temp 98 9 °F (37 2 °C)   Ht 5' 4 75\" (1 645 m)   Wt 53 6 kg (118 lb 3 2 oz)   SpO2 98%   BMI 19 82 kg/m²     Physical Exam  Vitals and nursing note reviewed  Constitutional:       General: She is not in acute distress  Appearance: She is underweight  Neck:      Thyroid: No thyromegaly  Vascular: No carotid bruit  Cardiovascular:      Rate and Rhythm: Normal rate and regular rhythm  Heart sounds: Normal heart sounds  Comments: /68  Pulmonary:      Effort: Pulmonary effort is normal       Breath sounds: Normal breath sounds  Musculoskeletal:      Right lower leg: No edema  Left lower leg: No edema  Lymphadenopathy:      Cervical: No cervical adenopathy  Skin:     General: Skin is warm and dry  Neurological:      Mental Status: She is alert and oriented to person, place, and time         Marcus Carrizales DO  "

## 2023-05-04 DIAGNOSIS — E78.5 HYPERLIPIDEMIA, UNSPECIFIED HYPERLIPIDEMIA TYPE: ICD-10-CM

## 2023-05-04 RX ORDER — EZETIMIBE 10 MG/1
TABLET ORAL
Qty: 30 TABLET | Refills: 0 | Status: SHIPPED | OUTPATIENT
Start: 2023-05-04

## 2023-05-22 DIAGNOSIS — Z86.73 HISTORY OF TIA (TRANSIENT ISCHEMIC ATTACK): ICD-10-CM

## 2023-05-22 RX ORDER — CLOPIDOGREL BISULFATE 75 MG/1
TABLET ORAL
Qty: 90 TABLET | Refills: 0 | Status: SHIPPED | OUTPATIENT
Start: 2023-05-22

## 2023-06-07 DIAGNOSIS — E78.5 HYPERLIPIDEMIA, UNSPECIFIED HYPERLIPIDEMIA TYPE: ICD-10-CM

## 2023-06-07 RX ORDER — EZETIMIBE 10 MG/1
TABLET ORAL
Qty: 30 TABLET | Refills: 0 | Status: SHIPPED | OUTPATIENT
Start: 2023-06-07

## 2023-06-08 ENCOUNTER — TELEPHONE (OUTPATIENT)
Dept: FAMILY MEDICINE CLINIC | Facility: CLINIC | Age: 78
End: 2023-06-08

## 2023-06-08 DIAGNOSIS — Z12.11 SCREEN FOR COLON CANCER: Primary | ICD-10-CM

## 2023-06-08 NOTE — PROGRESS NOTES
Phone call from patient requesting order for Cologuard  Order placed  Informed patient she should receive kit from [employer] in approximately 1-2 weeks

## 2023-06-08 NOTE — TELEPHONE ENCOUNTER
[address] [phone], [phone] code [address], [address] I have to speak to someone to get the color guard thing shipped to me and they said I have to call my doctor to someone will do that  All right  We'll expect your call  Let's see, It's [phone], [place] see Estefan[place] Diaz [id number], three, [address], [address]  OK  Thank you  Bye \"    I was going to order but it's not showing up in her health maintenance at all so I wasn't sure if it was needed for her    "

## 2023-07-06 DIAGNOSIS — E78.5 HYPERLIPIDEMIA, UNSPECIFIED HYPERLIPIDEMIA TYPE: ICD-10-CM

## 2023-07-06 RX ORDER — EZETIMIBE 10 MG/1
TABLET ORAL
Qty: 30 TABLET | Refills: 0 | Status: SHIPPED | OUTPATIENT
Start: 2023-07-06

## 2023-07-17 DIAGNOSIS — I10 BENIGN ESSENTIAL HYPERTENSION: ICD-10-CM

## 2023-07-17 RX ORDER — DILTIAZEM HYDROCHLORIDE 240 MG/1
240 CAPSULE, EXTENDED RELEASE ORAL DAILY
Qty: 90 CAPSULE | Refills: 0 | Status: SHIPPED | OUTPATIENT
Start: 2023-07-17

## 2023-08-10 DIAGNOSIS — E78.5 HYPERLIPIDEMIA, UNSPECIFIED HYPERLIPIDEMIA TYPE: ICD-10-CM

## 2023-08-10 RX ORDER — EZETIMIBE 10 MG/1
TABLET ORAL
Qty: 30 TABLET | Refills: 0 | Status: SHIPPED | OUTPATIENT
Start: 2023-08-10

## 2023-08-14 ENCOUNTER — APPOINTMENT (OUTPATIENT)
Dept: LAB | Facility: CLINIC | Age: 78
End: 2023-08-14
Payer: MEDICARE

## 2023-08-14 DIAGNOSIS — I10 ESSENTIAL HYPERTENSION: ICD-10-CM

## 2023-08-14 DIAGNOSIS — M62.82 NON-TRAUMATIC RHABDOMYOLYSIS: ICD-10-CM

## 2023-08-14 LAB
ALBUMIN SERPL BCP-MCNC: 3.2 G/DL (ref 3.5–5)
ALP SERPL-CCNC: 64 U/L (ref 46–116)
ALT SERPL W P-5'-P-CCNC: 16 U/L (ref 12–78)
ANION GAP SERPL CALCULATED.3IONS-SCNC: 4 MMOL/L
AST SERPL W P-5'-P-CCNC: 14 U/L (ref 5–45)
BILIRUB SERPL-MCNC: 0.53 MG/DL (ref 0.2–1)
BUN SERPL-MCNC: 4 MG/DL (ref 5–25)
CALCIUM ALBUM COR SERPL-MCNC: 9.6 MG/DL (ref 8.3–10.1)
CALCIUM SERPL-MCNC: 9 MG/DL (ref 8.3–10.1)
CHLORIDE SERPL-SCNC: 108 MMOL/L (ref 96–108)
CK SERPL-CCNC: 30 U/L (ref 26–192)
CO2 SERPL-SCNC: 29 MMOL/L (ref 21–32)
CREAT SERPL-MCNC: 0.79 MG/DL (ref 0.6–1.3)
GFR SERPL CREATININE-BSD FRML MDRD: 71 ML/MIN/1.73SQ M
GLUCOSE P FAST SERPL-MCNC: 99 MG/DL (ref 65–99)
POTASSIUM SERPL-SCNC: 3.4 MMOL/L (ref 3.5–5.3)
PROT SERPL-MCNC: 6.8 G/DL (ref 6.4–8.4)
SODIUM SERPL-SCNC: 141 MMOL/L (ref 135–147)

## 2023-08-14 PROCEDURE — 80053 COMPREHEN METABOLIC PANEL: CPT

## 2023-08-14 PROCEDURE — 82550 ASSAY OF CK (CPK): CPT

## 2023-08-15 ENCOUNTER — OFFICE VISIT (OUTPATIENT)
Dept: FAMILY MEDICINE CLINIC | Facility: CLINIC | Age: 78
End: 2023-08-15
Payer: MEDICARE

## 2023-08-15 VITALS
OXYGEN SATURATION: 97 % | SYSTOLIC BLOOD PRESSURE: 132 MMHG | BODY MASS INDEX: 20.46 KG/M2 | DIASTOLIC BLOOD PRESSURE: 78 MMHG | WEIGHT: 122 LBS | HEART RATE: 51 BPM | TEMPERATURE: 98.7 F

## 2023-08-15 DIAGNOSIS — E87.6 HYPOKALEMIA: ICD-10-CM

## 2023-08-15 DIAGNOSIS — E78.5 HYPERLIPIDEMIA, UNSPECIFIED HYPERLIPIDEMIA TYPE: ICD-10-CM

## 2023-08-15 DIAGNOSIS — I10 ESSENTIAL HYPERTENSION: Primary | ICD-10-CM

## 2023-08-15 PROCEDURE — 99214 OFFICE O/P EST MOD 30 MIN: CPT | Performed by: FAMILY MEDICINE

## 2023-08-15 NOTE — PROGRESS NOTES
Name: Letty Cervantes      : 1945      MRN: [id number]  Encounter Provider: Bryanna Guan DO  Encounter Date: 8/15/2023   Encounter department: [address] [place] [address]   Patient is 68-year-old female seen for follow-up of chronic medical conditions. 1. Essential hypertension  -     Comprehensive metabolic panel; Future; Expected date: 2023  -     TSH, 3rd generation with Free T4 reflex; Future; Expected date: 2023  -     CBC and differential; Future; Expected date: 2023    2. Hyperlipidemia, unspecified hyperlipidemia type  -     Lipid Panel with Direct LDL reflex; Future; Expected date: 2023    3. Hypokalemia  -     Comprehensive metabolic panel; Future; Expected date: 2023    Discussed diet. Low potassium. Weight is up. She is drinking pedialyte. Her CPK is normal.  She continues off of statins. She follows with Dr. Pretty Olsen for rheumatology. Recheck in four months with  Blood work prior. Subjective      Chief Complaint   Patient presents with   • Follow-up     4 month follow up       [address] [place] [address],[address] [id number] is seen for follow u of chronic medical conditions. She did have blood work. Review of Systems   Constitutional: Positive for fatigue. Negative for chills and fever. HENT: Negative for congestion and sore throat. Respiratory: Negative for chest tightness. Cardiovascular: Negative for chest pain and palpitations. Gastrointestinal: Negative for abdominal pain, constipation, diarrhea and nausea. Genitourinary: Negative for difficulty urinating. Musculoskeletal: Positive for arthralgias. Skin: Negative. Neurological: Negative for dizziness and headaches. Psychiatric/Behavioral: Negative.         Current Outpatient Medications on File Prior to Visit   Medication Sig   • cholecalciferol (VITAMIN D3) 1,000 units tablet Take 2 tablets (2,000 Units total) by mouth daily   • diltiazem (TIAZAC) 240 MG 24 hr capsule Take 1 capsule (240 mg total) by mouth daily   • ezetimibe (ZETIA) 10 mg tablet TAKE ONE TABLET BY MOUTH ONCE DAILY   • folic acid (FOLVITE) 1 mg tablet Take 1 mg by mouth 3 (three) times a day    • latanoprost (XALATAN) 0.005 % ophthalmic solution Administer 1 drop to both eyes daily at bedtime    • methotrexate 2.5 mg tablet Take 7 tablets (17.5 mg total) by mouth once a week   • calcium carbonate (OYSTER SHELL,OSCAL) 500 mg Take 2 tablets by mouth daily with breakfast (Patient not taking: Reported on 4/12/2023)   • nystatin-triamcinolone (MYCOLOG-II) cream        Objective     /78 (BP Location: Left arm, Patient Position: Sitting, Cuff Size: Adult)   Pulse (!) 51   Temp 98.7 °F (37.1 °C)   Wt 55.3 kg (122 lb)   SpO2 97%   BMI 20.46 kg/m²     Physical Exam  Vitals and nursing note reviewed. Constitutional:       General: She is not in acute distress. Appearance: She is underweight. She is ill-appearing. HENT:      Head: Normocephalic. Neck:      Thyroid: No thyromegaly. Vascular: No carotid bruit. Cardiovascular:      Rate and Rhythm: Normal rate and regular rhythm. Heart sounds: Normal heart sounds. Pulmonary:      Effort: Pulmonary effort is normal.      Breath sounds: Normal breath sounds. Musculoskeletal:      Right lower leg: No edema. Left lower leg: No edema. Lymphadenopathy:      Cervical: No cervical adenopathy. Skin:     General: Skin is warm and dry. Neurological:      Mental Status: She is alert and oriented to person, place, and time.    Psychiatric:         Mood and Affect: Mood normal.       [place] Pacheco, DO

## 2023-08-19 DIAGNOSIS — Z86.73 HISTORY OF TIA (TRANSIENT ISCHEMIC ATTACK): ICD-10-CM

## 2023-08-21 RX ORDER — CLOPIDOGREL BISULFATE 75 MG/1
75 TABLET ORAL
Qty: 90 TABLET | Refills: 1 | Status: SHIPPED | OUTPATIENT
Start: 2023-08-21

## 2023-08-28 ENCOUNTER — HOSPITAL ENCOUNTER (OUTPATIENT)
Dept: MAMMOGRAPHY | Facility: MEDICAL CENTER | Age: 78
Discharge: HOME | End: 2023-08-28
Payer: MEDICARE

## 2023-08-28 VITALS — HEIGHT: 65 IN | WEIGHT: 122 LBS | BODY MASS INDEX: 20.33 KG/M2

## 2023-08-28 DIAGNOSIS — Z12.31 ENCOUNTER FOR SCREENING MAMMOGRAM FOR MALIGNANT NEOPLASM OF BREAST: ICD-10-CM

## 2023-08-28 PROCEDURE — 77067 SCR MAMMO BI INCL CAD: CPT

## 2023-08-28 PROCEDURE — 77063 BREAST TOMOSYNTHESIS BI: CPT

## 2023-08-30 ENCOUNTER — TELEPHONE (OUTPATIENT)
Dept: FAMILY MEDICINE CLINIC | Facility: CLINIC | Age: 78
End: 2023-08-30

## 2023-08-30 DIAGNOSIS — E78.5 HYPERLIPIDEMIA, UNSPECIFIED HYPERLIPIDEMIA TYPE: ICD-10-CM

## 2023-08-30 NOTE — TELEPHONE ENCOUNTER
----- Message from Anne Mann DO sent at 8/29/2023  7:48 PM EDT -----  Please call patient, recent mammogram appeared normal.  Recommendations are to repeat this in 1 year.   Thank you

## 2023-08-31 DIAGNOSIS — E78.5 HYPERLIPIDEMIA, UNSPECIFIED HYPERLIPIDEMIA TYPE: ICD-10-CM

## 2023-08-31 RX ORDER — EZETIMIBE 10 MG/1
TABLET ORAL
Qty: 30 TABLET | Refills: 0 | Status: SHIPPED | OUTPATIENT
Start: 2023-08-31

## 2023-08-31 RX ORDER — EZETIMIBE 10 MG/1
TABLET ORAL
Qty: 30 TABLET | Refills: 0 | Status: SHIPPED | OUTPATIENT
Start: 2023-08-31 | End: 2023-08-31

## 2023-10-12 DIAGNOSIS — E78.5 HYPERLIPIDEMIA, UNSPECIFIED HYPERLIPIDEMIA TYPE: ICD-10-CM

## 2023-10-12 RX ORDER — EZETIMIBE 10 MG/1
10 TABLET ORAL DAILY
Qty: 30 TABLET | Refills: 5 | Status: SHIPPED | OUTPATIENT
Start: 2023-10-12

## 2023-10-20 DIAGNOSIS — I10 BENIGN ESSENTIAL HYPERTENSION: ICD-10-CM

## 2023-10-20 RX ORDER — DILTIAZEM HYDROCHLORIDE 240 MG/1
240 CAPSULE, EXTENDED RELEASE ORAL DAILY
Qty: 90 CAPSULE | Refills: 0 | Status: SHIPPED | OUTPATIENT
Start: 2023-10-20

## 2023-11-16 ENCOUNTER — TRANSCRIBE ORDERS (OUTPATIENT)
Dept: LAB | Facility: CLINIC | Age: 78
End: 2023-11-16

## 2023-11-16 ENCOUNTER — APPOINTMENT (OUTPATIENT)
Dept: LAB | Facility: CLINIC | Age: 78
End: 2023-11-16
Payer: MEDICARE

## 2023-11-16 DIAGNOSIS — I10 ESSENTIAL HYPERTENSION: ICD-10-CM

## 2023-11-16 DIAGNOSIS — E78.5 HYPERLIPIDEMIA, UNSPECIFIED HYPERLIPIDEMIA TYPE: ICD-10-CM

## 2023-11-16 DIAGNOSIS — L40.50 PSORIATIC ARTHRITIS (HCC): Primary | ICD-10-CM

## 2023-11-16 DIAGNOSIS — E87.6 HYPOKALEMIA: ICD-10-CM

## 2023-11-16 LAB
ALBUMIN SERPL BCP-MCNC: 4 G/DL (ref 3.5–5)
ALP SERPL-CCNC: 54 U/L (ref 34–104)
ALT SERPL W P-5'-P-CCNC: 13 U/L (ref 7–52)
ANION GAP SERPL CALCULATED.3IONS-SCNC: 8 MMOL/L
AST SERPL W P-5'-P-CCNC: 21 U/L (ref 13–39)
BASOPHILS # BLD AUTO: 0.03 THOUSANDS/ÂΜL (ref 0–0.1)
BASOPHILS NFR BLD AUTO: 0 % (ref 0–1)
BILIRUB SERPL-MCNC: 0.81 MG/DL (ref 0.2–1)
BUN SERPL-MCNC: 8 MG/DL (ref 5–25)
CALCIUM SERPL-MCNC: 9 MG/DL (ref 8.4–10.2)
CHLORIDE SERPL-SCNC: 101 MMOL/L (ref 96–108)
CHOLEST SERPL-MCNC: 243 MG/DL
CO2 SERPL-SCNC: 31 MMOL/L (ref 21–32)
CREAT SERPL-MCNC: 0.72 MG/DL (ref 0.6–1.3)
CRP SERPL QL: <1 MG/L
EOSINOPHIL # BLD AUTO: 0.12 THOUSAND/ÂΜL (ref 0–0.61)
EOSINOPHIL NFR BLD AUTO: 2 % (ref 0–6)
ERYTHROCYTE [DISTWIDTH] IN BLOOD BY AUTOMATED COUNT: 14.3 % (ref 11.6–15.1)
ERYTHROCYTE [SEDIMENTATION RATE] IN BLOOD: 32 MM/HOUR (ref 0–29)
GFR SERPL CREATININE-BSD FRML MDRD: 79 ML/MIN/1.73SQ M
GLUCOSE P FAST SERPL-MCNC: 103 MG/DL (ref 65–99)
HCT VFR BLD AUTO: 45.1 % (ref 34.8–46.1)
HDLC SERPL-MCNC: 76 MG/DL
HGB BLD-MCNC: 15.3 G/DL (ref 11.5–15.4)
IMM GRANULOCYTES # BLD AUTO: 0.01 THOUSAND/UL (ref 0–0.2)
IMM GRANULOCYTES NFR BLD AUTO: 0 % (ref 0–2)
LDLC SERPL CALC-MCNC: 141 MG/DL (ref 0–100)
LYMPHOCYTES # BLD AUTO: 2.19 THOUSANDS/ÂΜL (ref 0.6–4.47)
LYMPHOCYTES NFR BLD AUTO: 30 % (ref 14–44)
MCH RBC QN AUTO: 34.4 PG (ref 26.8–34.3)
MCHC RBC AUTO-ENTMCNC: 33.9 G/DL (ref 31.4–37.4)
MCV RBC AUTO: 101 FL (ref 82–98)
MONOCYTES # BLD AUTO: 0.89 THOUSAND/ÂΜL (ref 0.17–1.22)
MONOCYTES NFR BLD AUTO: 12 % (ref 4–12)
NEUTROPHILS # BLD AUTO: 3.96 THOUSANDS/ÂΜL (ref 1.85–7.62)
NEUTS SEG NFR BLD AUTO: 56 % (ref 43–75)
NRBC BLD AUTO-RTO: 0 /100 WBCS
PLATELET # BLD AUTO: 254 THOUSANDS/UL (ref 149–390)
PMV BLD AUTO: 10.2 FL (ref 8.9–12.7)
POTASSIUM SERPL-SCNC: 3.3 MMOL/L (ref 3.5–5.3)
PROT SERPL-MCNC: 7.1 G/DL (ref 6.4–8.4)
RBC # BLD AUTO: 4.45 MILLION/UL (ref 3.81–5.12)
SODIUM SERPL-SCNC: 140 MMOL/L (ref 135–147)
TRIGL SERPL-MCNC: 129 MG/DL
TSH SERPL DL<=0.05 MIU/L-ACNC: 4.2 UIU/ML (ref 0.45–4.5)
WBC # BLD AUTO: 7.2 THOUSAND/UL (ref 4.31–10.16)

## 2023-11-16 PROCEDURE — 85025 COMPLETE CBC W/AUTO DIFF WBC: CPT

## 2023-11-16 PROCEDURE — 85652 RBC SED RATE AUTOMATED: CPT

## 2023-11-16 PROCEDURE — 36415 COLL VENOUS BLD VENIPUNCTURE: CPT

## 2023-11-16 PROCEDURE — 84443 ASSAY THYROID STIM HORMONE: CPT

## 2023-11-16 PROCEDURE — 86140 C-REACTIVE PROTEIN: CPT

## 2023-11-16 PROCEDURE — 80053 COMPREHEN METABOLIC PANEL: CPT

## 2023-11-16 PROCEDURE — 80061 LIPID PANEL: CPT

## 2023-12-06 ENCOUNTER — RA CDI HCC (OUTPATIENT)
Dept: OTHER | Facility: HOSPITAL | Age: 78
End: 2023-12-06

## 2023-12-13 ENCOUNTER — OFFICE VISIT (OUTPATIENT)
Dept: FAMILY MEDICINE CLINIC | Facility: CLINIC | Age: 78
End: 2023-12-13
Payer: MEDICARE

## 2023-12-13 VITALS
SYSTOLIC BLOOD PRESSURE: 146 MMHG | HEART RATE: 55 BPM | WEIGHT: 131.6 LBS | TEMPERATURE: 98.9 F | OXYGEN SATURATION: 98 % | DIASTOLIC BLOOD PRESSURE: 66 MMHG | BODY MASS INDEX: 22.07 KG/M2

## 2023-12-13 DIAGNOSIS — Z23 ENCOUNTER FOR IMMUNIZATION: ICD-10-CM

## 2023-12-13 DIAGNOSIS — I73.9 PERIPHERAL VASCULAR DISEASE, UNSPECIFIED (HCC): ICD-10-CM

## 2023-12-13 DIAGNOSIS — E78.5 HYPERLIPIDEMIA, UNSPECIFIED HYPERLIPIDEMIA TYPE: ICD-10-CM

## 2023-12-13 DIAGNOSIS — E87.6 HYPOKALEMIA: ICD-10-CM

## 2023-12-13 DIAGNOSIS — I10 ESSENTIAL HYPERTENSION: Primary | ICD-10-CM

## 2023-12-13 PROCEDURE — 90662 IIV NO PRSV INCREASED AG IM: CPT

## 2023-12-13 PROCEDURE — G0008 ADMIN INFLUENZA VIRUS VAC: HCPCS

## 2023-12-13 PROCEDURE — 99214 OFFICE O/P EST MOD 30 MIN: CPT | Performed by: FAMILY MEDICINE

## 2023-12-13 RX ORDER — POTASSIUM CHLORIDE 20 MEQ/1
20 TABLET, EXTENDED RELEASE ORAL DAILY
Qty: 90 TABLET | Refills: 1 | Status: SHIPPED | OUTPATIENT
Start: 2023-12-13 | End: 2023-12-14 | Stop reason: ALTCHOICE

## 2023-12-13 NOTE — PATIENT INSTRUCTIONS
Try to eat more potassium rich foods. Start potassium prescription that I am sending to the pharmacy. Take once a day. [place] [facility] [place].

## 2023-12-13 NOTE — ASSESSMENT & PLAN NOTE
Potassium still low. Went over potassium rich foods. She does not eat a lot of them Will write for a supplement.

## 2023-12-14 ENCOUNTER — TELEPHONE (OUTPATIENT)
Dept: FAMILY MEDICINE CLINIC | Facility: CLINIC | Age: 78
End: 2023-12-14

## 2023-12-14 DIAGNOSIS — E87.6 HYPOKALEMIA: Primary | ICD-10-CM

## 2023-12-14 RX ORDER — POTASSIUM CHLORIDE 1.5 G/1.58G
20 POWDER, FOR SOLUTION ORAL DAILY
Qty: 30 PACKET | Refills: 5 | Status: SHIPPED | OUTPATIENT
Start: 2023-12-14 | End: 2024-06-11

## 2023-12-14 NOTE — TELEPHONE ENCOUNTER
Message from patient:    Viktoria Staffordjeffrey just picked picked up the potassium chloride. They're too. They're too big. Can I get the potassium chloride packet and mix it with applesauce? OK. Please return my call [phone]. Thank you.  Chyna.

## 2024-01-02 ENCOUNTER — APPOINTMENT (OUTPATIENT)
Dept: LAB | Facility: CLINIC | Age: 79
End: 2024-01-02
Payer: MEDICARE

## 2024-01-02 DIAGNOSIS — E87.6 HYPOKALEMIA: ICD-10-CM

## 2024-01-02 LAB
ALBUMIN SERPL BCP-MCNC: 4.1 G/DL (ref 3.5–5)
ALP SERPL-CCNC: 68 U/L (ref 34–104)
ALT SERPL W P-5'-P-CCNC: 20 U/L (ref 7–52)
ANION GAP SERPL CALCULATED.3IONS-SCNC: 15 MMOL/L
AST SERPL W P-5'-P-CCNC: 26 U/L (ref 13–39)
BILIRUB SERPL-MCNC: 0.69 MG/DL (ref 0.2–1)
BUN SERPL-MCNC: 9 MG/DL (ref 5–25)
CALCIUM SERPL-MCNC: 9.8 MG/DL (ref 8.4–10.2)
CHLORIDE SERPL-SCNC: 100 MMOL/L (ref 96–108)
CO2 SERPL-SCNC: 25 MMOL/L (ref 21–32)
CREAT SERPL-MCNC: 0.76 MG/DL (ref 0.6–1.3)
GFR SERPL CREATININE-BSD FRML MDRD: 74 ML/MIN/1.73SQ M
GLUCOSE P FAST SERPL-MCNC: 99 MG/DL (ref 65–99)
POTASSIUM SERPL-SCNC: 4 MMOL/L (ref 3.5–5.3)
PROT SERPL-MCNC: 7.4 G/DL (ref 6.4–8.4)
SODIUM SERPL-SCNC: 140 MMOL/L (ref 135–147)

## 2024-01-02 PROCEDURE — 80053 COMPREHEN METABOLIC PANEL: CPT

## 2024-01-02 PROCEDURE — 36415 COLL VENOUS BLD VENIPUNCTURE: CPT

## 2024-01-19 DIAGNOSIS — I10 BENIGN ESSENTIAL HYPERTENSION: ICD-10-CM

## 2024-01-19 RX ORDER — DILTIAZEM HYDROCHLORIDE 240 MG/1
240 CAPSULE, EXTENDED RELEASE ORAL DAILY
Qty: 90 CAPSULE | Refills: 1 | Status: SHIPPED | OUTPATIENT
Start: 2024-01-19

## 2024-01-19 NOTE — TELEPHONE ENCOUNTER
Reason for call:   [] Refill   [] Prior Auth  [] Other:     Office:   [x] PCP/Provider - Natalie Schroeder DO   [] Specialty/Provider -     Medication:    diltiazem (TIAZAC) 240 MG 24 hr capsule     Pharmacy: [id number] - [place]  [address] [facility]      Does the patient have enough for 3 days?   [x] Yes   [] No - Send as HP to POD

## 2024-02-15 ENCOUNTER — APPOINTMENT (OUTPATIENT)
Dept: LAB | Facility: CLINIC | Age: 79
End: 2024-02-15
Payer: MEDICARE

## 2024-02-15 ENCOUNTER — TRANSCRIBE ORDERS (OUTPATIENT)
Dept: LAB | Facility: CLINIC | Age: 79
End: 2024-02-15

## 2024-02-15 DIAGNOSIS — L40.50 PSORIATIC ARTHROPATHY (HCC): Primary | ICD-10-CM

## 2024-02-15 DIAGNOSIS — L40.50 PSORIATIC ARTHROPATHY (HCC): ICD-10-CM

## 2024-02-15 LAB
ALBUMIN SERPL BCP-MCNC: 4 G/DL (ref 3.5–5)
ALP SERPL-CCNC: 65 U/L (ref 34–104)
ALT SERPL W P-5'-P-CCNC: 13 U/L (ref 7–52)
ANION GAP SERPL CALCULATED.3IONS-SCNC: 11 MMOL/L
AST SERPL W P-5'-P-CCNC: 20 U/L (ref 13–39)
BASOPHILS # BLD AUTO: 0.03 THOUSANDS/ÂΜL (ref 0–0.1)
BASOPHILS NFR BLD AUTO: 0 % (ref 0–1)
BILIRUB SERPL-MCNC: 0.89 MG/DL (ref 0.2–1)
BUN SERPL-MCNC: 9 MG/DL (ref 5–25)
CALCIUM SERPL-MCNC: 8.8 MG/DL (ref 8.4–10.2)
CHLORIDE SERPL-SCNC: 100 MMOL/L (ref 96–108)
CO2 SERPL-SCNC: 28 MMOL/L (ref 21–32)
CREAT SERPL-MCNC: 0.74 MG/DL (ref 0.6–1.3)
CRP SERPL QL: 1.1 MG/L
EOSINOPHIL # BLD AUTO: 0.16 THOUSAND/ÂΜL (ref 0–0.61)
EOSINOPHIL NFR BLD AUTO: 2 % (ref 0–6)
ERYTHROCYTE [DISTWIDTH] IN BLOOD BY AUTOMATED COUNT: 14.3 % (ref 11.6–15.1)
ERYTHROCYTE [SEDIMENTATION RATE] IN BLOOD: 30 MM/HOUR (ref 0–29)
GFR SERPL CREATININE-BSD FRML MDRD: 76 ML/MIN/1.73SQ M
GLUCOSE P FAST SERPL-MCNC: 108 MG/DL (ref 65–99)
HCT VFR BLD AUTO: 46.8 % (ref 34.8–46.1)
HGB BLD-MCNC: 15.2 G/DL (ref 11.5–15.4)
IMM GRANULOCYTES # BLD AUTO: 0.02 THOUSAND/UL (ref 0–0.2)
IMM GRANULOCYTES NFR BLD AUTO: 0 % (ref 0–2)
LYMPHOCYTES # BLD AUTO: 2.01 THOUSANDS/ÂΜL (ref 0.6–4.47)
LYMPHOCYTES NFR BLD AUTO: 28 % (ref 14–44)
MCH RBC QN AUTO: 33 PG (ref 26.8–34.3)
MCHC RBC AUTO-ENTMCNC: 32.5 G/DL (ref 31.4–37.4)
MCV RBC AUTO: 102 FL (ref 82–98)
MONOCYTES # BLD AUTO: 0.77 THOUSAND/ÂΜL (ref 0.17–1.22)
MONOCYTES NFR BLD AUTO: 11 % (ref 4–12)
NEUTROPHILS # BLD AUTO: 4.33 THOUSANDS/ÂΜL (ref 1.85–7.62)
NEUTS SEG NFR BLD AUTO: 59 % (ref 43–75)
NRBC BLD AUTO-RTO: 0 /100 WBCS
PLATELET # BLD AUTO: 255 THOUSANDS/UL (ref 149–390)
PMV BLD AUTO: 10.5 FL (ref 8.9–12.7)
POTASSIUM SERPL-SCNC: 3.5 MMOL/L (ref 3.5–5.3)
PROT SERPL-MCNC: 7 G/DL (ref 6.4–8.4)
RBC # BLD AUTO: 4.6 MILLION/UL (ref 3.81–5.12)
SODIUM SERPL-SCNC: 139 MMOL/L (ref 135–147)
WBC # BLD AUTO: 7.32 THOUSAND/UL (ref 4.31–10.16)

## 2024-02-15 PROCEDURE — 85652 RBC SED RATE AUTOMATED: CPT

## 2024-02-15 PROCEDURE — 85025 COMPLETE CBC W/AUTO DIFF WBC: CPT

## 2024-02-15 PROCEDURE — 86140 C-REACTIVE PROTEIN: CPT

## 2024-02-15 PROCEDURE — 80053 COMPREHEN METABOLIC PANEL: CPT

## 2024-02-15 PROCEDURE — 36415 COLL VENOUS BLD VENIPUNCTURE: CPT

## 2024-02-20 DIAGNOSIS — Z86.73 HISTORY OF TIA (TRANSIENT ISCHEMIC ATTACK): ICD-10-CM

## 2024-02-20 RX ORDER — CLOPIDOGREL BISULFATE 75 MG/1
75 TABLET ORAL
Qty: 90 TABLET | Refills: 1 | Status: SHIPPED | OUTPATIENT
Start: 2024-02-20

## 2024-02-20 NOTE — TELEPHONE ENCOUNTER
Medication: clopidogrel (PLAVIX) 75 mg tablet     Dose/Frequency: TAKE 1 TABLET BY MOUTH AT BEDTIME     Quantity: 90    Pharmacy: [id number]    Office:   [x] PCP/Provider -   [] Speciality/Provider -     Does the patient have enough for 3 days?   [x] Yes   [] No - Send as HP to POD

## 2024-04-17 ENCOUNTER — OFFICE VISIT (OUTPATIENT)
Dept: FAMILY MEDICINE CLINIC | Facility: CLINIC | Age: 79
End: 2024-04-17
Payer: MEDICARE

## 2024-04-17 VITALS
SYSTOLIC BLOOD PRESSURE: 110 MMHG | HEART RATE: 56 BPM | TEMPERATURE: 98.2 F | HEIGHT: 65 IN | WEIGHT: 135.4 LBS | BODY MASS INDEX: 22.56 KG/M2 | DIASTOLIC BLOOD PRESSURE: 56 MMHG | OXYGEN SATURATION: 96 %

## 2024-04-17 DIAGNOSIS — Z86.73 HISTORY OF TIA (TRANSIENT ISCHEMIC ATTACK): ICD-10-CM

## 2024-04-17 DIAGNOSIS — L40.50 PSORIATIC ARTHRITIS (HCC): ICD-10-CM

## 2024-04-17 DIAGNOSIS — I73.9 PERIPHERAL VASCULAR DISEASE, UNSPECIFIED (HCC): ICD-10-CM

## 2024-04-17 DIAGNOSIS — I10 BENIGN ESSENTIAL HYPERTENSION: ICD-10-CM

## 2024-04-17 DIAGNOSIS — Z00.00 MEDICARE ANNUAL WELLNESS VISIT, SUBSEQUENT: Primary | ICD-10-CM

## 2024-04-17 PROBLEM — E44.1 MILD PROTEIN-CALORIE MALNUTRITION (HCC): Status: RESOLVED | Noted: 2021-11-30 | Resolved: 2024-04-17

## 2024-04-17 PROCEDURE — G0439 PPPS, SUBSEQ VISIT: HCPCS | Performed by: FAMILY MEDICINE

## 2024-04-17 PROCEDURE — 99213 OFFICE O/P EST LOW 20 MIN: CPT | Performed by: FAMILY MEDICINE
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
## 2024-04-17 NOTE — PATIENT INSTRUCTIONS
Try to eat more potassium rich foods.    Medicare Preventive Visit Patient Instructions  Thank you for completing your Welcome to Medicare Visit or Medicare Annual Wellness Visit today. Your next wellness visit will be due in one year (4/18/2025).  The screening/preventive services that you may require over the next 5-10 years are detailed below. Some tests may not apply to you based off risk factors and/or age. Screening tests ordered at today's visit but not completed yet may show as past due. Also, please note that scanned in results may not display below.  Preventive Screenings:  Service Recommendations Previous Testing/Comments   Colorectal Cancer Screening  * Colonoscopy    * Fecal Occult Blood Test (FOBT)/Fecal Immunochemical Test (FIT)  * Fecal DNA/Cologuard Test  * Flexible Sigmoidoscopy Age: 45-75 years old   Colonoscopy: every 10 years (may be performed more frequently if at higher risk)  OR  FOBT/FIT: every 1 year  OR  Cologuard: every 3 years  OR  Sigmoidoscopy: every 5 years  Screening may be recommended earlier than age 45 if at higher risk for colorectal cancer. Also, an individualized decision between you and your healthcare provider will decide whether screening between the ages of 76-85 would be appropriate. Colonoscopy: Not on file  FOBT/FIT: Not on file  Cologuard: Not on file  Sigmoidoscopy: Not on file          Breast Cancer Screening Age: 40+ years old  Frequency: every 1-2 years  Not required if history of left and right mastectomy Mammogram: 08/28/2023    Screening Current   Cervical Cancer Screening Between the ages of 21-29, pap smear recommended once every 3 years.   Between the ages of 30-65, can perform pap smear with HPV co-testing every 5 years.   Recommendations may differ for women with a history of total hysterectomy, cervical cancer, or abnormal pap smears in past. Pap Smear: Not on file    Screening Not Indicated   Hepatitis C Screening Once for adults born between 1945 and  1965  More frequently in patients at high risk for Hepatitis C Hep C Antibody: 05/26/2022    Screening Current   Diabetes Screening 1-2 times per year if you're at risk for diabetes or have pre-diabetes Fasting glucose: 108 mg/dL (2/15/2024)  A1C: 5.4 % (4/10/2023)  Screening Current   Cholesterol Screening Once every 5 years if you don't have a lipid disorder. May order more often based on risk factors. Lipid panel: 11/16/2023    Screening Not Indicated  History Lipid Disorder     Other Preventive Screenings Covered by Medicare:  Abdominal Aortic Aneurysm (AAA) Screening: covered once if your at risk. You're considered to be at risk if you have a family history of AAA.  Lung Cancer Screening: covers low dose CT scan once per year if you meet all of the following conditions: (1) Age 55-77; (2) No signs or symptoms of lung cancer; (3) Current smoker or have quit smoking within the last 15 years; (4) You have a tobacco smoking history of at least 20 pack years (packs per day multiplied by number of years you smoked); (5) You get a written order from a healthcare provider.  Glaucoma Screening: covered annually if you're considered high risk: (1) You have diabetes OR (2) Family history of glaucoma OR (3)  aged 50 and older OR (4)  American aged 65 and older  Osteoporosis Screening: covered every 2 years if you meet one of the following conditions: (1) You're estrogen deficient and at risk for osteoporosis based off medical history and other findings; (2) Have a vertebral abnormality; (3) On glucocorticoid therapy for more than 3 months; (4) Have primary hyperparathyroidism; (5) On osteoporosis medications and need to assess response to drug therapy.   Last bone density test (DXA Scan): 05/16/2022.  HIV Screening: covered annually if you're between the age of 15-65. Also covered annually if you are younger than 15 and older than 65 with risk factors for HIV infection. For pregnant patients, it is  covered up to 3 times per pregnancy.    Immunizations:  Immunization Recommendations   Influenza Vaccine Annual influenza vaccination during flu season is recommended for all persons aged >= 6 months who do not have contraindications   Pneumococcal Vaccine   * Pneumococcal conjugate vaccine = PCV13 (Prevnar 13), PCV15 (Vaxneuvance), PCV20 (Prevnar 20)  * Pneumococcal polysaccharide vaccine = PPSV23 (Pneumovax) Adults 19-63 yo with certain risk factors or if 65+ yo  If never received any pneumonia vaccine: recommend Prevnar 20 (PCV20)  Give PCV20 if previously received 1 dose of PCV13 or PPSV23   Hepatitis B Vaccine 3 dose series if at intermediate or high risk (ex: diabetes, end stage renal disease, liver disease)   Respiratory syncytial virus (RSV) Vaccine - COVERED BY MEDICARE PART D  * RSVPreF3 (Arexvy) CDC recommends that adults 60 years of age and older may receive a single dose of RSV vaccine using shared clinical decision-making (SCDM)   Tetanus (Td) Vaccine - COST NOT COVERED BY MEDICARE PART B Following completion of primary series, a booster dose should be given every 10 years to maintain immunity against tetanus. Td may also be given as tetanus wound prophylaxis.   Tdap Vaccine - COST NOT COVERED BY MEDICARE PART B Recommended at least once for all adults. For pregnant patients, recommended with each pregnancy.   Shingles Vaccine (Shingrix) - COST NOT COVERED BY MEDICARE PART B  2 shot series recommended in those 19 years and older who have or will have weakened immune systems or those 50 years and older     Health Maintenance Due:      Topic Date Due    DXA SCAN  05/13/2024    Hepatitis C Screening  Completed     Immunizations Due:      Topic Date Due    COVID-19 Vaccine (5 - 2023-24 season) 03/28/2024     Advance Directives   What are advance directives?  Advance directives are legal documents that state your wishes and plans for medical care. These plans are made ahead of time in case you lose your  ability to make decisions for yourself. Advance directives can apply to any medical decision, such as the treatments you want, and if you want to donate organs.   What are the types of advance directives?  There are many types of advance directives, and each state has rules about how to use them. You may choose a combination of any of the following:  Living will:  This is a written record of the treatment you want. You can also choose which treatments you do not want, which to limit, and which to stop at a certain time. This includes surgery, medicine, IV fluid, and tube feedings.   Durable power of  for healthcare (DPAHC):  This is a written record that states who you want to make healthcare choices for you when you are unable to make them for yourself. This person, called a proxy, is usually a family member or a friend. You may choose more than 1 proxy.  Do not resuscitate (DNR) order:  A DNR order is used in case your heart stops beating or you stop breathing. It is a request not to have certain forms of treatment, such as CPR. A DNR order may be included in other types of advance directives.  Medical directive:  This covers the care that you want if you are in a coma, near death, or unable to make decisions for yourself. You can list the treatments you want for each condition. Treatment may include pain medicine, surgery, blood transfusions, dialysis, IV or tube feedings, and a ventilator (breathing machine).  Values history:  This document has questions about your views, beliefs, and how you feel and think about life. This information can help others choose the care that you would choose.  Why are advance directives important?  An advance directive helps you control your care. Although spoken wishes may be used, it is better to have your wishes written down. Spoken wishes can be misunderstood, or not followed. Treatments may be given even if you do not want them. An advance directive may make it easier  for your family to make difficult choices about your care.       © Copyright [employer] 2018 Information is for End User's use only and may not be sold, redistributed or otherwise used for commercial purposes. All illustrations and images included in CareNotes® are the copyrighted property of [employer].D.A[employer]M., Inc. or [employer]

## 2024-05-10 DIAGNOSIS — E78.5 HYPERLIPIDEMIA, UNSPECIFIED HYPERLIPIDEMIA TYPE: ICD-10-CM

## 2024-05-10 RX ORDER — EZETIMIBE 10 MG/1
10 TABLET ORAL DAILY
Qty: 30 TABLET | Refills: 5 | Status: SHIPPED | OUTPATIENT
Start: 2024-05-10

## 2024-05-10 NOTE — TELEPHONE ENCOUNTER
Reason for call:   [x] Refill   [] Prior Auth  [] Other:     Office:   [x] PCP/Provider - Elda/Eduardo MASON  [] Specialty/Provider -     Medication: ezetimibe (ZETIA) 10 mg tablet      Dose/Frequency: Take 1 tablet (10 mg total) by mouth daily     Quantity: 30    Pharmacy: [id number] - [place]  [address]      Does the patient have enough for 3 days?   [] Yes   [x] No - Send as HP to POD

## 2024-06-08 ENCOUNTER — NURSE TRIAGE (OUTPATIENT)
Dept: OTHER | Facility: OTHER | Age: 79
End: 2024-06-08

## 2024-06-08 DIAGNOSIS — E87.6 HYPOKALEMIA: Primary | ICD-10-CM

## 2024-06-08 RX ORDER — POTASSIUM CHLORIDE 1.5 G/1.58G
20 POWDER, FOR SOLUTION ORAL DAILY
Qty: 7 PACKET | Refills: 0 | Status: SHIPPED | OUTPATIENT
Start: 2024-06-08

## 2024-06-08 NOTE — TELEPHONE ENCOUNTER
"Reason for Disposition  • [1] Caller requesting a prescription renewal (no refills left), no triage required, AND [2] triager able to renew prescription per department policy    Answer Assessment - Initial Assessment Questions  1. DRUG NAME: \"What medicine do you need to have refilled?\"      Potassium chloride    2. REFILLS REMAINING: \"How many refills are remaining?\" (Note: The label on the medicine or pill bottle will show how many refills are remaining. If there are no refills remaining, then a renewal may be needed.)      N/a    3. EXPIRATION DATE: \"What is the expiration date?\" (Note: The label states when the prescription will , and thus can no longer be refilled.)      N/a    4. PRESCRIBING HCP: \"Who prescribed it?\" Reason: If prescribed by specialist, call should be referred to that group.      Natalie Schroeder    Protocols used: Medication Refill and Renewal Call-ADULT-    "

## 2024-06-08 NOTE — TELEPHONE ENCOUNTER
"Regarding: med refill  ----- Message from Mima BERNAL sent at 6/8/2024  2:30 PM EDT -----  \"I need a refill on my potassium chloride.\"    "

## 2024-06-09 DIAGNOSIS — E87.6 HYPOKALEMIA: ICD-10-CM

## 2024-06-09 RX ORDER — POTASSIUM CHLORIDE 1.5 G/1.58G
20 POWDER, FOR SOLUTION ORAL DAILY
Qty: 90 PACKET | Refills: 1 | Status: SHIPPED | OUTPATIENT
Start: 2024-06-09 | End: 2024-12-06

## 2024-06-10 DIAGNOSIS — E87.6 HYPOKALEMIA: ICD-10-CM

## 2024-06-10 RX ORDER — POTASSIUM CHLORIDE 1.5 G/1.58G
20 POWDER, FOR SOLUTION ORAL DAILY
Qty: 7 PACKET | Refills: 0 | OUTPATIENT
Start: 2024-06-10

## 2024-06-13 ENCOUNTER — TELEPHONE (OUTPATIENT)
Age: 79
End: 2024-06-13

## 2024-06-13 NOTE — TELEPHONE ENCOUNTER
Patient called requesting refill for [id number] 20 mEq packet. Patient made aware medication was refilled on 6/9/24 for 90 packets with 1 refills to [facility] pharmacy. Patient instructed to contact the pharmacy to obtain refills of medication. Patient verbalized understanding.

## 2024-07-18 DIAGNOSIS — I10 BENIGN ESSENTIAL HYPERTENSION: ICD-10-CM

## 2024-07-18 RX ORDER — DILTIAZEM HYDROCHLORIDE 240 MG/1
240 CAPSULE, EXTENDED RELEASE ORAL DAILY
Qty: 100 CAPSULE | Refills: 1 | Status: SHIPPED | OUTPATIENT
Start: 2024-07-18

## 2024-07-18 NOTE — TELEPHONE ENCOUNTER
Reason for call:   [x] Refill   [] Prior Auth  [] Other:     Office:   [x] PCP/Provider - Natalie Schroeder DO   [] Specialty/Provider -     Medication: diltiazem (TIAZAC) 240 MG 24 hr capsule     Dose/Frequency: Take 1 capsule (240 mg total) by mouth daily     Quantity: 90 capsule     Pharmacy: [id number] - [place]  [address] [place] [address]     Does the patient have enough for 3 days?   [] Yes   [x] No - Send as HP to POD

## 2024-08-13 ENCOUNTER — APPOINTMENT (OUTPATIENT)
Dept: LAB | Facility: CLINIC | Age: 79
End: 2024-08-13
Payer: MEDICARE

## 2024-08-13 DIAGNOSIS — L40.50 PSORIATIC ARTHROPATHY (HCC): ICD-10-CM

## 2024-08-13 DIAGNOSIS — I10 BENIGN ESSENTIAL HYPERTENSION: ICD-10-CM

## 2024-08-13 DIAGNOSIS — Z86.73 HISTORY OF TIA (TRANSIENT ISCHEMIC ATTACK): ICD-10-CM

## 2024-08-13 DIAGNOSIS — I73.9 PERIPHERAL VASCULAR DISEASE, UNSPECIFIED (HCC): ICD-10-CM

## 2024-08-13 LAB
ALBUMIN SERPL BCG-MCNC: 3.7 G/DL (ref 3.5–5)
ALP SERPL-CCNC: 59 U/L (ref 34–104)
ALT SERPL W P-5'-P-CCNC: 10 U/L (ref 7–52)
ANION GAP SERPL CALCULATED.3IONS-SCNC: 8 MMOL/L (ref 4–13)
AST SERPL W P-5'-P-CCNC: 18 U/L (ref 13–39)
BASOPHILS # BLD AUTO: 0.03 THOUSANDS/ÂΜL (ref 0–0.1)
BASOPHILS NFR BLD AUTO: 0 % (ref 0–1)
BILIRUB SERPL-MCNC: 0.7 MG/DL (ref 0.2–1)
BUN SERPL-MCNC: 12 MG/DL (ref 5–25)
CALCIUM SERPL-MCNC: 8.8 MG/DL (ref 8.4–10.2)
CHLORIDE SERPL-SCNC: 102 MMOL/L (ref 96–108)
CHOLEST SERPL-MCNC: 197 MG/DL
CO2 SERPL-SCNC: 30 MMOL/L (ref 21–32)
CREAT SERPL-MCNC: 0.8 MG/DL (ref 0.6–1.3)
CRP SERPL QL: 5.2 MG/L
EOSINOPHIL # BLD AUTO: 0.14 THOUSAND/ÂΜL (ref 0–0.61)
EOSINOPHIL NFR BLD AUTO: 2 % (ref 0–6)
ERYTHROCYTE [DISTWIDTH] IN BLOOD BY AUTOMATED COUNT: 13.8 % (ref 11.6–15.1)
ERYTHROCYTE [SEDIMENTATION RATE] IN BLOOD: 35 MM/HOUR (ref 0–29)
GFR SERPL CREATININE-BSD FRML MDRD: 69 ML/MIN/1.73SQ M
GLUCOSE P FAST SERPL-MCNC: 103 MG/DL (ref 65–99)
HCT VFR BLD AUTO: 48.8 % (ref 34.8–46.1)
HDLC SERPL-MCNC: 68 MG/DL
HGB BLD-MCNC: 15.8 G/DL (ref 11.5–15.4)
IMM GRANULOCYTES # BLD AUTO: 0.02 THOUSAND/UL (ref 0–0.2)
IMM GRANULOCYTES NFR BLD AUTO: 0 % (ref 0–2)
LDLC SERPL CALC-MCNC: 103 MG/DL (ref 0–100)
LYMPHOCYTES # BLD AUTO: 2.11 THOUSANDS/ÂΜL (ref 0.6–4.47)
LYMPHOCYTES NFR BLD AUTO: 29 % (ref 14–44)
MCH RBC QN AUTO: 32.6 PG (ref 26.8–34.3)
MCHC RBC AUTO-ENTMCNC: 32.4 G/DL (ref 31.4–37.4)
MCV RBC AUTO: 101 FL (ref 82–98)
MONOCYTES # BLD AUTO: 0.61 THOUSAND/ÂΜL (ref 0.17–1.22)
MONOCYTES NFR BLD AUTO: 8 % (ref 4–12)
NEUTROPHILS # BLD AUTO: 4.46 THOUSANDS/ÂΜL (ref 1.85–7.62)
NEUTS SEG NFR BLD AUTO: 61 % (ref 43–75)
NRBC BLD AUTO-RTO: 0 /100 WBCS
PLATELET # BLD AUTO: 217 THOUSANDS/UL (ref 149–390)
PMV BLD AUTO: 11.4 FL (ref 8.9–12.7)
POTASSIUM SERPL-SCNC: 3.9 MMOL/L (ref 3.5–5.3)
PROT SERPL-MCNC: 7.2 G/DL (ref 6.4–8.4)
RBC # BLD AUTO: 4.85 MILLION/UL (ref 3.81–5.12)
SODIUM SERPL-SCNC: 140 MMOL/L (ref 135–147)
TRIGL SERPL-MCNC: 130 MG/DL
TSH SERPL DL<=0.05 MIU/L-ACNC: 3.21 UIU/ML (ref 0.45–4.5)
WBC # BLD AUTO: 7.37 THOUSAND/UL (ref 4.31–10.16)

## 2024-08-13 PROCEDURE — 85652 RBC SED RATE AUTOMATED: CPT

## 2024-08-13 PROCEDURE — 85025 COMPLETE CBC W/AUTO DIFF WBC: CPT

## 2024-08-13 PROCEDURE — 36415 COLL VENOUS BLD VENIPUNCTURE: CPT

## 2024-08-13 PROCEDURE — 84443 ASSAY THYROID STIM HORMONE: CPT

## 2024-08-13 PROCEDURE — 80061 LIPID PANEL: CPT

## 2024-08-13 PROCEDURE — 80053 COMPREHEN METABOLIC PANEL: CPT

## 2024-08-13 PROCEDURE — 86140 C-REACTIVE PROTEIN: CPT

## 2024-08-21 ENCOUNTER — OFFICE VISIT (OUTPATIENT)
Dept: FAMILY MEDICINE CLINIC | Facility: CLINIC | Age: 79
End: 2024-08-21
Payer: MEDICARE

## 2024-08-21 VITALS
HEART RATE: 58 BPM | BODY MASS INDEX: 22.61 KG/M2 | HEIGHT: 64 IN | OXYGEN SATURATION: 96 % | DIASTOLIC BLOOD PRESSURE: 66 MMHG | TEMPERATURE: 97.7 F | WEIGHT: 132.4 LBS | SYSTOLIC BLOOD PRESSURE: 128 MMHG

## 2024-08-21 DIAGNOSIS — E78.5 HYPERLIPIDEMIA, UNSPECIFIED HYPERLIPIDEMIA TYPE: ICD-10-CM

## 2024-08-21 DIAGNOSIS — E87.6 HYPOKALEMIA: ICD-10-CM

## 2024-08-21 DIAGNOSIS — I10 ESSENTIAL HYPERTENSION: Primary | ICD-10-CM

## 2024-08-21 PROCEDURE — 99214 OFFICE O/P EST MOD 30 MIN: CPT | Performed by: FAMILY MEDICINE

## 2024-08-21 PROCEDURE — G2211 COMPLEX E/M VISIT ADD ON: HCPCS | Performed by: FAMILY MEDICINE

## 2024-08-21 NOTE — PROGRESS NOTES
"Ambulatory Visit  Name: Jany Boyle      : 1945      MRN: [id number]  Encounter Provider: Natalie Schroeder DO  Encounter Date: 2024   Encounter department: [facility]    Assessment & Plan   1. Essential hypertension  -     Comprehensive metabolic panel; Future; Expected date: 2024  -     CBC and differential; Future; Expected date: 2024  2. Hyperlipidemia, unspecified hyperlipidemia type  3. Hypokalemia  Patient's blood pressure is controlled on diltiazem.  Patient takes ezetimibe for hyperlipidemia.  She is intolerant of statins.  She had developed rhabdomyolysis on a statin.  We will check her lipids prior to next visit.  She is taking potassium supplement and her potassium is in range.    Patient does see Dr. Rowland at [facility] for rheumatology.  She is on methotrexate and I believe this is the medicine she is questioning about taking 8 pills.  Patient  will call us about the medication she has a question about. She is not sure, but I think it may be Methotrexate - Dr. Bearden's note from Feb says to take 20 mg weekly and that would be eight 2.5 mg once a week.    I will see patient back in 4 months.     History of Present Illness     Patient is here for follow up of chronic medical conditions.  She denies falls.  She feels that she is taking medications ok except one that is marked \"8\"  Her neighbor brought her to the office today.        Review of Systems   Constitutional:  Negative for chills and fever.   HENT:  Negative for congestion and sore throat.    Respiratory:  Negative for chest tightness.    Cardiovascular:  Negative for chest pain and palpitations.   Gastrointestinal:  Negative for abdominal pain, constipation, diarrhea and nausea.   Genitourinary:  Negative for difficulty urinating.   Skin: Negative.    Neurological:  Negative for dizziness and headaches.   Psychiatric/Behavioral: Negative.         Objective     /66 (BP Location: Left arm, " "Patient Position: Sitting, Cuff Size: Adult)   Pulse 58   Temp 97.7 °F (36.5 °C)   Ht 5' 4\" (1.626 m)   Wt 60.1 kg (132 lb 6.4 oz)   SpO2 96%   BMI 22.73 kg/m²     Physical Exam  Vitals and nursing note reviewed.   Constitutional:       General: She is not in acute distress.  HENT:      Head: Normocephalic.   Eyes:      General: No scleral icterus.  Neck:      Thyroid: No thyromegaly.   Cardiovascular:      Rate and Rhythm: Normal rate and regular rhythm.      Heart sounds: Normal heart sounds.   Pulmonary:      Effort: Pulmonary effort is normal.      Breath sounds: Normal breath sounds.   Musculoskeletal:      Right lower leg: No edema.      Left lower leg: No edema.   Lymphadenopathy:      Cervical: No cervical adenopathy.   Skin:     General: Skin is warm and dry.      Comments: Skin is very dry.   Neurological:      Mental Status: She is alert and oriented to person, place, and time.      Comments: Patient did ask me the same question at least 3 times during the visit.   Psychiatric:         Mood and Affect: Mood normal.     I do have some concern about patient living by herself.  I asked her if she would consider moving into senior living but she would like is living by herself with her cat.  She has neighbors who look in on her and drive her to appointments.  Administrative Statements     "

## 2024-08-21 NOTE — PATIENT INSTRUCTIONS
"Bring pills with you to your visit with me and Dr. Bearden.  Use lotion every day - your skin is very dry.  Give us a call about the medication that says \"8\"  "

## 2024-08-23 DIAGNOSIS — Z86.73 HISTORY OF TIA (TRANSIENT ISCHEMIC ATTACK): ICD-10-CM

## 2024-08-23 RX ORDER — CLOPIDOGREL BISULFATE 75 MG/1
75 TABLET ORAL
Qty: 100 TABLET | Refills: 1 | Status: SHIPPED | OUTPATIENT
Start: 2024-08-23

## 2024-08-23 NOTE — TELEPHONE ENCOUNTER
Reason for call:   [x] Refill   [] Prior Auth  [] Other:     Office:   [x] PCP/Provider -Dr. Schroeder, DO  [] Specialty/Provider -     Medication: clopidogrel (PLAVIX) 75 mg     Dose/Frequency: Take 1 tablet (75 mg total) by mouth daily at bedtime     Quantity: 100    Pharmacy: [id number] - [place]  [address] [place] [address]     Does the patient have enough for 3 days?   [] Yes   [x] No - Send as HP to POD

## 2024-08-24 ENCOUNTER — NURSE TRIAGE (OUTPATIENT)
Dept: OTHER | Facility: OTHER | Age: 79
End: 2024-08-24

## 2024-08-24 ENCOUNTER — TELEPHONE (OUTPATIENT)
Dept: OTHER | Facility: OTHER | Age: 79
End: 2024-08-24

## 2024-08-24 NOTE — TELEPHONE ENCOUNTER
"Reason for Disposition  • Patient has refills remaining on their prescription    Answer Assessment - Initial Assessment Questions  1. REASON FOR CALL or QUESTION: \"What is your reason for calling today?\" or \"How can I best help you?\" or \"What question do you have that I can help answer?\"        Refill requested on Plavix. Receipt confirmed yesterday at 8:18am    Protocols used: Information Only Call - No Triage-ADULT-, Medication Refill and Renewal Call-ADULT-    "

## 2024-08-24 NOTE — TELEPHONE ENCOUNTER
"Regarding: Plavix  ----- Message from Gema KOROMA sent at 8/24/2024  8:44 AM EDT -----  \"I need a refill on my clopidogrel (PLAVIX) 75 mg tablet . I don't have anymore to take for tonight\"    "

## 2024-08-24 NOTE — TELEPHONE ENCOUNTER
"Pt stated, \" I need a refill on my medication.\"      clopidogrel (PLAVIX) 75 mg tablet [[id number]]    Order Details    Dose: 75 mg Route: Oral Frequency: Daily at bedtime  Dispense Quantity: 100 tablet Refills: 1   Please follow up, thank you !   "

## 2024-08-26 DIAGNOSIS — Z86.73 HISTORY OF TIA (TRANSIENT ISCHEMIC ATTACK): ICD-10-CM

## 2024-08-26 RX ORDER — CLOPIDOGREL BISULFATE 75 MG/1
75 TABLET ORAL
Qty: 100 TABLET | Refills: 1 | OUTPATIENT
Start: 2024-08-26

## 2024-09-14 ENCOUNTER — HOSPITAL ENCOUNTER (OUTPATIENT)
Dept: MAMMOGRAPHY | Facility: MEDICAL CENTER | Age: 79
Discharge: HOME | End: 2024-09-14
Payer: MEDICARE

## 2024-09-14 VITALS — BODY MASS INDEX: 22.62 KG/M2 | WEIGHT: 132.5 LBS | HEIGHT: 64 IN

## 2024-09-14 DIAGNOSIS — Z12.31 ENCOUNTER FOR SCREENING MAMMOGRAM FOR MALIGNANT NEOPLASM OF BREAST: ICD-10-CM

## 2024-09-14 PROCEDURE — 77067 SCR MAMMO BI INCL CAD: CPT

## 2024-09-14 PROCEDURE — 77063 BREAST TOMOSYNTHESIS BI: CPT

## 2024-12-05 DIAGNOSIS — E87.6 HYPOKALEMIA: ICD-10-CM

## 2024-12-06 RX ORDER — POTASSIUM CHLORIDE 1.5 G/1.58G
20 POWDER, FOR SOLUTION ORAL DAILY
Qty: 90 PACKET | Refills: 1 | Status: SHIPPED | OUTPATIENT
Start: 2024-12-06 | End: 2025-06-04

## 2025-01-10 NOTE — NURSING NOTE
Patient discharged to [facility]  Patients IV was removed  Report was called to [facility] @ receiving facility  Patient was transported via wheelchair van 
Resuming care of patient  Agree with previous RN's assessment  Patient is sleeping in bed at this time  No signs of visible distress is evident  Vital signs stable      Ion Love RN
Post-Care Instructions: I reviewed with the patient in detail post-care instructions. Patient is to wear sunprotection, and avoid picking at any of the treated lesions. Pt may apply Vaseline to crusted or scabbing areas.
Render Note In Bullet Format When Appropriate: Yes
Detail Level: Simple
Duration Of Freeze Thaw-Cycle (Seconds): 0
Consent: The patient's consent was obtained including but not limited to risks of crusting, scabbing, blistering, scarring, darker or lighter pigmentary change, recurrence, incomplete removal and infection.
Number Of Freeze-Thaw Cycles: 1 freeze-thaw cycle

## 2025-01-27 ENCOUNTER — TELEPHONE (OUTPATIENT)
Age: 80
End: 2025-01-27

## 2025-01-27 NOTE — TELEPHONE ENCOUNTER
Patient neighbor Georgette called in and canceled the patient appt for 1/29/25. Patient passes away yesterday 1/26/25. Please let Dr. Schroeder know.    Thank you